# Patient Record
Sex: MALE | Race: WHITE | NOT HISPANIC OR LATINO | URBAN - METROPOLITAN AREA
[De-identification: names, ages, dates, MRNs, and addresses within clinical notes are randomized per-mention and may not be internally consistent; named-entity substitution may affect disease eponyms.]

---

## 2017-01-16 ENCOUNTER — OFFICE VISIT - HEALTHEAST (OUTPATIENT)
Dept: INTERNAL MEDICINE | Facility: CLINIC | Age: 72
End: 2017-01-16

## 2017-01-16 ENCOUNTER — COMMUNICATION - HEALTHEAST (OUTPATIENT)
Dept: INTERNAL MEDICINE | Facility: CLINIC | Age: 72
End: 2017-01-16

## 2017-01-16 DIAGNOSIS — F32.9 MAJOR DEPRESSIVE DISORDER, SINGLE EPISODE, UNSPECIFIED: ICD-10-CM

## 2017-01-24 ENCOUNTER — COMMUNICATION - HEALTHEAST (OUTPATIENT)
Dept: INTERNAL MEDICINE | Facility: CLINIC | Age: 72
End: 2017-01-24

## 2017-01-24 DIAGNOSIS — R52 PAIN: ICD-10-CM

## 2017-02-13 ENCOUNTER — COMMUNICATION - HEALTHEAST (OUTPATIENT)
Dept: INTERNAL MEDICINE | Facility: CLINIC | Age: 72
End: 2017-02-13

## 2017-03-09 ENCOUNTER — COMMUNICATION - HEALTHEAST (OUTPATIENT)
Dept: INTERNAL MEDICINE | Facility: CLINIC | Age: 72
End: 2017-03-09

## 2017-03-09 DIAGNOSIS — R52 PAIN: ICD-10-CM

## 2017-03-27 ENCOUNTER — OFFICE VISIT - HEALTHEAST (OUTPATIENT)
Dept: INTERNAL MEDICINE | Facility: CLINIC | Age: 72
End: 2017-03-27

## 2017-03-27 DIAGNOSIS — Z12.11 ENCOUNTER FOR SCREENING COLONOSCOPY: ICD-10-CM

## 2017-03-27 DIAGNOSIS — J01.00 ACUTE MAXILLARY SINUSITIS, RECURRENCE NOT SPECIFIED: ICD-10-CM

## 2017-03-27 DIAGNOSIS — L30.9 DERMATITIS: ICD-10-CM

## 2017-03-27 DIAGNOSIS — J30.9 ALLERGIC RHINITIS, CAUSE UNSPECIFIED: ICD-10-CM

## 2017-03-27 ASSESSMENT — MIFFLIN-ST. JEOR: SCORE: 2014.46

## 2017-04-03 ENCOUNTER — AMBULATORY - HEALTHEAST (OUTPATIENT)
Dept: CARDIOLOGY | Facility: CLINIC | Age: 72
End: 2017-04-03

## 2017-04-03 DIAGNOSIS — Z95.810 ICD (IMPLANTABLE CARDIOVERTER-DEFIBRILLATOR) IN PLACE: ICD-10-CM

## 2017-05-12 ENCOUNTER — COMMUNICATION - HEALTHEAST (OUTPATIENT)
Dept: INTERNAL MEDICINE | Facility: CLINIC | Age: 72
End: 2017-05-12

## 2017-05-12 DIAGNOSIS — R52 PAIN: ICD-10-CM

## 2017-05-16 ENCOUNTER — COMMUNICATION - HEALTHEAST (OUTPATIENT)
Dept: INTERNAL MEDICINE | Facility: CLINIC | Age: 72
End: 2017-05-16

## 2017-05-19 ENCOUNTER — COMMUNICATION - HEALTHEAST (OUTPATIENT)
Dept: SCHEDULING | Facility: CLINIC | Age: 72
End: 2017-05-19

## 2017-05-31 ENCOUNTER — AMBULATORY - HEALTHEAST (OUTPATIENT)
Dept: CARDIOLOGY | Facility: CLINIC | Age: 72
End: 2017-05-31

## 2017-05-31 DIAGNOSIS — I42.8 CARDIOMYOPATHY, NONISCHEMIC (H): ICD-10-CM

## 2017-05-31 DIAGNOSIS — I47.20 SUSTAINED VT (VENTRICULAR TACHYCARDIA) (H): ICD-10-CM

## 2017-05-31 DIAGNOSIS — Z95.810 ICD (IMPLANTABLE CARDIOVERTER-DEFIBRILLATOR), BIVENTRICULAR, IN SITU: ICD-10-CM

## 2017-05-31 LAB — HCC DEVICE COMMENTS: NORMAL

## 2017-05-31 ASSESSMENT — MIFFLIN-ST. JEOR: SCORE: 2000.85

## 2017-06-22 ENCOUNTER — RECORDS - HEALTHEAST (OUTPATIENT)
Dept: ADMINISTRATIVE | Facility: OTHER | Age: 72
End: 2017-06-22

## 2017-06-22 ENCOUNTER — COMMUNICATION - HEALTHEAST (OUTPATIENT)
Dept: INTERNAL MEDICINE | Facility: CLINIC | Age: 72
End: 2017-06-22

## 2017-07-06 ENCOUNTER — COMMUNICATION - HEALTHEAST (OUTPATIENT)
Dept: INTERNAL MEDICINE | Facility: CLINIC | Age: 72
End: 2017-07-06

## 2017-07-06 DIAGNOSIS — R52 PAIN: ICD-10-CM

## 2017-07-23 ENCOUNTER — RECORDS - HEALTHEAST (OUTPATIENT)
Dept: ADMINISTRATIVE | Facility: OTHER | Age: 72
End: 2017-07-23

## 2017-07-27 ENCOUNTER — COMMUNICATION - HEALTHEAST (OUTPATIENT)
Dept: INTERNAL MEDICINE | Facility: CLINIC | Age: 72
End: 2017-07-27

## 2017-08-30 ENCOUNTER — AMBULATORY - HEALTHEAST (OUTPATIENT)
Dept: CARDIOLOGY | Facility: CLINIC | Age: 72
End: 2017-08-30

## 2017-08-30 DIAGNOSIS — Z95.810 ICD (IMPLANTABLE CARDIOVERTER-DEFIBRILLATOR), BIVENTRICULAR, IN SITU: ICD-10-CM

## 2017-08-31 ENCOUNTER — COMMUNICATION - HEALTHEAST (OUTPATIENT)
Dept: CARDIOLOGY | Facility: CLINIC | Age: 72
End: 2017-08-31

## 2017-09-01 LAB — HCC DEVICE COMMENTS: NORMAL

## 2017-09-07 ENCOUNTER — COMMUNICATION - HEALTHEAST (OUTPATIENT)
Dept: INTERNAL MEDICINE | Facility: CLINIC | Age: 72
End: 2017-09-07

## 2017-09-22 ENCOUNTER — COMMUNICATION - HEALTHEAST (OUTPATIENT)
Dept: INTERNAL MEDICINE | Facility: CLINIC | Age: 72
End: 2017-09-22

## 2017-09-25 ENCOUNTER — OFFICE VISIT - HEALTHEAST (OUTPATIENT)
Dept: INTERNAL MEDICINE | Facility: CLINIC | Age: 72
End: 2017-09-25

## 2017-09-25 DIAGNOSIS — F32.9 MAJOR DEPRESSIVE DISORDER, SINGLE EPISODE, UNSPECIFIED: ICD-10-CM

## 2017-09-25 DIAGNOSIS — I42.6 ALCOHOLIC CARDIOMYOPATHY (H): ICD-10-CM

## 2017-09-25 DIAGNOSIS — R06.02 SOB (SHORTNESS OF BREATH): ICD-10-CM

## 2017-09-25 DIAGNOSIS — L30.9 DERMATITIS: ICD-10-CM

## 2017-09-25 DIAGNOSIS — J30.9 ALLERGIC RHINITIS, CAUSE UNSPECIFIED: ICD-10-CM

## 2017-09-25 DIAGNOSIS — G62.9 NEUROPATHY: ICD-10-CM

## 2017-09-25 DIAGNOSIS — R53.83 FATIGUE, UNSPECIFIED TYPE: ICD-10-CM

## 2017-09-25 DIAGNOSIS — I10 HYPERTENSION: ICD-10-CM

## 2017-09-25 DIAGNOSIS — I42.8 OTHER PRIMARY CARDIOMYOPATHIES: ICD-10-CM

## 2017-09-25 DIAGNOSIS — R52 PAIN: ICD-10-CM

## 2017-09-25 DIAGNOSIS — J01.00 ACUTE MAXILLARY SINUSITIS, RECURRENCE NOT SPECIFIED: ICD-10-CM

## 2017-09-25 DIAGNOSIS — Z23 INFLUENZA VACCINATION GIVEN: ICD-10-CM

## 2017-09-25 DIAGNOSIS — Z12.11 SCREEN FOR COLON CANCER: ICD-10-CM

## 2017-09-25 LAB
ATRIAL RATE - MUSE: 81 BPM
DIASTOLIC BLOOD PRESSURE - MUSE: NORMAL MMHG
INTERPRETATION ECG - MUSE: NORMAL
P AXIS - MUSE: 62 DEGREES
PR INTERVAL - MUSE: 112 MS
QRS DURATION - MUSE: 182 MS
QT - MUSE: 432 MS
QTC - MUSE: 501 MS
R AXIS - MUSE: 100 DEGREES
SYSTOLIC BLOOD PRESSURE - MUSE: NORMAL MMHG
T AXIS - MUSE: -76 DEGREES
VENTRICULAR RATE- MUSE: 81 BPM

## 2017-09-25 ASSESSMENT — MIFFLIN-ST. JEOR: SCORE: 2016.45

## 2017-09-26 ENCOUNTER — COMMUNICATION - HEALTHEAST (OUTPATIENT)
Dept: INTERNAL MEDICINE | Facility: CLINIC | Age: 72
End: 2017-09-26

## 2017-09-27 ENCOUNTER — COMMUNICATION - HEALTHEAST (OUTPATIENT)
Dept: SCHEDULING | Facility: CLINIC | Age: 72
End: 2017-09-27

## 2017-10-17 ENCOUNTER — AMBULATORY - HEALTHEAST (OUTPATIENT)
Dept: CARDIOLOGY | Facility: CLINIC | Age: 72
End: 2017-10-17

## 2017-10-17 DIAGNOSIS — I42.8 CARDIOMYOPATHY, NONISCHEMIC (H): ICD-10-CM

## 2017-10-17 DIAGNOSIS — I50.22 CHRONIC SYSTOLIC CHF (CONGESTIVE HEART FAILURE), NYHA CLASS 2 (H): ICD-10-CM

## 2017-10-17 DIAGNOSIS — I42.6 ALCOHOLIC CARDIOMYOPATHY (H): ICD-10-CM

## 2017-10-17 DIAGNOSIS — I47.20 SUSTAINED VT (VENTRICULAR TACHYCARDIA) (H): ICD-10-CM

## 2017-10-17 DIAGNOSIS — I47.10 PSVT (PAROXYSMAL SUPRAVENTRICULAR TACHYCARDIA) (H): ICD-10-CM

## 2017-10-17 DIAGNOSIS — I50.22 CHRONIC SYSTOLIC HEART FAILURE (H): ICD-10-CM

## 2017-10-17 DIAGNOSIS — I49.3 FREQUENT PVCS: ICD-10-CM

## 2017-10-17 DIAGNOSIS — Z95.810 ICD (IMPLANTABLE CARDIOVERTER-DEFIBRILLATOR), BIVENTRICULAR, IN SITU: ICD-10-CM

## 2017-10-17 LAB
ATRIAL RATE - MUSE: 41 BPM
DIASTOLIC BLOOD PRESSURE - MUSE: NORMAL MMHG
HCC DEVICE COMMENTS: NORMAL
INTERPRETATION ECG - MUSE: NORMAL
P AXIS - MUSE: 59 DEGREES
PR INTERVAL - MUSE: 136 MS
QRS DURATION - MUSE: 186 MS
QT - MUSE: 444 MS
QTC - MUSE: 502 MS
R AXIS - MUSE: 101 DEGREES
SYSTOLIC BLOOD PRESSURE - MUSE: NORMAL MMHG
T AXIS - MUSE: -82 DEGREES
VENTRICULAR RATE- MUSE: 77 BPM

## 2017-10-18 ENCOUNTER — OFFICE VISIT - HEALTHEAST (OUTPATIENT)
Dept: INTERNAL MEDICINE | Facility: CLINIC | Age: 72
End: 2017-10-18

## 2017-10-18 DIAGNOSIS — I47.10 PSVT (PAROXYSMAL SUPRAVENTRICULAR TACHYCARDIA) (H): ICD-10-CM

## 2017-10-18 DIAGNOSIS — I50.22 CHRONIC SYSTOLIC HEART FAILURE (H): ICD-10-CM

## 2017-10-18 ASSESSMENT — MIFFLIN-ST. JEOR: SCORE: 1989.51

## 2017-10-25 ENCOUNTER — COMMUNICATION - HEALTHEAST (OUTPATIENT)
Dept: INTERNAL MEDICINE | Facility: CLINIC | Age: 72
End: 2017-10-25

## 2017-10-25 DIAGNOSIS — G62.9 NEUROPATHY: ICD-10-CM

## 2017-11-04 ENCOUNTER — COMMUNICATION - HEALTHEAST (OUTPATIENT)
Dept: INTERNAL MEDICINE | Facility: CLINIC | Age: 72
End: 2017-11-04

## 2017-11-04 DIAGNOSIS — Z00.00 HEALTHCARE MAINTENANCE: ICD-10-CM

## 2017-11-06 ENCOUNTER — COMMUNICATION - HEALTHEAST (OUTPATIENT)
Dept: SCHEDULING | Facility: CLINIC | Age: 72
End: 2017-11-06

## 2017-11-06 DIAGNOSIS — R52 PAIN: ICD-10-CM

## 2017-12-06 ENCOUNTER — COMMUNICATION - HEALTHEAST (OUTPATIENT)
Dept: INTERNAL MEDICINE | Facility: CLINIC | Age: 72
End: 2017-12-06

## 2017-12-21 ENCOUNTER — COMMUNICATION - HEALTHEAST (OUTPATIENT)
Dept: INTERNAL MEDICINE | Facility: CLINIC | Age: 72
End: 2017-12-21

## 2018-01-16 ENCOUNTER — COMMUNICATION - HEALTHEAST (OUTPATIENT)
Dept: INTERNAL MEDICINE | Facility: CLINIC | Age: 73
End: 2018-01-16

## 2018-01-16 DIAGNOSIS — I10 HYPERTENSION: ICD-10-CM

## 2018-01-19 ENCOUNTER — COMMUNICATION - HEALTHEAST (OUTPATIENT)
Dept: INTERNAL MEDICINE | Facility: CLINIC | Age: 73
End: 2018-01-19

## 2018-01-19 DIAGNOSIS — I10 HYPERTENSION: ICD-10-CM

## 2018-01-22 ENCOUNTER — AMBULATORY - HEALTHEAST (OUTPATIENT)
Dept: CARDIOLOGY | Facility: CLINIC | Age: 73
End: 2018-01-22

## 2018-01-22 DIAGNOSIS — Z95.810 ICD (IMPLANTABLE CARDIOVERTER-DEFIBRILLATOR), BIVENTRICULAR, IN SITU: ICD-10-CM

## 2018-01-22 LAB — HCC DEVICE COMMENTS: NORMAL

## 2018-01-25 ENCOUNTER — COMMUNICATION - HEALTHEAST (OUTPATIENT)
Dept: INTERNAL MEDICINE | Facility: CLINIC | Age: 73
End: 2018-01-25

## 2018-01-29 ENCOUNTER — COMMUNICATION - HEALTHEAST (OUTPATIENT)
Dept: CARDIOLOGY | Facility: CLINIC | Age: 73
End: 2018-01-29

## 2018-01-29 ENCOUNTER — AMBULATORY - HEALTHEAST (OUTPATIENT)
Dept: CARDIOLOGY | Facility: CLINIC | Age: 73
End: 2018-01-29

## 2018-01-29 DIAGNOSIS — Z95.810 ICD (IMPLANTABLE CARDIOVERTER-DEFIBRILLATOR), BIVENTRICULAR, IN SITU: ICD-10-CM

## 2018-01-29 LAB — HCC DEVICE COMMENTS: NORMAL

## 2018-02-08 ENCOUNTER — AMBULATORY - HEALTHEAST (OUTPATIENT)
Dept: CARDIOLOGY | Facility: CLINIC | Age: 73
End: 2018-02-08

## 2018-02-08 DIAGNOSIS — I42.8 CARDIOMYOPATHY, NONISCHEMIC (H): ICD-10-CM

## 2018-02-08 DIAGNOSIS — I50.22 CHRONIC SYSTOLIC HEART FAILURE (H): ICD-10-CM

## 2018-02-08 DIAGNOSIS — I10 ESSENTIAL HYPERTENSION WITH GOAL BLOOD PRESSURE LESS THAN 140/90: ICD-10-CM

## 2018-02-08 DIAGNOSIS — I47.10 PSVT (PAROXYSMAL SUPRAVENTRICULAR TACHYCARDIA) (H): ICD-10-CM

## 2018-02-08 DIAGNOSIS — Z95.810 ICD (IMPLANTABLE CARDIOVERTER-DEFIBRILLATOR), BIVENTRICULAR, IN SITU: ICD-10-CM

## 2018-02-08 DIAGNOSIS — I49.3 FREQUENT PVCS: ICD-10-CM

## 2018-02-08 DIAGNOSIS — I47.20 SUSTAINED VT (VENTRICULAR TACHYCARDIA) (H): ICD-10-CM

## 2018-02-08 LAB — HCC DEVICE COMMENTS: NORMAL

## 2018-02-08 ASSESSMENT — MIFFLIN-ST. JEOR: SCORE: 1964.57

## 2018-03-05 ENCOUNTER — AMBULATORY - HEALTHEAST (OUTPATIENT)
Dept: CARDIOLOGY | Facility: CLINIC | Age: 73
End: 2018-03-05

## 2018-03-05 ENCOUNTER — COMMUNICATION - HEALTHEAST (OUTPATIENT)
Dept: CARDIOLOGY | Facility: CLINIC | Age: 73
End: 2018-03-05

## 2018-03-05 DIAGNOSIS — Z95.810 ICD (IMPLANTABLE CARDIOVERTER-DEFIBRILLATOR), BIVENTRICULAR, IN SITU: ICD-10-CM

## 2018-03-06 LAB — HCC DEVICE COMMENTS: NORMAL

## 2018-04-04 ENCOUNTER — AMBULATORY - HEALTHEAST (OUTPATIENT)
Dept: CARDIOLOGY | Facility: CLINIC | Age: 73
End: 2018-04-04

## 2018-04-04 DIAGNOSIS — Z95.810 ICD (IMPLANTABLE CARDIOVERTER-DEFIBRILLATOR), BIVENTRICULAR, IN SITU: ICD-10-CM

## 2018-04-04 DIAGNOSIS — I47.10 PSVT (PAROXYSMAL SUPRAVENTRICULAR TACHYCARDIA) (H): ICD-10-CM

## 2018-04-04 DIAGNOSIS — I47.20 SUSTAINED VT (VENTRICULAR TACHYCARDIA) (H): ICD-10-CM

## 2018-04-04 DIAGNOSIS — I42.8 CARDIOMYOPATHY, NONISCHEMIC (H): ICD-10-CM

## 2018-04-04 LAB
ANION GAP SERPL CALCULATED.3IONS-SCNC: 13 MMOL/L (ref 5–18)
BUN SERPL-MCNC: 55 MG/DL (ref 8–28)
CALCIUM SERPL-MCNC: 10.4 MG/DL (ref 8.5–10.5)
CHLORIDE BLD-SCNC: 103 MMOL/L (ref 98–107)
CO2 SERPL-SCNC: 21 MMOL/L (ref 22–31)
CREAT SERPL-MCNC: 2.02 MG/DL (ref 0.7–1.3)
GFR SERPL CREATININE-BSD FRML MDRD: 32 ML/MIN/1.73M2
GLUCOSE BLD-MCNC: 76 MG/DL (ref 70–125)
HCC DEVICE COMMENTS: NORMAL
MAGNESIUM SERPL-MCNC: 2 MG/DL (ref 1.8–2.6)
POTASSIUM BLD-SCNC: 6 MMOL/L (ref 3.5–5)
SODIUM SERPL-SCNC: 137 MMOL/L (ref 136–145)

## 2018-04-04 ASSESSMENT — MIFFLIN-ST. JEOR: SCORE: 1969.1

## 2018-04-06 ENCOUNTER — AMBULATORY - HEALTHEAST (OUTPATIENT)
Dept: CARDIOLOGY | Facility: CLINIC | Age: 73
End: 2018-04-06

## 2018-04-06 DIAGNOSIS — I42.6 ALCOHOLIC CARDIOMYOPATHY (H): ICD-10-CM

## 2018-04-06 DIAGNOSIS — I42.9 CARDIOMYOPATHY (H): ICD-10-CM

## 2018-04-13 ENCOUNTER — OFFICE VISIT - HEALTHEAST (OUTPATIENT)
Dept: INTERNAL MEDICINE | Facility: CLINIC | Age: 73
End: 2018-04-13

## 2018-04-13 DIAGNOSIS — Z95.810 ICD (IMPLANTABLE CARDIOVERTER-DEFIBRILLATOR), BIVENTRICULAR, IN SITU: ICD-10-CM

## 2018-04-13 DIAGNOSIS — I47.20 SUSTAINED VT (VENTRICULAR TACHYCARDIA) (H): ICD-10-CM

## 2018-04-13 DIAGNOSIS — I42.8 CARDIOMYOPATHY, NONISCHEMIC (H): ICD-10-CM

## 2018-04-13 DIAGNOSIS — G62.9 NEUROPATHY: ICD-10-CM

## 2018-04-13 DIAGNOSIS — Z12.11 SCREEN FOR COLON CANCER: ICD-10-CM

## 2018-04-13 DIAGNOSIS — H61.20 CERUMEN DEBRIS ON TYMPANIC MEMBRANE, UNSPECIFIED LATERALITY: ICD-10-CM

## 2018-04-13 DIAGNOSIS — F32.4 MAJOR DEPRESSIVE DISORDER WITH SINGLE EPISODE, IN PARTIAL REMISSION (H): ICD-10-CM

## 2018-04-13 LAB
ANION GAP SERPL CALCULATED.3IONS-SCNC: 10 MMOL/L (ref 5–18)
BUN SERPL-MCNC: 46 MG/DL (ref 8–28)
CALCIUM SERPL-MCNC: 10.1 MG/DL (ref 8.5–10.5)
CHLORIDE BLD-SCNC: 107 MMOL/L (ref 98–107)
CO2 SERPL-SCNC: 22 MMOL/L (ref 22–31)
CREAT SERPL-MCNC: 1.98 MG/DL (ref 0.7–1.3)
GFR SERPL CREATININE-BSD FRML MDRD: 33 ML/MIN/1.73M2
GLUCOSE BLD-MCNC: 104 MG/DL (ref 70–125)
POTASSIUM BLD-SCNC: 5.2 MMOL/L (ref 3.5–5)
SODIUM SERPL-SCNC: 139 MMOL/L (ref 136–145)

## 2018-04-15 ENCOUNTER — COMMUNICATION - HEALTHEAST (OUTPATIENT)
Dept: INTERNAL MEDICINE | Facility: CLINIC | Age: 73
End: 2018-04-15

## 2018-05-08 ENCOUNTER — COMMUNICATION - HEALTHEAST (OUTPATIENT)
Dept: INTERNAL MEDICINE | Facility: CLINIC | Age: 73
End: 2018-05-08

## 2018-05-08 DIAGNOSIS — Z88.9 MULTIPLE ALLERGIES: ICD-10-CM

## 2018-05-15 ENCOUNTER — COMMUNICATION - HEALTHEAST (OUTPATIENT)
Dept: INTERNAL MEDICINE | Facility: CLINIC | Age: 73
End: 2018-05-15

## 2018-05-15 DIAGNOSIS — Z91.030 BEE STING ALLERGY: ICD-10-CM

## 2018-05-21 ENCOUNTER — COMMUNICATION - HEALTHEAST (OUTPATIENT)
Dept: INTERNAL MEDICINE | Facility: CLINIC | Age: 73
End: 2018-05-21

## 2018-05-21 DIAGNOSIS — Z91.030 BEE STING ALLERGY: ICD-10-CM

## 2018-06-05 ENCOUNTER — OFFICE VISIT - HEALTHEAST (OUTPATIENT)
Dept: PODIATRY | Facility: CLINIC | Age: 73
End: 2018-06-05

## 2018-06-05 ENCOUNTER — AMBULATORY - HEALTHEAST (OUTPATIENT)
Dept: OTHER | Facility: CLINIC | Age: 73
End: 2018-06-05

## 2018-06-05 DIAGNOSIS — M21.6X1 PRONATION DEFORMITY OF BOTH FEET: ICD-10-CM

## 2018-06-05 DIAGNOSIS — M21.6X2 PRONATION DEFORMITY OF BOTH FEET: ICD-10-CM

## 2018-06-05 DIAGNOSIS — L97.509: ICD-10-CM

## 2018-06-18 ENCOUNTER — AMBULATORY - HEALTHEAST (OUTPATIENT)
Dept: CARDIOLOGY | Facility: CLINIC | Age: 73
End: 2018-06-18

## 2018-06-18 ENCOUNTER — COMMUNICATION - HEALTHEAST (OUTPATIENT)
Dept: CARDIOLOGY | Facility: CLINIC | Age: 73
End: 2018-06-18

## 2018-06-18 DIAGNOSIS — Z95.810 ICD (IMPLANTABLE CARDIOVERTER-DEFIBRILLATOR), BIVENTRICULAR, IN SITU: ICD-10-CM

## 2018-06-18 LAB
HCC DEVICE COMMENTS: NORMAL
HCC DEVICE IMPLANTING PROVIDER: NORMAL
HCC DEVICE MANUFACTURE: NORMAL
HCC DEVICE MODEL: NORMAL
HCC DEVICE SERIAL NUMBER: NORMAL
HCC DEVICE TYPE: NORMAL

## 2018-06-19 ENCOUNTER — ANESTHESIA - HEALTHEAST (OUTPATIENT)
Dept: SURGERY | Facility: HOSPITAL | Age: 73
End: 2018-06-19

## 2018-06-19 ENCOUNTER — OFFICE VISIT - HEALTHEAST (OUTPATIENT)
Dept: VASCULAR SURGERY | Facility: CLINIC | Age: 73
End: 2018-06-19

## 2018-06-19 DIAGNOSIS — L03.119 CELLULITIS OF FOOT: ICD-10-CM

## 2018-06-19 DIAGNOSIS — L97.512 SKIN ULCER OF RIGHT FOOT WITH FAT LAYER EXPOSED (H): ICD-10-CM

## 2018-06-19 ASSESSMENT — MIFFLIN-ST. JEOR: SCORE: 1878.38

## 2018-06-20 ENCOUNTER — SURGERY - HEALTHEAST (OUTPATIENT)
Dept: SURGERY | Facility: HOSPITAL | Age: 73
End: 2018-06-20

## 2018-06-22 LAB — BACTERIA SPEC CULT: ABNORMAL

## 2018-06-22 ASSESSMENT — MIFFLIN-ST. JEOR: SCORE: 1866.76

## 2018-06-23 LAB
BACTERIA SPEC CULT: ABNORMAL
GRAM STAIN RESULT: ABNORMAL

## 2018-06-25 ENCOUNTER — RECORDS - HEALTHEAST (OUTPATIENT)
Dept: LAB | Facility: CLINIC | Age: 73
End: 2018-06-25

## 2018-06-25 ENCOUNTER — OFFICE VISIT - HEALTHEAST (OUTPATIENT)
Dept: GERIATRICS | Facility: CLINIC | Age: 73
End: 2018-06-25

## 2018-06-25 ENCOUNTER — COMMUNICATION - HEALTHEAST (OUTPATIENT)
Dept: INFECTIOUS DISEASES | Facility: CLINIC | Age: 73
End: 2018-06-25

## 2018-06-25 DIAGNOSIS — M62.81 GENERALIZED MUSCLE WEAKNESS: ICD-10-CM

## 2018-06-25 DIAGNOSIS — I47.10 PSVT (PAROXYSMAL SUPRAVENTRICULAR TACHYCARDIA) (H): ICD-10-CM

## 2018-06-25 DIAGNOSIS — L02.611 ABSCESS OF RIGHT FOOT: ICD-10-CM

## 2018-06-25 DIAGNOSIS — G62.9 NEUROPATHY: ICD-10-CM

## 2018-06-25 DIAGNOSIS — I10 ESSENTIAL HYPERTENSION WITH GOAL BLOOD PRESSURE LESS THAN 140/90: ICD-10-CM

## 2018-06-25 LAB
ANION GAP SERPL CALCULATED.3IONS-SCNC: 11 MMOL/L (ref 5–18)
BASOPHILS # BLD AUTO: 0.1 THOU/UL (ref 0–0.2)
BASOPHILS NFR BLD AUTO: 1 % (ref 0–2)
BUN SERPL-MCNC: 33 MG/DL (ref 8–28)
C REACTIVE PROTEIN LHE: 1.9 MG/DL (ref 0–0.8)
CALCIUM SERPL-MCNC: 10.1 MG/DL (ref 8.5–10.5)
CHLORIDE BLD-SCNC: 106 MMOL/L (ref 98–107)
CO2 SERPL-SCNC: 24 MMOL/L (ref 22–31)
CREAT SERPL-MCNC: 1.56 MG/DL (ref 0.7–1.3)
EOSINOPHIL # BLD AUTO: 0.3 THOU/UL (ref 0–0.4)
EOSINOPHIL NFR BLD AUTO: 4 % (ref 0–6)
ERYTHROCYTE [DISTWIDTH] IN BLOOD BY AUTOMATED COUNT: 13.3 % (ref 11–14.5)
GFR SERPL CREATININE-BSD FRML MDRD: 44 ML/MIN/1.73M2
GLUCOSE BLD-MCNC: 94 MG/DL (ref 70–125)
HCT VFR BLD AUTO: 38.6 % (ref 40–54)
HGB BLD-MCNC: 13.5 G/DL (ref 14–18)
LYMPHOCYTES # BLD AUTO: 1.3 THOU/UL (ref 0.8–4.4)
LYMPHOCYTES NFR BLD AUTO: 15 % (ref 20–40)
MCH RBC QN AUTO: 33.2 PG (ref 27–34)
MCHC RBC AUTO-ENTMCNC: 35 G/DL (ref 32–36)
MCV RBC AUTO: 95 FL (ref 80–100)
MONOCYTES # BLD AUTO: 0.7 THOU/UL (ref 0–0.9)
MONOCYTES NFR BLD AUTO: 8 % (ref 2–10)
NEUTROPHILS # BLD AUTO: 6.4 THOU/UL (ref 2–7.7)
NEUTROPHILS NFR BLD AUTO: 73 % (ref 50–70)
PLATELET # BLD AUTO: 191 THOU/UL (ref 140–440)
PMV BLD AUTO: 9.5 FL (ref 8.5–12.5)
POTASSIUM BLD-SCNC: 4 MMOL/L (ref 3.5–5)
RBC # BLD AUTO: 4.07 MILL/UL (ref 4.4–6.2)
SODIUM SERPL-SCNC: 141 MMOL/L (ref 136–145)
WBC: 8.9 THOU/UL (ref 4–11)

## 2018-06-27 ENCOUNTER — AMBULATORY - HEALTHEAST (OUTPATIENT)
Dept: CARDIOLOGY | Facility: CLINIC | Age: 73
End: 2018-06-27

## 2018-06-27 ENCOUNTER — COMMUNICATION - HEALTHEAST (OUTPATIENT)
Dept: CARDIOLOGY | Facility: CLINIC | Age: 73
End: 2018-06-27

## 2018-06-27 DIAGNOSIS — Z95.810 ICD (IMPLANTABLE CARDIOVERTER-DEFIBRILLATOR), BIVENTRICULAR, IN SITU: ICD-10-CM

## 2018-06-28 ENCOUNTER — OFFICE VISIT - HEALTHEAST (OUTPATIENT)
Dept: GERIATRICS | Facility: CLINIC | Age: 73
End: 2018-06-28

## 2018-06-28 DIAGNOSIS — I50.22 CHRONIC SYSTOLIC HEART FAILURE (H): ICD-10-CM

## 2018-06-28 DIAGNOSIS — L02.611 ABSCESS OF RIGHT FOOT: ICD-10-CM

## 2018-06-28 DIAGNOSIS — Z86.74 HISTORY OF CARDIAC ARREST: ICD-10-CM

## 2018-06-28 DIAGNOSIS — F10.11 HISTORY OF ALCOHOL ABUSE: ICD-10-CM

## 2018-06-28 DIAGNOSIS — I47.20 SUSTAINED VT (VENTRICULAR TACHYCARDIA) (H): ICD-10-CM

## 2018-06-28 DIAGNOSIS — G62.9 NEUROPATHY: ICD-10-CM

## 2018-06-28 DIAGNOSIS — Z95.810 ICD (IMPLANTABLE CARDIOVERTER-DEFIBRILLATOR), BIVENTRICULAR, IN SITU: ICD-10-CM

## 2018-06-28 DIAGNOSIS — I42.8 CARDIOMYOPATHY, NONISCHEMIC (H): ICD-10-CM

## 2018-06-28 DIAGNOSIS — M86.9 OSTEOMYELITIS OF RIGHT FOOT (H): ICD-10-CM

## 2018-06-30 ENCOUNTER — AMBULATORY - HEALTHEAST (OUTPATIENT)
Dept: CARDIOLOGY | Facility: CLINIC | Age: 73
End: 2018-06-30

## 2018-06-30 DIAGNOSIS — Z95.810 ICD (IMPLANTABLE CARDIOVERTER-DEFIBRILLATOR), BIVENTRICULAR, IN SITU: ICD-10-CM

## 2018-07-04 ENCOUNTER — AMBULATORY - HEALTHEAST (OUTPATIENT)
Dept: CARDIOLOGY | Facility: CLINIC | Age: 73
End: 2018-07-04

## 2018-07-04 DIAGNOSIS — Z95.810 ICD (IMPLANTABLE CARDIOVERTER-DEFIBRILLATOR), BIVENTRICULAR, IN SITU: ICD-10-CM

## 2018-07-05 ENCOUNTER — OFFICE VISIT - HEALTHEAST (OUTPATIENT)
Dept: GERIATRICS | Facility: CLINIC | Age: 73
End: 2018-07-05

## 2018-07-05 DIAGNOSIS — M86.271 SUBACUTE OSTEOMYELITIS OF RIGHT FOOT (H): ICD-10-CM

## 2018-07-05 DIAGNOSIS — Z95.810 ICD (IMPLANTABLE CARDIOVERTER-DEFIBRILLATOR), BIVENTRICULAR, IN SITU: ICD-10-CM

## 2018-07-05 DIAGNOSIS — I47.20 VENTRICULAR TACHYCARDIA (H): ICD-10-CM

## 2018-07-05 DIAGNOSIS — I42.8 CARDIOMYOPATHY, NONISCHEMIC (H): ICD-10-CM

## 2018-07-06 ENCOUNTER — AMBULATORY - HEALTHEAST (OUTPATIENT)
Dept: CARDIOLOGY | Facility: CLINIC | Age: 73
End: 2018-07-06

## 2018-07-06 ENCOUNTER — CARE COORDINATION (OUTPATIENT)
Dept: CARDIOLOGY | Facility: CLINIC | Age: 73
End: 2018-07-06

## 2018-07-06 ENCOUNTER — RECORDS - HEALTHEAST (OUTPATIENT)
Dept: LAB | Facility: CLINIC | Age: 73
End: 2018-07-06

## 2018-07-06 ENCOUNTER — COMMUNICATION - HEALTHEAST (OUTPATIENT)
Dept: CARDIOLOGY | Facility: CLINIC | Age: 73
End: 2018-07-06

## 2018-07-06 ENCOUNTER — AMBULATORY - HEALTHEAST (OUTPATIENT)
Dept: GERIATRICS | Facility: CLINIC | Age: 73
End: 2018-07-06

## 2018-07-06 DIAGNOSIS — Z95.810 ICD (IMPLANTABLE CARDIOVERTER-DEFIBRILLATOR), BIVENTRICULAR, IN SITU: ICD-10-CM

## 2018-07-06 LAB
ANION GAP SERPL CALCULATED.3IONS-SCNC: 12 MMOL/L (ref 5–18)
BASOPHILS # BLD AUTO: 0.1 THOU/UL (ref 0–0.2)
BASOPHILS NFR BLD AUTO: 1 % (ref 0–2)
BUN SERPL-MCNC: 41 MG/DL (ref 8–28)
C REACTIVE PROTEIN LHE: 4.1 MG/DL (ref 0–0.8)
CALCIUM SERPL-MCNC: 9.7 MG/DL (ref 8.5–10.5)
CHLORIDE BLD-SCNC: 104 MMOL/L (ref 98–107)
CO2 SERPL-SCNC: 23 MMOL/L (ref 22–31)
CREAT SERPL-MCNC: 1.68 MG/DL (ref 0.7–1.3)
EOSINOPHIL # BLD AUTO: 0.3 THOU/UL (ref 0–0.4)
EOSINOPHIL NFR BLD AUTO: 3 % (ref 0–6)
ERYTHROCYTE [DISTWIDTH] IN BLOOD BY AUTOMATED COUNT: 13.4 % (ref 11–14.5)
GFR SERPL CREATININE-BSD FRML MDRD: 40 ML/MIN/1.73M2
GLUCOSE BLD-MCNC: 82 MG/DL (ref 70–125)
HCC DEVICE COMMENTS: NORMAL
HCC DEVICE IMPLANTING PROVIDER: NORMAL
HCC DEVICE MANUFACTURE: NORMAL
HCC DEVICE MODEL: NORMAL
HCC DEVICE SERIAL NUMBER: NORMAL
HCC DEVICE TYPE: NORMAL
HCT VFR BLD AUTO: 35.4 % (ref 40–54)
HGB BLD-MCNC: 12.3 G/DL (ref 14–18)
LYMPHOCYTES # BLD AUTO: 1 THOU/UL (ref 0.8–4.4)
LYMPHOCYTES NFR BLD AUTO: 13 % (ref 20–40)
MCH RBC QN AUTO: 32.2 PG (ref 27–34)
MCHC RBC AUTO-ENTMCNC: 34.7 G/DL (ref 32–36)
MCV RBC AUTO: 93 FL (ref 80–100)
MONOCYTES # BLD AUTO: 0.6 THOU/UL (ref 0–0.9)
MONOCYTES NFR BLD AUTO: 8 % (ref 2–10)
NEUTROPHILS # BLD AUTO: 5.7 THOU/UL (ref 2–7.7)
NEUTROPHILS NFR BLD AUTO: 75 % (ref 50–70)
PLATELET # BLD AUTO: 209 THOU/UL (ref 140–440)
PMV BLD AUTO: 9.9 FL (ref 8.5–12.5)
POTASSIUM BLD-SCNC: 3.4 MMOL/L (ref 3.5–5)
RBC # BLD AUTO: 3.82 MILL/UL (ref 4.4–6.2)
SODIUM SERPL-SCNC: 139 MMOL/L (ref 136–145)
WBC: 7.6 THOU/UL (ref 4–11)

## 2018-07-09 ENCOUNTER — OFFICE VISIT - HEALTHEAST (OUTPATIENT)
Dept: GERIATRICS | Facility: CLINIC | Age: 73
End: 2018-07-09

## 2018-07-09 DIAGNOSIS — I47.20 VENTRICULAR TACHYCARDIA (H): ICD-10-CM

## 2018-07-09 DIAGNOSIS — K59.00 CONSTIPATION: ICD-10-CM

## 2018-07-09 DIAGNOSIS — I42.8 CARDIOMYOPATHY, NONISCHEMIC (H): ICD-10-CM

## 2018-07-09 DIAGNOSIS — N18.30 CKD (CHRONIC KIDNEY DISEASE) STAGE 3, GFR 30-59 ML/MIN (H): ICD-10-CM

## 2018-07-09 DIAGNOSIS — Z95.810 ICD (IMPLANTABLE CARDIOVERTER-DEFIBRILLATOR), BIVENTRICULAR, IN SITU: ICD-10-CM

## 2018-07-09 DIAGNOSIS — M86.271 SUBACUTE OSTEOMYELITIS OF RIGHT FOOT (H): ICD-10-CM

## 2018-07-09 LAB
ANION GAP SERPL CALCULATED.3IONS-SCNC: 8 MMOL/L (ref 5–18)
BASOPHILS # BLD AUTO: 0.1 THOU/UL (ref 0–0.2)
BASOPHILS NFR BLD AUTO: 1 % (ref 0–2)
BUN SERPL-MCNC: 32 MG/DL (ref 8–28)
C REACTIVE PROTEIN LHE: 0.7 MG/DL (ref 0–0.8)
CALCIUM SERPL-MCNC: 10.1 MG/DL (ref 8.5–10.5)
CHLORIDE BLD-SCNC: 105 MMOL/L (ref 98–107)
CO2 SERPL-SCNC: 26 MMOL/L (ref 22–31)
CREAT SERPL-MCNC: 1.61 MG/DL (ref 0.7–1.3)
EOSINOPHIL # BLD AUTO: 0.2 THOU/UL (ref 0–0.4)
EOSINOPHIL NFR BLD AUTO: 3 % (ref 0–6)
ERYTHROCYTE [DISTWIDTH] IN BLOOD BY AUTOMATED COUNT: 13.4 % (ref 11–14.5)
GFR SERPL CREATININE-BSD FRML MDRD: 42 ML/MIN/1.73M2
GLUCOSE BLD-MCNC: 81 MG/DL (ref 70–125)
HCT VFR BLD AUTO: 41.4 % (ref 40–54)
HGB BLD-MCNC: 14 G/DL (ref 14–18)
LYMPHOCYTES # BLD AUTO: 1.2 THOU/UL (ref 0.8–4.4)
LYMPHOCYTES NFR BLD AUTO: 18 % (ref 20–40)
MCH RBC QN AUTO: 32.4 PG (ref 27–34)
MCHC RBC AUTO-ENTMCNC: 33.8 G/DL (ref 32–36)
MCV RBC AUTO: 96 FL (ref 80–100)
MONOCYTES # BLD AUTO: 0.6 THOU/UL (ref 0–0.9)
MONOCYTES NFR BLD AUTO: 9 % (ref 2–10)
NEUTROPHILS # BLD AUTO: 4.8 THOU/UL (ref 2–7.7)
NEUTROPHILS NFR BLD AUTO: 70 % (ref 50–70)
PLATELET # BLD AUTO: 215 THOU/UL (ref 140–440)
PMV BLD AUTO: 10.2 FL (ref 8.5–12.5)
POTASSIUM BLD-SCNC: 4.1 MMOL/L (ref 3.5–5)
RBC # BLD AUTO: 4.32 MILL/UL (ref 4.4–6.2)
SODIUM SERPL-SCNC: 139 MMOL/L (ref 136–145)
WBC: 6.9 THOU/UL (ref 4–11)

## 2018-07-11 ENCOUNTER — ALLIED HEALTH/NURSE VISIT (OUTPATIENT)
Dept: CARDIOLOGY | Facility: CLINIC | Age: 73
End: 2018-07-11
Attending: INTERNAL MEDICINE
Payer: MEDICARE

## 2018-07-11 ENCOUNTER — RESULTS ONLY (OUTPATIENT)
Dept: CARDIOLOGY | Facility: CLINIC | Age: 73
End: 2018-07-11

## 2018-07-11 VITALS
HEART RATE: 59 BPM | SYSTOLIC BLOOD PRESSURE: 105 MMHG | DIASTOLIC BLOOD PRESSURE: 69 MMHG | HEIGHT: 74 IN | OXYGEN SATURATION: 96 %

## 2018-07-11 DIAGNOSIS — Z95.810 ICD (IMPLANTABLE CARDIOVERTER-DEFIBRILLATOR) IN PLACE: ICD-10-CM

## 2018-07-11 DIAGNOSIS — I47.20 VENTRICULAR TACHYCARDIA (H): ICD-10-CM

## 2018-07-11 DIAGNOSIS — I42.8 NON-ISCHEMIC CARDIOMYOPATHY (H): Primary | ICD-10-CM

## 2018-07-11 DIAGNOSIS — I42.8 NICM (NONISCHEMIC CARDIOMYOPATHY) (H): Primary | ICD-10-CM

## 2018-07-11 PROCEDURE — 93226 XTRNL ECG REC<48 HR SCAN A/R: CPT

## 2018-07-11 PROCEDURE — 93284 PRGRMG EVAL IMPLANTABLE DFB: CPT | Mod: 26 | Performed by: INTERNAL MEDICINE

## 2018-07-11 PROCEDURE — 99205 OFFICE O/P NEW HI 60 MIN: CPT | Mod: ZP | Performed by: INTERNAL MEDICINE

## 2018-07-11 PROCEDURE — 93284 PRGRMG EVAL IMPLANTABLE DFB: CPT | Mod: ZF

## 2018-07-11 PROCEDURE — G0463 HOSPITAL OUTPT CLINIC VISIT: HCPCS | Mod: 25,ZF

## 2018-07-11 PROCEDURE — 93005 ELECTROCARDIOGRAM TRACING: CPT | Mod: ZF

## 2018-07-11 RX ORDER — OXYMETAZOLINE HYDROCHLORIDE 0.05 G/100ML
2 SPRAY NASAL
COMMUNITY

## 2018-07-11 RX ORDER — AMPICILLIN TRIHYDRATE 250 MG
1000 CAPSULE ORAL
COMMUNITY

## 2018-07-11 RX ORDER — LOPERAMIDE HCL 2 MG
2 CAPSULE ORAL
COMMUNITY
Start: 2018-07-04

## 2018-07-11 RX ORDER — FAMOTIDINE 20 MG/1
20 TABLET, FILM COATED ORAL
COMMUNITY
Start: 2018-07-04

## 2018-07-11 RX ORDER — FEXOFENADINE HCL 180 MG/1
TABLET ORAL
COMMUNITY
Start: 2017-12-22

## 2018-07-11 RX ORDER — ACETAMINOPHEN 325 MG/1
650 TABLET ORAL
COMMUNITY

## 2018-07-11 RX ORDER — SPIRONOLACTONE 25 MG/1
TABLET ORAL
COMMUNITY
Start: 2017-09-25

## 2018-07-11 RX ORDER — TOPIRAMATE 25 MG/1
25 TABLET, FILM COATED ORAL
COMMUNITY
Start: 2018-04-13 | End: 2019-04-13

## 2018-07-11 RX ORDER — EPINEPHRINE 0.3 MG/.3ML
0.3 INJECTION SUBCUTANEOUS
COMMUNITY
Start: 2018-05-21

## 2018-07-11 RX ORDER — DULOXETIN HYDROCHLORIDE 60 MG/1
60 CAPSULE, DELAYED RELEASE ORAL
COMMUNITY
Start: 2018-04-13 | End: 2019-04-13

## 2018-07-11 RX ORDER — ASCORBIC ACID 500 MG
500 TABLET ORAL
COMMUNITY

## 2018-07-11 RX ORDER — ASCORBIC ACID 1000 MG
175 TABLET ORAL
COMMUNITY

## 2018-07-11 RX ORDER — ASPIRIN 81 MG/1
81 TABLET ORAL
COMMUNITY

## 2018-07-11 RX ORDER — POTASSIUM CHLORIDE 1500 MG/1
20 TABLET, EXTENDED RELEASE ORAL
COMMUNITY

## 2018-07-11 RX ORDER — AMIODARONE HYDROCHLORIDE 200 MG/1
200 TABLET ORAL
COMMUNITY
Start: 2018-07-04

## 2018-07-11 RX ORDER — FUROSEMIDE 40 MG
40 TABLET ORAL
COMMUNITY
Start: 2018-04-06

## 2018-07-11 RX ORDER — DIPHENOXYLATE HYDROCHLORIDE AND ATROPINE SULFATE 2.5; .025 MG/1; MG/1
1 TABLET ORAL
COMMUNITY

## 2018-07-11 RX ORDER — SIMETHICONE 125 MG
125 TABLET,CHEWABLE ORAL
COMMUNITY

## 2018-07-11 RX ORDER — CARVEDILOL 25 MG/1
37.5 TABLET ORAL
COMMUNITY
Start: 2018-04-13 | End: 2019-04-13

## 2018-07-11 RX ORDER — UBIDECARENONE 100 MG
200 CAPSULE ORAL
COMMUNITY

## 2018-07-11 RX ORDER — MUPIROCIN 20 MG/G
OINTMENT TOPICAL
COMMUNITY
Start: 2017-09-25

## 2018-07-11 RX ORDER — TRAMADOL HYDROCHLORIDE 50 MG/1
100 TABLET ORAL
COMMUNITY
Start: 2018-07-04

## 2018-07-11 RX ORDER — ALLOPURINOL 300 MG/1
300 TABLET ORAL
COMMUNITY
Start: 2017-09-25 | End: 2018-09-25

## 2018-07-11 ASSESSMENT — PAIN SCALES - GENERAL: PAINLEVEL: NO PAIN (0)

## 2018-07-11 NOTE — MR AVS SNAPSHOT
"              After Visit Summary   2018    Florentino Fall    MRN: [id number]           Patient Information     Date Of Birth          1945        Visit Information        Provider Department      2018 4:00 PM 1,  Cv Device [facility]         Follow-ups after your visit        Who to contact     If you have questions or need follow up information about today's clinic visit or your schedule please contact [facility] directly at [phone].  Normal or non-critical lab and imaging results will be communicated to you by biNuhart, letter or phone within 4 business days after the clinic has received the results. If you do not hear from us within 7 days, please contact the clinic through [employer] System[employer] or phone. If you have a critical or abnormal lab result, we will notify you by phone as soon as possible.  Submit refill requests through [employer] or call your pharmacy and they will forward the refill request to us. Please allow 3 business days for your refill to be completed.          Additional Information About Your Visit        biNuharSmilebox Information     Alverix lets you send messages to your doctor, view your test results, renew your prescriptions, schedule appointments and more. To sign up, go to [email or web address] . Click on \"Log in\" on the left side of the screen, which will take you to the Welcome page. Then click on \"Sign up Now\" on the right side of the page.     You will be asked to enter the access code listed below, as well as some personal information. Please follow the directions to create your username and password.     Your access code is: [email or web address]  Expires: 10/7/2018  6:31 AM     Your access code will  in 90 days. If you need help or a new code, please call your [place] clinic or [phone].        Care EveryWhere ID     This is your Care EveryWhere ID. This could be used by other organizations to access your [place] medical records  [id number]         Blood " Pressure from Last 3 Encounters:   07/11/18 105/69    Weight from Last 3 Encounters:   No data found for Wt              Today, you had the following     No orders found for display       Primary Care Provider    None Specified       No primary provider on file.        Equal Access to Services     DEBORAH FAN : Hadjaxon milena zhu peter Hendricks, waherveda luqadaha, qaarethata kaalmada enrrique, liborio abraham quintondave watkins laSerinamarko stoddard. So [facility] [phone].    ATENCIÓN: Si habla español, tiene a cavanaugh disposición servicios gratuitos de asistencia lingüística. Llame al [phone].    We comply with applicable federal civil rights laws and [place] laws. We do not discriminate on the basis of race, color, national origin, age, disability, sex, sexual orientation, or gender identity.            Thank you!     Thank you for choosing [facility]  for your care. Our goal is always to provide you with excellent care. Hearing back from our patients is one way we can continue to improve our services. Please take a few minutes to complete the written survey that you may receive in the mail after your visit with us. Thank you!             Your Updated Medication List - Protect others around you: Learn how to safely use, store and throw away your medicines at www.disposemymeds.org.          This list is accurate as of 7/11/18 11:59 PM.  Always use your most recent med list.                   Brand Name Dispense Instructions for use Diagnosis    12 HOUR NASAL SPRAY 0.05 % spray   Generic drug:  oxymetazoline      Spray 2 sprays in nostril        acetaminophen 325 MG tablet    TYLENOL     Take 650 mg by mouth        allopurinol 300 MG tablet    ZYLOPRIM     Take 300 mg by mouth        amiodarone 200 MG tablet    PACERONE/CODARONE     Take 200 mg by mouth        ascorbic acid 500 MG tablet    VITAMIN C     Take 500 mg by mouth        aspirin 81 MG EC tablet      Take 81 mg by mouth        carvedilol 25 MG tablet    COREG     Take  37.5 mg by mouth        cinnamon 500 MG Caps      1,000 tablets        co-enzyme Q-10 100 MG Caps capsule      Take 200 mg by mouth        DULoxetine 60 MG EC capsule    CYMBALTA     Take 60 mg by mouth        EPINEPHrine 0.3 MG/0.3ML injection 2-pack    EPIPEN/ADRENACLICK/or ANY BX GENERIC EQUIV     0.3 mg        famotidine 20 MG tablet    PEPCID     Take 20 mg by mouth        fexofenadine 180 MG tablet    ALLEGRA     TAKE 1 TABLET (180 MG TOTAL) BY MOUTH DAILY.        furosemide 40 MG tablet    LASIX     Take 40 mg by mouth        loperamide 2 MG capsule    IMODIUM     Take 2 mg by mouth        Milk Thistle Extract 175 MG Tabs      Take 175 mg by mouth        MULTI-VITAMINS Tabs      1 tablet        mupirocin 2 % ointment    BACTROBAN          potassium chloride SA 20 MEQ CR tablet    K-DUR/KLOR-CON M     Take 20 mEq by mouth        RA KRILL  MG Caps      Take 1,000 mg by mouth        simethicone 125 MG Chew chewable tablet    MYLICON     Take 125 mg by mouth        spironolactone 25 MG tablet    ALDACTONE     Take one tablet by mouth one time daily        topiramate 25 MG tablet    TOPAMAX     Take 25 mg by mouth        traMADol 50 MG tablet    ULTRAM     Take 100 mg by mouth

## 2018-07-11 NOTE — LETTER
7/11/2018      RE: Florentino Fall  [address] [facility] [address]       Dear Colleague,    Thank you for the opportunity to participate in the care of your patient, Florentino Fall, at the [facility]. Please see a copy of my visit note below.    Electrophysiology Clinic Note  HPI:   Mr. Fall is a 73 year old male who has a past medical history significant for NICM LVEF 30%, s/p CRTD 8/2013, HTN, HLD, PVCs/VT, CKD, depression, and osteomyelitis Right foot. He was referred by [facility] Dr. Hernández for VT management.     He has a longstanding cardiac history. He reports he was diagnosed with NICM. He suffered an out of hospital cardiac arrest in 2/2013. Angiogram showed normal coronaries. Echo showed LVEF 30%. A CMRI showed NICM with DE and mid-myocardial scar c/w NICM thought secondary to past myocarditis. He had  LBBB. He later had a CRTD implanted in 8/2013. He then started developing VT with ATP and shocks (-260 ms). He was started on amiodarone. He had more VT in 6/2016 with ICD shock ( ms). Then in 7/2016 amiodarone was stopped due to ineffectiveness and malaise. In 8/2016, he had sustained VT ( ms ) that lasted about 40 minutes. His device was reprogrammed to cover this. He also had ICD shocks and ATPs. 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 He did pretty well until 2/2018 when he had further VT treated with ATPs (-280 ms). Then in 6/2018, he had ICD shocks and the later VT storm in 6/30/18 with multiple ICD shocks and amiodarone was resumed. On 7/2/18 and 7/5/18 he had further single ICD shocks. Episodes tend to show a PVC trigger initiating a polymorphic VT. See tracings below. He reports symptoms of lightheadedness, weakness, and some palpitations with VT. He has also been undergoing treatment for right foot osteomyelitis and is getting IV abx,  reports he has about 3 weeks left of abx. He has a wound vac on right foot.     He reports feeling at baseline today. Device interrogation shows normal device function. His intrinsic rhythm is SR. He had VT storm 6/30/18. That day he received 11 ICD shocks. ATP was delivered before every shock and all were unsuccessful. There is one EGM of ATP only prior to this date. Device reports it was successful, however the VT broke 5 seconds later. Most recent shock was 7/5/18. AP= 15% and RVP= 84% and LVP= 84%. Lead trends appear stable. He denies any chest pain/pressures,worsening shortness of breath, leg/ankle swelling, PND, or orthopnea. Current cardiac medications include: ASA, Spironolactone, Lasix, Coreg, and Amiodarone.         PAST MEDICAL HISTORY:  NICM LVEF 30%  s/p CRTD 8/2013  HTN  HLD  PVCs/VT  CKD  Depression  osteomyelitis Right foot.     CURRENT MEDICATIONS:  Current Outpatient Prescriptions   Medication Sig Dispense Refill     acetaminophen (TYLENOL) 325 MG tablet Take 650 mg by mouth       allopurinol (ZYLOPRIM) 300 MG tablet Take 300 mg by mouth       amiodarone (PACERONE/CODARONE) 200 MG tablet Take 200 mg by mouth       ascorbic acid (VITAMIN C) 500 MG tablet Take 500 mg by mouth       aspirin 81 MG EC tablet Take 81 mg by mouth       carvedilol (COREG) 25 MG tablet Take 37.5 mg by mouth       cinnamon 500 MG CAPS 1,000 tablets       co-enzyme Q-10 100 MG CAPS capsule Take 200 mg by mouth       DULoxetine (CYMBALTA) 60 MG EC capsule Take 60 mg by mouth       EPINEPHrine (EPIPEN/ADRENACLICK/OR ANY BX GENERIC EQUIV) 0.3 MG/0.3ML injection 2-pack 0.3 mg       famotidine (PEPCID) 20 MG tablet Take 20 mg by mouth       fexofenadine (ALLEGRA) 180 MG tablet TAKE 1 TABLET (180 MG TOTAL) BY MOUTH DAILY.       furosemide (LASIX) 40 MG tablet Take 40 mg by mouth       loperamide (IMODIUM) 2 MG capsule Take 2 mg by mouth       Milk Thistle Extract 175 MG TABS Take 175 mg by mouth       Multiple Vitamin  "(MULTI-VITAMINS) TABS 1 tablet       mupirocin (BACTROBAN) 2 % ointment        oxymetazoline (12 HOUR NASAL SPRAY) 0.05 % spray Spray 2 sprays in nostril       potassium chloride SA (K-DUR/KLOR-CON M) 20 MEQ CR tablet Take 20 mEq by mouth       RA KRILL  MG CAPS Take 1,000 mg by mouth       simethicone (MYLICON) 125 MG CHEW chewable tablet Take 125 mg by mouth       spironolactone (ALDACTONE) 25 MG tablet Take one tablet by mouth one time daily       topiramate (TOPAMAX) 25 MG tablet Take 25 mg by mouth       traMADol (ULTRAM) 50 MG tablet Take 100 mg by mouth         PAST SURGICAL HISTORY:  No past surgical history on file.    ALLERGIES:   Not on File    FAMILY HISTORY:  No family history on file.    SOCIAL HISTORY:  Social History   Substance Use Topics     Smoking status: Former Smoker     Smokeless tobacco: Never Used     Alcohol use No       ROS:   A comprehensive 10 point review of systems negative other than as mentioned in HPI.  Exam:  /69 (BP Location: Left arm, Patient Position: Chair, Cuff Size: Adult Large)  Pulse 59  Ht 1.88 m (6' 2\")  SpO2 96%  GENERAL APPEARANCE: alert and no distress  HEENT: no icterus, no xanthelasmas, normal pupil size and reaction, normal palate, mucosa moist, no central cyanosis  NECK: no adenopathy, no asymmetry, masses, or scars, thyroid normal to palpation and no bruits, JVP not elevated  RESPIRATORY: lungs clear to auscultation - no rales, rhonchi or wheezes, no use of accessory muscles, no retractions, respirations are unlabored, normal respiratory rate  CARDIOVASCULAR: regular rhythm, normal S1 with physiologic split S2, no S3 or S4 and soft murmur.  ABDOMEN: soft, non tender, bowel sounds normal, non-distended  EXTREMITIES: right foot wound vac, no edema.   NEURO: alert and oriented to person/place/time, normal speech  SKIN: right foot wound, no ecchymoses, no rashes  PSYCH: normal affect, cooperative    Labs:  Reviewed.     Testing/Procedures:  7/2/18 " ECHOCARDIOGRAM (OSH REPORT):   1.Left ventricle ejection fraction is severely decreased. The estimated   left ventricular ejection fraction is 30%.    2.Normal right ventricular size and systolic function.    3.Severe enlargement left ventricle with mild increased size left   atrium.    4.No significant valvular abnormalities noted other than the trivial   tricuspid, pulmonic and mitral insufficiencies.    5.No obvious infective vegetation noted.    6.When compared to the previous TTE study dated 7/1/2018, no significant   change.        Assessment and Plan:   Mr. Fall is a 73 year old male who has a past medical history significant for NICM LVEF 30%, s/p CRTD 8/2013, HTN, HLD, PVCs/VT, CKD, depression, and osteomyelitis Right foot. He was referred by [facility] Dr. Hernández for VT management. He has a longstanding cardiac history. He reports he was diagnosed with NICM. He suffered an out of hospital cardiac arrest in 2/2013. Angiogram showed normal coronaries. Echo showed LVEF 30%. A CMRI showed NICM with DE and mid-myocardial scar c/w NICM thought secondary to past myocarditis. He had  LBBB. He later had a CRTD implanted in 8/2013. He then started developing VT with ATP and shocks (-260 ms). He was started on amiodarone. He had more VT in 6/2016 with ICD shock ( ms). Then in 7/2016 amiodarone was stopped due to ineffectiveness and malaise. In 8/2016, he had sustained VT ( ms ) that lasted about 40 minutes. His device was reprogrammed to cover this. He also had ICD shocks and ATPs. 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 He did pretty well until 2/2018 when he had further VT treated with ATPs (-280 ms). Then in 6/2018, he had ICD shocks and the later VT storm in 6/30/18 with multiple ICD shocks and amiodarone was resumed. On 7/2/18 and 7/5/18 he had further single ICD shocks. Episodes tend to show a PVC trigger  initiating a polymorphic VT. See tracings below. He reports symptoms of lightheadedness, weakness, and some palpitations with VT. He has also been undergoing treatment for right foot osteomyelitis and is getting IV abx, reports he has about 3 weeks left of abx. He has a wound vac on right foot.     NICM LVEF 30%, NYHA II-III:  VT with recent VT storm:   1. ACEi/ARB: Deferred due to renal function  2. BB: Continue Coreg   3. Aldosterone antagonist: Continue spironolactone.  4. SCD prophylaxis: s/p CRTD, secondary prevention  5. Fluid status: euvolemic, continue lasix  6. VT: He has had VT with ICD shocks over the years with recent VT stephen necessitating resumption of amiodarone. Amiodarone previously was stopped in 7/2016 due to ineffectiveness and and malaise. He was started back on amiodarone in 6/2018. He has since had a couple ICD shocks. He would strongly prefer not to be on amiodarone chronically. Sotalol has been considered, but he does have some renal dysfunction. We also discussed role of ablation including indications, risks, and benefits. He might require epicardial access. The procedural risk of EP study and ablation were discussed in detail. Risks discussed include: vascular complications, CVA, AVB, pericardial effusion and tamponade, bleeding to internal structures surrounding access sites, HF exacerbation, or even death. He would like to pursue ablation. We will work to schedule this after his treatment for his right foot osteomyelitis is completed. In the interim, we will have him undergo a 12 lead Holter to identify PVC trigger and PVC morphology.   Follow up 3 months after ablation.       The patient states understanding and is agreeable with plan.   This patient was seen and evaluated with Dr. Coy. The above note reflects our joint assessment and plan.   DANN Stout CNP  Pager: [phone]    EP STAFF NOTE  I have seen and examined the patient as part of a shared visit with RAMANA Stout  NP.  I agree with the note above. I reviewed today's vital signs and medications. I have reviewed and discussed with the advanced practice provider their physical examination, assessment, and plan   Briefly, NICM with fast VT with ICD shocks, now back on amio, has had mild side effects before and wants to get off of it if possible  My key history/exam findings are: RRR.   The key management decisions made by me: ablation reasonable to consider, explained in detail, also explained higher recurrence rate with NICM and the possibility of still needing amio or another AAT..    Uvaldo Coy MD [facility]  Cardiology - Electrophysiology    CC  HARMONY JARAMILLO

## 2018-07-11 NOTE — NURSING NOTE
Per Nayeli Zhou, patient to have 48  monitor placed.  Diagnosis: VT  Monitor placed: Yes  Patient Instructed: Yes  Patient verbalized understanding: Yes  Holter # 12 lead holter monitor   Placed by; lino milan  Documented by: yisel khan cma

## 2018-07-11 NOTE — PROGRESS NOTES
Electrophysiology Clinic Note  HPI:   Mr. Fall is a 73 year old male who has a past medical history significant for NICM LVEF 30%, s/p CRTD 8/2013, HTN, HLD, PVCs/VT, CKD, depression, and osteomyelitis Right foot. He was referred by [facility] Dr. Hernández for VT management.     He has a longstanding cardiac history. He reports he was diagnosed with NICM. He suffered an out of hospital cardiac arrest in 2/2013. Angiogram showed normal coronaries. Echo showed LVEF 30%. A CMRI showed NICM with DE and mid-myocardial scar c/w NICM thought secondary to past myocarditis. He had  LBBB. He later had a CRTD implanted in 8/2013. He then started developing VT with ATP and shocks (-260 ms). He was started on amiodarone. He had more VT in 6/2016 with ICD shock ( ms). Then in 7/2016 amiodarone was stopped due to ineffectiveness and malaise. In 8/2016, he had sustained VT ( ms ) that lasted about 40 minutes. His device was reprogrammed to cover this. He also had ICD shocks and ATPs. 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 He did pretty well until 2/2018 when he had further VT treated with ATPs (-280 ms). Then in 6/2018, he had ICD shocks and the later VT storm in 6/30/18 with multiple ICD shocks and amiodarone was resumed. On 7/2/18 and 7/5/18 he had further single ICD shocks. Episodes tend to show a PVC trigger initiating a polymorphic VT. See tracings below. He reports symptoms of lightheadedness, weakness, and some palpitations with VT. He has also been undergoing treatment for right foot osteomyelitis and is getting IV abx, reports he has about 3 weeks left of abx. He has a wound vac on right foot.     He reports feeling at baseline today. Device interrogation shows normal device function. His intrinsic rhythm is SR. He had VT storm 6/30/18. That day he received 11 ICD shocks. ATP was delivered before every shock and all were  unsuccessful. There is one EGM of ATP only prior to this date. Device reports it was successful, however the VT broke 5 seconds later. Most recent shock was 7/5/18. AP= 15% and RVP= 84% and LVP= 84%. Lead trends appear stable. He denies any chest pain/pressures,worsening shortness of breath, leg/ankle swelling, PND, or orthopnea. Current cardiac medications include: ASA, Spironolactone, Lasix, Coreg, and Amiodarone.         PAST MEDICAL HISTORY:  NICM LVEF 30%  s/p CRTD 8/2013  HTN  HLD  PVCs/VT  CKD  Depression  osteomyelitis Right foot.     CURRENT MEDICATIONS:  Current Outpatient Prescriptions   Medication Sig Dispense Refill     acetaminophen (TYLENOL) 325 MG tablet Take 650 mg by mouth       allopurinol (ZYLOPRIM) 300 MG tablet Take 300 mg by mouth       amiodarone (PACERONE/CODARONE) 200 MG tablet Take 200 mg by mouth       ascorbic acid (VITAMIN C) 500 MG tablet Take 500 mg by mouth       aspirin 81 MG EC tablet Take 81 mg by mouth       carvedilol (COREG) 25 MG tablet Take 37.5 mg by mouth       cinnamon 500 MG CAPS 1,000 tablets       co-enzyme Q-10 100 MG CAPS capsule Take 200 mg by mouth       DULoxetine (CYMBALTA) 60 MG EC capsule Take 60 mg by mouth       EPINEPHrine (EPIPEN/ADRENACLICK/OR ANY BX GENERIC EQUIV) 0.3 MG/0.3ML injection 2-pack 0.3 mg       famotidine (PEPCID) 20 MG tablet Take 20 mg by mouth       fexofenadine (ALLEGRA) 180 MG tablet TAKE 1 TABLET (180 MG TOTAL) BY MOUTH DAILY.       furosemide (LASIX) 40 MG tablet Take 40 mg by mouth       loperamide (IMODIUM) 2 MG capsule Take 2 mg by mouth       Milk Thistle Extract 175 MG TABS Take 175 mg by mouth       Multiple Vitamin (MULTI-VITAMINS) TABS 1 tablet       mupirocin (BACTROBAN) 2 % ointment        oxymetazoline (12 HOUR NASAL SPRAY) 0.05 % spray Spray 2 sprays in nostril       potassium chloride SA (K-DUR/KLOR-CON M) 20 MEQ CR tablet Take 20 mEq by mouth       RA KRILL  MG CAPS Take 1,000 mg by mouth       simethicone (MYLICON)  "125 MG CHEW chewable tablet Take 125 mg by mouth       spironolactone (ALDACTONE) 25 MG tablet Take one tablet by mouth one time daily       topiramate (TOPAMAX) 25 MG tablet Take 25 mg by mouth       traMADol (ULTRAM) 50 MG tablet Take 100 mg by mouth         PAST SURGICAL HISTORY:  No past surgical history on file.    ALLERGIES:   Not on File    FAMILY HISTORY:  No family history on file.    SOCIAL HISTORY:  Social History   Substance Use Topics     Smoking status: Former Smoker     Smokeless tobacco: Never Used     Alcohol use No       ROS:   A comprehensive 10 point review of systems negative other than as mentioned in HPI.  Exam:  /69 (BP Location: Left arm, Patient Position: Chair, Cuff Size: Adult Large)  Pulse 59  Ht 1.88 m (6' 2\")  SpO2 96%  GENERAL APPEARANCE: alert and no distress  HEENT: no icterus, no xanthelasmas, normal pupil size and reaction, normal palate, mucosa moist, no central cyanosis  NECK: no adenopathy, no asymmetry, masses, or scars, thyroid normal to palpation and no bruits, JVP not elevated  RESPIRATORY: lungs clear to auscultation - no rales, rhonchi or wheezes, no use of accessory muscles, no retractions, respirations are unlabored, normal respiratory rate  CARDIOVASCULAR: regular rhythm, normal S1 with physiologic split S2, no S3 or S4 and soft murmur.  ABDOMEN: soft, non tender, bowel sounds normal, non-distended  EXTREMITIES: right foot wound vac, no edema.   NEURO: alert and oriented to person/place/time, normal speech  SKIN: right foot wound, no ecchymoses, no rashes  PSYCH: normal affect, cooperative    Labs:  Reviewed.     Testing/Procedures:  7/2/18 ECHOCARDIOGRAM ([facility] REPORT):   1.Left ventricle ejection fraction is severely decreased. The estimated   left ventricular ejection fraction is 30%.    2.Normal right ventricular size and systolic function.    3.Severe enlargement left ventricle with mild increased size left   atrium.    4.No significant valvular " abnormalities noted other than the trivial   tricuspid, pulmonic and mitral insufficiencies.    5.No obvious infective vegetation noted.    6.When compared to the previous TTE study dated 7/1/2018, no significant   change.        Assessment and Plan:   Mr. Fall is a 73 year old male who has a past medical history significant for NICM LVEF 30%, s/p CRTD 8/2013, HTN, HLD, PVCs/VT, CKD, depression, and osteomyelitis Right foot. He was referred by [facility] Dr. Hernández for VT management. He has a longstanding cardiac history. He reports he was diagnosed with NICM. He suffered an out of hospital cardiac arrest in 2/2013. Angiogram showed normal coronaries. Echo showed LVEF 30%. A CMRI showed NICM with DE and mid-myocardial scar c/w NICM thought secondary to past myocarditis. He had  LBBB. He later had a CRTD implanted in 8/2013. He then started developing VT with ATP and shocks (-260 ms). He was started on amiodarone. He had more VT in 6/2016 with ICD shock ( ms). Then in 7/2016 amiodarone was stopped due to ineffectiveness and malaise. In 8/2016, he had sustained VT ( ms ) that lasted about 40 minutes. His device was reprogrammed to cover this. He also had ICD shocks and ATPs. 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 He did pretty well until 2/2018 when he had further VT treated with ATPs (-280 ms). Then in 6/2018, he had ICD shocks and the later VT storm in 6/30/18 with multiple ICD shocks and amiodarone was resumed. On 7/2/18 and 7/5/18 he had further single ICD shocks. Episodes tend to show a PVC trigger initiating a polymorphic VT. See tracings below. He reports symptoms of lightheadedness, weakness, and some palpitations with VT. He has also been undergoing treatment for right foot osteomyelitis and is getting IV abx, reports he has about 3 weeks left of abx. He has a wound vac on right foot.     NICM LVEF 30%, NYHA  II-III:  VT with recent VT storm:   1. ACEi/ARB: Deferred due to renal function  2. BB: Continue Coreg   3. Aldosterone antagonist: Continue spironolactone.  4. SCD prophylaxis: s/p CRTD, secondary prevention  5. Fluid status: euvolemic, continue lasix  6. VT: He has had VT with ICD shocks over the years with recent VT stephen necessitating resumption of amiodarone. Amiodarone previously was stopped in 7/2016 due to ineffectiveness and and malaise. He was started back on amiodarone in 6/2018. He has since had a couple ICD shocks. He would strongly prefer not to be on amiodarone chronically. Sotalol has been considered, but he does have some renal dysfunction. We also discussed role of ablation including indications, risks, and benefits. He might require epicardial access. The procedural risk of EP study and ablation were discussed in detail. Risks discussed include: vascular complications, CVA, AVB, pericardial effusion and tamponade, bleeding to internal structures surrounding access sites, HF exacerbation, or even death. He would like to pursue ablation. We will work to schedule this after his treatment for his right foot osteomyelitis is completed. In the interim, we will have him undergo a 12 lead Holter to identify PVC trigger and PVC morphology.   Follow up 3 months after ablation.       The patient states understanding and is agreeable with plan.   This patient was seen and evaluated with Dr. Coy. The above note reflects our joint assessment and plan.   DANN Stout CNP  Pager: [phone]    EP STAFF NOTE  I have seen and examined the patient as part of a shared visit with RAMANA Stuot NP.  I agree with the note above. I reviewed today's vital signs and medications. I have reviewed and discussed with the advanced practice provider their physical examination, assessment, and plan   Briefly, NICM with fast VT with ICD shocks, now back on amio, has had mild side effects before and wants to get off of it  if possible  My key history/exam findings are: RRR.   The key management decisions made by me: ablation reasonable to consider, explained in detail, also explained higher recurrence rate with NICM and the possibility of still needing amio or another AAT..    Uvaldo Coy MD [facility]  Cardiology - Electrophysiology    CC  HARMONY JARAMILLO

## 2018-07-11 NOTE — MR AVS SNAPSHOT
"              After Visit Summary   2018    Florentino Fall    MRN: [id number]           Patient Information     Date Of Birth          1945        Visit Information        Provider Department      2018 4:00 PM Uvaldo Coy MD [facility]        Today's Diagnoses     NICM (nonischemic cardiomyopathy) (H)    -  1    Ventricular tachycardia (H)           Follow-ups after your visit        Who to contact     If you have questions or need follow up information about today's clinic visit or your schedule please contact [facility] directly at [phone].  Normal or non-critical lab and imaging results will be communicated to you by HOTELbeathart, letter or phone within 4 business days after the clinic has received the results. If you do not hear from us within 7 days, please contact the clinic through [employer]ELbe[employer] or phone. If you have a critical or abnormal lab result, we will notify you by phone as soon as possible.  Submit refill requests through [employer] or call your pharmacy and they will forward the refill request to us. Please allow 3 business days for your refill to be completed.          Additional Information About Your Visit        MyChart Information     [employer] lets you send messages to your doctor, view your test results, renew your prescriptions, schedule appointments and more. To sign up, go to [email or web address] . Click on \"Log in\" on the left side of the screen, which will take you to the Welcome page. Then click on \"Sign up Now\" on the right side of the page.     You will be asked to enter the access code listed below, as well as some personal information. Please follow the directions to create your username and password.     Your access code is: [email or web address]  Expires: 10/7/2018  6:31 AM     Your access code will  in 90 days. If you need help or a new code, please call your [place] clinic or [phone].        Care EveryWhere ID     This is your Care EveryWhere ID. " "This could be used by other organizations to access your [place] medical records  [id number]        Your Vitals Were     Pulse Height Pulse Oximetry             59 1.88 m (6' 2\") 96%          Blood Pressure from Last 3 Encounters:   07/11/18 105/69    Weight from Last 3 Encounters:   No data found for Wt              We Performed the Following     EKG 12-lead, tracing only (Same Day)     Holter monitor 48 hour       Information about OPIOIDS     PRESCRIPTION OPIOIDS: WHAT YOU NEED TO KNOW   We gave you an opioid (narcotic) pain medicine. It is important to manage your pain, but opioids are not always the best choice. You should first try all the other options your care team gave you. Take this medicine for as short a time (and as few doses) as possible.     These medicines have risks:    DO NOT drive when on new or higher doses of pain medicine. These medicines can affect your alertness and reaction times, and you could be arrested for driving under the influence (DUI). If you need to use opioids long-term, talk to your care team about driving.    DO NOT operate heave machinery    DO NOT do any other dangerous activities while taking these medicines.     DO NOT drink any alcohol while taking these medicines.      If the opioid prescribed includes acetaminophen, DO NOT take with any other medicines that contain acetaminophen. Read all labels carefully. Look for the word  acetaminophen  or  Tylenol.  Ask your pharmacist if you have questions or are unsure.    You can get addicted to pain medicines, especially if you have a history of addiction (chemical, alcohol or substance dependence). Talk to your care team about ways to reduce this risk.    Store your pills in a secure place, locked if possible. We will not replace any lost or stolen medicine. If you don t finish your medicine, please throw away (dispose) as directed by your pharmacist. The [place] [employer] has more information about safe " disposal: https://www.pca.[facility].us/living-green/managing-unwanted-medications.     All opioids tend to cause constipation. Drink plenty of water and eat foods that have a lot of fiber, such as fruits, vegetables, prune juice, apple juice and high-fiber cereal. Take a laxative (Miralax, milk of magnesia, Colace, Senna) if you don t move your bowels at least every other day.          Primary Care Provider    None Specified       No primary provider on file.        Equal Access to Services     DEBORAH FAN : Hadjaxon perezo Soomaali, waaxda luqadaha, qaybta kaalmada adedaveyaindra, liborio eddy . So [facility] [phone].    ATENCIÓN: Si habla español, tiene a cavanaugh disposición servicios gratuitos de asistencia lingüística. Llame al [phone].    We comply with applicable federal civil rights laws and [place] laws. We do not discriminate on the basis of race, color, national origin, age, disability, sex, sexual orientation, or gender identity.            Thank you!     Thank you for choosing [facility]  for your care. Our goal is always to provide you with excellent care. Hearing back from our patients is one way we can continue to improve our services. Please take a few minutes to complete the written survey that you may receive in the mail after your visit with us. Thank you!             Your Updated Medication List - Protect others around you: Learn how to safely use, store and throw away your medicines at www.disposemymeds.org.          This list is accurate as of 7/11/18 11:59 PM.  Always use your most recent med list.                   Brand Name Dispense Instructions for use Diagnosis    12 HOUR NASAL SPRAY 0.05 % spray   Generic drug:  oxymetazoline      Spray 2 sprays in nostril        acetaminophen 325 MG tablet    TYLENOL     Take 650 mg by mouth        allopurinol 300 MG tablet    ZYLOPRIM     Take 300 mg by mouth        amiodarone 200 MG tablet    PACERONE/CODARONE      Take 200 mg by mouth        ascorbic acid 500 MG tablet    VITAMIN C     Take 500 mg by mouth        aspirin 81 MG EC tablet      Take 81 mg by mouth        carvedilol 25 MG tablet    COREG     Take 37.5 mg by mouth        cinnamon 500 MG Caps      1,000 tablets        co-enzyme Q-10 100 MG Caps capsule      Take 200 mg by mouth        DULoxetine 60 MG EC capsule    CYMBALTA     Take 60 mg by mouth        EPINEPHrine 0.3 MG/0.3ML injection 2-pack    EPIPEN/ADRENACLICK/or ANY BX GENERIC EQUIV     0.3 mg        famotidine 20 MG tablet    PEPCID     Take 20 mg by mouth        fexofenadine 180 MG tablet    ALLEGRA     TAKE 1 TABLET (180 MG TOTAL) BY MOUTH DAILY.        furosemide 40 MG tablet    LASIX     Take 40 mg by mouth        loperamide 2 MG capsule    IMODIUM     Take 2 mg by mouth        Milk Thistle Extract 175 MG Tabs      Take 175 mg by mouth        MULTI-VITAMINS Tabs      1 tablet        mupirocin 2 % ointment    BACTROBAN          potassium chloride SA 20 MEQ CR tablet    K-DUR/KLOR-CON M     Take 20 mEq by mouth        RA KRILL  MG Caps      Take 1,000 mg by mouth        simethicone 125 MG Chew chewable tablet    MYLICON     Take 125 mg by mouth        spironolactone 25 MG tablet    ALDACTONE     Take one tablet by mouth one time daily        topiramate 25 MG tablet    TOPAMAX     Take 25 mg by mouth        traMADol 50 MG tablet    ULTRAM     Take 100 mg by mouth

## 2018-07-11 NOTE — NURSING NOTE
Chief Complaint   Patient presents with     New Patient     RAMANA Coy, referral from Dr. Hernández for VT, NICM, CRTD     Vitals were taken and medications were reconciled. EKG was performed.    ALAN Choudhary  4:03 PM

## 2018-07-12 ENCOUNTER — OFFICE VISIT - HEALTHEAST (OUTPATIENT)
Dept: GERIATRICS | Facility: CLINIC | Age: 73
End: 2018-07-12

## 2018-07-12 DIAGNOSIS — I47.20 VENTRICULAR TACHYCARDIA (H): ICD-10-CM

## 2018-07-12 DIAGNOSIS — I42.8 CARDIOMYOPATHY, NONISCHEMIC (H): ICD-10-CM

## 2018-07-12 DIAGNOSIS — Z95.810 ICD (IMPLANTABLE CARDIOVERTER-DEFIBRILLATOR), BIVENTRICULAR, IN SITU: ICD-10-CM

## 2018-07-12 DIAGNOSIS — M86.271 SUBACUTE OSTEOMYELITIS OF RIGHT FOOT (H): ICD-10-CM

## 2018-07-12 PROBLEM — I42.9 CARDIOMYOPATHY (H): Status: ACTIVE | Noted: 2018-07-12

## 2018-07-12 LAB — INTERPRETATION ECG - MUSE: NORMAL

## 2018-07-12 NOTE — PROGRESS NOTES
Preliminary Device Interrogation Results.  Final physician signed paceart report to be scanned and attached.    [place] [facility] [place] and iterative programming of a [employer], Bi-Ventricular ICD, per Dr. Coy's orders. His intrinsic rhythm is SR. Normal ICD function. Pt had VT storm 6/30/18. That day he received 11 ICD shocks. ATP was delivered before every shock and all were unsuccessful. There is one EGM of ATP only prior to this date. Device reports it was successful, however the VT broke 5 seconds later. Most recent shock was 7/5/18, at the rehab facility. AP= 15% and RVP= 84% and LVP= 84%. Lead trends appear stable. Battery estimates 3.5 years to TEMO. Per Dr. Coy's orders the ATP was made more aggressive and BV-Trigger pacing turned off to see the PVC morphology.  Multi lead ICD

## 2018-07-13 ENCOUNTER — RECORDS - HEALTHEAST (OUTPATIENT)
Dept: LAB | Facility: CLINIC | Age: 73
End: 2018-07-13

## 2018-07-13 LAB
ANION GAP SERPL CALCULATED.3IONS-SCNC: 7 MMOL/L (ref 5–18)
BASOPHILS # BLD AUTO: 0.1 THOU/UL (ref 0–0.2)
BASOPHILS NFR BLD AUTO: 1 % (ref 0–2)
BUN SERPL-MCNC: 35 MG/DL (ref 8–28)
C REACTIVE PROTEIN LHE: 1.6 MG/DL (ref 0–0.8)
CALCIUM SERPL-MCNC: 9.9 MG/DL (ref 8.5–10.5)
CHLORIDE BLD-SCNC: 107 MMOL/L (ref 98–107)
CO2 SERPL-SCNC: 27 MMOL/L (ref 22–31)
CREAT SERPL-MCNC: 1.61 MG/DL (ref 0.7–1.3)
EOSINOPHIL # BLD AUTO: 0.3 THOU/UL (ref 0–0.4)
EOSINOPHIL NFR BLD AUTO: 3 % (ref 0–6)
ERYTHROCYTE [DISTWIDTH] IN BLOOD BY AUTOMATED COUNT: 13.4 % (ref 11–14.5)
GFR SERPL CREATININE-BSD FRML MDRD: 42 ML/MIN/1.73M2
GLUCOSE BLD-MCNC: 84 MG/DL (ref 70–125)
HCT VFR BLD AUTO: 37.5 % (ref 40–54)
HGB BLD-MCNC: 12.4 G/DL (ref 14–18)
LYMPHOCYTES # BLD AUTO: 1.1 THOU/UL (ref 0.8–4.4)
LYMPHOCYTES NFR BLD AUTO: 14 % (ref 20–40)
MCH RBC QN AUTO: 32.1 PG (ref 27–34)
MCHC RBC AUTO-ENTMCNC: 33.1 G/DL (ref 32–36)
MCV RBC AUTO: 97 FL (ref 80–100)
MONOCYTES # BLD AUTO: 0.5 THOU/UL (ref 0–0.9)
MONOCYTES NFR BLD AUTO: 6 % (ref 2–10)
NEUTROPHILS # BLD AUTO: 6 THOU/UL (ref 2–7.7)
NEUTROPHILS NFR BLD AUTO: 76 % (ref 50–70)
PLATELET # BLD AUTO: 173 THOU/UL (ref 140–440)
PMV BLD AUTO: 10.5 FL (ref 8.5–12.5)
POTASSIUM BLD-SCNC: 4.2 MMOL/L (ref 3.5–5)
RBC # BLD AUTO: 3.86 MILL/UL (ref 4.4–6.2)
SODIUM SERPL-SCNC: 141 MMOL/L (ref 136–145)
WBC: 8 THOU/UL (ref 4–11)

## 2018-07-16 ENCOUNTER — OFFICE VISIT - HEALTHEAST (OUTPATIENT)
Dept: GERIATRICS | Facility: CLINIC | Age: 73
End: 2018-07-16

## 2018-07-16 ENCOUNTER — RECORDS - HEALTHEAST (OUTPATIENT)
Dept: LAB | Facility: CLINIC | Age: 73
End: 2018-07-16

## 2018-07-16 ENCOUNTER — AMBULATORY - HEALTHEAST (OUTPATIENT)
Dept: GERIATRICS | Facility: CLINIC | Age: 73
End: 2018-07-16

## 2018-07-16 DIAGNOSIS — I10 ESSENTIAL HYPERTENSION WITH GOAL BLOOD PRESSURE LESS THAN 140/90: ICD-10-CM

## 2018-07-16 DIAGNOSIS — I50.22 CHRONIC SYSTOLIC HEART FAILURE (H): ICD-10-CM

## 2018-07-16 DIAGNOSIS — Z95.810 ICD (IMPLANTABLE CARDIOVERTER-DEFIBRILLATOR), BIVENTRICULAR, IN SITU: ICD-10-CM

## 2018-07-16 DIAGNOSIS — M86.271 SUBACUTE OSTEOMYELITIS OF RIGHT FOOT (H): ICD-10-CM

## 2018-07-16 DIAGNOSIS — I47.20 VENTRICULAR TACHYCARDIA (H): ICD-10-CM

## 2018-07-16 LAB
ANION GAP SERPL CALCULATED.3IONS-SCNC: 9 MMOL/L (ref 5–18)
BASOPHILS # BLD AUTO: 0.1 THOU/UL (ref 0–0.2)
BASOPHILS NFR BLD AUTO: 1 % (ref 0–2)
BUN SERPL-MCNC: 31 MG/DL (ref 8–28)
C REACTIVE PROTEIN LHE: 0.4 MG/DL (ref 0–0.8)
CALCIUM SERPL-MCNC: 9.4 MG/DL (ref 8.5–10.5)
CHLORIDE BLD-SCNC: 107 MMOL/L (ref 98–107)
CO2 SERPL-SCNC: 23 MMOL/L (ref 22–31)
CREAT SERPL-MCNC: 1.56 MG/DL (ref 0.7–1.3)
EOSINOPHIL # BLD AUTO: 0.3 THOU/UL (ref 0–0.4)
EOSINOPHIL NFR BLD AUTO: 4 % (ref 0–6)
ERYTHROCYTE [DISTWIDTH] IN BLOOD BY AUTOMATED COUNT: 13.6 % (ref 11–14.5)
GFR SERPL CREATININE-BSD FRML MDRD: 44 ML/MIN/1.73M2
GLUCOSE BLD-MCNC: 81 MG/DL (ref 70–125)
HCT VFR BLD AUTO: 36.4 % (ref 40–54)
HGB BLD-MCNC: 11.7 G/DL (ref 14–18)
LYMPHOCYTES # BLD AUTO: 1 THOU/UL (ref 0.8–4.4)
LYMPHOCYTES NFR BLD AUTO: 17 % (ref 20–40)
MCH RBC QN AUTO: 31.2 PG (ref 27–34)
MCHC RBC AUTO-ENTMCNC: 32.1 G/DL (ref 32–36)
MCV RBC AUTO: 97 FL (ref 80–100)
MONOCYTES # BLD AUTO: 0.5 THOU/UL (ref 0–0.9)
MONOCYTES NFR BLD AUTO: 8 % (ref 2–10)
NEUTROPHILS # BLD AUTO: 4.1 THOU/UL (ref 2–7.7)
NEUTROPHILS NFR BLD AUTO: 69 % (ref 50–70)
PLATELET # BLD AUTO: 166 THOU/UL (ref 140–440)
PMV BLD AUTO: 10.3 FL (ref 8.5–12.5)
POTASSIUM BLD-SCNC: 4.1 MMOL/L (ref 3.5–5)
RBC # BLD AUTO: 3.75 MILL/UL (ref 4.4–6.2)
SODIUM SERPL-SCNC: 139 MMOL/L (ref 136–145)
WBC: 6 THOU/UL (ref 4–11)

## 2018-07-17 ENCOUNTER — RECORDS - HEALTHEAST (OUTPATIENT)
Dept: ADMINISTRATIVE | Facility: OTHER | Age: 73
End: 2018-07-17

## 2018-07-19 ENCOUNTER — OFFICE VISIT - HEALTHEAST (OUTPATIENT)
Dept: GERIATRICS | Facility: CLINIC | Age: 73
End: 2018-07-19

## 2018-07-19 DIAGNOSIS — Z95.810 ICD (IMPLANTABLE CARDIOVERTER-DEFIBRILLATOR), BIVENTRICULAR, IN SITU: ICD-10-CM

## 2018-07-19 DIAGNOSIS — I47.20 VENTRICULAR TACHYCARDIA (H): ICD-10-CM

## 2018-07-19 DIAGNOSIS — M86.271 SUBACUTE OSTEOMYELITIS OF RIGHT FOOT (H): ICD-10-CM

## 2018-07-19 DIAGNOSIS — I42.8 CARDIOMYOPATHY, NONISCHEMIC (H): ICD-10-CM

## 2018-07-20 ENCOUNTER — RECORDS - HEALTHEAST (OUTPATIENT)
Dept: LAB | Facility: CLINIC | Age: 73
End: 2018-07-20

## 2018-07-20 LAB
ANION GAP SERPL CALCULATED.3IONS-SCNC: 8 MMOL/L (ref 5–18)
BASOPHILS # BLD AUTO: 0.1 THOU/UL (ref 0–0.2)
BASOPHILS NFR BLD AUTO: 1 % (ref 0–2)
BUN SERPL-MCNC: 27 MG/DL (ref 8–28)
C REACTIVE PROTEIN LHE: 1 MG/DL (ref 0–0.8)
CALCIUM SERPL-MCNC: 9.6 MG/DL (ref 8.5–10.5)
CHLORIDE BLD-SCNC: 108 MMOL/L (ref 98–107)
CO2 SERPL-SCNC: 25 MMOL/L (ref 22–31)
CREAT SERPL-MCNC: 1.57 MG/DL (ref 0.7–1.3)
EOSINOPHIL # BLD AUTO: 0.3 THOU/UL (ref 0–0.4)
EOSINOPHIL NFR BLD AUTO: 5 % (ref 0–6)
ERYTHROCYTE [DISTWIDTH] IN BLOOD BY AUTOMATED COUNT: 14 % (ref 11–14.5)
GFR SERPL CREATININE-BSD FRML MDRD: 43 ML/MIN/1.73M2
GLUCOSE BLD-MCNC: 75 MG/DL (ref 70–125)
HCT VFR BLD AUTO: 36 % (ref 40–54)
HGB BLD-MCNC: 12.4 G/DL (ref 14–18)
LYMPHOCYTES # BLD AUTO: 0.8 THOU/UL (ref 0.8–4.4)
LYMPHOCYTES NFR BLD AUTO: 14 % (ref 20–40)
MCH RBC QN AUTO: 32.2 PG (ref 27–34)
MCHC RBC AUTO-ENTMCNC: 34.4 G/DL (ref 32–36)
MCV RBC AUTO: 94 FL (ref 80–100)
MONOCYTES # BLD AUTO: 0.5 THOU/UL (ref 0–0.9)
MONOCYTES NFR BLD AUTO: 9 % (ref 2–10)
NEUTROPHILS # BLD AUTO: 4.2 THOU/UL (ref 2–7.7)
NEUTROPHILS NFR BLD AUTO: 72 % (ref 50–70)
PLATELET # BLD AUTO: 151 THOU/UL (ref 140–440)
PMV BLD AUTO: 10.3 FL (ref 8.5–12.5)
POTASSIUM BLD-SCNC: 4 MMOL/L (ref 3.5–5)
RBC # BLD AUTO: 3.85 MILL/UL (ref 4.4–6.2)
SODIUM SERPL-SCNC: 141 MMOL/L (ref 136–145)
WBC: 5.9 THOU/UL (ref 4–11)

## 2018-07-23 ENCOUNTER — OFFICE VISIT - HEALTHEAST (OUTPATIENT)
Dept: GERIATRICS | Facility: CLINIC | Age: 73
End: 2018-07-23

## 2018-07-23 DIAGNOSIS — M86.271 SUBACUTE OSTEOMYELITIS OF RIGHT FOOT (H): ICD-10-CM

## 2018-07-23 DIAGNOSIS — M62.81 GENERALIZED MUSCLE WEAKNESS: ICD-10-CM

## 2018-07-23 DIAGNOSIS — I10 ESSENTIAL HYPERTENSION: ICD-10-CM

## 2018-07-23 DIAGNOSIS — I50.22 CHRONIC SYSTOLIC HEART FAILURE (H): ICD-10-CM

## 2018-07-23 DIAGNOSIS — M10.9 GOUT: ICD-10-CM

## 2018-07-23 LAB
ANION GAP SERPL CALCULATED.3IONS-SCNC: 8 MMOL/L (ref 5–18)
BASOPHILS # BLD AUTO: 0.1 THOU/UL (ref 0–0.2)
BASOPHILS NFR BLD AUTO: 1 % (ref 0–2)
BUN SERPL-MCNC: 27 MG/DL (ref 8–28)
C REACTIVE PROTEIN LHE: 0.4 MG/DL (ref 0–0.8)
CALCIUM SERPL-MCNC: 9.5 MG/DL (ref 8.5–10.5)
CHLORIDE BLD-SCNC: 110 MMOL/L (ref 98–107)
CO2 SERPL-SCNC: 25 MMOL/L (ref 22–31)
CREAT SERPL-MCNC: 1.52 MG/DL (ref 0.7–1.3)
EOSINOPHIL # BLD AUTO: 0.2 THOU/UL (ref 0–0.4)
EOSINOPHIL NFR BLD AUTO: 6 % (ref 0–6)
ERYTHROCYTE [DISTWIDTH] IN BLOOD BY AUTOMATED COUNT: 14.4 % (ref 11–14.5)
GFR SERPL CREATININE-BSD FRML MDRD: 45 ML/MIN/1.73M2
GLUCOSE BLD-MCNC: 80 MG/DL (ref 70–125)
HCT VFR BLD AUTO: 37.2 % (ref 40–54)
HGB BLD-MCNC: 12.1 G/DL (ref 14–18)
LYMPHOCYTES # BLD AUTO: 0.9 THOU/UL (ref 0.8–4.4)
LYMPHOCYTES NFR BLD AUTO: 23 % (ref 20–40)
MCH RBC QN AUTO: 32 PG (ref 27–34)
MCHC RBC AUTO-ENTMCNC: 32.5 G/DL (ref 32–36)
MCV RBC AUTO: 98 FL (ref 80–100)
MONOCYTES # BLD AUTO: 0.4 THOU/UL (ref 0–0.9)
MONOCYTES NFR BLD AUTO: 11 % (ref 2–10)
NEUTROPHILS # BLD AUTO: 2.3 THOU/UL (ref 2–7.7)
NEUTROPHILS NFR BLD AUTO: 59 % (ref 50–70)
PLATELET # BLD AUTO: 132 THOU/UL (ref 140–440)
PMV BLD AUTO: 10.2 FL (ref 8.5–12.5)
POTASSIUM BLD-SCNC: 4.1 MMOL/L (ref 3.5–5)
RBC # BLD AUTO: 3.78 MILL/UL (ref 4.4–6.2)
SODIUM SERPL-SCNC: 143 MMOL/L (ref 136–145)
WBC: 3.9 THOU/UL (ref 4–11)

## 2018-07-24 ENCOUNTER — OFFICE VISIT - HEALTHEAST (OUTPATIENT)
Dept: INFECTIOUS DISEASES | Facility: CLINIC | Age: 73
End: 2018-07-24

## 2018-07-24 ENCOUNTER — COMMUNICATION - HEALTHEAST (OUTPATIENT)
Dept: ADMINISTRATIVE | Facility: CLINIC | Age: 73
End: 2018-07-24

## 2018-07-24 DIAGNOSIS — M86.271 SUBACUTE OSTEOMYELITIS OF RIGHT FOOT (H): ICD-10-CM

## 2018-07-24 ASSESSMENT — MIFFLIN-ST. JEOR: SCORE: 1814.88

## 2018-07-26 ENCOUNTER — OFFICE VISIT - HEALTHEAST (OUTPATIENT)
Dept: GERIATRICS | Facility: CLINIC | Age: 73
End: 2018-07-26

## 2018-07-26 DIAGNOSIS — I42.8 CARDIOMYOPATHY, NONISCHEMIC (H): ICD-10-CM

## 2018-07-26 DIAGNOSIS — M86.271 SUBACUTE OSTEOMYELITIS OF RIGHT FOOT (H): ICD-10-CM

## 2018-07-26 DIAGNOSIS — I47.20 VENTRICULAR TACHYCARDIA (H): ICD-10-CM

## 2018-07-26 DIAGNOSIS — I50.22 CHRONIC SYSTOLIC HEART FAILURE (H): ICD-10-CM

## 2018-07-26 DIAGNOSIS — M62.81 GENERALIZED MUSCLE WEAKNESS: ICD-10-CM

## 2018-07-26 DIAGNOSIS — Z95.810 ICD (IMPLANTABLE CARDIOVERTER-DEFIBRILLATOR), BIVENTRICULAR, IN SITU: ICD-10-CM

## 2018-07-27 ENCOUNTER — RECORDS - HEALTHEAST (OUTPATIENT)
Dept: LAB | Facility: CLINIC | Age: 73
End: 2018-07-27

## 2018-07-27 ENCOUNTER — HOME CARE/HOSPICE - HEALTHEAST (OUTPATIENT)
Dept: HOME HEALTH SERVICES | Facility: HOME HEALTH | Age: 73
End: 2018-07-27

## 2018-07-27 LAB
ANION GAP SERPL CALCULATED.3IONS-SCNC: 7 MMOL/L (ref 5–18)
BASOPHILS # BLD AUTO: 0 THOU/UL (ref 0–0.2)
BASOPHILS NFR BLD AUTO: 1 % (ref 0–2)
BUN SERPL-MCNC: 27 MG/DL (ref 8–28)
C REACTIVE PROTEIN LHE: 0.2 MG/DL (ref 0–0.8)
CALCIUM SERPL-MCNC: 9.8 MG/DL (ref 8.5–10.5)
CHLORIDE BLD-SCNC: 110 MMOL/L (ref 98–107)
CO2 SERPL-SCNC: 26 MMOL/L (ref 22–31)
CREAT SERPL-MCNC: 1.57 MG/DL (ref 0.7–1.3)
EOSINOPHIL # BLD AUTO: 0.2 THOU/UL (ref 0–0.4)
EOSINOPHIL NFR BLD AUTO: 5 % (ref 0–6)
ERYTHROCYTE [DISTWIDTH] IN BLOOD BY AUTOMATED COUNT: 14.4 % (ref 11–14.5)
GFR SERPL CREATININE-BSD FRML MDRD: 43 ML/MIN/1.73M2
GLUCOSE BLD-MCNC: 93 MG/DL (ref 70–125)
HCT VFR BLD AUTO: 39.1 % (ref 40–54)
HGB BLD-MCNC: 12.6 G/DL (ref 14–18)
INTERPRETATION MONITOR -MUSE: NORMAL
LYMPHOCYTES # BLD AUTO: 0.9 THOU/UL (ref 0.8–4.4)
LYMPHOCYTES NFR BLD AUTO: 20 % (ref 20–40)
MCH RBC QN AUTO: 31.6 PG (ref 27–34)
MCHC RBC AUTO-ENTMCNC: 32.2 G/DL (ref 32–36)
MCV RBC AUTO: 98 FL (ref 80–100)
MONOCYTES # BLD AUTO: 0.4 THOU/UL (ref 0–0.9)
MONOCYTES NFR BLD AUTO: 8 % (ref 2–10)
NEUTROPHILS # BLD AUTO: 3 THOU/UL (ref 2–7.7)
NEUTROPHILS NFR BLD AUTO: 67 % (ref 50–70)
PLATELET # BLD AUTO: 140 THOU/UL (ref 140–440)
PMV BLD AUTO: 10.3 FL (ref 8.5–12.5)
POTASSIUM BLD-SCNC: 4.3 MMOL/L (ref 3.5–5)
RBC # BLD AUTO: 3.99 MILL/UL (ref 4.4–6.2)
SODIUM SERPL-SCNC: 143 MMOL/L (ref 136–145)
WBC: 4.5 THOU/UL (ref 4–11)

## 2018-07-30 ENCOUNTER — OFFICE VISIT - HEALTHEAST (OUTPATIENT)
Dept: GERIATRICS | Facility: CLINIC | Age: 73
End: 2018-07-30

## 2018-07-30 ENCOUNTER — AMBULATORY - HEALTHEAST (OUTPATIENT)
Dept: CARDIOLOGY | Facility: CLINIC | Age: 73
End: 2018-07-30

## 2018-07-30 ENCOUNTER — COMMUNICATION - HEALTHEAST (OUTPATIENT)
Dept: CARDIOLOGY | Facility: CLINIC | Age: 73
End: 2018-07-30

## 2018-07-30 DIAGNOSIS — Z95.810 ICD (IMPLANTABLE CARDIOVERTER-DEFIBRILLATOR), BIVENTRICULAR, IN SITU: ICD-10-CM

## 2018-07-30 DIAGNOSIS — M86.271 SUBACUTE OSTEOMYELITIS OF RIGHT FOOT (H): ICD-10-CM

## 2018-07-30 DIAGNOSIS — I47.20 VENTRICULAR TACHYCARDIA (H): ICD-10-CM

## 2018-07-30 DIAGNOSIS — D64.9 ANEMIA: ICD-10-CM

## 2018-07-30 DIAGNOSIS — I10 ESSENTIAL HYPERTENSION WITH GOAL BLOOD PRESSURE LESS THAN 140/90: ICD-10-CM

## 2018-07-30 DIAGNOSIS — M62.81 GENERALIZED MUSCLE WEAKNESS: ICD-10-CM

## 2018-07-30 DIAGNOSIS — Z79.899 LONG TERM USE OF DRUG: ICD-10-CM

## 2018-07-30 LAB
ANION GAP SERPL CALCULATED.3IONS-SCNC: 7 MMOL/L (ref 5–18)
BASOPHILS # BLD AUTO: 0.1 THOU/UL (ref 0–0.2)
BASOPHILS NFR BLD AUTO: 1 % (ref 0–2)
BUN SERPL-MCNC: 27 MG/DL (ref 8–28)
C REACTIVE PROTEIN LHE: 0.4 MG/DL (ref 0–0.8)
CALCIUM SERPL-MCNC: 9.3 MG/DL (ref 8.5–10.5)
CHLORIDE BLD-SCNC: 108 MMOL/L (ref 98–107)
CO2 SERPL-SCNC: 25 MMOL/L (ref 22–31)
CREAT SERPL-MCNC: 1.42 MG/DL (ref 0.7–1.3)
EOSINOPHIL # BLD AUTO: 0.3 THOU/UL (ref 0–0.4)
EOSINOPHIL NFR BLD AUTO: 6 % (ref 0–6)
ERYTHROCYTE [DISTWIDTH] IN BLOOD BY AUTOMATED COUNT: 14.6 % (ref 11–14.5)
GFR SERPL CREATININE-BSD FRML MDRD: 49 ML/MIN/1.73M2
GLUCOSE BLD-MCNC: 86 MG/DL (ref 70–125)
HCT VFR BLD AUTO: 36.3 % (ref 40–54)
HGB BLD-MCNC: 12.2 G/DL (ref 14–18)
LYMPHOCYTES # BLD AUTO: 0.8 THOU/UL (ref 0.8–4.4)
LYMPHOCYTES NFR BLD AUTO: 19 % (ref 20–40)
MCH RBC QN AUTO: 32.4 PG (ref 27–34)
MCHC RBC AUTO-ENTMCNC: 33.6 G/DL (ref 32–36)
MCV RBC AUTO: 96 FL (ref 80–100)
MONOCYTES # BLD AUTO: 0.4 THOU/UL (ref 0–0.9)
MONOCYTES NFR BLD AUTO: 9 % (ref 2–10)
NEUTROPHILS # BLD AUTO: 2.8 THOU/UL (ref 2–7.7)
NEUTROPHILS NFR BLD AUTO: 65 % (ref 50–70)
PLATELET # BLD AUTO: 123 THOU/UL (ref 140–440)
PMV BLD AUTO: 10 FL (ref 8.5–12.5)
POTASSIUM BLD-SCNC: 4 MMOL/L (ref 3.5–5)
RBC # BLD AUTO: 3.77 MILL/UL (ref 4.4–6.2)
SODIUM SERPL-SCNC: 140 MMOL/L (ref 136–145)
WBC: 4.3 THOU/UL (ref 4–11)

## 2018-08-01 ENCOUNTER — COMMUNICATION - HEALTHEAST (OUTPATIENT)
Dept: CARDIOLOGY | Facility: CLINIC | Age: 73
End: 2018-08-01

## 2018-08-01 ENCOUNTER — OFFICE VISIT - HEALTHEAST (OUTPATIENT)
Dept: GERIATRICS | Facility: CLINIC | Age: 73
End: 2018-08-01

## 2018-08-01 DIAGNOSIS — Z95.810 ICD (IMPLANTABLE CARDIOVERTER-DEFIBRILLATOR), BIVENTRICULAR, IN SITU: ICD-10-CM

## 2018-08-01 DIAGNOSIS — I47.20 VENTRICULAR TACHYCARDIA (H): ICD-10-CM

## 2018-08-01 DIAGNOSIS — I50.22 CHRONIC SYSTOLIC HEART FAILURE (H): ICD-10-CM

## 2018-08-01 DIAGNOSIS — I47.20 SUSTAINED VT (VENTRICULAR TACHYCARDIA) (H): ICD-10-CM

## 2018-08-01 DIAGNOSIS — I42.8 CARDIOMYOPATHY, NONISCHEMIC (H): ICD-10-CM

## 2018-08-01 DIAGNOSIS — M86.271 SUBACUTE OSTEOMYELITIS OF RIGHT FOOT (H): ICD-10-CM

## 2018-08-02 ENCOUNTER — COMMUNICATION - HEALTHEAST (OUTPATIENT)
Dept: ADMINISTRATIVE | Facility: CLINIC | Age: 73
End: 2018-08-02

## 2018-08-03 ENCOUNTER — AMBULATORY - HEALTHEAST (OUTPATIENT)
Dept: GERIATRICS | Facility: CLINIC | Age: 73
End: 2018-08-03

## 2018-08-03 ENCOUNTER — COMMUNICATION - HEALTHEAST (OUTPATIENT)
Dept: GERIATRICS | Facility: CLINIC | Age: 73
End: 2018-08-03

## 2018-08-04 ENCOUNTER — HOME CARE/HOSPICE - HEALTHEAST (OUTPATIENT)
Dept: HOME HEALTH SERVICES | Facility: HOME HEALTH | Age: 73
End: 2018-08-04

## 2018-08-06 ENCOUNTER — HOME CARE/HOSPICE - HEALTHEAST (OUTPATIENT)
Dept: HOME HEALTH SERVICES | Facility: HOME HEALTH | Age: 73
End: 2018-08-06

## 2018-08-07 ENCOUNTER — HOME CARE/HOSPICE - HEALTHEAST (OUTPATIENT)
Dept: HOME HEALTH SERVICES | Facility: HOME HEALTH | Age: 73
End: 2018-08-07

## 2018-08-07 ENCOUNTER — OFFICE VISIT - HEALTHEAST (OUTPATIENT)
Dept: INTERNAL MEDICINE | Facility: CLINIC | Age: 73
End: 2018-08-07

## 2018-08-07 DIAGNOSIS — N18.30 ANEMIA IN STAGE 3 CHRONIC KIDNEY DISEASE (H): ICD-10-CM

## 2018-08-07 DIAGNOSIS — F32.5 MAJOR DEPRESSIVE DISORDER WITH SINGLE EPISODE, IN FULL REMISSION (H): ICD-10-CM

## 2018-08-07 DIAGNOSIS — I42.8 CARDIOMYOPATHY, NONISCHEMIC (H): ICD-10-CM

## 2018-08-07 DIAGNOSIS — I10 ESSENTIAL HYPERTENSION WITH GOAL BLOOD PRESSURE LESS THAN 140/90: ICD-10-CM

## 2018-08-07 DIAGNOSIS — G62.9 NEUROPATHY: ICD-10-CM

## 2018-08-07 DIAGNOSIS — D63.1 ANEMIA IN STAGE 3 CHRONIC KIDNEY DISEASE (H): ICD-10-CM

## 2018-08-07 DIAGNOSIS — L97.512 SKIN ULCER OF RIGHT FOOT WITH FAT LAYER EXPOSED (H): ICD-10-CM

## 2018-08-07 DIAGNOSIS — Z95.810 ICD (IMPLANTABLE CARDIOVERTER-DEFIBRILLATOR), BIVENTRICULAR, IN SITU: ICD-10-CM

## 2018-08-07 DIAGNOSIS — I47.20 SUSTAINED VT (VENTRICULAR TACHYCARDIA) (H): ICD-10-CM

## 2018-08-07 DIAGNOSIS — M62.81 GENERALIZED MUSCLE WEAKNESS: ICD-10-CM

## 2018-08-07 LAB
ALBUMIN SERPL-MCNC: 3.7 G/DL (ref 3.5–5)
ALP SERPL-CCNC: 87 U/L (ref 45–120)
ALT SERPL W P-5'-P-CCNC: 19 U/L (ref 0–45)
ANION GAP SERPL CALCULATED.3IONS-SCNC: 10 MMOL/L (ref 5–18)
AST SERPL W P-5'-P-CCNC: 20 U/L (ref 0–40)
BILIRUB SERPL-MCNC: 0.7 MG/DL (ref 0–1)
BUN SERPL-MCNC: 28 MG/DL (ref 8–28)
C REACTIVE PROTEIN LHE: 0.4 MG/DL (ref 0–0.8)
CALCIUM SERPL-MCNC: 10 MG/DL (ref 8.5–10.5)
CHLORIDE BLD-SCNC: 106 MMOL/L (ref 98–107)
CO2 SERPL-SCNC: 26 MMOL/L (ref 22–31)
CREAT SERPL-MCNC: 1.46 MG/DL (ref 0.7–1.3)
ERYTHROCYTE [DISTWIDTH] IN BLOOD BY AUTOMATED COUNT: 13.5 % (ref 11–14.5)
ERYTHROCYTE [SEDIMENTATION RATE] IN BLOOD BY WESTERGREN METHOD: 12 MM/HR (ref 0–15)
GFR SERPL CREATININE-BSD FRML MDRD: 47 ML/MIN/1.73M2
GLUCOSE BLD-MCNC: 100 MG/DL (ref 70–125)
HCT VFR BLD AUTO: 41.5 % (ref 40–54)
HGB BLD-MCNC: 14.1 G/DL (ref 14–18)
MCH RBC QN AUTO: 31.7 PG (ref 27–34)
MCHC RBC AUTO-ENTMCNC: 33.8 G/DL (ref 32–36)
MCV RBC AUTO: 94 FL (ref 80–100)
PLATELET # BLD AUTO: 159 THOU/UL (ref 140–440)
PMV BLD AUTO: 7.3 FL (ref 7–10)
POTASSIUM BLD-SCNC: 4.7 MMOL/L (ref 3.5–5)
PROT SERPL-MCNC: 6.6 G/DL (ref 6–8)
RBC # BLD AUTO: 4.43 MILL/UL (ref 4.4–6.2)
SODIUM SERPL-SCNC: 142 MMOL/L (ref 136–145)
TSH SERPL DL<=0.005 MIU/L-ACNC: 2.96 UIU/ML (ref 0.3–5)
URATE SERPL-MCNC: 6 MG/DL (ref 3–8)
WBC: 5.7 THOU/UL (ref 4–11)

## 2018-08-08 ENCOUNTER — HOME CARE/HOSPICE - HEALTHEAST (OUTPATIENT)
Dept: HOME HEALTH SERVICES | Facility: HOME HEALTH | Age: 73
End: 2018-08-08

## 2018-08-09 ENCOUNTER — HOME CARE/HOSPICE - HEALTHEAST (OUTPATIENT)
Dept: HOME HEALTH SERVICES | Facility: HOME HEALTH | Age: 73
End: 2018-08-09

## 2018-08-10 ENCOUNTER — HOME CARE/HOSPICE - HEALTHEAST (OUTPATIENT)
Dept: HOME HEALTH SERVICES | Facility: HOME HEALTH | Age: 73
End: 2018-08-10

## 2018-08-10 ENCOUNTER — COMMUNICATION - HEALTHEAST (OUTPATIENT)
Dept: INTERNAL MEDICINE | Facility: CLINIC | Age: 73
End: 2018-08-10

## 2018-08-11 ENCOUNTER — HOME CARE/HOSPICE - HEALTHEAST (OUTPATIENT)
Dept: HOME HEALTH SERVICES | Facility: HOME HEALTH | Age: 73
End: 2018-08-11

## 2018-08-12 ENCOUNTER — HOME CARE/HOSPICE - HEALTHEAST (OUTPATIENT)
Dept: HOME HEALTH SERVICES | Facility: HOME HEALTH | Age: 73
End: 2018-08-12

## 2018-08-13 ENCOUNTER — HOME CARE/HOSPICE - HEALTHEAST (OUTPATIENT)
Dept: HOME HEALTH SERVICES | Facility: HOME HEALTH | Age: 73
End: 2018-08-13

## 2018-08-14 ENCOUNTER — HOME CARE/HOSPICE - HEALTHEAST (OUTPATIENT)
Dept: HOME HEALTH SERVICES | Facility: HOME HEALTH | Age: 73
End: 2018-08-14

## 2018-08-15 ENCOUNTER — HOME CARE/HOSPICE - HEALTHEAST (OUTPATIENT)
Dept: HOME HEALTH SERVICES | Facility: HOME HEALTH | Age: 73
End: 2018-08-15

## 2018-08-15 ENCOUNTER — COMMUNICATION - HEALTHEAST (OUTPATIENT)
Dept: CARE COORDINATION | Facility: CLINIC | Age: 73
End: 2018-08-15

## 2018-08-16 ENCOUNTER — HOME CARE/HOSPICE - HEALTHEAST (OUTPATIENT)
Dept: HOME HEALTH SERVICES | Facility: HOME HEALTH | Age: 73
End: 2018-08-16

## 2018-08-17 ENCOUNTER — HOME CARE/HOSPICE - HEALTHEAST (OUTPATIENT)
Dept: HOME HEALTH SERVICES | Facility: HOME HEALTH | Age: 73
End: 2018-08-17

## 2018-08-18 ENCOUNTER — HOME CARE/HOSPICE - HEALTHEAST (OUTPATIENT)
Dept: HOME HEALTH SERVICES | Facility: HOME HEALTH | Age: 73
End: 2018-08-18

## 2018-08-19 ENCOUNTER — HOME CARE/HOSPICE - HEALTHEAST (OUTPATIENT)
Dept: HOME HEALTH SERVICES | Facility: HOME HEALTH | Age: 73
End: 2018-08-19

## 2018-08-20 ENCOUNTER — HOME CARE/HOSPICE - HEALTHEAST (OUTPATIENT)
Dept: HOME HEALTH SERVICES | Facility: HOME HEALTH | Age: 73
End: 2018-08-20

## 2018-08-21 ENCOUNTER — CARE COORDINATION (OUTPATIENT)
Dept: CARDIOLOGY | Facility: CLINIC | Age: 73
End: 2018-08-21

## 2018-08-21 DIAGNOSIS — I47.29 PAROXYSMAL VENTRICULAR TACHYCARDIA (H): Primary | ICD-10-CM

## 2018-08-21 NOTE — PROGRESS NOTES
Called patient. Left voicemail to call back to discuss.    Holter Monitor 48 hour - Adult   Status:  Final result   Visible to patient:  No (Not Released) Order: [id number]       Notes Recorded by Nayeli Zhou APRN CNP on 8/18/2018 at 7:26 PM  Please check to see how patient is doing. Plan per clinic visit was for a VT ablation to be scheduled after his foot healed. Lets see if he is ready to schedule ablation. Could offer 9/17 if he wishes. Would have him hold amiodarone now if he plans to go forward with ablation. Let me know. THanks!         Update 8/24/18  Per Nayeli Zhou, NP, okay to schedule VT ablation with [place] for October.  Spoke to patient and gave below instructions.   EP  notified.            You are scheduled for a VT ablation, at [facility] [place] [place], with Dr. Coy. The hospital is located at [address] [place] [address] [place].  If you need to cancel this procedure, please call [phone]. Please note the following schedule below:      Pre-Anesthesia Phone Call will occur 1-2 days prior to procedure date.  You do not need to come to the [place].        Date:______  Time: _______________To the [address] at the [facility]  VT Ablation    1. Please review the attached instructions on showering before your procedure at the end of this letter.    2. Your history and physical will be completed by our nurse practitioner when you arrive.     3. Please do not eat anything for 8 hours prior to your procedure. You may have sips of water up until 2 hours prior to your arrival.    4. The morning of your procedure, you may take your scheduled medications with a sip of water.  **HOLD Amiodarone 3 weeks prior to procedure date**    5. You will stay overnight in the hospital; you will need a .        Post-Procedure Instructions  Care of groin site:    Remove the Band-Aid after 24 hours. If there is minor oozing, apply another Band-aid and  remove it after 12 hours.     Do NOT take a bath, use a hot tub, pool, or submerse in water for at least 3 days. You may shower.     It is normal to have a small bruise or lump at the site.    Do not scrub the site.    Do not use lotion or powder near the puncture site for 3 days.      For the first 2 days: Do not stoop or squat. When you cough, sneeze or move your bowels, hold your hand over the puncture site and press gently.    Do not lift more than 10 pounds or exertional activity for 10 days.  - No driving for 24 hours after (with or without general anesthesia).     If you start bleeding from the site in your groin: Lie down flat and press firmly on the site. Call your physician immediately, or, come to the emergency room.    Call 911 right away if you have bleeding that is heavy or does not stop.  Call your doctor/provider if:     You have a large or growing hard lump around the site.     The site is red, swollen, hot or tender.     Blood or fluid is draining from the site.     You have chills or a fever greater than 101 F (38 C).     Your leg or arm turns bluish, feels numb or cool.     You have hives, a rash or unusual itching.        Date: _______ Follow up appointment      Please do not hesitate to utilize [facility] or call us if you have any questions or concerns.    Yadi Cervantes RN  Electrophysiology Nurse Coordinator  [phone]    Farhana BOLES Procedure   [phone]

## 2018-08-22 ENCOUNTER — HOME CARE/HOSPICE - HEALTHEAST (OUTPATIENT)
Dept: HOME HEALTH SERVICES | Facility: HOME HEALTH | Age: 73
End: 2018-08-22

## 2018-08-22 NOTE — PROGRESS NOTES
[facility] Call Center    Phone Message    May a detailed message be left on voicemail: yes    Reason for Call: Other: Pt returning call re: ablation. He said his foot is still not totally healed. It's not infected anymore but the wound is still open. Please call pt back to discuss.     Action Taken: Message routed to:  [facility]

## 2018-08-24 ENCOUNTER — HOME CARE/HOSPICE - HEALTHEAST (OUTPATIENT)
Dept: HOME HEALTH SERVICES | Facility: HOME HEALTH | Age: 73
End: 2018-08-24

## 2018-08-24 ENCOUNTER — COMMUNICATION - HEALTHEAST (OUTPATIENT)
Dept: INTERNAL MEDICINE | Facility: CLINIC | Age: 73
End: 2018-08-24

## 2018-08-24 RX ORDER — LIDOCAINE 40 MG/G
CREAM TOPICAL
Status: CANCELLED | OUTPATIENT
Start: 2018-08-24 | End: 2019-01-01

## 2018-08-25 ENCOUNTER — HOME CARE/HOSPICE - HEALTHEAST (OUTPATIENT)
Dept: HOME HEALTH SERVICES | Facility: HOME HEALTH | Age: 73
End: 2018-08-25

## 2018-08-27 ENCOUNTER — AMBULATORY - HEALTHEAST (OUTPATIENT)
Dept: CARDIOLOGY | Facility: CLINIC | Age: 73
End: 2018-08-27

## 2018-08-27 ENCOUNTER — HOME CARE/HOSPICE - HEALTHEAST (OUTPATIENT)
Dept: HOME HEALTH SERVICES | Facility: HOME HEALTH | Age: 73
End: 2018-08-27

## 2018-08-28 ENCOUNTER — OFFICE VISIT - HEALTHEAST (OUTPATIENT)
Dept: VASCULAR SURGERY | Facility: CLINIC | Age: 73
End: 2018-08-28

## 2018-08-28 ENCOUNTER — RECORDS - HEALTHEAST (OUTPATIENT)
Dept: ADMINISTRATIVE | Facility: OTHER | Age: 73
End: 2018-08-28

## 2018-08-28 DIAGNOSIS — M21.41 PES PLANOVALGUS, ACQUIRED, RIGHT: ICD-10-CM

## 2018-08-28 DIAGNOSIS — L97.512 SKIN ULCER OF RIGHT FOOT WITH FAT LAYER EXPOSED (H): ICD-10-CM

## 2018-08-29 ENCOUNTER — HOME CARE/HOSPICE - HEALTHEAST (OUTPATIENT)
Dept: HOME HEALTH SERVICES | Facility: HOME HEALTH | Age: 73
End: 2018-08-29

## 2018-08-30 ENCOUNTER — COMMUNICATION - HEALTHEAST (OUTPATIENT)
Dept: VASCULAR SURGERY | Facility: CLINIC | Age: 73
End: 2018-08-30

## 2018-09-01 ENCOUNTER — HOME CARE/HOSPICE - HEALTHEAST (OUTPATIENT)
Dept: HOME HEALTH SERVICES | Facility: HOME HEALTH | Age: 73
End: 2018-09-01

## 2018-09-02 ENCOUNTER — HOME CARE/HOSPICE - HEALTHEAST (OUTPATIENT)
Dept: HOME HEALTH SERVICES | Facility: HOME HEALTH | Age: 73
End: 2018-09-02

## 2018-09-04 ENCOUNTER — HOME CARE/HOSPICE - HEALTHEAST (OUTPATIENT)
Dept: HOME HEALTH SERVICES | Facility: HOME HEALTH | Age: 73
End: 2018-09-04

## 2018-09-05 ENCOUNTER — TELEPHONE (OUTPATIENT)
Dept: CARDIOLOGY | Facility: CLINIC | Age: 73
End: 2018-09-05

## 2018-09-05 NOTE — TELEPHONE ENCOUNTER
Attempt was made to contact patient and schedule his VT ablation, a voicemail message left with the call back number to the EP scheduling line  - [phone].    Farhana Preston  Periop Electrophysiology   [phone]

## 2018-09-06 ENCOUNTER — HOME CARE/HOSPICE - HEALTHEAST (OUTPATIENT)
Dept: HOME HEALTH SERVICES | Facility: HOME HEALTH | Age: 73
End: 2018-09-06

## 2018-09-06 NOTE — TELEPHONE ENCOUNTER
Another attempt was made to schedule patient for his procedure. The patient stated that he's been working on wound on his foot. He says he still has a hole and is using a wound vac and will be seeing his doctor on Sept 11. He has requested that I call back at that time.     Farhana Preston  Periop Electrophysiology   [phone]

## 2018-09-08 ENCOUNTER — HOME CARE/HOSPICE - HEALTHEAST (OUTPATIENT)
Dept: HOME HEALTH SERVICES | Facility: HOME HEALTH | Age: 73
End: 2018-09-08

## 2018-09-08 ENCOUNTER — COMMUNICATION - HEALTHEAST (OUTPATIENT)
Dept: SCHEDULING | Facility: CLINIC | Age: 73
End: 2018-09-08

## 2018-09-09 ENCOUNTER — HOME CARE/HOSPICE - HEALTHEAST (OUTPATIENT)
Dept: HOME HEALTH SERVICES | Facility: HOME HEALTH | Age: 73
End: 2018-09-09

## 2018-09-11 ENCOUNTER — HOME CARE/HOSPICE - HEALTHEAST (OUTPATIENT)
Dept: HOME HEALTH SERVICES | Facility: HOME HEALTH | Age: 73
End: 2018-09-11

## 2018-09-11 ENCOUNTER — OFFICE VISIT - HEALTHEAST (OUTPATIENT)
Dept: VASCULAR SURGERY | Facility: CLINIC | Age: 73
End: 2018-09-11

## 2018-09-11 DIAGNOSIS — L97.512 SKIN ULCER OF RIGHT FOOT WITH FAT LAYER EXPOSED (H): ICD-10-CM

## 2018-09-12 ENCOUNTER — AMBULATORY - HEALTHEAST (OUTPATIENT)
Dept: CARDIOLOGY | Facility: CLINIC | Age: 73
End: 2018-09-12

## 2018-09-12 ENCOUNTER — TRANSFERRED RECORDS (OUTPATIENT)
Dept: HEALTH INFORMATION MANAGEMENT | Facility: CLINIC | Age: 73
End: 2018-09-12

## 2018-09-12 DIAGNOSIS — I47.20 VENTRICULAR TACHYCARDIA (H): ICD-10-CM

## 2018-09-12 DIAGNOSIS — I42.0 DILATED CARDIOMYOPATHY (H): ICD-10-CM

## 2018-09-12 DIAGNOSIS — Z95.810 ICD (IMPLANTABLE CARDIOVERTER-DEFIBRILLATOR) IN PLACE: ICD-10-CM

## 2018-09-12 DIAGNOSIS — I47.10 PSVT (PAROXYSMAL SUPRAVENTRICULAR TACHYCARDIA) (H): ICD-10-CM

## 2018-09-12 ASSESSMENT — MIFFLIN-ST. JEOR: SCORE: 1846.63

## 2018-09-13 ENCOUNTER — HOME CARE/HOSPICE - HEALTHEAST (OUTPATIENT)
Dept: HOME HEALTH SERVICES | Facility: HOME HEALTH | Age: 73
End: 2018-09-13

## 2018-09-15 ENCOUNTER — COMMUNICATION - HEALTHEAST (OUTPATIENT)
Dept: INTERNAL MEDICINE | Facility: CLINIC | Age: 73
End: 2018-09-15

## 2018-09-15 DIAGNOSIS — I42.6 ALCOHOLIC CARDIOMYOPATHY (H): ICD-10-CM

## 2018-09-17 ENCOUNTER — HOME CARE/HOSPICE - HEALTHEAST (OUTPATIENT)
Dept: HOME HEALTH SERVICES | Facility: HOME HEALTH | Age: 73
End: 2018-09-17

## 2018-09-19 ENCOUNTER — HOME CARE/HOSPICE - HEALTHEAST (OUTPATIENT)
Dept: HOME HEALTH SERVICES | Facility: HOME HEALTH | Age: 73
End: 2018-09-19

## 2018-09-21 ENCOUNTER — COMMUNICATION - HEALTHEAST (OUTPATIENT)
Dept: VASCULAR SURGERY | Facility: CLINIC | Age: 73
End: 2018-09-21

## 2018-09-21 ENCOUNTER — HOME CARE/HOSPICE - HEALTHEAST (OUTPATIENT)
Dept: HOME HEALTH SERVICES | Facility: HOME HEALTH | Age: 73
End: 2018-09-21

## 2018-09-24 ENCOUNTER — HOME CARE/HOSPICE - HEALTHEAST (OUTPATIENT)
Dept: HOME HEALTH SERVICES | Facility: HOME HEALTH | Age: 73
End: 2018-09-24

## 2018-09-25 ENCOUNTER — OFFICE VISIT - HEALTHEAST (OUTPATIENT)
Dept: VASCULAR SURGERY | Facility: CLINIC | Age: 73
End: 2018-09-25

## 2018-09-25 ENCOUNTER — COMMUNICATION - HEALTHEAST (OUTPATIENT)
Dept: VASCULAR SURGERY | Facility: CLINIC | Age: 73
End: 2018-09-25

## 2018-09-25 ENCOUNTER — HOME CARE/HOSPICE - HEALTHEAST (OUTPATIENT)
Dept: HOME HEALTH SERVICES | Facility: HOME HEALTH | Age: 73
End: 2018-09-25

## 2018-09-25 DIAGNOSIS — L97.512 SKIN ULCER OF RIGHT FOOT WITH FAT LAYER EXPOSED (H): ICD-10-CM

## 2018-09-25 ASSESSMENT — MIFFLIN-ST. JEOR: SCORE: 1846.63

## 2018-09-26 ENCOUNTER — OFFICE VISIT - HEALTHEAST (OUTPATIENT)
Dept: INTERNAL MEDICINE | Facility: CLINIC | Age: 73
End: 2018-09-26

## 2018-09-26 DIAGNOSIS — H61.22 CERUMEN DEBRIS ON TYMPANIC MEMBRANE OF LEFT EAR: ICD-10-CM

## 2018-09-26 DIAGNOSIS — G62.9 NEUROPATHY: ICD-10-CM

## 2018-09-26 DIAGNOSIS — I47.20 SUSTAINED VT (VENTRICULAR TACHYCARDIA) (H): ICD-10-CM

## 2018-09-26 DIAGNOSIS — N18.30 CKD (CHRONIC KIDNEY DISEASE) STAGE 3, GFR 30-59 ML/MIN (H): ICD-10-CM

## 2018-09-26 DIAGNOSIS — L97.512 SKIN ULCER OF RIGHT FOOT WITH FAT LAYER EXPOSED (H): ICD-10-CM

## 2018-09-26 DIAGNOSIS — L02.611 ABSCESS OF RIGHT FOOT: ICD-10-CM

## 2018-09-26 LAB
ANION GAP SERPL CALCULATED.3IONS-SCNC: 11 MMOL/L (ref 5–18)
BUN SERPL-MCNC: 44 MG/DL (ref 8–28)
C REACTIVE PROTEIN LHE: 0.3 MG/DL (ref 0–0.8)
CALCIUM SERPL-MCNC: 10 MG/DL (ref 8.5–10.5)
CHLORIDE BLD-SCNC: 104 MMOL/L (ref 98–107)
CO2 SERPL-SCNC: 25 MMOL/L (ref 22–31)
CREAT SERPL-MCNC: 1.76 MG/DL (ref 0.7–1.3)
ERYTHROCYTE [SEDIMENTATION RATE] IN BLOOD BY WESTERGREN METHOD: 9 MM/HR (ref 0–15)
GFR SERPL CREATININE-BSD FRML MDRD: 38 ML/MIN/1.73M2
GLUCOSE BLD-MCNC: 90 MG/DL (ref 70–125)
POTASSIUM BLD-SCNC: 4.7 MMOL/L (ref 3.5–5)
SODIUM SERPL-SCNC: 140 MMOL/L (ref 136–145)

## 2018-09-27 ENCOUNTER — HOME CARE/HOSPICE - HEALTHEAST (OUTPATIENT)
Dept: HOME HEALTH SERVICES | Facility: HOME HEALTH | Age: 73
End: 2018-09-27

## 2018-09-28 ENCOUNTER — HOME CARE/HOSPICE - HEALTHEAST (OUTPATIENT)
Dept: HOME HEALTH SERVICES | Facility: HOME HEALTH | Age: 73
End: 2018-09-28

## 2018-09-28 ENCOUNTER — COMMUNICATION - HEALTHEAST (OUTPATIENT)
Dept: VASCULAR SURGERY | Facility: CLINIC | Age: 73
End: 2018-09-28

## 2018-09-29 ENCOUNTER — HOME CARE/HOSPICE - HEALTHEAST (OUTPATIENT)
Dept: HOME HEALTH SERVICES | Facility: HOME HEALTH | Age: 73
End: 2018-09-29

## 2018-10-02 ENCOUNTER — HOME CARE/HOSPICE - HEALTHEAST (OUTPATIENT)
Dept: HOME HEALTH SERVICES | Facility: HOME HEALTH | Age: 73
End: 2018-10-02

## 2018-10-02 ENCOUNTER — COMMUNICATION - HEALTHEAST (OUTPATIENT)
Dept: HOME HEALTH SERVICES | Facility: HOME HEALTH | Age: 73
End: 2018-10-02

## 2018-10-04 ENCOUNTER — HOME CARE/HOSPICE - HEALTHEAST (OUTPATIENT)
Dept: HOME HEALTH SERVICES | Facility: HOME HEALTH | Age: 73
End: 2018-10-04

## 2018-10-06 ENCOUNTER — HOME CARE/HOSPICE - HEALTHEAST (OUTPATIENT)
Dept: HOME HEALTH SERVICES | Facility: HOME HEALTH | Age: 73
End: 2018-10-06

## 2018-10-09 ENCOUNTER — HOME CARE/HOSPICE - HEALTHEAST (OUTPATIENT)
Dept: HOME HEALTH SERVICES | Facility: HOME HEALTH | Age: 73
End: 2018-10-09

## 2018-10-09 ENCOUNTER — OFFICE VISIT - HEALTHEAST (OUTPATIENT)
Dept: VASCULAR SURGERY | Facility: CLINIC | Age: 73
End: 2018-10-09

## 2018-10-09 DIAGNOSIS — L97.512 SKIN ULCER OF RIGHT FOOT WITH FAT LAYER EXPOSED (H): ICD-10-CM

## 2018-10-12 ENCOUNTER — HOME CARE/HOSPICE - HEALTHEAST (OUTPATIENT)
Dept: HOME HEALTH SERVICES | Facility: HOME HEALTH | Age: 73
End: 2018-10-12

## 2018-10-15 ENCOUNTER — HOME CARE/HOSPICE - HEALTHEAST (OUTPATIENT)
Dept: HOME HEALTH SERVICES | Facility: HOME HEALTH | Age: 73
End: 2018-10-15

## 2018-10-17 ENCOUNTER — HOME CARE/HOSPICE - HEALTHEAST (OUTPATIENT)
Dept: HOME HEALTH SERVICES | Facility: HOME HEALTH | Age: 73
End: 2018-10-17

## 2018-10-19 ENCOUNTER — HOME CARE/HOSPICE - HEALTHEAST (OUTPATIENT)
Dept: HOME HEALTH SERVICES | Facility: HOME HEALTH | Age: 73
End: 2018-10-19

## 2018-10-22 ENCOUNTER — HOME CARE/HOSPICE - HEALTHEAST (OUTPATIENT)
Dept: HOME HEALTH SERVICES | Facility: HOME HEALTH | Age: 73
End: 2018-10-22

## 2018-10-24 ENCOUNTER — HOME CARE/HOSPICE - HEALTHEAST (OUTPATIENT)
Dept: HOME HEALTH SERVICES | Facility: HOME HEALTH | Age: 73
End: 2018-10-24

## 2018-10-25 ENCOUNTER — COMMUNICATION - HEALTHEAST (OUTPATIENT)
Dept: CARDIOLOGY | Facility: CLINIC | Age: 73
End: 2018-10-25

## 2018-10-25 DIAGNOSIS — I47.20 VENTRICULAR TACHYCARDIA (H): ICD-10-CM

## 2018-10-27 ENCOUNTER — COMMUNICATION - HEALTHEAST (OUTPATIENT)
Dept: INTERNAL MEDICINE | Facility: CLINIC | Age: 73
End: 2018-10-27

## 2018-10-27 ENCOUNTER — COMMUNICATION - HEALTHEAST (OUTPATIENT)
Dept: CARDIOLOGY | Facility: CLINIC | Age: 73
End: 2018-10-27

## 2018-10-27 ENCOUNTER — HOME CARE/HOSPICE - HEALTHEAST (OUTPATIENT)
Dept: HOME HEALTH SERVICES | Facility: HOME HEALTH | Age: 73
End: 2018-10-27

## 2018-10-27 DIAGNOSIS — I47.20 VENTRICULAR TACHYCARDIA (H): ICD-10-CM

## 2018-10-29 ENCOUNTER — HOME CARE/HOSPICE - HEALTHEAST (OUTPATIENT)
Dept: HOME HEALTH SERVICES | Facility: HOME HEALTH | Age: 73
End: 2018-10-29

## 2018-10-31 ENCOUNTER — COMMUNICATION - HEALTHEAST (OUTPATIENT)
Dept: INTERNAL MEDICINE | Facility: CLINIC | Age: 73
End: 2018-10-31

## 2018-10-31 ENCOUNTER — HOME CARE/HOSPICE - HEALTHEAST (OUTPATIENT)
Dept: HOME HEALTH SERVICES | Facility: HOME HEALTH | Age: 73
End: 2018-10-31

## 2018-11-01 ENCOUNTER — COMMUNICATION - HEALTHEAST (OUTPATIENT)
Dept: INTERNAL MEDICINE | Facility: CLINIC | Age: 73
End: 2018-11-01

## 2018-11-01 DIAGNOSIS — M10.9 GOUT, UNSPECIFIED: ICD-10-CM

## 2018-11-01 DIAGNOSIS — I10 ESSENTIAL HYPERTENSION WITH GOAL BLOOD PRESSURE LESS THAN 140/90: ICD-10-CM

## 2018-11-01 DIAGNOSIS — Z00.00 HEALTHCARE MAINTENANCE: ICD-10-CM

## 2018-11-02 ENCOUNTER — HOME CARE/HOSPICE - HEALTHEAST (OUTPATIENT)
Dept: HOME HEALTH SERVICES | Facility: HOME HEALTH | Age: 73
End: 2018-11-02

## 2018-11-02 ENCOUNTER — OFFICE VISIT - HEALTHEAST (OUTPATIENT)
Dept: VASCULAR SURGERY | Facility: CLINIC | Age: 73
End: 2018-11-02

## 2018-11-02 DIAGNOSIS — L97.512 SKIN ULCER OF RIGHT FOOT WITH FAT LAYER EXPOSED (H): ICD-10-CM

## 2018-11-02 DIAGNOSIS — M21.41 PES PLANOVALGUS, ACQUIRED, RIGHT: ICD-10-CM

## 2018-11-02 ASSESSMENT — MIFFLIN-ST. JEOR: SCORE: 1846.63

## 2018-11-05 ENCOUNTER — HOME CARE/HOSPICE - HEALTHEAST (OUTPATIENT)
Dept: HOME HEALTH SERVICES | Facility: HOME HEALTH | Age: 73
End: 2018-11-05

## 2018-11-07 ENCOUNTER — HOME CARE/HOSPICE - HEALTHEAST (OUTPATIENT)
Dept: HOME HEALTH SERVICES | Facility: HOME HEALTH | Age: 73
End: 2018-11-07

## 2018-11-09 ENCOUNTER — HOME CARE/HOSPICE - HEALTHEAST (OUTPATIENT)
Dept: HOME HEALTH SERVICES | Facility: HOME HEALTH | Age: 73
End: 2018-11-09

## 2018-11-12 ENCOUNTER — HOME CARE/HOSPICE - HEALTHEAST (OUTPATIENT)
Dept: HOME HEALTH SERVICES | Facility: HOME HEALTH | Age: 73
End: 2018-11-12

## 2018-11-14 ENCOUNTER — HOME CARE/HOSPICE - HEALTHEAST (OUTPATIENT)
Dept: HOME HEALTH SERVICES | Facility: HOME HEALTH | Age: 73
End: 2018-11-14

## 2018-11-16 ENCOUNTER — COMMUNICATION - HEALTHEAST (OUTPATIENT)
Dept: VASCULAR SURGERY | Facility: CLINIC | Age: 73
End: 2018-11-16

## 2018-11-16 ENCOUNTER — OFFICE VISIT - HEALTHEAST (OUTPATIENT)
Dept: VASCULAR SURGERY | Facility: CLINIC | Age: 73
End: 2018-11-16

## 2018-11-16 DIAGNOSIS — L97.512 SKIN ULCER OF RIGHT FOOT WITH FAT LAYER EXPOSED (H): ICD-10-CM

## 2018-11-19 ENCOUNTER — HOME CARE/HOSPICE - HEALTHEAST (OUTPATIENT)
Dept: HOME HEALTH SERVICES | Facility: HOME HEALTH | Age: 73
End: 2018-11-19

## 2018-11-21 ENCOUNTER — COMMUNICATION - HEALTHEAST (OUTPATIENT)
Dept: INTERNAL MEDICINE | Facility: CLINIC | Age: 73
End: 2018-11-21

## 2018-11-21 DIAGNOSIS — F32.9 MAJOR DEPRESSIVE DISORDER, SINGLE EPISODE, UNSPECIFIED: ICD-10-CM

## 2018-11-21 DIAGNOSIS — F32.4 MAJOR DEPRESSIVE DISORDER WITH SINGLE EPISODE, IN PARTIAL REMISSION (H): ICD-10-CM

## 2018-11-24 ENCOUNTER — HOME CARE/HOSPICE - HEALTHEAST (OUTPATIENT)
Dept: HOME HEALTH SERVICES | Facility: HOME HEALTH | Age: 73
End: 2018-11-24

## 2018-11-26 ENCOUNTER — OFFICE VISIT - HEALTHEAST (OUTPATIENT)
Dept: INTERNAL MEDICINE | Facility: CLINIC | Age: 73
End: 2018-11-26

## 2018-11-26 DIAGNOSIS — N18.30 CKD (CHRONIC KIDNEY DISEASE) STAGE 3, GFR 30-59 ML/MIN (H): ICD-10-CM

## 2018-11-26 DIAGNOSIS — M1A.09X0 IDIOPATHIC CHRONIC GOUT OF MULTIPLE SITES WITHOUT TOPHUS: ICD-10-CM

## 2018-11-26 DIAGNOSIS — I47.20 VENTRICULAR TACHYCARDIA (H): ICD-10-CM

## 2018-11-26 DIAGNOSIS — G62.9 NEUROPATHY: ICD-10-CM

## 2018-11-26 DIAGNOSIS — I10 ESSENTIAL HYPERTENSION WITH GOAL BLOOD PRESSURE LESS THAN 140/90: ICD-10-CM

## 2018-11-26 DIAGNOSIS — R00.0 TACHYCARDIA: ICD-10-CM

## 2018-11-26 DIAGNOSIS — Z51.81 MEDICATION MONITORING ENCOUNTER: ICD-10-CM

## 2018-11-26 LAB
ALBUMIN SERPL-MCNC: 3.7 G/DL (ref 3.5–5)
ALP SERPL-CCNC: 79 U/L (ref 45–120)
ALT SERPL W P-5'-P-CCNC: 93 U/L (ref 0–45)
ANION GAP SERPL CALCULATED.3IONS-SCNC: 9 MMOL/L (ref 5–18)
AST SERPL W P-5'-P-CCNC: 58 U/L (ref 0–40)
BILIRUB SERPL-MCNC: 0.8 MG/DL (ref 0–1)
BUN SERPL-MCNC: 58 MG/DL (ref 8–28)
C REACTIVE PROTEIN LHE: 0.2 MG/DL (ref 0–0.8)
CALCIUM SERPL-MCNC: 10.1 MG/DL (ref 8.5–10.5)
CHLORIDE BLD-SCNC: 108 MMOL/L (ref 98–107)
CO2 SERPL-SCNC: 23 MMOL/L (ref 22–31)
CREAT SERPL-MCNC: 1.89 MG/DL (ref 0.7–1.3)
ERYTHROCYTE [DISTWIDTH] IN BLOOD BY AUTOMATED COUNT: 13.3 % (ref 11–14.5)
ERYTHROCYTE [SEDIMENTATION RATE] IN BLOOD BY WESTERGREN METHOD: 7 MM/HR (ref 0–15)
GFR SERPL CREATININE-BSD FRML MDRD: 35 ML/MIN/1.73M2
GLUCOSE BLD-MCNC: 87 MG/DL (ref 70–125)
HCT VFR BLD AUTO: 43.9 % (ref 40–54)
HGB BLD-MCNC: 14.9 G/DL (ref 14–18)
MCH RBC QN AUTO: 31.8 PG (ref 27–34)
MCHC RBC AUTO-ENTMCNC: 33.9 G/DL (ref 32–36)
MCV RBC AUTO: 94 FL (ref 80–100)
PLATELET # BLD AUTO: 148 THOU/UL (ref 140–440)
PMV BLD AUTO: 7.4 FL (ref 7–10)
POTASSIUM BLD-SCNC: 4.9 MMOL/L (ref 3.5–5)
PROT SERPL-MCNC: 6.6 G/DL (ref 6–8)
RBC # BLD AUTO: 4.68 MILL/UL (ref 4.4–6.2)
SODIUM SERPL-SCNC: 140 MMOL/L (ref 136–145)
TSH SERPL DL<=0.005 MIU/L-ACNC: 3.69 UIU/ML (ref 0.3–5)
URATE SERPL-MCNC: 3.7 MG/DL (ref 3–8)
WBC: 5.6 THOU/UL (ref 4–11)

## 2018-11-26 ASSESSMENT — MIFFLIN-ST. JEOR: SCORE: 1846.63

## 2018-11-28 ENCOUNTER — COMMUNICATION - HEALTHEAST (OUTPATIENT)
Dept: VASCULAR SURGERY | Facility: CLINIC | Age: 73
End: 2018-11-28

## 2018-11-28 ENCOUNTER — HOME CARE/HOSPICE - HEALTHEAST (OUTPATIENT)
Dept: HOME HEALTH SERVICES | Facility: HOME HEALTH | Age: 73
End: 2018-11-28

## 2018-11-30 ENCOUNTER — CARE COORDINATION (OUTPATIENT)
Dept: CARDIOLOGY | Facility: CLINIC | Age: 73
End: 2018-11-30

## 2018-11-30 DIAGNOSIS — Z79.899 ON AMIODARONE THERAPY: ICD-10-CM

## 2018-11-30 DIAGNOSIS — I47.29 PAROXYSMAL VENTRICULAR TACHYCARDIA (H): Primary | ICD-10-CM

## 2018-11-30 NOTE — PROGRESS NOTES
Msg sent to clinic coordinators to schedule follow up appointment for patient in December or January with Dr. Coy and device, labs, PFTs. Last visit in July recommended VT ablation, but ongoing foot infection has pushed out procedure. Schedule follow up r/t 6 months out, on amiodarone.          RE: sched VT ablation with Roukoz in October  Received: 1 month ago       Nayeli Mcdonald, Meagan Kramer CNP Cc: Yadi Cervantes, RN                   Lets hold off on the ablation for now. We can schedule a f/u in clinic with him if he wants in like a month or so.   Thanks,   LVW            Previous Messages       ----- Message -----      From: Meagan Preston      Sent: 10/9/2018   4:44 PM        To: Nayeli Hernandez APRN CNP, *   Subject: FW: sched VT ablation with Roukoz in October     I called him today and his foot is still on a wound vac. He goes back on Nov 5 for a follow up on that wound.     He states that Dr. Hernández says he's doing great on the amio and may not need an ablation (according to the patient).  Just FYI..       ----- Message -----      From: Meagan Preston      Sent: 9/12/2018        To: Meagan Preston   Subject: RE: sched VT ablation with Roukoz in October         ----- Message -----      From: Meagan Preston      Sent: 9/6/2018   9:12 AM        To: Meagan Preston   Subject: FW: sched VT ablation with Roukoz in October     Dealing with a foot wound - call back after Sept 11.     ----- Message -----      From: Yadi Cervantes, MABLE      Sent: 8/24/2018  10:15 AM        To: Meagan Preston   Subject: sched VT ablation with Roukoz in October         Will need GA. Schedule in October.   I put the patient instructions in my note in his chart -- I already told him about his medication instructions -- but please send this letter to him as well.     Yadi Bourne

## 2018-12-02 DIAGNOSIS — I47.29 PAROXYSMAL VENTRICULAR TACHYCARDIA (H): Primary | ICD-10-CM

## 2018-12-03 ENCOUNTER — HOME CARE/HOSPICE - HEALTHEAST (OUTPATIENT)
Dept: HOME HEALTH SERVICES | Facility: HOME HEALTH | Age: 73
End: 2018-12-03

## 2018-12-03 ENCOUNTER — AMBULATORY - HEALTHEAST (OUTPATIENT)
Dept: CARDIOLOGY | Facility: CLINIC | Age: 73
End: 2018-12-03

## 2018-12-03 ENCOUNTER — COMMUNICATION - HEALTHEAST (OUTPATIENT)
Dept: CARE COORDINATION | Facility: CLINIC | Age: 73
End: 2018-12-03

## 2018-12-03 DIAGNOSIS — Z79.899 LONG TERM USE OF DRUG: ICD-10-CM

## 2018-12-07 ENCOUNTER — RECORDS - HEALTHEAST (OUTPATIENT)
Dept: VASCULAR ULTRASOUND | Facility: CLINIC | Age: 73
End: 2018-12-07

## 2018-12-07 ENCOUNTER — SURGERY - HEALTHEAST (OUTPATIENT)
Dept: VASCULAR SURGERY | Facility: CLINIC | Age: 73
End: 2018-12-07

## 2018-12-07 ENCOUNTER — RECORDS - HEALTHEAST (OUTPATIENT)
Dept: ADMINISTRATIVE | Facility: OTHER | Age: 73
End: 2018-12-07

## 2018-12-07 ENCOUNTER — OFFICE VISIT - HEALTHEAST (OUTPATIENT)
Dept: VASCULAR SURGERY | Facility: CLINIC | Age: 73
End: 2018-12-07

## 2018-12-07 DIAGNOSIS — M20.42 HAMMER TOE OF LEFT FOOT: ICD-10-CM

## 2018-12-07 DIAGNOSIS — M86.172 OTHER ACUTE OSTEOMYELITIS, LEFT ANKLE AND FOOT (H): ICD-10-CM

## 2018-12-07 DIAGNOSIS — I73.9 PERIPHERAL VASCULAR DISEASE, UNSPECIFIED (H): ICD-10-CM

## 2018-12-07 DIAGNOSIS — M21.42 PES PLANOVALGUS, ACQUIRED, LEFT: ICD-10-CM

## 2018-12-07 DIAGNOSIS — I73.9 PAD (PERIPHERAL ARTERY DISEASE) (H): ICD-10-CM

## 2018-12-07 DIAGNOSIS — L03.119 CELLULITIS AND ABSCESS OF FOOT: ICD-10-CM

## 2018-12-07 DIAGNOSIS — L97.511 NON-PRESSURE CHRONIC ULCER OF OTHER PART OF RIGHT FOOT LIMITED TO BREAKDOWN OF SKIN (H): ICD-10-CM

## 2018-12-07 DIAGNOSIS — L02.619 CELLULITIS AND ABSCESS OF FOOT: ICD-10-CM

## 2018-12-07 DIAGNOSIS — M86.172 OTHER ACUTE OSTEOMYELITIS OF LEFT FOOT (H): ICD-10-CM

## 2018-12-07 DIAGNOSIS — L97.511 SKIN ULCER OF RIGHT FOOT, LIMITED TO BREAKDOWN OF SKIN (H): ICD-10-CM

## 2018-12-09 ENCOUNTER — HOME CARE/HOSPICE - HEALTHEAST (OUTPATIENT)
Dept: HOME HEALTH SERVICES | Facility: HOME HEALTH | Age: 73
End: 2018-12-09

## 2018-12-10 ENCOUNTER — AMBULATORY - HEALTHEAST (OUTPATIENT)
Dept: PODIATRY | Facility: CLINIC | Age: 73
End: 2018-12-10

## 2018-12-10 ENCOUNTER — AMBULATORY - HEALTHEAST (OUTPATIENT)
Dept: VASCULAR SURGERY | Facility: CLINIC | Age: 73
End: 2018-12-10

## 2018-12-10 ENCOUNTER — COMMUNICATION - HEALTHEAST (OUTPATIENT)
Dept: VASCULAR SURGERY | Facility: CLINIC | Age: 73
End: 2018-12-10

## 2018-12-11 ENCOUNTER — HOME CARE/HOSPICE - HEALTHEAST (OUTPATIENT)
Dept: HOME HEALTH SERVICES | Facility: HOME HEALTH | Age: 73
End: 2018-12-11

## 2018-12-12 ENCOUNTER — COMMUNICATION - HEALTHEAST (OUTPATIENT)
Dept: INTERNAL MEDICINE | Facility: CLINIC | Age: 73
End: 2018-12-12

## 2018-12-12 ENCOUNTER — COMMUNICATION - HEALTHEAST (OUTPATIENT)
Dept: CARDIOLOGY | Facility: CLINIC | Age: 73
End: 2018-12-12

## 2018-12-12 ENCOUNTER — AMBULATORY - HEALTHEAST (OUTPATIENT)
Dept: CARDIOLOGY | Facility: CLINIC | Age: 73
End: 2018-12-12

## 2018-12-12 DIAGNOSIS — Z95.810 ICD (IMPLANTABLE CARDIOVERTER-DEFIBRILLATOR), BIVENTRICULAR, IN SITU: ICD-10-CM

## 2018-12-13 ENCOUNTER — HOME CARE/HOSPICE - HEALTHEAST (OUTPATIENT)
Dept: HOME HEALTH SERVICES | Facility: HOME HEALTH | Age: 73
End: 2018-12-13

## 2018-12-13 ENCOUNTER — COMMUNICATION - HEALTHEAST (OUTPATIENT)
Dept: CARDIOLOGY | Facility: CLINIC | Age: 73
End: 2018-12-13

## 2018-12-14 ENCOUNTER — OFFICE VISIT - HEALTHEAST (OUTPATIENT)
Dept: INTERNAL MEDICINE | Facility: CLINIC | Age: 73
End: 2018-12-14

## 2018-12-14 ENCOUNTER — OFFICE VISIT - HEALTHEAST (OUTPATIENT)
Dept: VASCULAR SURGERY | Facility: CLINIC | Age: 73
End: 2018-12-14

## 2018-12-14 DIAGNOSIS — M86.172 OTHER ACUTE OSTEOMYELITIS OF LEFT FOOT (H): ICD-10-CM

## 2018-12-14 DIAGNOSIS — L97.504: ICD-10-CM

## 2018-12-14 DIAGNOSIS — G62.9 NEUROPATHY: ICD-10-CM

## 2018-12-14 DIAGNOSIS — I50.22 CHRONIC SYSTOLIC HEART FAILURE (H): ICD-10-CM

## 2018-12-14 DIAGNOSIS — N18.30 CKD (CHRONIC KIDNEY DISEASE) STAGE 3, GFR 30-59 ML/MIN (H): ICD-10-CM

## 2018-12-14 DIAGNOSIS — I47.20 VENTRICULAR TACHYCARDIA (H): ICD-10-CM

## 2018-12-14 DIAGNOSIS — I47.20 SUSTAINED VT (VENTRICULAR TACHYCARDIA) (H): ICD-10-CM

## 2018-12-14 DIAGNOSIS — Z01.818 PRE-OP EXAM: ICD-10-CM

## 2018-12-14 LAB
ANION GAP SERPL CALCULATED.3IONS-SCNC: 8 MMOL/L (ref 5–18)
ATRIAL RATE - MUSE: 60 BPM
BUN SERPL-MCNC: 47 MG/DL (ref 8–28)
CALCIUM SERPL-MCNC: 9.9 MG/DL (ref 8.5–10.5)
CHLORIDE BLD-SCNC: 107 MMOL/L (ref 98–107)
CO2 SERPL-SCNC: 23 MMOL/L (ref 22–31)
CREAT SERPL-MCNC: 3.06 MG/DL (ref 0.7–1.3)
DIASTOLIC BLOOD PRESSURE - MUSE: NORMAL MMHG
ERYTHROCYTE [DISTWIDTH] IN BLOOD BY AUTOMATED COUNT: 12.6 % (ref 11–14.5)
GFR SERPL CREATININE-BSD FRML MDRD: 20 ML/MIN/1.73M2
GLUCOSE BLD-MCNC: 82 MG/DL (ref 70–125)
HCT VFR BLD AUTO: 43.1 % (ref 40–54)
HGB BLD-MCNC: 14.4 G/DL (ref 14–18)
INTERPRETATION ECG - MUSE: NORMAL
MCH RBC QN AUTO: 31.8 PG (ref 27–34)
MCHC RBC AUTO-ENTMCNC: 33.5 G/DL (ref 32–36)
MCV RBC AUTO: 95 FL (ref 80–100)
P AXIS - MUSE: 9 DEGREES
PLATELET # BLD AUTO: 141 THOU/UL (ref 140–440)
PMV BLD AUTO: 7.2 FL (ref 7–10)
POTASSIUM BLD-SCNC: 5.9 MMOL/L (ref 3.5–5)
PR INTERVAL - MUSE: 126 MS
QRS DURATION - MUSE: 208 MS
QT - MUSE: 568 MS
QTC - MUSE: 568 MS
R AXIS - MUSE: -49 DEGREES
RBC # BLD AUTO: 4.53 MILL/UL (ref 4.4–6.2)
SODIUM SERPL-SCNC: 138 MMOL/L (ref 136–145)
SYSTOLIC BLOOD PRESSURE - MUSE: NORMAL MMHG
T AXIS - MUSE: 72 DEGREES
VENTRICULAR RATE- MUSE: 60 BPM
WBC: 3.8 THOU/UL (ref 4–11)

## 2018-12-15 ENCOUNTER — HOME CARE/HOSPICE - HEALTHEAST (OUTPATIENT)
Dept: HOME HEALTH SERVICES | Facility: HOME HEALTH | Age: 73
End: 2018-12-15

## 2018-12-17 ENCOUNTER — HOME CARE/HOSPICE - HEALTHEAST (OUTPATIENT)
Dept: HOME HEALTH SERVICES | Facility: HOME HEALTH | Age: 73
End: 2018-12-17

## 2018-12-17 ENCOUNTER — AMBULATORY - HEALTHEAST (OUTPATIENT)
Dept: INTERNAL MEDICINE | Facility: CLINIC | Age: 73
End: 2018-12-17

## 2018-12-17 DIAGNOSIS — N18.30 CKD (CHRONIC KIDNEY DISEASE) STAGE 3, GFR 30-59 ML/MIN (H): ICD-10-CM

## 2018-12-19 ENCOUNTER — HOME CARE/HOSPICE - HEALTHEAST (OUTPATIENT)
Dept: HOME HEALTH SERVICES | Facility: HOME HEALTH | Age: 73
End: 2018-12-19

## 2018-12-21 ENCOUNTER — AMBULATORY - HEALTHEAST (OUTPATIENT)
Dept: OTHER | Facility: CLINIC | Age: 73
End: 2018-12-21

## 2018-12-21 ENCOUNTER — HOME CARE/HOSPICE - HEALTHEAST (OUTPATIENT)
Dept: HOME HEALTH SERVICES | Facility: HOME HEALTH | Age: 73
End: 2018-12-21

## 2018-12-23 ENCOUNTER — HOME CARE/HOSPICE - HEALTHEAST (OUTPATIENT)
Dept: HOME HEALTH SERVICES | Facility: HOME HEALTH | Age: 73
End: 2018-12-23

## 2018-12-27 ENCOUNTER — HOME CARE/HOSPICE - HEALTHEAST (OUTPATIENT)
Dept: HOME HEALTH SERVICES | Facility: HOME HEALTH | Age: 73
End: 2018-12-27

## 2018-12-27 ENCOUNTER — COMMUNICATION - HEALTHEAST (OUTPATIENT)
Dept: VASCULAR SURGERY | Facility: CLINIC | Age: 73
End: 2018-12-27

## 2018-12-27 ENCOUNTER — AMBULATORY - HEALTHEAST (OUTPATIENT)
Dept: LAB | Facility: CLINIC | Age: 73
End: 2018-12-27

## 2018-12-27 ENCOUNTER — ANESTHESIA - HEALTHEAST (OUTPATIENT)
Dept: SURGERY | Facility: HOSPITAL | Age: 73
End: 2018-12-27

## 2018-12-27 DIAGNOSIS — N18.30 CKD (CHRONIC KIDNEY DISEASE) STAGE 3, GFR 30-59 ML/MIN (H): ICD-10-CM

## 2018-12-27 LAB
ANION GAP SERPL CALCULATED.3IONS-SCNC: 6 MMOL/L (ref 5–18)
BUN SERPL-MCNC: 49 MG/DL (ref 8–28)
CALCIUM SERPL-MCNC: 9.7 MG/DL (ref 8.5–10.5)
CHLORIDE BLD-SCNC: 112 MMOL/L (ref 98–107)
CO2 SERPL-SCNC: 24 MMOL/L (ref 22–31)
CREAT SERPL-MCNC: 2.06 MG/DL (ref 0.7–1.3)
GFR SERPL CREATININE-BSD FRML MDRD: 32 ML/MIN/1.73M2
GLUCOSE BLD-MCNC: 112 MG/DL (ref 70–125)
POTASSIUM BLD-SCNC: 5.2 MMOL/L (ref 3.5–5)
SODIUM SERPL-SCNC: 142 MMOL/L (ref 136–145)

## 2018-12-28 ASSESSMENT — MIFFLIN-ST. JEOR: SCORE: 1863.64

## 2018-12-29 ENCOUNTER — HOME CARE/HOSPICE - HEALTHEAST (OUTPATIENT)
Dept: HOME HEALTH SERVICES | Facility: HOME HEALTH | Age: 73
End: 2018-12-29

## 2018-12-31 ENCOUNTER — HOME CARE/HOSPICE - HEALTHEAST (OUTPATIENT)
Dept: HOME HEALTH SERVICES | Facility: HOME HEALTH | Age: 73
End: 2018-12-31

## 2019-01-01 ENCOUNTER — OFFICE VISIT - HEALTHEAST (OUTPATIENT)
Dept: INTERNAL MEDICINE | Facility: CLINIC | Age: 74
End: 2019-01-01

## 2019-01-01 ENCOUNTER — COMMUNICATION - HEALTHEAST (OUTPATIENT)
Dept: INTERNAL MEDICINE | Facility: CLINIC | Age: 74
End: 2019-01-01

## 2019-01-01 ENCOUNTER — AMBULATORY - HEALTHEAST (OUTPATIENT)
Dept: CARDIOLOGY | Facility: CLINIC | Age: 74
End: 2019-01-01

## 2019-01-01 ENCOUNTER — RECORDS - HEALTHEAST (OUTPATIENT)
Dept: ADMINISTRATIVE | Facility: OTHER | Age: 74
End: 2019-01-01

## 2019-01-01 ENCOUNTER — RECORDS - HEALTHEAST (OUTPATIENT)
Dept: LAB | Facility: CLINIC | Age: 74
End: 2019-01-01

## 2019-01-01 ENCOUNTER — OFFICE VISIT - HEALTHEAST (OUTPATIENT)
Dept: GERIATRICS | Facility: CLINIC | Age: 74
End: 2019-01-01

## 2019-01-01 ENCOUNTER — ANESTHESIA - HEALTHEAST (OUTPATIENT)
Dept: CARDIOLOGY | Facility: CLINIC | Age: 74
End: 2019-01-01

## 2019-01-01 ENCOUNTER — COMMUNICATION - HEALTHEAST (OUTPATIENT)
Dept: SCHEDULING | Facility: CLINIC | Age: 74
End: 2019-01-01

## 2019-01-01 ENCOUNTER — SURGERY - HEALTHEAST (OUTPATIENT)
Dept: CARDIOLOGY | Facility: CLINIC | Age: 74
End: 2019-01-01

## 2019-01-01 ENCOUNTER — DOCUMENTATION ONLY (OUTPATIENT)
Dept: OTHER | Facility: CLINIC | Age: 74
End: 2019-01-01

## 2019-01-01 ENCOUNTER — COMMUNICATION - HEALTHEAST (OUTPATIENT)
Dept: INFECTIOUS DISEASES | Facility: CLINIC | Age: 74
End: 2019-01-01

## 2019-01-01 ENCOUNTER — COMMUNICATION - HEALTHEAST (OUTPATIENT)
Dept: GERIATRICS | Facility: CLINIC | Age: 74
End: 2019-01-01

## 2019-01-01 ENCOUNTER — OFFICE VISIT - HEALTHEAST (OUTPATIENT)
Dept: INFECTIOUS DISEASES | Facility: CLINIC | Age: 74
End: 2019-01-01

## 2019-01-01 ENCOUNTER — COMMUNICATION - HEALTHEAST (OUTPATIENT)
Dept: PHARMACY | Facility: CLINIC | Age: 74
End: 2019-01-01

## 2019-01-01 ENCOUNTER — AMBULATORY - HEALTHEAST (OUTPATIENT)
Dept: OTHER | Facility: CLINIC | Age: 74
End: 2019-01-01

## 2019-01-01 ENCOUNTER — COMMUNICATION - HEALTHEAST (OUTPATIENT)
Dept: CARDIOLOGY | Facility: CLINIC | Age: 74
End: 2019-01-01

## 2019-01-01 ENCOUNTER — AMBULATORY - HEALTHEAST (OUTPATIENT)
Dept: PHARMACY | Facility: CLINIC | Age: 74
End: 2019-01-01

## 2019-01-01 ENCOUNTER — AMBULATORY - HEALTHEAST (OUTPATIENT)
Dept: GERIATRICS | Facility: CLINIC | Age: 74
End: 2019-01-01

## 2019-01-01 ENCOUNTER — COMMUNICATION - HEALTHEAST (OUTPATIENT)
Dept: ADMINISTRATIVE | Facility: CLINIC | Age: 74
End: 2019-01-01

## 2019-01-01 ENCOUNTER — RECORDS - HEALTHEAST (OUTPATIENT)
Dept: HEALTH INFORMATION MANAGEMENT | Facility: CLINIC | Age: 74
End: 2019-01-01

## 2019-01-01 ENCOUNTER — COMMUNICATION - HEALTHEAST (OUTPATIENT)
Dept: CARE COORDINATION | Facility: CLINIC | Age: 74
End: 2019-01-01

## 2019-01-01 ENCOUNTER — AMBULATORY - HEALTHEAST (OUTPATIENT)
Dept: INFECTIOUS DISEASES | Facility: CLINIC | Age: 74
End: 2019-01-01

## 2019-01-01 DIAGNOSIS — I47.20 VENTRICULAR TACHYCARDIA (H): ICD-10-CM

## 2019-01-01 DIAGNOSIS — F11.90 OPIATE USE: ICD-10-CM

## 2019-01-01 DIAGNOSIS — I47.20 VT (VENTRICULAR TACHYCARDIA) (H): ICD-10-CM

## 2019-01-01 DIAGNOSIS — Z95.810 ICD (IMPLANTABLE CARDIOVERTER-DEFIBRILLATOR), BIVENTRICULAR, IN SITU: ICD-10-CM

## 2019-01-01 DIAGNOSIS — N18.30 CKD (CHRONIC KIDNEY DISEASE) STAGE 3, GFR 30-59 ML/MIN (H): ICD-10-CM

## 2019-01-01 DIAGNOSIS — I33.0 SUBACUTE BACTERIAL ENDOCARDITIS: ICD-10-CM

## 2019-01-01 DIAGNOSIS — I48.20 CHRONIC ATRIAL FIBRILLATION (H): ICD-10-CM

## 2019-01-01 DIAGNOSIS — M54.50 ACUTE LEFT-SIDED LOW BACK PAIN WITHOUT SCIATICA: ICD-10-CM

## 2019-01-01 DIAGNOSIS — I44.2 THIRD DEGREE AV BLOCK (H): ICD-10-CM

## 2019-01-01 DIAGNOSIS — I42.8 NON-ISCHEMIC CARDIOMYOPATHY (H): ICD-10-CM

## 2019-01-01 DIAGNOSIS — I47.29 PVT (PAROXYSMAL VENTRICULAR TACHYCARDIA) (H): ICD-10-CM

## 2019-01-01 DIAGNOSIS — I50.22 CHRONIC SYSTOLIC HEART FAILURE (H): ICD-10-CM

## 2019-01-01 DIAGNOSIS — F32.5 MAJOR DEPRESSIVE DISORDER WITH SINGLE EPISODE, IN FULL REMISSION (H): ICD-10-CM

## 2019-01-01 DIAGNOSIS — Z79.899 OTHER LONG TERM (CURRENT) DRUG THERAPY: ICD-10-CM

## 2019-01-01 DIAGNOSIS — I47.20 SUSTAINED VT (VENTRICULAR TACHYCARDIA) (H): ICD-10-CM

## 2019-01-01 DIAGNOSIS — I47.20 RECURRENT VENTRICULAR TACHYCARDIA (H): ICD-10-CM

## 2019-01-01 DIAGNOSIS — I42.0 DILATED CARDIOMYOPATHY (H): ICD-10-CM

## 2019-01-01 DIAGNOSIS — I42.8 CARDIOMYOPATHY, NONISCHEMIC (H): ICD-10-CM

## 2019-01-01 DIAGNOSIS — L97.512 SKIN ULCER OF RIGHT FOOT WITH FAT LAYER EXPOSED (H): ICD-10-CM

## 2019-01-01 DIAGNOSIS — I47.29 PAROXYSMAL VENTRICULAR TACHYCARDIA (H): ICD-10-CM

## 2019-01-01 DIAGNOSIS — G62.9 NEUROPATHY: ICD-10-CM

## 2019-01-01 DIAGNOSIS — Z95.810 ICD (IMPLANTABLE CARDIOVERTER-DEFIBRILLATOR) IN PLACE: ICD-10-CM

## 2019-01-01 DIAGNOSIS — Z79.899 LONG TERM USE OF DRUG: ICD-10-CM

## 2019-01-01 DIAGNOSIS — R35.0 URINARY FREQUENCY: ICD-10-CM

## 2019-01-01 DIAGNOSIS — I10 ESSENTIAL HYPERTENSION WITH GOAL BLOOD PRESSURE LESS THAN 140/90: ICD-10-CM

## 2019-01-01 DIAGNOSIS — I47.10 PSVT (PAROXYSMAL SUPRAVENTRICULAR TACHYCARDIA) (H): ICD-10-CM

## 2019-01-01 DIAGNOSIS — I48.91 A-FIB (H): ICD-10-CM

## 2019-01-01 DIAGNOSIS — R89.9 ABNORMAL LABORATORY TEST RESULT: ICD-10-CM

## 2019-01-01 DIAGNOSIS — R79.89 OTHER SPECIFIED ABNORMAL FINDINGS OF BLOOD CHEMISTRY: ICD-10-CM

## 2019-01-01 DIAGNOSIS — E87.6 HYPOKALEMIA: ICD-10-CM

## 2019-01-01 DIAGNOSIS — I50.42 CHRONIC COMBINED SYSTOLIC AND DIASTOLIC HEART FAILURE (H): ICD-10-CM

## 2019-01-01 DIAGNOSIS — M62.81 GENERALIZED MUSCLE WEAKNESS: ICD-10-CM

## 2019-01-01 DIAGNOSIS — Z79.899 ON AMIODARONE THERAPY: ICD-10-CM

## 2019-01-01 DIAGNOSIS — N41.0 ACUTE PROSTATITIS: ICD-10-CM

## 2019-01-01 DIAGNOSIS — J30.9 ALLERGIC RHINITIS, UNSPECIFIED SEASONALITY, UNSPECIFIED TRIGGER: ICD-10-CM

## 2019-01-01 DIAGNOSIS — R19.5 POSITIVE COLORECTAL CANCER SCREENING USING COLOGUARD TEST: ICD-10-CM

## 2019-01-01 DIAGNOSIS — Z00.00 HEALTHCARE MAINTENANCE: ICD-10-CM

## 2019-01-01 DIAGNOSIS — D64.9 ANEMIA, UNSPECIFIED TYPE: ICD-10-CM

## 2019-01-01 DIAGNOSIS — K21.9 GASTROESOPHAGEAL REFLUX DISEASE, ESOPHAGITIS PRESENCE NOT SPECIFIED: ICD-10-CM

## 2019-01-01 DIAGNOSIS — Z86.74 HISTORY OF CARDIAC ARREST: ICD-10-CM

## 2019-01-01 DIAGNOSIS — N40.0 BENIGN PROSTATIC HYPERPLASIA, UNSPECIFIED WHETHER LOWER URINARY TRACT SYMPTOMS PRESENT: ICD-10-CM

## 2019-01-01 LAB
AEROBIC BLOOD CULTURE BOTTLE: NO GROWTH
AEROBIC BLOOD CULTURE BOTTLE: NO GROWTH
ALBUMIN SERPL-MCNC: 2.5 G/DL (ref 3.5–5)
ALBUMIN SERPL-MCNC: 2.8 G/DL (ref 3.5–5)
ALBUMIN SERPL-MCNC: 2.8 G/DL (ref 3.5–5)
ALBUMIN SERPL-MCNC: 3.2 G/DL (ref 3.5–5)
ALBUMIN SERPL-MCNC: 3.3 G/DL (ref 3.5–5)
ALBUMIN SERPL-MCNC: 3.4 G/DL (ref 3.5–5)
ALBUMIN SERPL-MCNC: 3.6 G/DL (ref 3.5–5)
ALBUMIN UR-MCNC: NEGATIVE MG/DL
ALP SERPL-CCNC: 66 U/L (ref 45–120)
ALP SERPL-CCNC: 67 U/L (ref 45–120)
ALP SERPL-CCNC: 68 U/L (ref 45–120)
ALP SERPL-CCNC: 68 U/L (ref 45–120)
ALP SERPL-CCNC: 78 U/L (ref 45–120)
ALP SERPL-CCNC: 83 U/L (ref 45–120)
ALP SERPL-CCNC: 86 U/L (ref 45–120)
ALT SERPL W P-5'-P-CCNC: 14 U/L (ref 0–45)
ALT SERPL W P-5'-P-CCNC: 18 U/L (ref 0–45)
ALT SERPL W P-5'-P-CCNC: 18 U/L (ref 0–45)
ALT SERPL W P-5'-P-CCNC: 260 U/L (ref 0–45)
ALT SERPL W P-5'-P-CCNC: 48 U/L (ref 0–45)
ALT SERPL W P-5'-P-CCNC: 75 U/L (ref 0–45)
ALT SERPL W P-5'-P-CCNC: <9 U/L (ref 0–45)
ANAEROBIC BLOOD CULTURE BOTTLE: NO GROWTH
ANAEROBIC BLOOD CULTURE BOTTLE: NO GROWTH
ANION GAP SERPL CALCULATED.3IONS-SCNC: 10 MMOL/L (ref 5–18)
ANION GAP SERPL CALCULATED.3IONS-SCNC: 12 MMOL/L (ref 5–18)
ANION GAP SERPL CALCULATED.3IONS-SCNC: 6 MMOL/L (ref 5–18)
ANION GAP SERPL CALCULATED.3IONS-SCNC: 7 MMOL/L (ref 5–18)
ANION GAP SERPL CALCULATED.3IONS-SCNC: 8 MMOL/L (ref 5–18)
ANION GAP SERPL CALCULATED.3IONS-SCNC: 9 MMOL/L (ref 5–18)
APPEARANCE UR: CLEAR
AST SERPL W P-5'-P-CCNC: 119 U/L (ref 0–40)
AST SERPL W P-5'-P-CCNC: 13 U/L (ref 0–40)
AST SERPL W P-5'-P-CCNC: 14 U/L (ref 0–40)
AST SERPL W P-5'-P-CCNC: 23 U/L (ref 0–40)
AST SERPL W P-5'-P-CCNC: 24 U/L (ref 0–40)
AST SERPL W P-5'-P-CCNC: 38 U/L (ref 0–40)
AST SERPL W P-5'-P-CCNC: 42 U/L (ref 0–40)
ATRIAL RATE - MUSE: 80 BPM
BASOPHILS # BLD AUTO: 0 THOU/UL (ref 0–0.2)
BASOPHILS # BLD AUTO: 0.1 THOU/UL (ref 0–0.2)
BASOPHILS # BLD AUTO: 0.1 THOU/UL (ref 0–0.2)
BASOPHILS NFR BLD AUTO: 1 % (ref 0–2)
BASOPHILS NFR BLD AUTO: 1 % (ref 0–2)
BASOPHILS NFR BLD AUTO: 2 % (ref 0–2)
BILIRUB SERPL-MCNC: 0.2 MG/DL (ref 0–1)
BILIRUB SERPL-MCNC: 0.3 MG/DL (ref 0–1)
BILIRUB SERPL-MCNC: 0.4 MG/DL (ref 0–1)
BILIRUB SERPL-MCNC: 0.4 MG/DL (ref 0–1)
BILIRUB SERPL-MCNC: 0.5 MG/DL (ref 0–1)
BILIRUB SERPL-MCNC: 0.5 MG/DL (ref 0–1)
BILIRUB UR QL STRIP: NEGATIVE
BUN SERPL-MCNC: 27 MG/DL (ref 8–28)
BUN SERPL-MCNC: 28 MG/DL (ref 8–28)
BUN SERPL-MCNC: 33 MG/DL (ref 8–28)
BUN SERPL-MCNC: 34 MG/DL (ref 8–28)
BUN SERPL-MCNC: 34 MG/DL (ref 8–28)
BUN SERPL-MCNC: 35 MG/DL (ref 8–28)
BUN SERPL-MCNC: 35 MG/DL (ref 8–28)
BUN SERPL-MCNC: 36 MG/DL (ref 8–28)
BUN SERPL-MCNC: 36 MG/DL (ref 8–28)
BUN SERPL-MCNC: 39 MG/DL (ref 8–28)
BUN SERPL-MCNC: 39 MG/DL (ref 8–28)
BUN SERPL-MCNC: 45 MG/DL (ref 8–28)
C REACTIVE PROTEIN LHE: 0.7 MG/DL (ref 0–0.8)
C REACTIVE PROTEIN LHE: 0.9 MG/DL (ref 0–0.8)
C REACTIVE PROTEIN LHE: 1 MG/DL (ref 0–0.8)
C REACTIVE PROTEIN LHE: 1.1 MG/DL (ref 0–0.8)
CALCIUM SERPL-MCNC: 8.3 MG/DL (ref 8.5–10.5)
CALCIUM SERPL-MCNC: 8.9 MG/DL (ref 8.5–10.5)
CALCIUM SERPL-MCNC: 9 MG/DL (ref 8.5–10.5)
CALCIUM SERPL-MCNC: 9.1 MG/DL (ref 8.5–10.5)
CALCIUM SERPL-MCNC: 9.2 MG/DL (ref 8.5–10.5)
CALCIUM SERPL-MCNC: 9.3 MG/DL (ref 8.5–10.5)
CALCIUM SERPL-MCNC: 9.4 MG/DL (ref 8.5–10.5)
CALCIUM SERPL-MCNC: 9.9 MG/DL (ref 8.5–10.5)
CEA SERPL-MCNC: 6.2 NG/ML (ref 0–3)
CHLORIDE BLD-SCNC: 107 MMOL/L (ref 98–107)
CHLORIDE BLD-SCNC: 107 MMOL/L (ref 98–107)
CHLORIDE BLD-SCNC: 109 MMOL/L (ref 98–107)
CHLORIDE BLD-SCNC: 111 MMOL/L (ref 98–107)
CHLORIDE BLD-SCNC: 112 MMOL/L (ref 98–107)
CHLORIDE BLD-SCNC: 112 MMOL/L (ref 98–107)
CHLORIDE BLD-SCNC: 114 MMOL/L (ref 98–107)
CO2 SERPL-SCNC: 18 MMOL/L (ref 22–31)
CO2 SERPL-SCNC: 22 MMOL/L (ref 22–31)
CO2 SERPL-SCNC: 23 MMOL/L (ref 22–31)
CO2 SERPL-SCNC: 24 MMOL/L (ref 22–31)
CO2 SERPL-SCNC: 24 MMOL/L (ref 22–31)
CO2 SERPL-SCNC: 25 MMOL/L (ref 22–31)
CO2 SERPL-SCNC: 26 MMOL/L (ref 22–31)
COLOR UR AUTO: YELLOW
CREAT SERPL-MCNC: 1.58 MG/DL (ref 0.7–1.3)
CREAT SERPL-MCNC: 1.59 MG/DL (ref 0.7–1.3)
CREAT SERPL-MCNC: 1.65 MG/DL (ref 0.7–1.3)
CREAT SERPL-MCNC: 2.31 MG/DL (ref 0.7–1.3)
CREAT SERPL-MCNC: 2.7 MG/DL (ref 0.7–1.3)
CREAT SERPL-MCNC: 2.71 MG/DL (ref 0.7–1.3)
CREAT SERPL-MCNC: 2.74 MG/DL (ref 0.7–1.3)
CREAT SERPL-MCNC: 2.74 MG/DL (ref 0.7–1.3)
CREAT SERPL-MCNC: 2.77 MG/DL (ref 0.7–1.3)
CREAT SERPL-MCNC: 2.81 MG/DL (ref 0.7–1.3)
CREAT SERPL-MCNC: 2.97 MG/DL (ref 0.7–1.3)
CREAT SERPL-MCNC: 2.97 MG/DL (ref 0.7–1.3)
DIASTOLIC BLOOD PRESSURE - MUSE: NORMAL MMHG
EOSINOPHIL # BLD AUTO: 0.2 THOU/UL (ref 0–0.4)
EOSINOPHIL # BLD AUTO: 0.3 THOU/UL (ref 0–0.4)
EOSINOPHIL # BLD AUTO: 0.3 THOU/UL (ref 0–0.4)
EOSINOPHIL NFR BLD AUTO: 5 % (ref 0–6)
EOSINOPHIL NFR BLD AUTO: 9 % (ref 0–6)
EOSINOPHIL NFR BLD AUTO: 9 % (ref 0–6)
ERYTHROCYTE [DISTWIDTH] IN BLOOD BY AUTOMATED COUNT: 10.5 % (ref 11–14.5)
ERYTHROCYTE [DISTWIDTH] IN BLOOD BY AUTOMATED COUNT: 11.2 % (ref 11–14.5)
ERYTHROCYTE [DISTWIDTH] IN BLOOD BY AUTOMATED COUNT: 11.3 % (ref 11–14.5)
ERYTHROCYTE [DISTWIDTH] IN BLOOD BY AUTOMATED COUNT: 12.1 % (ref 11–14.5)
ERYTHROCYTE [DISTWIDTH] IN BLOOD BY AUTOMATED COUNT: 12.7 % (ref 11–14.5)
ERYTHROCYTE [DISTWIDTH] IN BLOOD BY AUTOMATED COUNT: 14.1 % (ref 11–14.5)
ERYTHROCYTE [DISTWIDTH] IN BLOOD BY AUTOMATED COUNT: 14.5 % (ref 11–14.5)
ERYTHROCYTE [DISTWIDTH] IN BLOOD BY AUTOMATED COUNT: 15 % (ref 11–14.5)
ERYTHROCYTE [DISTWIDTH] IN BLOOD BY AUTOMATED COUNT: 15.5 % (ref 11–14.5)
ERYTHROCYTE [DISTWIDTH] IN BLOOD BY AUTOMATED COUNT: 15.9 % (ref 11–14.5)
ERYTHROCYTE [SEDIMENTATION RATE] IN BLOOD BY WESTERGREN METHOD: 19 MM/HR (ref 0–15)
ERYTHROCYTE [SEDIMENTATION RATE] IN BLOOD BY WESTERGREN METHOD: 19 MM/HR (ref 0–15)
ERYTHROCYTE [SEDIMENTATION RATE] IN BLOOD BY WESTERGREN METHOD: 25 MM/HR (ref 0–15)
ERYTHROCYTE [SEDIMENTATION RATE] IN BLOOD BY WESTERGREN METHOD: 27 MM/HR (ref 0–15)
GFR SERPL CREATININE-BSD FRML MDRD: 21 ML/MIN/1.73M2
GFR SERPL CREATININE-BSD FRML MDRD: 21 ML/MIN/1.73M2
GFR SERPL CREATININE-BSD FRML MDRD: 22 ML/MIN/1.73M2
GFR SERPL CREATININE-BSD FRML MDRD: 22 ML/MIN/1.73M2
GFR SERPL CREATININE-BSD FRML MDRD: 23 ML/MIN/1.73M2
GFR SERPL CREATININE-BSD FRML MDRD: 28 ML/MIN/1.73M2
GFR SERPL CREATININE-BSD FRML MDRD: 41 ML/MIN/1.73M2
GFR SERPL CREATININE-BSD FRML MDRD: 43 ML/MIN/1.73M2
GFR SERPL CREATININE-BSD FRML MDRD: 43 ML/MIN/1.73M2
GLUCOSE BLD-MCNC: 101 MG/DL (ref 70–125)
GLUCOSE BLD-MCNC: 104 MG/DL (ref 70–125)
GLUCOSE BLD-MCNC: 76 MG/DL (ref 70–125)
GLUCOSE BLD-MCNC: 79 MG/DL (ref 70–125)
GLUCOSE BLD-MCNC: 81 MG/DL (ref 70–125)
GLUCOSE BLD-MCNC: 81 MG/DL (ref 70–125)
GLUCOSE BLD-MCNC: 88 MG/DL (ref 70–125)
GLUCOSE BLD-MCNC: 90 MG/DL (ref 70–125)
GLUCOSE BLD-MCNC: 93 MG/DL (ref 70–125)
GLUCOSE BLD-MCNC: 93 MG/DL (ref 70–125)
GLUCOSE BLD-MCNC: 99 MG/DL (ref 70–125)
GLUCOSE BLD-MCNC: 99 MG/DL (ref 70–125)
GLUCOSE UR STRIP-MCNC: NEGATIVE MG/DL
HBA1C MFR BLD: 4.9 % (ref 3.5–6)
HCC DEVICE COMMENTS: NORMAL
HCC DEVICE IMPLANTING PROVIDER: NORMAL
HCC DEVICE MANUFACTURE: NORMAL
HCC DEVICE MODEL: NORMAL
HCC DEVICE SERIAL NUMBER: NORMAL
HCC DEVICE TYPE: NORMAL
HCT VFR BLD AUTO: 31.6 % (ref 40–54)
HCT VFR BLD AUTO: 32.2 % (ref 40–54)
HCT VFR BLD AUTO: 32.9 % (ref 40–54)
HCT VFR BLD AUTO: 35.1 % (ref 40–54)
HCT VFR BLD AUTO: 35.5 % (ref 40–54)
HCT VFR BLD AUTO: 36.1 % (ref 40–54)
HCT VFR BLD AUTO: 39.6 % (ref 40–54)
HCT VFR BLD AUTO: 43.6 % (ref 40–54)
HCT VFR BLD AUTO: 43.6 % (ref 40–54)
HCT VFR BLD AUTO: 44.4 % (ref 40–54)
HGB BLD-MCNC: 10.1 G/DL (ref 14–18)
HGB BLD-MCNC: 10.6 G/DL (ref 14–18)
HGB BLD-MCNC: 10.9 G/DL (ref 14–18)
HGB BLD-MCNC: 11.9 G/DL (ref 14–18)
HGB BLD-MCNC: 13.3 G/DL (ref 14–18)
HGB BLD-MCNC: 14.6 G/DL (ref 14–18)
HGB BLD-MCNC: 14.8 G/DL (ref 14–18)
HGB BLD-MCNC: 15 G/DL (ref 14–18)
HGB BLD-MCNC: 9.6 G/DL (ref 14–18)
HGB BLD-MCNC: 9.9 G/DL (ref 14–18)
HGB UR QL STRIP: NEGATIVE
INTERPRETATION ECG - MUSE: NORMAL
KETONES UR STRIP-MCNC: NEGATIVE MG/DL
LEUKOCYTE ESTERASE UR QL STRIP: NEGATIVE
LYMPHOCYTES # BLD AUTO: 0.7 THOU/UL (ref 0.8–4.4)
LYMPHOCYTES # BLD AUTO: 0.8 THOU/UL (ref 0.8–4.4)
LYMPHOCYTES # BLD AUTO: 0.9 THOU/UL (ref 0.8–4.4)
LYMPHOCYTES NFR BLD AUTO: 18 % (ref 20–40)
LYMPHOCYTES NFR BLD AUTO: 19 % (ref 20–40)
LYMPHOCYTES NFR BLD AUTO: 23 % (ref 20–40)
MCH RBC QN AUTO: 31.2 PG (ref 27–34)
MCH RBC QN AUTO: 31.3 PG (ref 27–34)
MCH RBC QN AUTO: 31.4 PG (ref 27–34)
MCH RBC QN AUTO: 31.5 PG (ref 27–34)
MCH RBC QN AUTO: 31.7 PG (ref 27–34)
MCH RBC QN AUTO: 31.9 PG (ref 27–34)
MCH RBC QN AUTO: 31.9 PG (ref 27–34)
MCH RBC QN AUTO: 33.3 PG (ref 27–34)
MCH RBC QN AUTO: 33.4 PG (ref 27–34)
MCH RBC QN AUTO: 33.5 PG (ref 27–34)
MCHC RBC AUTO-ENTMCNC: 30.1 G/DL (ref 32–36)
MCHC RBC AUTO-ENTMCNC: 30.2 G/DL (ref 32–36)
MCHC RBC AUTO-ENTMCNC: 30.4 G/DL (ref 32–36)
MCHC RBC AUTO-ENTMCNC: 30.7 G/DL (ref 32–36)
MCHC RBC AUTO-ENTMCNC: 31.4 G/DL (ref 32–36)
MCHC RBC AUTO-ENTMCNC: 33.1 G/DL (ref 32–36)
MCHC RBC AUTO-ENTMCNC: 33.5 G/DL (ref 32–36)
MCHC RBC AUTO-ENTMCNC: 33.5 G/DL (ref 32–36)
MCHC RBC AUTO-ENTMCNC: 33.8 G/DL (ref 32–36)
MCHC RBC AUTO-ENTMCNC: 33.9 G/DL (ref 32–36)
MCV RBC AUTO: 100 FL (ref 80–100)
MCV RBC AUTO: 102 FL (ref 80–100)
MCV RBC AUTO: 102 FL (ref 80–100)
MCV RBC AUTO: 103 FL (ref 80–100)
MCV RBC AUTO: 104 FL (ref 80–100)
MCV RBC AUTO: 105 FL (ref 80–100)
MCV RBC AUTO: 95 FL (ref 80–100)
MCV RBC AUTO: 96 FL (ref 80–100)
MCV RBC AUTO: 98 FL (ref 80–100)
MCV RBC AUTO: 98 FL (ref 80–100)
MONOCYTES # BLD AUTO: 0.4 THOU/UL (ref 0–0.9)
MONOCYTES # BLD AUTO: 0.5 THOU/UL (ref 0–0.9)
MONOCYTES # BLD AUTO: 0.5 THOU/UL (ref 0–0.9)
MONOCYTES NFR BLD AUTO: 12 % (ref 2–10)
MONOCYTES NFR BLD AUTO: 13 % (ref 2–10)
MONOCYTES NFR BLD AUTO: 9 % (ref 2–10)
NEUTROPHILS # BLD AUTO: 1.9 THOU/UL (ref 2–7.7)
NEUTROPHILS # BLD AUTO: 2.1 THOU/UL (ref 2–7.7)
NEUTROPHILS # BLD AUTO: 3 THOU/UL (ref 2–7.7)
NEUTROPHILS NFR BLD AUTO: 53 % (ref 50–70)
NEUTROPHILS NFR BLD AUTO: 58 % (ref 50–70)
NEUTROPHILS NFR BLD AUTO: 66 % (ref 50–70)
NITRATE UR QL: NEGATIVE
P AXIS - MUSE: 37 DEGREES
PH UR STRIP: 5 [PH] (ref 5–8)
PH UR STRIP: 5.5 [PH] (ref 5–8)
PH UR STRIP: 5.5 [PH] (ref 5–8)
PLATELET # BLD AUTO: 112 THOU/UL (ref 140–440)
PLATELET # BLD AUTO: 120 THOU/UL (ref 140–440)
PLATELET # BLD AUTO: 123 THOU/UL (ref 140–440)
PLATELET # BLD AUTO: 128 THOU/UL (ref 140–440)
PLATELET # BLD AUTO: 140 THOU/UL (ref 140–440)
PLATELET # BLD AUTO: 144 THOU/UL (ref 140–440)
PLATELET # BLD AUTO: 146 THOU/UL (ref 140–440)
PLATELET # BLD AUTO: 160 THOU/UL (ref 140–440)
PLATELET # BLD AUTO: 161 THOU/UL (ref 140–440)
PLATELET # BLD AUTO: 162 THOU/UL (ref 140–440)
PMV BLD AUTO: 10 FL (ref 8.5–12.5)
PMV BLD AUTO: 10.1 FL (ref 8.5–12.5)
PMV BLD AUTO: 10.1 FL (ref 8.5–12.5)
PMV BLD AUTO: 10.2 FL (ref 8.5–12.5)
PMV BLD AUTO: 10.4 FL (ref 8.5–12.5)
PMV BLD AUTO: 7 FL (ref 7–10)
PMV BLD AUTO: 7.4 FL (ref 7–10)
PMV BLD AUTO: 7.7 FL (ref 7–10)
POTASSIUM BLD-SCNC: 3.8 MMOL/L (ref 3.5–5)
POTASSIUM BLD-SCNC: 3.8 MMOL/L (ref 3.5–5)
POTASSIUM BLD-SCNC: 4 MMOL/L (ref 3.5–5)
POTASSIUM BLD-SCNC: 4 MMOL/L (ref 3.5–5)
POTASSIUM BLD-SCNC: 4.2 MMOL/L (ref 3.5–5)
POTASSIUM BLD-SCNC: 4.2 MMOL/L (ref 3.5–5)
POTASSIUM BLD-SCNC: 4.3 MMOL/L (ref 3.5–5)
POTASSIUM BLD-SCNC: 4.6 MMOL/L (ref 3.5–5)
POTASSIUM BLD-SCNC: 4.7 MMOL/L (ref 3.5–5)
POTASSIUM BLD-SCNC: 4.9 MMOL/L (ref 3.5–5)
PR INTERVAL - MUSE: 126 MS
PROT SERPL-MCNC: 5.9 G/DL (ref 6–8)
PROT SERPL-MCNC: 5.9 G/DL (ref 6–8)
PROT SERPL-MCNC: 6 G/DL (ref 6–8)
PROT SERPL-MCNC: 6.1 G/DL (ref 6–8)
PROT SERPL-MCNC: 6.1 G/DL (ref 6–8)
PROT SERPL-MCNC: 6.2 G/DL (ref 6–8)
PROT SERPL-MCNC: 6.3 G/DL (ref 6–8)
QRS DURATION - MUSE: 232 MS
QT - MUSE: 542 MS
QTC - MUSE: 625 MS
R AXIS - MUSE: -27 DEGREES
RBC # BLD AUTO: 3.07 MILL/UL (ref 4.4–6.2)
RBC # BLD AUTO: 3.14 MILL/UL (ref 4.4–6.2)
RBC # BLD AUTO: 3.17 MILL/UL (ref 4.4–6.2)
RBC # BLD AUTO: 3.38 MILL/UL (ref 4.4–6.2)
RBC # BLD AUTO: 3.49 MILL/UL (ref 4.4–6.2)
RBC # BLD AUTO: 3.75 MILL/UL (ref 4.4–6.2)
RBC # BLD AUTO: 4.18 MILL/UL (ref 4.4–6.2)
RBC # BLD AUTO: 4.35 MILL/UL (ref 4.4–6.2)
RBC # BLD AUTO: 4.43 MILL/UL (ref 4.4–6.2)
RBC # BLD AUTO: 4.51 MILL/UL (ref 4.4–6.2)
SODIUM SERPL-SCNC: 136 MMOL/L (ref 136–145)
SODIUM SERPL-SCNC: 142 MMOL/L (ref 136–145)
SODIUM SERPL-SCNC: 144 MMOL/L (ref 136–145)
SODIUM SERPL-SCNC: 145 MMOL/L (ref 136–145)
SP GR UR STRIP: 1.01 (ref 1–1.03)
SYSTOLIC BLOOD PRESSURE - MUSE: NORMAL MMHG
T AXIS - MUSE: 85 DEGREES
TSH SERPL DL<=0.005 MIU/L-ACNC: 2.96 UIU/ML (ref 0.3–5)
TSH SERPL DL<=0.005 MIU/L-ACNC: 3.83 UIU/ML (ref 0.3–5)
URATE SERPL-MCNC: 5.1 MG/DL (ref 3–8)
UROBILINOGEN UR STRIP-ACNC: NORMAL
VENTRICULAR RATE- MUSE: 80 BPM
WBC: 3.5 THOU/UL (ref 4–11)
WBC: 3.6 THOU/UL (ref 4–11)
WBC: 3.6 THOU/UL (ref 4–11)
WBC: 4 THOU/UL (ref 4–11)
WBC: 4.5 THOU/UL (ref 4–11)
WBC: 5.2 THOU/UL (ref 4–11)
WBC: 5.5 THOU/UL (ref 4–11)
WBC: 5.9 THOU/UL (ref 4–11)
WBC: 6.1 THOU/UL (ref 4–11)
WBC: 6.5 THOU/UL (ref 4–11)

## 2019-01-01 ASSESSMENT — MIFFLIN-ST. JEOR
SCORE: 1870.73
SCORE: 1857.97
SCORE: 1852.53
SCORE: 1852.53
SCORE: 1891.08
SCORE: 1760.45
SCORE: 1799.46
SCORE: 1795.83
SCORE: 1849.75
SCORE: 1845.75
SCORE: 1845.27
SCORE: 1783.98
SCORE: 1746.92
SCORE: 1845.27
SCORE: 1890.18
SCORE: 1867.95
SCORE: 1867.95
SCORE: 1894.71
SCORE: 1771.79
SCORE: 1792.2
SCORE: 1846.69
SCORE: 1760.45
SCORE: 1820.75
SCORE: 1705.75
SCORE: 1831.75
SCORE: 1928.28
SCORE: 1849.75
SCORE: 1806.26
SCORE: 1840.28
SCORE: 1920.57
SCORE: 1867.95
SCORE: 1845.27
SCORE: 1920.57
SCORE: 1824.21
SCORE: 1871.01
SCORE: 1901.52
SCORE: 1891.08
SCORE: 1845.75
SCORE: 1806.26
SCORE: 1831.75
SCORE: 1873.85
SCORE: 1891.08
SCORE: 1833.02
SCORE: 1789.93
SCORE: 1840.28
SCORE: 1829.39
SCORE: 1705.75
SCORE: 1795.83
SCORE: 1824.21
SCORE: 1821.23
SCORE: 1821.23
SCORE: 1846.69
SCORE: 1840.28
SCORE: 1844.75
SCORE: 1789.93
SCORE: 1821.23
SCORE: 1852.53
SCORE: 1901.52
SCORE: 1831.75
SCORE: 1829.39
SCORE: 1745.93
SCORE: 1831.75
SCORE: 1831.75
SCORE: 1783.98
SCORE: 1789.93
SCORE: 1760.45
SCORE: 1846.75
SCORE: 1783.98
SCORE: 1746.92
SCORE: 1901.06
SCORE: 1745.93
SCORE: 1901.06
SCORE: 1837.81
SCORE: 1890.18
SCORE: 1845.75
SCORE: 1795.83
SCORE: 1920.57
SCORE: 1771.79
SCORE: 1851.17
SCORE: 1887.74
SCORE: 1846.63
SCORE: 1792.2
SCORE: 1792.2
SCORE: 1890.18
SCORE: 1857.97
SCORE: 1799.46
SCORE: 1831.75
SCORE: 1846.75
SCORE: 1857.97
SCORE: 1846.69
SCORE: 1799.46
SCORE: 1771.79
SCORE: 1837.81
SCORE: 1806.26
SCORE: 1829.39
SCORE: 1867.04
SCORE: 1837.81
SCORE: 1844.75
SCORE: 1824.21
SCORE: 1820.75
SCORE: 1844.75
SCORE: 1849.75
SCORE: 1928.28
SCORE: 1894.71
SCORE: 1705.75
SCORE: 1745.93
SCORE: 1928.28
SCORE: 1846.75
SCORE: 1746.92
SCORE: 1820.75
SCORE: 1901.06
SCORE: 1894.71
SCORE: 1901.52

## 2019-01-02 ENCOUNTER — SURGERY - HEALTHEAST (OUTPATIENT)
Dept: SURGERY | Facility: HOSPITAL | Age: 74
End: 2019-01-02

## 2019-01-02 ENCOUNTER — COMMUNICATION - HEALTHEAST (OUTPATIENT)
Dept: VASCULAR SURGERY | Facility: CLINIC | Age: 74
End: 2019-01-02

## 2019-01-03 ENCOUNTER — HOME CARE/HOSPICE - HEALTHEAST (OUTPATIENT)
Dept: HOME HEALTH SERVICES | Facility: HOME HEALTH | Age: 74
End: 2019-01-03

## 2019-01-04 ENCOUNTER — HOME CARE/HOSPICE - HEALTHEAST (OUTPATIENT)
Dept: HOME HEALTH SERVICES | Facility: HOME HEALTH | Age: 74
End: 2019-01-04

## 2019-01-04 ENCOUNTER — COMMUNICATION - HEALTHEAST (OUTPATIENT)
Dept: HOME HEALTH SERVICES | Facility: HOME HEALTH | Age: 74
End: 2019-01-04

## 2019-01-09 ENCOUNTER — OFFICE VISIT - HEALTHEAST (OUTPATIENT)
Dept: VASCULAR SURGERY | Facility: CLINIC | Age: 74
End: 2019-01-09

## 2019-01-09 DIAGNOSIS — L97.501 ULCER OF FOOT, LIMITED TO BREAKDOWN OF SKIN, UNSPECIFIED LATERALITY (H): ICD-10-CM

## 2019-01-09 DIAGNOSIS — L97.511 SKIN ULCER OF RIGHT FOOT, LIMITED TO BREAKDOWN OF SKIN (H): ICD-10-CM

## 2019-01-10 ENCOUNTER — COMMUNICATION - HEALTHEAST (OUTPATIENT)
Dept: VASCULAR SURGERY | Facility: CLINIC | Age: 74
End: 2019-01-10

## 2019-01-10 ENCOUNTER — COMMUNICATION - HEALTHEAST (OUTPATIENT)
Dept: HOME HEALTH SERVICES | Facility: HOME HEALTH | Age: 74
End: 2019-01-10

## 2019-01-10 ENCOUNTER — HOME CARE/HOSPICE - HEALTHEAST (OUTPATIENT)
Dept: HOME HEALTH SERVICES | Facility: HOME HEALTH | Age: 74
End: 2019-01-10

## 2019-01-14 ENCOUNTER — HOME CARE/HOSPICE - HEALTHEAST (OUTPATIENT)
Dept: HOME HEALTH SERVICES | Facility: HOME HEALTH | Age: 74
End: 2019-01-14

## 2019-01-16 ENCOUNTER — HOME CARE/HOSPICE - HEALTHEAST (OUTPATIENT)
Dept: HOME HEALTH SERVICES | Facility: HOME HEALTH | Age: 74
End: 2019-01-16

## 2019-01-18 ENCOUNTER — HOME CARE/HOSPICE - HEALTHEAST (OUTPATIENT)
Dept: HOME HEALTH SERVICES | Facility: HOME HEALTH | Age: 74
End: 2019-01-18

## 2019-01-20 ENCOUNTER — HOME CARE/HOSPICE - HEALTHEAST (OUTPATIENT)
Dept: HOME HEALTH SERVICES | Facility: HOME HEALTH | Age: 74
End: 2019-01-20

## 2019-01-22 ENCOUNTER — HOME CARE/HOSPICE - HEALTHEAST (OUTPATIENT)
Dept: HOME HEALTH SERVICES | Facility: HOME HEALTH | Age: 74
End: 2019-01-22

## 2019-01-22 ENCOUNTER — COMMUNICATION - HEALTHEAST (OUTPATIENT)
Dept: INTERNAL MEDICINE | Facility: CLINIC | Age: 74
End: 2019-01-22

## 2019-01-24 ENCOUNTER — OFFICE VISIT - HEALTHEAST (OUTPATIENT)
Dept: VASCULAR SURGERY | Facility: CLINIC | Age: 74
End: 2019-01-24

## 2019-01-24 ENCOUNTER — AMBULATORY - HEALTHEAST (OUTPATIENT)
Dept: OTHER | Facility: CLINIC | Age: 74
End: 2019-01-24

## 2019-01-24 DIAGNOSIS — M21.42 PES PLANOVALGUS, ACQUIRED, LEFT: ICD-10-CM

## 2019-01-24 DIAGNOSIS — M21.41 PES PLANOVALGUS, ACQUIRED, RIGHT: ICD-10-CM

## 2019-01-24 DIAGNOSIS — L97.511 SKIN ULCER OF RIGHT FOOT, LIMITED TO BREAKDOWN OF SKIN (H): ICD-10-CM

## 2019-01-26 ENCOUNTER — HOME CARE/HOSPICE - HEALTHEAST (OUTPATIENT)
Dept: HOME HEALTH SERVICES | Facility: HOME HEALTH | Age: 74
End: 2019-01-26

## 2019-01-28 ENCOUNTER — HOME CARE/HOSPICE - HEALTHEAST (OUTPATIENT)
Dept: HOME HEALTH SERVICES | Facility: HOME HEALTH | Age: 74
End: 2019-01-28

## 2019-01-30 ENCOUNTER — COMMUNICATION - HEALTHEAST (OUTPATIENT)
Dept: CARE COORDINATION | Facility: CLINIC | Age: 74
End: 2019-01-30

## 2019-01-30 ENCOUNTER — AMBULATORY - HEALTHEAST (OUTPATIENT)
Dept: CARDIOLOGY | Facility: CLINIC | Age: 74
End: 2019-01-30

## 2019-01-30 ENCOUNTER — COMMUNICATION - HEALTHEAST (OUTPATIENT)
Dept: VASCULAR SURGERY | Facility: CLINIC | Age: 74
End: 2019-01-30

## 2019-01-30 ENCOUNTER — HOME CARE/HOSPICE - HEALTHEAST (OUTPATIENT)
Dept: HOME HEALTH SERVICES | Facility: HOME HEALTH | Age: 74
End: 2019-01-30

## 2019-02-01 ENCOUNTER — HOME CARE/HOSPICE - HEALTHEAST (OUTPATIENT)
Dept: HOME HEALTH SERVICES | Facility: HOME HEALTH | Age: 74
End: 2019-02-01

## 2019-02-03 ENCOUNTER — HOME CARE/HOSPICE - HEALTHEAST (OUTPATIENT)
Dept: HOME HEALTH SERVICES | Facility: HOME HEALTH | Age: 74
End: 2019-02-03

## 2019-02-07 ENCOUNTER — COMMUNICATION - HEALTHEAST (OUTPATIENT)
Dept: INTERNAL MEDICINE | Facility: CLINIC | Age: 74
End: 2019-02-07

## 2019-02-08 ENCOUNTER — HOME CARE/HOSPICE - HEALTHEAST (OUTPATIENT)
Dept: HOME HEALTH SERVICES | Facility: HOME HEALTH | Age: 74
End: 2019-02-08

## 2019-02-14 ENCOUNTER — HOME CARE/HOSPICE - HEALTHEAST (OUTPATIENT)
Dept: HOME HEALTH SERVICES | Facility: HOME HEALTH | Age: 74
End: 2019-02-14

## 2019-02-18 ENCOUNTER — COMMUNICATION - HEALTHEAST (OUTPATIENT)
Dept: CARDIOLOGY | Facility: CLINIC | Age: 74
End: 2019-02-18

## 2019-02-18 ENCOUNTER — AMBULATORY - HEALTHEAST (OUTPATIENT)
Dept: CARDIOLOGY | Facility: CLINIC | Age: 74
End: 2019-02-18

## 2019-02-18 ENCOUNTER — TRANSFERRED RECORDS (OUTPATIENT)
Dept: HEALTH INFORMATION MANAGEMENT | Facility: CLINIC | Age: 74
End: 2019-02-18

## 2019-02-18 DIAGNOSIS — Z95.810 ICD (IMPLANTABLE CARDIOVERTER-DEFIBRILLATOR), BIVENTRICULAR, IN SITU: ICD-10-CM

## 2019-02-21 ENCOUNTER — HOME CARE/HOSPICE - HEALTHEAST (OUTPATIENT)
Dept: HOME HEALTH SERVICES | Facility: HOME HEALTH | Age: 74
End: 2019-02-21

## 2019-02-22 ENCOUNTER — OFFICE VISIT - HEALTHEAST (OUTPATIENT)
Dept: VASCULAR SURGERY | Facility: CLINIC | Age: 74
End: 2019-02-22

## 2019-02-22 DIAGNOSIS — M21.41 PES PLANOVALGUS, ACQUIRED, RIGHT: ICD-10-CM

## 2019-02-22 DIAGNOSIS — M21.42 PES PLANOVALGUS, ACQUIRED, LEFT: ICD-10-CM

## 2019-02-22 DIAGNOSIS — L97.511 SKIN ULCER OF RIGHT FOOT, LIMITED TO BREAKDOWN OF SKIN (H): ICD-10-CM

## 2019-02-26 ENCOUNTER — OFFICE VISIT - HEALTHEAST (OUTPATIENT)
Dept: INTERNAL MEDICINE | Facility: CLINIC | Age: 74
End: 2019-02-26

## 2019-02-26 DIAGNOSIS — I73.9 PAD (PERIPHERAL ARTERY DISEASE) (H): ICD-10-CM

## 2019-02-26 DIAGNOSIS — I10 ESSENTIAL HYPERTENSION WITH GOAL BLOOD PRESSURE LESS THAN 140/90: ICD-10-CM

## 2019-02-26 DIAGNOSIS — G62.9 NEUROPATHY: ICD-10-CM

## 2019-02-26 DIAGNOSIS — B37.2 YEAST DERMATITIS: ICD-10-CM

## 2019-02-26 DIAGNOSIS — M1A.09X1 IDIOPATHIC CHRONIC GOUT OF MULTIPLE SITES WITH TOPHUS: ICD-10-CM

## 2019-02-26 DIAGNOSIS — Z79.899 LONG TERM USE OF DRUG: ICD-10-CM

## 2019-02-26 DIAGNOSIS — Z95.810 ICD (IMPLANTABLE CARDIOVERTER-DEFIBRILLATOR) IN PLACE: ICD-10-CM

## 2019-02-26 DIAGNOSIS — I47.20 SUSTAINED VT (VENTRICULAR TACHYCARDIA) (H): ICD-10-CM

## 2019-02-26 DIAGNOSIS — Z86.79 HISTORY OF VENTRICULAR FIBRILLATION: ICD-10-CM

## 2019-02-26 DIAGNOSIS — F32.5 MAJOR DEPRESSIVE DISORDER WITH SINGLE EPISODE, IN FULL REMISSION (H): ICD-10-CM

## 2019-02-26 DIAGNOSIS — I50.22 CHRONIC SYSTOLIC HEART FAILURE (H): ICD-10-CM

## 2019-02-26 DIAGNOSIS — N18.30 CKD (CHRONIC KIDNEY DISEASE) STAGE 3, GFR 30-59 ML/MIN (H): ICD-10-CM

## 2019-02-26 LAB
ERYTHROCYTE [DISTWIDTH] IN BLOOD BY AUTOMATED COUNT: 12.4 % (ref 11–14.5)
HCT VFR BLD AUTO: 41.6 % (ref 40–54)
HGB BLD-MCNC: 13.9 G/DL (ref 14–18)
MCH RBC QN AUTO: 32.2 PG (ref 27–34)
MCHC RBC AUTO-ENTMCNC: 33.5 G/DL (ref 32–36)
MCV RBC AUTO: 96 FL (ref 80–100)
PLATELET # BLD AUTO: 146 THOU/UL (ref 140–440)
PMV BLD AUTO: 7.6 FL (ref 7–10)
RBC # BLD AUTO: 4.32 MILL/UL (ref 4.4–6.2)
TSH SERPL DL<=0.005 MIU/L-ACNC: 3.17 UIU/ML (ref 0.3–5)
WBC: 6.1 THOU/UL (ref 4–11)

## 2019-02-26 ASSESSMENT — MIFFLIN-ST. JEOR: SCORE: 1875.27

## 2019-02-27 ENCOUNTER — AMBULATORY - HEALTHEAST (OUTPATIENT)
Dept: CARDIOLOGY | Facility: CLINIC | Age: 74
End: 2019-02-27

## 2019-02-27 ENCOUNTER — AMBULATORY - HEALTHEAST (OUTPATIENT)
Dept: OTHER | Facility: CLINIC | Age: 74
End: 2019-02-27

## 2019-02-27 LAB
ALT SERPL W P-5'-P-CCNC: 57 U/L (ref 0–45)
ANION GAP SERPL CALCULATED.3IONS-SCNC: 10 MMOL/L (ref 5–18)
BUN SERPL-MCNC: 37 MG/DL (ref 8–28)
CALCIUM SERPL-MCNC: 9.2 MG/DL (ref 8.5–10.5)
CHLORIDE BLD-SCNC: 108 MMOL/L (ref 98–107)
CO2 SERPL-SCNC: 23 MMOL/L (ref 22–31)
CREAT SERPL-MCNC: 2 MG/DL (ref 0.7–1.3)
GFR SERPL CREATININE-BSD FRML MDRD: 33 ML/MIN/1.73M2
GLUCOSE BLD-MCNC: 87 MG/DL (ref 70–125)
POTASSIUM BLD-SCNC: 4.3 MMOL/L (ref 3.5–5)
SODIUM SERPL-SCNC: 141 MMOL/L (ref 136–145)

## 2019-02-28 ENCOUNTER — HOME CARE/HOSPICE - HEALTHEAST (OUTPATIENT)
Dept: HOME HEALTH SERVICES | Facility: HOME HEALTH | Age: 74
End: 2019-02-28

## 2019-03-05 ENCOUNTER — AMBULATORY - HEALTHEAST (OUTPATIENT)
Dept: CARDIOLOGY | Facility: CLINIC | Age: 74
End: 2019-03-05

## 2019-03-05 ENCOUNTER — TRANSFERRED RECORDS (OUTPATIENT)
Dept: HEALTH INFORMATION MANAGEMENT | Facility: CLINIC | Age: 74
End: 2019-03-05

## 2019-03-05 DIAGNOSIS — I47.29 PAROXYSMAL VENTRICULAR TACHYCARDIA (H): ICD-10-CM

## 2019-03-05 DIAGNOSIS — Z95.810 ICD (IMPLANTABLE CARDIOVERTER-DEFIBRILLATOR) IN PLACE: ICD-10-CM

## 2019-03-05 DIAGNOSIS — N18.30 CKD (CHRONIC KIDNEY DISEASE) STAGE 3, GFR 30-59 ML/MIN (H): ICD-10-CM

## 2019-03-05 DIAGNOSIS — I42.8 NON-ISCHEMIC CARDIOMYOPATHY (H): ICD-10-CM

## 2019-03-05 DIAGNOSIS — Z95.810 ICD (IMPLANTABLE CARDIOVERTER-DEFIBRILLATOR) IN PLACE: Primary | ICD-10-CM

## 2019-03-05 ASSESSMENT — MIFFLIN-ST. JEOR: SCORE: 1872.71

## 2019-03-17 ENCOUNTER — COMMUNICATION - HEALTHEAST (OUTPATIENT)
Dept: INTERNAL MEDICINE | Facility: CLINIC | Age: 74
End: 2019-03-17

## 2019-03-17 DIAGNOSIS — G62.9 NEUROPATHY: ICD-10-CM

## 2019-03-20 ENCOUNTER — AMBULATORY - HEALTHEAST (OUTPATIENT)
Dept: INTERNAL MEDICINE | Facility: CLINIC | Age: 74
End: 2019-03-20

## 2019-03-20 ENCOUNTER — COMMUNICATION - HEALTHEAST (OUTPATIENT)
Dept: INTERNAL MEDICINE | Facility: CLINIC | Age: 74
End: 2019-03-20

## 2019-03-20 DIAGNOSIS — L27.0 ALLERGIC DRUG RASH: ICD-10-CM

## 2019-03-22 ENCOUNTER — OFFICE VISIT - HEALTHEAST (OUTPATIENT)
Dept: VASCULAR SURGERY | Facility: CLINIC | Age: 74
End: 2019-03-22

## 2019-03-22 ENCOUNTER — RECORDS - HEALTHEAST (OUTPATIENT)
Dept: ADMINISTRATIVE | Facility: OTHER | Age: 74
End: 2019-03-22

## 2019-03-22 DIAGNOSIS — L97.521 FOOT ULCERATION, LEFT, LIMITED TO BREAKDOWN OF SKIN (H): ICD-10-CM

## 2019-03-22 ASSESSMENT — MIFFLIN-ST. JEOR: SCORE: 1872.71

## 2019-03-28 ENCOUNTER — COMMUNICATION - HEALTHEAST (OUTPATIENT)
Dept: INTERNAL MEDICINE | Facility: CLINIC | Age: 74
End: 2019-03-28

## 2019-03-28 DIAGNOSIS — L97.512 SKIN ULCER OF RIGHT FOOT WITH FAT LAYER EXPOSED (H): ICD-10-CM

## 2019-04-02 ENCOUNTER — COMMUNICATION - HEALTHEAST (OUTPATIENT)
Dept: INTERNAL MEDICINE | Facility: CLINIC | Age: 74
End: 2019-04-02

## 2019-04-02 DIAGNOSIS — E11.9 TYPE 2 DIABETES MELLITUS (H): ICD-10-CM

## 2019-04-03 ENCOUNTER — AMBULATORY - HEALTHEAST (OUTPATIENT)
Dept: VASCULAR SURGERY | Facility: CLINIC | Age: 74
End: 2019-04-03

## 2019-04-03 ENCOUNTER — COMMUNICATION - HEALTHEAST (OUTPATIENT)
Dept: VASCULAR SURGERY | Facility: CLINIC | Age: 74
End: 2019-04-03

## 2019-04-05 ENCOUNTER — OFFICE VISIT - HEALTHEAST (OUTPATIENT)
Dept: VASCULAR SURGERY | Facility: CLINIC | Age: 74
End: 2019-04-05

## 2019-04-05 DIAGNOSIS — M21.42 PES PLANOVALGUS, ACQUIRED, LEFT: ICD-10-CM

## 2019-04-05 ASSESSMENT — MIFFLIN-ST. JEOR: SCORE: 1872.71

## 2019-04-11 ENCOUNTER — COMMUNICATION - HEALTHEAST (OUTPATIENT)
Dept: ADMINISTRATIVE | Facility: CLINIC | Age: 74
End: 2019-04-11

## 2019-04-11 ENCOUNTER — RECORDS - HEALTHEAST (OUTPATIENT)
Dept: SCHEDULING | Facility: CLINIC | Age: 74
End: 2019-04-11

## 2019-04-12 ENCOUNTER — COMMUNICATION - HEALTHEAST (OUTPATIENT)
Dept: CARDIOLOGY | Facility: CLINIC | Age: 74
End: 2019-04-12

## 2019-04-12 DIAGNOSIS — I47.20 VENTRICULAR TACHYCARDIA (H): ICD-10-CM

## 2019-04-15 ENCOUNTER — OFFICE VISIT - HEALTHEAST (OUTPATIENT)
Dept: INTERNAL MEDICINE | Facility: CLINIC | Age: 74
End: 2019-04-15

## 2019-04-15 ENCOUNTER — AMBULATORY - HEALTHEAST (OUTPATIENT)
Dept: LAB | Facility: CLINIC | Age: 74
End: 2019-04-15

## 2019-04-15 DIAGNOSIS — E11.9 TYPE 2 DIABETES MELLITUS (H): ICD-10-CM

## 2019-04-15 DIAGNOSIS — Z12.11 SCREEN FOR COLON CANCER: ICD-10-CM

## 2019-04-15 DIAGNOSIS — R19.7 DIARRHEA, UNSPECIFIED TYPE: ICD-10-CM

## 2019-04-15 DIAGNOSIS — I49.01 VENTRICULAR FIBRILLATION (H): ICD-10-CM

## 2019-04-15 LAB
ALBUMIN SERPL-MCNC: 3.4 G/DL (ref 3.5–5)
ALP SERPL-CCNC: 59 U/L (ref 45–120)
ALT SERPL W P-5'-P-CCNC: 35 U/L (ref 0–45)
ANION GAP SERPL CALCULATED.3IONS-SCNC: 8 MMOL/L (ref 5–18)
AST SERPL W P-5'-P-CCNC: 20 U/L (ref 0–40)
BILIRUB SERPL-MCNC: 0.5 MG/DL (ref 0–1)
BUN SERPL-MCNC: 41 MG/DL (ref 8–28)
CALCIUM SERPL-MCNC: 9.2 MG/DL (ref 8.5–10.5)
CHLORIDE BLD-SCNC: 112 MMOL/L (ref 98–107)
CO2 SERPL-SCNC: 21 MMOL/L (ref 22–31)
CREAT SERPL-MCNC: 2.31 MG/DL (ref 0.7–1.3)
CREAT UR-MCNC: 190.6 MG/DL
ERYTHROCYTE [DISTWIDTH] IN BLOOD BY AUTOMATED COUNT: 11.5 % (ref 11–14.5)
GFR SERPL CREATININE-BSD FRML MDRD: 28 ML/MIN/1.73M2
GLUCOSE BLD-MCNC: 92 MG/DL (ref 70–125)
HBA1C MFR BLD: 4.9 % (ref 3.5–6)
HCT VFR BLD AUTO: 40.1 % (ref 40–54)
HGB BLD-MCNC: 13.7 G/DL (ref 14–18)
MCH RBC QN AUTO: 33.3 PG (ref 27–34)
MCHC RBC AUTO-ENTMCNC: 34.1 G/DL (ref 32–36)
MCV RBC AUTO: 97 FL (ref 80–100)
MICROALBUMIN UR-MCNC: 0.81 MG/DL (ref 0–1.99)
MICROALBUMIN/CREAT UR: 4.2 MG/G
PLATELET # BLD AUTO: 148 THOU/UL (ref 140–440)
PMV BLD AUTO: 8.3 FL (ref 7–10)
POTASSIUM BLD-SCNC: 3.8 MMOL/L (ref 3.5–5)
PROT SERPL-MCNC: 5.8 G/DL (ref 6–8)
RBC # BLD AUTO: 4.12 MILL/UL (ref 4.4–6.2)
SODIUM SERPL-SCNC: 141 MMOL/L (ref 136–145)
TSH SERPL DL<=0.005 MIU/L-ACNC: 3.7 UIU/ML (ref 0.3–5)
WBC: 5 THOU/UL (ref 4–11)

## 2019-04-15 ASSESSMENT — MIFFLIN-ST. JEOR: SCORE: 1878.1

## 2019-04-16 ENCOUNTER — AMBULATORY - HEALTHEAST (OUTPATIENT)
Dept: CARDIOLOGY | Facility: CLINIC | Age: 74
End: 2019-04-16

## 2019-04-16 DIAGNOSIS — Z95.810 ICD (IMPLANTABLE CARDIOVERTER-DEFIBRILLATOR) IN PLACE: ICD-10-CM

## 2019-04-17 ENCOUNTER — AMBULATORY - HEALTHEAST (OUTPATIENT)
Dept: CARDIOLOGY | Facility: CLINIC | Age: 74
End: 2019-04-17

## 2019-04-17 DIAGNOSIS — Z95.810 ICD (IMPLANTABLE CARDIOVERTER-DEFIBRILLATOR) IN PLACE: ICD-10-CM

## 2019-04-18 ENCOUNTER — AMBULATORY - HEALTHEAST (OUTPATIENT)
Dept: CARDIOLOGY | Facility: CLINIC | Age: 74
End: 2019-04-18

## 2019-04-18 ENCOUNTER — HOME CARE/HOSPICE - HEALTHEAST (OUTPATIENT)
Dept: HOME HEALTH SERVICES | Facility: HOME HEALTH | Age: 74
End: 2019-04-18

## 2019-04-18 DIAGNOSIS — Z95.810 ICD (IMPLANTABLE CARDIOVERTER-DEFIBRILLATOR) IN PLACE: ICD-10-CM

## 2019-04-19 ENCOUNTER — COMMUNICATION - HEALTHEAST (OUTPATIENT)
Dept: VASCULAR SURGERY | Facility: CLINIC | Age: 74
End: 2019-04-19

## 2019-04-22 ENCOUNTER — AMBULATORY - HEALTHEAST (OUTPATIENT)
Dept: CARDIOLOGY | Facility: CLINIC | Age: 74
End: 2019-04-22

## 2019-04-22 ENCOUNTER — COMMUNICATION - HEALTHEAST (OUTPATIENT)
Dept: INTERNAL MEDICINE | Facility: CLINIC | Age: 74
End: 2019-04-22

## 2019-04-22 ENCOUNTER — COMMUNICATION - HEALTHEAST (OUTPATIENT)
Dept: CARDIOLOGY | Facility: CLINIC | Age: 74
End: 2019-04-22

## 2019-04-22 ENCOUNTER — RECORDS - HEALTHEAST (OUTPATIENT)
Dept: ADMINISTRATIVE | Facility: OTHER | Age: 74
End: 2019-04-22

## 2019-04-22 DIAGNOSIS — I42.8 NON-ISCHEMIC CARDIOMYOPATHY (H): ICD-10-CM

## 2019-04-22 DIAGNOSIS — Z95.810 ICD (IMPLANTABLE CARDIOVERTER-DEFIBRILLATOR) IN PLACE: ICD-10-CM

## 2019-04-22 DIAGNOSIS — I47.20 RECURRENT VENTRICULAR TACHYCARDIA (H): ICD-10-CM

## 2019-04-22 ASSESSMENT — MIFFLIN-ST. JEOR: SCORE: 1855.7

## 2019-04-24 ENCOUNTER — OFFICE VISIT - HEALTHEAST (OUTPATIENT)
Dept: INTERNAL MEDICINE | Facility: CLINIC | Age: 74
End: 2019-04-24

## 2019-04-24 DIAGNOSIS — I50.23 ACUTE ON CHRONIC SYSTOLIC CONGESTIVE HEART FAILURE (H): ICD-10-CM

## 2019-04-24 DIAGNOSIS — I47.29 PAROXYSMAL VENTRICULAR TACHYCARDIA (H): ICD-10-CM

## 2019-04-24 DIAGNOSIS — G62.9 NEUROPATHY: ICD-10-CM

## 2019-04-24 DIAGNOSIS — N18.30 CKD (CHRONIC KIDNEY DISEASE) STAGE 3, GFR 30-59 ML/MIN (H): ICD-10-CM

## 2019-04-24 DIAGNOSIS — R19.7 DIARRHEA, UNSPECIFIED TYPE: ICD-10-CM

## 2019-04-24 ASSESSMENT — MIFFLIN-ST. JEOR: SCORE: 1852.3

## 2019-04-25 ENCOUNTER — AMBULATORY - HEALTHEAST (OUTPATIENT)
Dept: PHARMACY | Facility: CLINIC | Age: 74
End: 2019-04-25

## 2019-04-25 DIAGNOSIS — I50.23 ACUTE ON CHRONIC SYSTOLIC CONGESTIVE HEART FAILURE (H): ICD-10-CM

## 2019-04-25 DIAGNOSIS — R19.7 DIARRHEA, UNSPECIFIED TYPE: ICD-10-CM

## 2019-04-25 DIAGNOSIS — K21.9 GASTROESOPHAGEAL REFLUX DISEASE, ESOPHAGITIS PRESENCE NOT SPECIFIED: ICD-10-CM

## 2019-04-25 DIAGNOSIS — I47.29 PAROXYSMAL VENTRICULAR TACHYCARDIA (H): ICD-10-CM

## 2019-04-26 ENCOUNTER — COMMUNICATION - HEALTHEAST (OUTPATIENT)
Dept: INTERNAL MEDICINE | Facility: CLINIC | Age: 74
End: 2019-04-26

## 2019-04-29 ENCOUNTER — COMMUNICATION - HEALTHEAST (OUTPATIENT)
Dept: CARDIOLOGY | Facility: CLINIC | Age: 74
End: 2019-04-29

## 2019-04-29 ENCOUNTER — AMBULATORY - HEALTHEAST (OUTPATIENT)
Dept: CARDIOLOGY | Facility: CLINIC | Age: 74
End: 2019-04-29

## 2019-04-29 DIAGNOSIS — Z95.810 ICD (IMPLANTABLE CARDIOVERTER-DEFIBRILLATOR) IN PLACE: ICD-10-CM

## 2019-04-30 ENCOUNTER — RECORDS - HEALTHEAST (OUTPATIENT)
Dept: ADMINISTRATIVE | Facility: OTHER | Age: 74
End: 2019-04-30

## 2019-04-30 LAB — COLOGUARD-ABSTRACT: POSITIVE

## 2019-05-10 ENCOUNTER — COMMUNICATION - HEALTHEAST (OUTPATIENT)
Dept: INTERNAL MEDICINE | Facility: CLINIC | Age: 74
End: 2019-05-10

## 2019-05-10 DIAGNOSIS — I47.29 PAROXYSMAL VENTRICULAR TACHYCARDIA (H): ICD-10-CM

## 2020-01-01 ENCOUNTER — COMMUNICATION - HEALTHEAST (OUTPATIENT)
Dept: INTERNAL MEDICINE | Facility: CLINIC | Age: 75
End: 2020-01-01

## 2020-01-01 ENCOUNTER — OFFICE VISIT - HEALTHEAST (OUTPATIENT)
Dept: INTERNAL MEDICINE | Facility: CLINIC | Age: 75
End: 2020-01-01

## 2020-01-01 ENCOUNTER — AMBULATORY - HEALTHEAST (OUTPATIENT)
Dept: INTERNAL MEDICINE | Facility: CLINIC | Age: 75
End: 2020-01-01

## 2020-01-01 ENCOUNTER — ANESTHESIA - HEALTHEAST (OUTPATIENT)
Dept: CARDIOLOGY | Facility: CLINIC | Age: 75
End: 2020-01-01

## 2020-01-01 ENCOUNTER — AMBULATORY - HEALTHEAST (OUTPATIENT)
Dept: CARDIOLOGY | Facility: CLINIC | Age: 75
End: 2020-01-01

## 2020-01-01 ENCOUNTER — AMBULATORY - HEALTHEAST (OUTPATIENT)
Dept: LAB | Facility: CLINIC | Age: 75
End: 2020-01-01

## 2020-01-01 ENCOUNTER — COMMUNICATION - HEALTHEAST (OUTPATIENT)
Dept: NURSING | Facility: CLINIC | Age: 75
End: 2020-01-01

## 2020-01-01 ENCOUNTER — AMBULATORY - HEALTHEAST (OUTPATIENT)
Dept: NURSING | Facility: CLINIC | Age: 75
End: 2020-01-01

## 2020-01-01 ENCOUNTER — COMMUNICATION - HEALTHEAST (OUTPATIENT)
Dept: CARDIOLOGY | Facility: CLINIC | Age: 75
End: 2020-01-01

## 2020-01-01 ENCOUNTER — OFFICE VISIT - HEALTHEAST (OUTPATIENT)
Dept: CARDIOLOGY | Facility: CLINIC | Age: 75
End: 2020-01-01

## 2020-01-01 DIAGNOSIS — I42.0 DILATED CARDIOMYOPATHY (H): ICD-10-CM

## 2020-01-01 DIAGNOSIS — N18.4 CHRONIC KIDNEY DISEASE, STAGE 4 (SEVERE) (H): ICD-10-CM

## 2020-01-01 DIAGNOSIS — Z51.81 MEDICATION MONITORING ENCOUNTER: ICD-10-CM

## 2020-01-01 DIAGNOSIS — M54.50 CHRONIC BILATERAL LOW BACK PAIN WITHOUT SCIATICA: ICD-10-CM

## 2020-01-01 DIAGNOSIS — I47.29 PAROXYSMAL VENTRICULAR TACHYCARDIA (H): ICD-10-CM

## 2020-01-01 DIAGNOSIS — Z79.899 ON AMIODARONE THERAPY: ICD-10-CM

## 2020-01-01 DIAGNOSIS — G89.29 CHRONIC BILATERAL LOW BACK PAIN WITHOUT SCIATICA: ICD-10-CM

## 2020-01-01 DIAGNOSIS — M1A.09X0 IDIOPATHIC CHRONIC GOUT OF MULTIPLE SITES WITHOUT TOPHUS: ICD-10-CM

## 2020-01-01 DIAGNOSIS — Z95.810 ICD (IMPLANTABLE CARDIOVERTER-DEFIBRILLATOR) IN PLACE: ICD-10-CM

## 2020-01-01 DIAGNOSIS — I33.0 SUBACUTE BACTERIAL ENDOCARDITIS: ICD-10-CM

## 2020-01-01 DIAGNOSIS — I47.20 RECURRENT VENTRICULAR TACHYCARDIA (H): ICD-10-CM

## 2020-01-01 DIAGNOSIS — Z79.899 OTHER LONG TERM (CURRENT) DRUG THERAPY: ICD-10-CM

## 2020-01-01 DIAGNOSIS — Z95.810 ICD (IMPLANTABLE CARDIOVERTER-DEFIBRILLATOR), BIVENTRICULAR, IN SITU: ICD-10-CM

## 2020-01-01 DIAGNOSIS — I48.91 A-FIB (H): ICD-10-CM

## 2020-01-01 DIAGNOSIS — I44.2 THIRD DEGREE AV BLOCK (H): ICD-10-CM

## 2020-01-01 DIAGNOSIS — I47.20 VT (VENTRICULAR TACHYCARDIA) (H): ICD-10-CM

## 2020-01-01 DIAGNOSIS — I47.20 VENTRICULAR TACHYCARDIA (H): ICD-10-CM

## 2020-01-01 DIAGNOSIS — N18.30 CKD (CHRONIC KIDNEY DISEASE) STAGE 3, GFR 30-59 ML/MIN (H): ICD-10-CM

## 2020-01-01 DIAGNOSIS — I48.0 PAROXYSMAL ATRIAL FIBRILLATION (H): ICD-10-CM

## 2020-01-01 DIAGNOSIS — L29.9 PRURITUS, UNSPECIFIED: ICD-10-CM

## 2020-01-01 DIAGNOSIS — R06.02 SOB (SHORTNESS OF BREATH): ICD-10-CM

## 2020-01-01 DIAGNOSIS — F32.5 MAJOR DEPRESSIVE DISORDER WITH SINGLE EPISODE, IN FULL REMISSION (H): ICD-10-CM

## 2020-01-01 DIAGNOSIS — Z89.421 ACQUIRED ABSENCE OF OTHER RIGHT TOE(S) (H): ICD-10-CM

## 2020-01-01 LAB
ALBUMIN SERPL-MCNC: 3.6 G/DL (ref 3.5–5)
ALBUMIN SERPL-MCNC: 3.8 G/DL (ref 3.5–5)
ALBUMIN SERPL-MCNC: 3.8 G/DL (ref 3.5–5)
ALP SERPL-CCNC: 54 U/L (ref 45–120)
ALP SERPL-CCNC: 70 U/L (ref 45–120)
ALP SERPL-CCNC: 91 U/L (ref 45–120)
ALT SERPL W P-5'-P-CCNC: 10 U/L (ref 0–45)
ALT SERPL W P-5'-P-CCNC: 24 U/L (ref 0–45)
ALT SERPL W P-5'-P-CCNC: 24 U/L (ref 0–45)
ALT SERPL W P-5'-P-CCNC: <9 U/L (ref 0–45)
ANION GAP SERPL CALCULATED.3IONS-SCNC: 8 MMOL/L (ref 5–18)
ANION GAP SERPL CALCULATED.3IONS-SCNC: 9 MMOL/L (ref 5–18)
ANION GAP SERPL CALCULATED.3IONS-SCNC: 9 MMOL/L (ref 5–18)
AST SERPL W P-5'-P-CCNC: 16 U/L (ref 0–40)
AST SERPL W P-5'-P-CCNC: 19 U/L (ref 0–40)
AST SERPL W P-5'-P-CCNC: 20 U/L (ref 0–40)
BILIRUB SERPL-MCNC: 0.4 MG/DL (ref 0–1)
BILIRUB SERPL-MCNC: 0.5 MG/DL (ref 0–1)
BILIRUB SERPL-MCNC: 0.6 MG/DL (ref 0–1)
BUN SERPL-MCNC: 40 MG/DL (ref 8–28)
BUN SERPL-MCNC: 51 MG/DL (ref 8–28)
BUN SERPL-MCNC: 52 MG/DL (ref 8–28)
CALCIUM SERPL-MCNC: 8.9 MG/DL (ref 8.5–10.5)
CALCIUM SERPL-MCNC: 9.4 MG/DL (ref 8.5–10.5)
CALCIUM SERPL-MCNC: 9.5 MG/DL (ref 8.5–10.5)
CHLORIDE BLD-SCNC: 111 MMOL/L (ref 98–107)
CHLORIDE BLD-SCNC: 111 MMOL/L (ref 98–107)
CHLORIDE BLD-SCNC: 112 MMOL/L (ref 98–107)
CHOLEST SERPL-MCNC: 131 MG/DL
CO2 SERPL-SCNC: 20 MMOL/L (ref 22–31)
CO2 SERPL-SCNC: 21 MMOL/L (ref 22–31)
CO2 SERPL-SCNC: 22 MMOL/L (ref 22–31)
CREAT SERPL-MCNC: 2.69 MG/DL (ref 0.7–1.3)
CREAT SERPL-MCNC: 2.81 MG/DL (ref 0.7–1.3)
CREAT SERPL-MCNC: 2.85 MG/DL (ref 0.7–1.3)
ERYTHROCYTE [DISTWIDTH] IN BLOOD BY AUTOMATED COUNT: 11.1 % (ref 11–14.5)
ERYTHROCYTE [DISTWIDTH] IN BLOOD BY AUTOMATED COUNT: 14.9 % (ref 11–14.5)
ERYTHROCYTE [DISTWIDTH] IN BLOOD BY AUTOMATED COUNT: 17.2 % (ref 11–14.5)
FASTING STATUS PATIENT QL REPORTED: NO
GFR SERPL CREATININE-BSD FRML MDRD: 22 ML/MIN/1.73M2
GFR SERPL CREATININE-BSD FRML MDRD: 22 ML/MIN/1.73M2
GFR SERPL CREATININE-BSD FRML MDRD: 23 ML/MIN/1.73M2
GLUCOSE BLD-MCNC: 81 MG/DL (ref 70–125)
GLUCOSE BLD-MCNC: 82 MG/DL (ref 70–125)
GLUCOSE BLD-MCNC: 96 MG/DL (ref 70–125)
HCC DEVICE COMMENTS: NORMAL
HCC DEVICE IMPLANTING PROVIDER: NORMAL
HCC DEVICE MANUFACTURE: NORMAL
HCC DEVICE MODEL: NORMAL
HCC DEVICE SERIAL NUMBER: NORMAL
HCC DEVICE TYPE: NORMAL
HCT VFR BLD AUTO: 37.6 % (ref 40–54)
HCT VFR BLD AUTO: 38.3 % (ref 40–54)
HCT VFR BLD AUTO: 38.3 % (ref 40–54)
HDLC SERPL-MCNC: 39 MG/DL
HGB BLD-MCNC: 12 G/DL (ref 14–18)
HGB BLD-MCNC: 12.4 G/DL (ref 14–18)
HGB BLD-MCNC: 13.5 G/DL (ref 14–18)
LDLC SERPL CALC-MCNC: 80 MG/DL
MCH RBC QN AUTO: 31.4 PG (ref 27–34)
MCH RBC QN AUTO: 32 PG (ref 27–34)
MCH RBC QN AUTO: 32.4 PG (ref 27–34)
MCHC RBC AUTO-ENTMCNC: 31.9 G/DL (ref 32–36)
MCHC RBC AUTO-ENTMCNC: 32.4 G/DL (ref 32–36)
MCHC RBC AUTO-ENTMCNC: 35.3 G/DL (ref 32–36)
MCV RBC AUTO: 102 FL (ref 80–100)
MCV RBC AUTO: 91 FL (ref 80–100)
MCV RBC AUTO: 97 FL (ref 80–100)
PLATELET # BLD AUTO: 113 THOU/UL (ref 140–440)
PLATELET # BLD AUTO: 144 THOU/UL (ref 140–440)
PLATELET # BLD AUTO: 164 THOU/UL (ref 140–440)
PMV BLD AUTO: 11.7 FL (ref 8.5–12.5)
PMV BLD AUTO: 12.4 FL (ref 8.5–12.5)
PMV BLD AUTO: 7.8 FL (ref 7–10)
POTASSIUM BLD-SCNC: 4.5 MMOL/L (ref 3.5–5)
POTASSIUM BLD-SCNC: 5.2 MMOL/L (ref 3.5–5)
POTASSIUM BLD-SCNC: 5.3 MMOL/L (ref 3.5–5)
PROT SERPL-MCNC: 6.3 G/DL (ref 6–8)
PROT SERPL-MCNC: 6.4 G/DL (ref 6–8)
PROT SERPL-MCNC: 6.6 G/DL (ref 6–8)
RBC # BLD AUTO: 3.7 MILL/UL (ref 4.4–6.2)
RBC # BLD AUTO: 3.95 MILL/UL (ref 4.4–6.2)
RBC # BLD AUTO: 4.22 MILL/UL (ref 4.4–6.2)
SODIUM SERPL-SCNC: 140 MMOL/L (ref 136–145)
SODIUM SERPL-SCNC: 141 MMOL/L (ref 136–145)
SODIUM SERPL-SCNC: 142 MMOL/L (ref 136–145)
TRIGL SERPL-MCNC: 62 MG/DL
TSH SERPL DL<=0.005 MIU/L-ACNC: 1.79 UIU/ML (ref 0.3–5)
TSH SERPL DL<=0.005 MIU/L-ACNC: 3.13 UIU/ML (ref 0.3–5)
TSH SERPL DL<=0.005 MIU/L-ACNC: 4.01 UIU/ML (ref 0.3–5)
URATE SERPL-MCNC: 2.8 MG/DL (ref 3–8)
URATE SERPL-MCNC: 8.4 MG/DL (ref 3–8)
WBC: 4.5 THOU/UL (ref 4–11)
WBC: 4.5 THOU/UL (ref 4–11)
WBC: 4.8 THOU/UL (ref 4–11)

## 2020-01-01 ASSESSMENT — MIFFLIN-ST. JEOR: SCORE: 1833.02

## 2020-01-01 ASSESSMENT — PATIENT HEALTH QUESTIONNAIRE - PHQ9: SUM OF ALL RESPONSES TO PHQ QUESTIONS 1-9: 2

## 2020-05-12 ENCOUNTER — COMMUNICATION - HEALTHEAST (OUTPATIENT)
Dept: INTERNAL MEDICINE | Facility: CLINIC | Age: 75
End: 2020-05-12

## 2020-05-14 ENCOUNTER — COMMUNICATION - HEALTHEAST (OUTPATIENT)
Dept: SCHEDULING | Facility: CLINIC | Age: 75
End: 2020-05-14

## 2021-05-26 VITALS — HEART RATE: 74 BPM | RESPIRATION RATE: 14 BRPM | DIASTOLIC BLOOD PRESSURE: 70 MMHG | SYSTOLIC BLOOD PRESSURE: 104 MMHG

## 2021-05-27 ASSESSMENT — PATIENT HEALTH QUESTIONNAIRE - PHQ9: SUM OF ALL RESPONSES TO PHQ QUESTIONS 1-9: 2

## 2021-05-27 NOTE — TELEPHONE ENCOUNTER
Medication: Tramadol  Last Date Filled 2/10/19   pulled: YES    Only PCP Prescribing? : YES  Taken as prescribed from physician notes? YES    CSA in last year: YES  Random Utox in last year: no, will get at next scheduled appointment  (if any of the above answer NO - schedule with PCP)     Opioids + benzodiazepines? YES  (if the above answer YES - schedule with PCP every 6 months)     All responses suggest: Refilling prescription

## 2021-05-27 NOTE — PROGRESS NOTES
ASSESSMENT:  1. Diarrhea, unspecified type  1 week duration.  No recent antibiotics.  No new medication.  High risk for C. difficile with hospitalization and chronic osteomyelitis but no antibiotics since at least January.  Begin metabolic evaluation and stepwise trial off medicines  - C. difficile Toxigenic by PCR  - Ova and Parasite, Stool; Future  - HM2(CBC w/o Differential)  - Comprehensive Metabolic Panel  - Thyroid Stimulating Hormone (TSH)    2. Screen for colon cancer  - Cologuard    3. Ventricular fibrillation (H)  Stable.  Syncope earlier today less alarming with known defibrillator in place and previous history of ventricular tachycardia and fibrillation, not an issue during that event.  Advised to stay hydrated and hold diuretics        PLAN:  Patient Instructions   No travel diarrhea    Bacterial or parasitic infection    Antibiotic induced diarrhea.  You have taken them , but wrong time frame    Medicine side effect:  Iron   Anything with magnesium    Stool tests for infection    Stop iron    Stop Meloxicam    Decrease Topiramate to 50 mgs twice a day    Decrease Duloxetine from twice a day to once a day    Watch furosemide while you are having diarrhea    Stop Spironolactone           Depending on what we find we may add empiric antibiotics     Take as many of the diarrhea pills as you need      Orders Placed This Encounter   Procedures     Ova and Parasite, Stool     Standing Status:   Future     Standing Expiration Date:   4/15/2020     C. Diff Toxin By PCR     Standing Status:   Future     Standing Expiration Date:   4/14/2020     HM2(CBC w/o Differential)     Comprehensive Metabolic Panel     Thyroid Stimulating Hormone (TSH)     Cologuard     Medications Discontinued During This Encounter   Medication Reason     topiramate (TOPAMAX) 25 MG tablet        Return in about 6 months (around 10/15/2019) for for a full review of medications and lab testing.      CHIEF COMPLAINT:  Chief Complaint    Patient presents with     Diarrhea     x1 wk       HISTORY OF PRESENT ILLNESS:  Florentino is a 73 y.o. male presenting to the clinic today with complaints of diarrhea. He is accompanied by his wife.     Diarrhea: He has had diarrhea since last Tuesday. He has not been getting much of a warning before needing to go, and it has been terrible. It was especially bad this morning. The stool has been mostly liquid, and he is having diarrhea every hour to hour and a half. He does not feel sick and denies fever or chills. He has not done any recent traveling, but he wonders if it could be related to food. He has not started any new medications recently and has not been treated with antibiotics for quite a while. He has been taking antidiarrheal medications and inquires if that is okay to continue. He notes that he passed out this morning when getting up from the toilet. His blood pressure is quite low in the clinic today. He has been trying to drink a lot of water and is not taking as much furosemide.    Neuropathy: He has been taking the same dose of topiramate for a while; he thinks it was last increased around the beginning of the year. He is taking 50 mg in the morning and 100 mg in the evening. He would like to try another vitamin B12 shot today.     Edema: He has cut back on his furosemide since even before developing the diarrhea.     Foot Ulceration: His foot wound is doing well and Dr. Zaman has cleared him.     Gout: He restarted allopurinol about two weeks ago.     REVIEW OF SYSTEMS:   He has not needed to take anything for acid reflux lately. He has been taking iron once daily since being in transitional care. He took prednisone for a rash recently. He is not taking any new vitamins. All other systems are negative.    PFSH:  Reviewed, as below.     TOBACCO USE:  Social History     Tobacco Use   Smoking Status Former Smoker     Packs/day: 2.50     Years: 50.00     Pack years: 125.00     Types: Cigarettes, Pipe,  "Cigars     Last attempt to quit: 2000     Years since quittin.2   Smokeless Tobacco Never Used       VITALS:  Vitals:    04/15/19 1635   BP: (!) 72/48   Patient Site: Left Arm   Patient Position: Sitting   Cuff Size: Adult Large   Pulse: 60   Resp: 16   SpO2: 92%   Weight: (!) 238 lb 3 oz (108 kg)   Height: 6' 2.5\" (1.892 m)     Wt Readings from Last 3 Encounters:   04/15/19 (!) 238 lb 3 oz (108 kg)   19 (!) 237 lb (107.5 kg)   19 (!) 237 lb (107.5 kg)     Body mass index is 30.17 kg/m .    PHYSICAL EXAM:  Constitutional:  Reveals an alert, pleasant, talkative man who presents in a wheelchair. Affect appropriate.  Vitals:  Per nursing notes.  Neurologic:  Cranial nerves II-XII intact.     Psychiatric:  Mood appropriate, memory intact.     MEDICATIONS:  Current Outpatient Medications   Medication Sig Dispense Refill     acetaminophen (TYLENOL) 325 MG tablet Take 650 mg by mouth 2 (two) times a day as needed for pain.        allopurinol (ZYLOPRIM) 300 MG tablet TAKE 1 TABLET BY MOUTH DAILY. 90 tablet 2     amiodarone (PACERONE) 200 MG tablet Take 200 mg by mouth see administration instructions. Take 1 tablet daily on 4 days per week, and 1 tablet twice a day on 3 days per week as directed       ascorbic acid, vitamin C, (ASCORBIC ACID WITH JAVIER HIPS) 500 MG tablet Take 500 mg by mouth daily. (start after guiacs obtained)       aspirin 81 MG EC tablet Take 81 mg by mouth daily.       carvedilol (COREG) 25 MG tablet Take 2 tablets (50 mg total) by mouth 2 (two) times a day with meals. 360 tablet 3     CHOLECALCIFEROL 1,000 unit tablet TAKE 1 TABLET BY MOUTH DAILY. 90 tablet 2     cinnamon bark (CINNAMON ORAL) Take 1 tablet by mouth Daily at 5 pm..              coenzyme Q10 100 mg capsule Take 200 mg by mouth daily.        DULoxetine (CYMBALTA) 60 MG capsule Take 1 capsule (60 mg total) by mouth 2 (two) times a day. 180 capsule 3     EPINEPHrine (EPIPEN/ADRENACLICK) 0.3 mg/0.3 mL injection Inject 0.3 " mL (0.3 mg total) as directed as needed for anaphylaxis. Inject into thigh. 2 Pre-filled Pen Syringe 0     famotidine (PEPCID) 20 MG tablet Take 1 tablet (20 mg total) by mouth 2 (two) times a day. (Patient taking differently: Take 1 tablet by mouth 2 (two) times a day as needed for heartburn. Indications: multiple endocrine neoplasia, gastroesophageal reflux disease)  0     ferrous sulfate 325 (65 FE) MG tablet Take 1 tablet by mouth daily with breakfast. (start after guiacs obtained)       fexofenadine (ALLEGRA) 180 MG tablet TAKE 1 TABLET (180 MG TOTAL) BY MOUTH DAILY. 90 tablet 3     furosemide (LASIX) 20 MG tablet Take 20 mg by mouth daily.       furosemide (LASIX) 40 MG tablet TAKE 1 AND 1/2 TABLET BY MOUTH TWICE DAILY  3     HYDROcodone-acetaminophen (NORCO) 5-325 mg per tablet Take 1 tablet by mouth every 4 (four) hours as needed for pain. 20 tablet 0     KRILL OIL ORAL Take 1,000 mg by mouth daily.              loperamide (IMODIUM) 2 mg capsule Take 1 capsule (2 mg total) by mouth 3 (three) times a day as needed for diarrhea.  0     meloxicam (MOBIC) 15 MG tablet TAKE ONE TABLET BY MOUTH ONE TIME DAILY 90 tablet 3     milk thistle 175 mg tablet Take 175 mg by mouth daily.       multivitamin (MULTIVITAMIN) per tablet 1 tablet every other day.        mupirocin (BACTROBAN) 2 % ointment Apply 1 application topically 2 (two) times a day as needed.       oxymetazoline (AFRIN) 0.05 % nasal spray 2 sprays into each nostril 2 (two) times a day as needed.        simethicone (MYLICON) 125 MG chewable tablet Chew 125 mg every 6 (six) hours as needed for flatulence.       spironolactone (ALDACTONE) 25 MG tablet TAKE ONE TABLET BY MOUTH ONE TIME DAILY 90 tablet 2     topiramate (TOPAMAX) 50 MG tablet 50 mgs am and 100 mgs pm 270 tablet 3     traMADol (ULTRAM) 50 mg tablet TAKE 2 TABLETS (100 MG TOTAL) BY MOUTH 3 (THREE) TIMES A DAY. 180 tablet 1     Current Facility-Administered Medications   Medication Dose Route  Frequency Provider Last Rate Last Dose     cyanocobalamin injection 1,000 mcg  1,000 mcg Intramuscular Q30 Days Mario Lepe MD   1,000 mcg at 02/26/19 1231     lidocaine 2 % jelly (XYLOCAINE)   Topical PRN Phu Zaman DPM   1 application at 01/09/19 1349       ADDITIONAL HISTORY SUMMARIZED (2): Reviewed 4/5/2019 Dr. Zaman note regarding foot ulceration. Reviewed mychart message from 3/20/2019 regarding rash  DECISION TO OBTAIN EXTRA INFORMATION (1): None.   RADIOLOGY TESTS (1): None.  LABS (1): Labs ordered.   MEDICINE TESTS (1): None.  INDEPENDENT REVIEW (2 each): None.     The visit lasted a total of 19 minutes face to face with the patient. Over 50% of the time was spent counseling and educating the patient about his diarrhea.    I, Mitch Vines, am scribing for and in the presence of, Dr. Lepe.    I, Dr. Lepe, personally performed the services described in this documentation, as scribed by Mitch Vines in my presence, and it is both accurate and complete.    Total data points: 3

## 2021-05-27 NOTE — TELEPHONE ENCOUNTER
VS Patient  He assumed he will still be needing wound care supplies? Patient spoke to [facility] and they have not received orders for the supplies. [facility] fax #[phone].    Please call Ray on cell with an update. Detailed message ok.

## 2021-05-27 NOTE — TELEPHONE ENCOUNTER
Pt is over due for microalbumin and A1c please sign so we can have the pt come in for a lab only appointment

## 2021-05-27 NOTE — TELEPHONE ENCOUNTER
Controlled Substance Refill Request  Medication:   Requested Prescriptions     Pending Prescriptions Disp Refills     traMADol (ULTRAM) 50 mg tablet [Pharmacy Med Name: TRAMADOL HCL 50 MG TABLET] 180 tablet      Sig: TAKE 2 TABLETS (100 MG TOTAL) BY MOUTH 3 (THREE) TIMES A DAY.     Date Last Fill: 9/26/18  Ph[id number]   Submit electronically to pharmacy  Controlled Substance Agreement on File:   Encounter-Level CSA Scan Date - 01/16/2017:    Scan on 1/18/2017  1:41 PM (below)             Encounter-Level CSA Scan Date - 11/12/2015:    Scan on 11/13/2015  1:29 PM (below)         Last office visit: Last office visit pertaining to requested medication was 2/26/19.

## 2021-05-27 NOTE — TELEPHONE ENCOUNTER
Writer called and left a detailed message. Informed patient that since his wound has healed we are unable to order supplies for him as they won't be covered. I also informed him that if he was looking to protect the area for a while longer he should  supplies at his local pharmacy.

## 2021-05-27 NOTE — PATIENT INSTRUCTIONS - HE
No travel diarrhea    Bacterial or parasitic infection    Antibiotic induced diarrhea.  You have taken them , but wrong time frame    Medicine side effect:  Iron   Anything with magnesium    Stool tests for infection    Stop iron    Stop Meloxicam    Decrease Topiramate to 50 mgs twice a day    Decrease Duloxetine from twice a day to once a day    Watch furosemide while you are having diarrhea    Stop Spironolactone           Depending on what we find we may add empiric antibiotics     Take as many of the diarrhea pills as you need

## 2021-05-28 NOTE — TELEPHONE ENCOUNTER
"Type: Alert remote CRT-D transmission for shock therapy.   Presenting: None provided.   Lead/Battery Status: Stable battery, RA, and RV measurements, although variable R wave measurements, noted, in the past. There was a jump in LV pace impedance (473 ohms to 611 ohms, appears to have stabilized.   Atrial Arrhythmias: Since 4/22/19, none detected   Vent Arrhythmias: Since 4/22/19, one VT episode detected on 4/29/19, rate 120s-170s, that toggled the therapy zone, treated with ATPx7 accelerating rhythm with some treatments, treated successfully with a 31J shock. Four other episode detected in the V-1 zone, review of available EGMs appear to be one 11 seconds VT, rate 130s-140s, treated successfully with ATPx1, and 12 seconds VT, rate 100s-150s toggling the therapy zone that was self-terminating (two episdoes treated with ATPx1-2, unable to view EGMs). and 229 NSVT  Comments: Normal CRT-D function. RV pacing 94%, LV pacing 93%.  Routed to device RN for review.   Alerts: None. KLP      Transmission reviewed, known frequent slow VT. Recent admission for VT storm and is now on Amiodarone and Mexilitine 3x/day. Todd received a shock post-discharge last week and was seen by Dr. Hernández in clinic for reprogramming to 3-Zone therapies. See last OV noted and [facility] reports for changes made to settings.     Since OV last week on 4/22/19 continues to have several NSVTs, some VT episodes requiring ATP, and now another 31J shock for slow VT is noted at ~1:30 AM this morning, ( after 7 rounds of ATP were unsuccessful)    Reviewed with Todd, he states he has actually was feeling pretty well up until the shock last evening. States he was very anxious/stressed out yesterday about an upcoming move, also states he may have \"pushed it too hard.\" He and his wife are supposed to be moving into an Assisted Living facility, but with his wife's dementia they are now telling them they need memory care facility. This has been really tough on the " "whole family. Todd was still up worrying about everything when he got the shock. States he was laying in a bit of an awkward position on the left side last night, turned over because of a very sharp pain and felt like something moved inside him. He sat up and then said for about 2 inutes he could tell he was about to get a shock, then it \"finally did go off.\"      Todd is feeling well now. States he is going to try not to stress too much today and will try to rest. He is aware that Dr. Hernández is out of office this week and has follow up with him next week. He is aware of when to seek ER, and will call with any concerns before then.     Cassandra Cook RN      "

## 2021-05-28 NOTE — PATIENT INSTRUCTIONS - HE
Florentino Fall    It was a pleasure to see you at [facility] today.    Your defibrillator suggested for more effective pacing for ventricular tachycardia  Continue current medications  Follow up appointment:   Dr. Hernández in device clinic on May 8, 2019    Contact Tatyana at [phone] with questions or concerns.    Ulises Hernández MD

## 2021-05-28 NOTE — TELEPHONE ENCOUNTER
Sivan from [facility] called requesting dressing change order and note from 4/5/19 visit. Writer informed her that pt was discharged from our care.

## 2021-05-28 NOTE — PROGRESS NOTES
ASSESSMENT:  1.  Paroxysmal ventricular tachycardia.  Now on 3 times daily amiodarone and mexiletine.  Brief recurrence on April 19 without syncope.  Discussed driving restrictions    2.  Acute/chronic congestive heart failure.  Reviewed volume and adjustment of furosemide    3.  Chronic kidney disease.  Reviewed worsening then improvement on decreased diuretics    4.  Diarrhea.  No recurrence.  Reviewed stool cultures negative in the hospital    5.  Neuropathy.  Stable on decreased medication.  Consider repeat B12 shot    6.  Disposition.  Almost moved into assisted living but unable due to wife memory issues.  Discussed    PLAN:  Patient Instructions   Stay on furosemide 20, either 20 mgs, or half of 40's    No blood today          No orders of the defined types were placed in this encounter.    There are no discontinued medications.    Return in about 1 month (around 5/22/2019) for for a full review of medications and lab testing, and contact me through [email or web address].    All patient medications were reconciled.     CHIEF COMPLAINT:  Chief Complaint   Patient presents with     Hospital Visit Follow Up     4/16-4/18 - [facility] - Syncope - Dehydration/hypoglycemic       HISTORY OF PRESENT ILLNESS:  Florentino is a 73 y.o. male presenting to the clinic today for inpatient follow up after discharge on 4/18/19 with V tach, and medication monitoring, and diarrhea.     Assisted living: They were expecting to go to an assisted living facility, but they were evaluated today and were told that his wife needs a greater level of care for memory care, so they no longer can go to the assisted living facility.     Recurrent Vtach: He had a couple more heart symptoms but once he started getting the new pills this started quieting down. They are going to be getting therapy in home. He is taking the amiodarone three times a day, and Mexiletinethree times a day. He will follow up with cardiologist Dr. Hernández in a couple weeks. He is taking  "his furosemide 20mg and tolerating it well. He gets a little swelling in his feet and left leg. He had LOC after diarrhea for 5 days with low blood sugars. State law is that after LOC there is 6 months without driving, and he is very disappointed to be without a car for that length of time.     Diarrhea: He had a little more diarrhea  afternoon, but it has since returned to normal. Stool cultures in hospital were negative for C. Diff, and otherwise negative.     REVIEW OF SYSTEMS:   Denies present diarrhea, medication side effects,  Admits to peripheral edema, diarrhea 3 days ago,   All other systems are negative.    PFSH:  He continues to live in his home.   He is disappointed an assisted living facility option just fell through for him.   Reviewed as below.     TOBACCO USE:  Social History     Tobacco Use   Smoking Status Former Smoker     Packs/day: 2.50     Years: 50.00     Pack years: 125.00     Types: Cigarettes, Pipe, Cigars     Last attempt to quit: 2000     Years since quittin.3   Smokeless Tobacco Never Used       VITALS:  Vitals:    19 1441   BP: 96/68   Patient Site: Left Arm   Patient Position: Sitting   Cuff Size: Adult Large   Pulse: 95   Resp: 16   SpO2: 95%   Weight: (!) 234 lb 4 oz (106.3 kg)   Height: 6' 2\" (1.88 m)     Wt Readings from Last 3 Encounters:   19 (!) 234 lb 4 oz (106.3 kg)   19 (!) 235 lb (106.6 kg)   19 (!) 231 lb (104.8 kg)     Body mass index is 30.08 kg/m .    PHYSICAL EXAM:  Constitutional:  Reveals an alert pleasant man in no acute distress, affect appropriate.  Vitals:  Per nursing notes.  Cardiac:  Regular rate and rhythm without murmurs, rubs, or gallops. Legs with trace edema on the left. Palpation of the radial pulse regular.  Lungs: Clear lung fields bilaterally, no wheezes crackles or rhonchi.  Respiratory effort normal.  Neurologic: Cranial nerves II-XII, motor strength, sensation, and coordination grossly intact.     Psychiatric:  " Mood appropriate, memory intact.     MEDICATIONS:  Current Outpatient Medications   Medication Sig Dispense Refill     acetaminophen (TYLENOL) 325 MG tablet Take 650 mg by mouth 2 (two) times a day as needed for pain.        allopurinol (ZYLOPRIM) 300 MG tablet TAKE 1 TABLET BY MOUTH DAILY. 90 tablet 2     amiodarone (PACERONE) 200 MG tablet Take 1 tablet (200 mg total) by mouth 3 (three) times a day. 90 tablet 0     ascorbic acid, vitamin C, (ASCORBIC ACID WITH JAVIER HIPS) 500 MG tablet Take 500 mg by mouth daily. (start after mya obtained)       aspirin 81 MG EC tablet Take 81 mg by mouth daily.       CHOLECALCIFEROL 1,000 unit tablet TAKE 1 TABLET BY MOUTH DAILY. 90 tablet 2     cinnamon bark (CINNAMON ORAL) Take 1,000 mg by mouth Daily at 5 pm..              coenzyme Q10 100 mg capsule Take 200 mg by mouth daily.        DULoxetine (CYMBALTA) 60 MG capsule Take 60 mg by mouth daily.       EPINEPHrine (EPIPEN/ADRENACLICK) 0.3 mg/0.3 mL injection Inject 0.3 mL (0.3 mg total) as directed as needed for anaphylaxis. Inject into thigh. 2 Pre-filled Pen Syringe 0     famotidine (PEPCID) 20 MG tablet Take 1 tablet (20 mg total) by mouth 2 (two) times a day. (Patient taking differently: Take 1 tablet by mouth 2 (two) times a day as needed for heartburn. Indications: multiple endocrine neoplasia, gastroesophageal reflux disease)  0     fexofenadine (ALLEGRA) 180 MG tablet TAKE 1 TABLET (180 MG TOTAL) BY MOUTH DAILY. 90 tablet 3     furosemide (LASIX) 20 MG tablet Take 1 tablet (20 mg total) by mouth daily. 30 tablet 0     KLOR-CON M20 20 mEq tablet Take 20 mEq by mouth daily.  3     KRILL OIL ORAL Take 1,000 mg by mouth daily.              loperamide (IMODIUM) 2 mg capsule Take 1 capsule (2 mg total) by mouth 3 (three) times a day as needed for diarrhea.  0     metoprolol succinate (TOPROL-XL) 100 MG 24 hr tablet Take 1 tablet (100 mg total) by mouth daily. 30 tablet 0     mexiletine (MEXITIL) 150 MG capsule Take 1  capsule (150 mg total) by mouth 3 (three) times a day with meals. 90 capsule 0     milk thistle 175 mg tablet Take 175 mg by mouth daily.       multivitamin (MULTIVITAMIN) per tablet 1 tablet every other day.        mupirocin (BACTROBAN) 2 % ointment Apply 1 application topically 2 (two) times a day as needed.       simethicone (MYLICON) 125 MG chewable tablet Chew 125 mg every 6 (six) hours as needed for flatulence.       topiramate (TOPAMAX) 50 MG tablet Take 50 mg by mouth 2 (two) times a day.       traMADol (ULTRAM) 50 mg tablet TAKE 2 TABLETS (100 MG TOTAL) BY MOUTH 3 (THREE) TIMES A DAY. 180 tablet 1     Current Facility-Administered Medications   Medication Dose Route Frequency Provider Last Rate Last Dose     cyanocobalamin injection 1,000 mcg  1,000 mcg Intramuscular Q30 Days Mario Lepe MD   1,000 mcg at 02/26/19 1231       ADDITIONAL HISTORY SUMMARIZED (2): 4/22/19 Dr. Hernández cardiology note reviewed for treatment plan for recurrent Vtach, and non ischemic cardiomyopathy.   DECISION TO OBTAIN EXTRA INFORMATION (1): None.   RADIOLOGY TESTS (1): None.  LABS (1): 4/18/19, 4/17/19 labs reviewed   MEDICINE TESTS (1): None.  INDEPENDENT REVIEW (2 each): None.     The visit lasted a total of 13 minutes face to face with the patient. Over 50% of the time was spent counseling and educating the patient about fluid balance, return to driving, cardiac arrythmias.    IHarrison, am scribing for and in the presence of, Dr. Lepe.    I, Dr. Lepe, personally performed the services described in this documentation, as scribed by Harrison Dudley in my presence, and it is both accurate and complete.    Total data points: 3

## 2021-05-28 NOTE — PROGRESS NOTES
Rep device check.  Please see link for full device report.  Patient was informed of results and next follow up during today's visit.

## 2021-05-28 NOTE — PATIENT INSTRUCTIONS - HE
Florentino Fall    It was a pleasure to see you at [facility] today.    Continue taking your current medications including amiodarone 200 mg twice daily  ICD was reset from low rate 95 to low rate 80 bpm  Follow up appointment:   Dr. Hernández in 1 month in device clinic  Consideration will be given to decreasing amiodarone to 400mg 4 days/week 200 mg 3 days/week at that time  VT ablation procedure at [facility] was again discussed    Contact Tatyana at [phone] with questions or concerns.    Ulises Hernández MD

## 2021-05-28 NOTE — PROGRESS NOTES
In clinic device check with Device RN and Dr. Hernández.  Please see link for full device report.  Patient was informed of results and next follow up during today's visit.

## 2021-05-28 NOTE — PROGRESS NOTES
"[facility] Transition of Care Encounter  Assessment & Plan                                                     Post Discharge Medication Reconciliation Status: discharge medications reconciled, continue medications without change    1. Paroxysmal ventricular tachycardia (H)  Now on his regimen of amiodarone, mexiletine, metoprolol he has not had another episode of V. tach.  Tolerating without adverse effects.  He will be following up in about a week and a half with Dr. Hernández.    2. Acute on chronic systolic congestive heart failure (H)  Continues on 20 mg of furosemide, his continued off of spironolactone, continue current regimen until follow-up with PCP.    3. Gastroesophageal reflux disease, esophagitis presence not specified  Stable on as needed famotidine.    4. Diarrhea, unspecified type  Largely resolved with medication adjustments, recommend to continue current regimen.  He has had slight worsening of his neuropathy but this is manageable, will discuss further with PCP at follow-up.    Follow Up  Return in about 2 weeks (around 5/8/2019) for Dr. Hernández .      Subjective & Objective                                                       Florentino Fall is a 73 y.o. male called for a transitions of care visit. he was discharged from [facility] on 4/18/19 for Ventricular tachycardia. He is in a wheelchair.     Chief Complaint: Hospital Follow up     Medication Adherence/Access: stable, he sets up his own medications on a daily basis, he prefers this. He is taking medications now 3 times daily.     Paroxyzmal V-Tach: reviewed follow up with Dr. Hernández, \"He was discharged from the hospital on April 18 but did not start taking mexiletine until late on the 19th.  ICD interrogation today confirmed ICD shocks.  Patient was unaware of shocks and felt minimal palpitations.\" Follow up on May 8th.  He is appropriately switched from carvedilol to metoprolol during hospitalization.    CHF: now back on furosemide after discharge, " no more spironolactone.     GERD: as needed famotidine.     Diarrhea: much better now after adjusting topiramate and cymbalta. Slightly worse neuropathy but tolerable.     PMH: reviewed in EPIC   Allergies/ADRs: reviewed in EPIC   Alcohol: reviewed in EPIC   Tobacco:   Social History     Tobacco Use   Smoking Status Former Smoker     Packs/day: 2.50     Years: 50.00     Pack years: 125.00     Types: Cigarettes, Pipe, Cigars     Last attempt to quit: 2000     Years since quittin.3   Smokeless Tobacco Never Used     Recent Vitals:   BP Readings from Last 3 Encounters:   19 96/68   19 90/66   19 119/74      Wt Readings from Last 3 Encounters:   19 (!) 234 lb 4 oz (106.3 kg)   19 (!) 235 lb (106.6 kg)   19 (!) 231 lb (104.8 kg)     ----------------    Much or all of the text in this note was generated through the use of Dragon Dictate voice-to-text software. Errors in spelling or words which seem out of context are unintentional. Sound alike errors, in particular, may have escaped editing.    The patient declined an after visit summary    I spent 30 minutes with this patient today;  . All changes were made via collaborative practice agreement with Mario Lepe MD. A copy of the visit note was provided to the patient's provider.     Ulices Richardson, PharmD, BCACP  Medication Management (MTM) Pharmacist  [facility]    Current Outpatient Medications   Medication Sig Dispense Refill     amiodarone (PACERONE) 200 MG tablet Take 1 tablet (200 mg total) by mouth 3 (three) times a day. 90 tablet 0     DULoxetine (CYMBALTA) 60 MG capsule Take 60 mg by mouth daily.       famotidine (PEPCID) 20 MG tablet Take 1 tablet (20 mg total) by mouth 2 (two) times a day. (Patient taking differently: Take 1 tablet by mouth 2 (two) times a day as needed for heartburn. Indications: multiple endocrine neoplasia, gastroesophageal reflux disease)  0     furosemide  (LASIX) 20 MG tablet Take 1 tablet (20 mg total) by mouth daily. 30 tablet 0     loperamide (IMODIUM) 2 mg capsule Take 1 capsule (2 mg total) by mouth 3 (three) times a day as needed for diarrhea.  0     metoprolol succinate (TOPROL-XL) 100 MG 24 hr tablet Take 1 tablet (100 mg total) by mouth daily. 30 tablet 0     mexiletine (MEXITIL) 150 MG capsule Take 1 capsule (150 mg total) by mouth 3 (three) times a day with meals. 90 capsule 0     topiramate (TOPAMAX) 50 MG tablet Take 50 mg by mouth 2 (two) times a day.       acetaminophen (TYLENOL) 325 MG tablet Take 650 mg by mouth 2 (two) times a day as needed for pain.        allopurinol (ZYLOPRIM) 300 MG tablet TAKE 1 TABLET BY MOUTH DAILY. 90 tablet 2     ascorbic acid, vitamin C, (ASCORBIC ACID WITH JAVIER HIPS) 500 MG tablet Take 500 mg by mouth daily. (start after guiacs obtained)       aspirin 81 MG EC tablet Take 81 mg by mouth daily.       CHOLECALCIFEROL 1,000 unit tablet TAKE 1 TABLET BY MOUTH DAILY. 90 tablet 2     cinnamon bark (CINNAMON ORAL) Take 1,000 mg by mouth Daily at 5 pm..              coenzyme Q10 100 mg capsule Take 200 mg by mouth daily.        EPINEPHrine (EPIPEN/ADRENACLICK) 0.3 mg/0.3 mL injection Inject 0.3 mL (0.3 mg total) as directed as needed for anaphylaxis. Inject into thigh. 2 Pre-filled Pen Syringe 0     fexofenadine (ALLEGRA) 180 MG tablet TAKE 1 TABLET (180 MG TOTAL) BY MOUTH DAILY. 90 tablet 3     KLOR-CON M20 20 mEq tablet Take 20 mEq by mouth daily.  3     KRILL OIL ORAL Take 1,000 mg by mouth daily.              milk thistle 175 mg tablet Take 175 mg by mouth daily.       multivitamin (MULTIVITAMIN) per tablet 1 tablet every other day.        mupirocin (BACTROBAN) 2 % ointment Apply 1 application topically 2 (two) times a day as needed.       simethicone (MYLICON) 125 MG chewable tablet Chew 125 mg every 6 (six) hours as needed for flatulence.       traMADol (ULTRAM) 50 mg tablet TAKE 2 TABLETS (100 MG TOTAL) BY MOUTH 3 (THREE)  TIMES A DAY. 180 tablet 1     Current Facility-Administered Medications   Medication Dose Route Frequency Provider Last Rate Last Dose     cyanocobalamin injection 1,000 mcg  1,000 mcg Intramuscular Q30 Days Mario Lepe MD   1,000 mcg at 02/26/19 1234

## 2021-05-28 NOTE — TELEPHONE ENCOUNTER
Type: CRT-D alert remote transmission for shock therapy.  Presenting rhythm: unavailable.   Battery/lead status: stable.  Arrhythmias: since 4/18/19(device checked elsewhere); 11 VT episodes all occurring on 4/19/19 from 5:56-23:36, 130's-160's, 1-5 ATP attempted, some unsuccessful, terminated with 41J shock. 26 other NSVT detected.  Comments: appears to be normal ICD function. Device biVP 88%RV and 84%LV. Routed to device RN for review.   Device/lead alerts: none. HARSHA     Transmission reviewed. Several  NSVT episodes noted the morning of 4/19/19 (day after DC from hospital). Eventually sustained slow VT is noted, 5 rounds of ATP and then 41 J shock at ~7:30 AM, and then again another sustained episode requiring 5 rounds ATP and then 41 J shock at around 11:30 pm on 4/19/19. Some crosstalk is noted at times but appropriately blanked by device.     Reviewed episodes of with Ray, he does not recall either shock event from Friday. However, states he was probably asleep at the time of both shocks. Does not recall waking up to shocks either. He states he is currently on track with Mexiltine and Amiodarone 3 times a day, but admits that the Mexilitine was delayed about a day due to pharmacy issue. He thinks he was able to get 2 doses in Saturday and all 3 doses in yesterday.        Advised to call with any new shocks or syncope. He agrees. Will update Dr Hernández at this time. Device reports scanned in.     Cassandra Cook RN

## 2021-05-28 NOTE — TELEPHONE ENCOUNTER
Pt's cologuard test came back positive. This means abnormal DNA was found in the stool.  PCP recommends further evaluation by means of a barium enema.  Spoke with pt and relaeyd this.  PT understanding and agreeable to barium enema.  Order pended for PCP approval.

## 2021-05-28 NOTE — TELEPHONE ENCOUNTER
Refill Approved    Rx renewed per Medication Renewal Policy. Medication was last renewed on 4/18/19, last OV 4/24/19.    Jyotsna Herr, [facility] Triage/Med Refill 5/12/2019     Requested Prescriptions   Pending Prescriptions Disp Refills     furosemide (LASIX) 20 MG tablet [Pharmacy Med Name: FUROSEMIDE 20 MG TABLET] 30 tablet 0     Sig: TAKE 1 TABLET BY MOUTH EVERY DAY       Diuretics/Combination Diuretics Refill Protocol  Passed - 5/11/2019  1:40 PM        Passed - Visit with PCP or prescribing provider visit in past 12 months     Last office visit with prescriber/PCP: 4/24/2019 Mario Lepe MD OR same dept: 4/24/2019 Mario Lepe MD OR same specialty: 4/24/2019 Mario Lepe MD  Last physical: 12/14/2018 Last MTM visit: Visit date not found   Next visit within 3 mo: Visit date not found  Next physical within 3 mo: Visit date not found  Prescriber OR PCP: Mario Lepe MD  Last diagnosis associated with med order: 1. Paroxysmal ventricular tachycardia (H)  - furosemide (LASIX) 20 MG tablet [Pharmacy Med Name: FUROSEMIDE 20 MG TABLET]; TAKE 1 TABLET BY MOUTH EVERY DAY  Dispense: 30 tablet; Refill: 0  - amiodarone (PACERONE) 200 MG tablet [Pharmacy Med Name: AMIODARONE  MG TABLET]; TAKE 1 TABLET BY MOUTH THREE TIMES A DAY  Dispense: 90 tablet; Refill: 0  - mexiletine (MEXITIL) 150 MG capsule [Pharmacy Med Name: MEXILETINE 150 MG CAPSULE]; TAKE 1 CAPSULE BY MOUTH THREE TIMES A DAY WITH MEALS  Dispense: 90 capsule; Refill: 0  - metoprolol succinate (TOPROL-XL) 100 MG 24 hr tablet [Pharmacy Med Name: METOPROLOL SUCC  MG TAB]; TAKE 1 TABLET BY MOUTH EVERY DAY  Dispense: 30 tablet; Refill: 0    If protocol passes may refill for 12 months if within 3 months of last provider visit (or a total of 15 months).             Passed - Serum Potassium in past 12 months      Lab Results   Component Value Date    Potassium 3.8 04/18/2019             Passed - Serum Sodium in past 12  months      Lab Results   Component Value Date    Sodium 142 04/18/2019             Passed - Blood pressure on file in past 12 months     BP Readings from Last 1 Encounters:   04/24/19 96/68             Passed - Serum Creatinine in past 12 months      Creatinine   Date Value Ref Range Status   04/18/2019 1.42 (H) 0.70 - 1.30 mg/dL Final             amiodarone (PACERONE) 200 MG tablet [Pharmacy Med Name: AMIODARONE  MG TABLET] 90 tablet 0     Sig: TAKE 1 TABLET BY MOUTH THREE TIMES A DAY       Refill Protocol for Amiodarone Failed - 5/11/2019  1:40 PM        Failed - Amiodarone level in last 3 months     No components found for: AMIODARO                Failed - Eye exam in last 3 months     Eye exam: No results found for this or any previous visit. No results found for this or any previous visit.  No exam data present If protocol doesn't pull in eye exam; should not prevent refill.          Passed - TSH in last 6 months     TSH   Date Value Ref Range Status   04/15/2019 3.70 0.30 - 5.00 uIU/mL Final                   Passed - LFT or AST or ALT in last 12 months     Albumin   Date Value Ref Range Status   04/15/2019 3.4 (L) 3.5 - 5.0 g/dL Final     Bilirubin, Total   Date Value Ref Range Status   04/15/2019 0.5 0.0 - 1.0 mg/dL Final     Bilirubin, Direct   Date Value Ref Range Status   05/18/2016 0.2 <=0.5 mg/dL Final     Alkaline Phosphatase   Date Value Ref Range Status   04/15/2019 59 45 - 120 U/L Final     AST   Date Value Ref Range Status   04/15/2019 20 0 - 40 U/L Final     ALT   Date Value Ref Range Status   04/15/2019 35 0 - 45 U/L Final     Protein, Total   Date Value Ref Range Status   04/15/2019 5.8 (L) 6.0 - 8.0 g/dL Final                Passed - PCP or prescribing provider visit in past 6 months     Last office visit with prescriber/PCP: 4/24/2019 OR same dept: 4/24/2019 Mario Lepe MD OR same specialty: 4/24/2019 Mario Lepe MD Last physical: 12/14/2018  Last MTM visit: Visit  date not found     Next appt within 3 mo: Visit date not found  Next physical within 3 mo: Visit date not found  Prescriber OR PCP: Mario Lepe MD  Last diagnosis associated with med order: 1. Paroxysmal ventricular tachycardia (H)  - furosemide (LASIX) 20 MG tablet [Pharmacy Med Name: FUROSEMIDE 20 MG TABLET]; TAKE 1 TABLET BY MOUTH EVERY DAY  Dispense: 30 tablet; Refill: 0  - amiodarone (PACERONE) 200 MG tablet [Pharmacy Med Name: AMIODARONE  MG TABLET]; TAKE 1 TABLET BY MOUTH THREE TIMES A DAY  Dispense: 90 tablet; Refill: 0  - mexiletine (MEXITIL) 150 MG capsule [Pharmacy Med Name: MEXILETINE 150 MG CAPSULE]; TAKE 1 CAPSULE BY MOUTH THREE TIMES A DAY WITH MEALS  Dispense: 90 capsule; Refill: 0  - metoprolol succinate (TOPROL-XL) 100 MG 24 hr tablet [Pharmacy Med Name: METOPROLOL SUCC  MG TAB]; TAKE 1 TABLET BY MOUTH EVERY DAY  Dispense: 30 tablet; Refill: 0     If protocol passes may refill for 6 months if within 3 months of last provider visit (or a total of 9 months).          Passed - BMP in last 12 months     Sodium   Date Value Ref Range Status   04/18/2019 142 136 - 145 mmol/L Final     Potassium   Date Value Ref Range Status   04/18/2019 3.8 3.5 - 5.0 mmol/L Final     Chloride   Date Value Ref Range Status   04/18/2019 118 (H) 98 - 107 mmol/L Final     CO2   Date Value Ref Range Status   04/18/2019 18 (L) 22 - 31 mmol/L Final     BUN   Date Value Ref Range Status   04/18/2019 25 8 - 28 mg/dL Final     Creatinine   Date Value Ref Range Status   04/18/2019 1.42 (H) 0.70 - 1.30 mg/dL Final             Passed - CBC w/plts (HM2) in last 12 months     WBC   Date Value Ref Range Status   04/17/2019 3.7 (L) 4.0 - 11.0 thou/uL Final     Hemoglobin   Date Value Ref Range Status   04/17/2019 11.5 (L) 14.0 - 18.0 g/dL Final     Hematocrit   Date Value Ref Range Status   04/17/2019 34.4 (L) 40.0 - 54.0 % Final     Platelets   Date Value Ref Range Status   04/17/2019 112 (E) 044 - 243 [facility]/ Final              Passed - ECG in last 12 months     ECG rhythm strip: No results found for this or any previous visit. ECG 12 lead MUSE:   Results for orders placed or performed during the hospital encounter of 04/16/19   ECG 12 lead   Result Value Ref Range    SYSTOLIC BLOOD PRESSURE  mmHg    DIASTOLIC BLOOD PRESSURE  mmHg    VENTRICULAR RATE 80 BPM    ATRIAL RATE 77 BPM    P-R INTERVAL  ms    QRS DURATION 162 ms    Q-T INTERVAL 524 ms    QTC CALCULATION (BEZET) 604 ms    P Axis  degrees    R AXIS 245 degrees    T AXIS 60 degrees    MUSE DIAGNOSIS       AV sequential or dual chamber electronic pacemaker  When compared with ECG of 16-APR-2019 13:28,  Electronic ventricular pacemaker has replaced Wide QRS rhythm  Vent. rate has decreased BY  57 BPM  Confirmed by YUE PARK MD LOC:WW ([id number]) on 4/17/2019 11:52:45 AM      ECG 12 lead nursing unit:   Results for orders placed or performed during the hospital encounter of 04/16/19   ECG 12 lead nursing unit performed   Result Value Ref Range    SYSTOLIC BLOOD PRESSURE 95 mmHg    DIASTOLIC BLOOD PRESSURE 65 mmHg    VENTRICULAR RATE 137 BPM    ATRIAL RATE 137 BPM    P-R INTERVAL 136 ms    QRS DURATION 192 ms    Q-T INTERVAL 342 ms    QTC CALCULATION (BEZET) 516 ms    P Axis 44 degrees    R AXIS 76 degrees    T AXIS 261 degrees    MUSE DIAGNOSIS       Wide QRS rhythm  Ventricular tachycardia  Abnormal ECG  When compared with ECG of 14-DEC-2018 15:28,  Ventricular tachycardia has replaced AV sequential or dual chamber electronic pacemaker  Vent. rate has increased BY  77 BPM  Confirmed by SHIV RUEDA MD LOC:JN ([id number]) on 4/16/2019 2:41:53 PM               Passed - Mg level in last 3 months     Magnesium   Date Value Ref Range Status   04/18/2019 1.8 1.8 - 2.6 mg/dL Final              Passed - Chest x-ray in last 3 months     Chest x-ray: No results found for this or any previous visit. No results found for this or any previous visit.          mexiletine (MEXITIL) 150 MG  capsule [Pharmacy Med Name: MEXILETINE 150 MG CAPSULE] 90 capsule 0     Sig: TAKE 1 CAPSULE BY MOUTH THREE TIMES A DAY WITH MEALS       Refill Protocol for Dronedarone, Mexiletine, Propafenone Passed - 5/11/2019  1:40 PM        Passed - LFT or AST or ALT on file in last 12 months     Albumin   Date Value Ref Range Status   04/15/2019 3.4 (L) 3.5 - 5.0 g/dL Final     Bilirubin, Total   Date Value Ref Range Status   04/15/2019 0.5 0.0 - 1.0 mg/dL Final     Bilirubin, Direct   Date Value Ref Range Status   05/18/2016 0.2 <=0.5 mg/dL Final     Alkaline Phosphatase   Date Value Ref Range Status   04/15/2019 59 45 - 120 U/L Final     AST   Date Value Ref Range Status   04/15/2019 20 0 - 40 U/L Final     ALT   Date Value Ref Range Status   04/15/2019 35 0 - 45 U/L Final     Protein, Total   Date Value Ref Range Status   04/15/2019 5.8 (L) 6.0 - 8.0 g/dL Final                Passed - PCP or prescribing provider visit in past 6 months or next 3 months     Last office visit with prescriber/PCP: 4/24/2019 OR same dept: 4/24/2019 Mario Lepe MD OR same specialty: 4/24/2019 Mario Lepe MD Last physical: 12/14/2018 Last MTM visit: Visit date not found     Next appt within 3 mo: Visit date not found  Next physical within 3 mo: Visit date not found  Prescriber OR PCP: Mario Lepe MD  Last diagnosis associated with med order: 1. Paroxysmal ventricular tachycardia (H)  - furosemide (LASIX) 20 MG tablet [Pharmacy Med Name: FUROSEMIDE 20 MG TABLET]; TAKE 1 TABLET BY MOUTH EVERY DAY  Dispense: 30 tablet; Refill: 0  - amiodarone (PACERONE) 200 MG tablet [Pharmacy Med Name: AMIODARONE  MG TABLET]; TAKE 1 TABLET BY MOUTH THREE TIMES A DAY  Dispense: 90 tablet; Refill: 0  - mexiletine (MEXITIL) 150 MG capsule [Pharmacy Med Name: MEXILETINE 150 MG CAPSULE]; TAKE 1 CAPSULE BY MOUTH THREE TIMES A DAY WITH MEALS  Dispense: 90 capsule; Refill: 0  - metoprolol succinate (TOPROL-XL) 100 MG 24 hr tablet [Pharmacy  Med Name: METOPROLOL SUCC  MG TAB]; TAKE 1 TABLET BY MOUTH EVERY DAY  Dispense: 30 tablet; Refill: 0    If protocol passes may refill for 6 months if within 3 months of last provider visit (or a total of 9 months).              Passed - BMP on file in last 12 months     Sodium   Date Value Ref Range Status   04/18/2019 142 136 - 145 mmol/L Final     Potassium   Date Value Ref Range Status   04/18/2019 3.8 3.5 - 5.0 mmol/L Final     Chloride   Date Value Ref Range Status   04/18/2019 118 (H) 98 - 107 mmol/L Final     CO2   Date Value Ref Range Status   04/18/2019 18 (L) 22 - 31 mmol/L Final     BUN   Date Value Ref Range Status   04/18/2019 25 8 - 28 mg/dL Final     Creatinine   Date Value Ref Range Status   04/18/2019 1.42 (H) 0.70 - 1.30 mg/dL Final             Passed - CBC w/plts (hm2) on file in last 12 months     WBC   Date Value Ref Range Status   04/17/2019 3.7 (L) 4.0 - 11.0 thou/uL Final     Hemoglobin   Date Value Ref Range Status   04/17/2019 11.5 (L) 14.0 - 18.0 g/dL Final     Hematocrit   Date Value Ref Range Status   04/17/2019 34.4 (L) 40.0 - 54.0 % Final     Platelets   Date Value Ref Range Status   04/17/2019 112 (L) 140 - 440 thou/uL Final             Passed - ECG in last 12 months     ECG rhythm strip: No results found for this or any previous visit. ECG 12 lead MUSE:   Results for orders placed or performed during the hospital encounter of 04/16/19   ECG 12 lead   Result Value Ref Range    SYSTOLIC BLOOD PRESSURE  mmHg    DIASTOLIC BLOOD PRESSURE  mmHg    VENTRICULAR RATE 80 BPM    ATRIAL RATE 77 BPM    P-R INTERVAL  ms    QRS DURATION 162 ms    Q-T INTERVAL 524 ms    QTC CALCULATION (BEZET) 604 ms    P Axis  degrees    R AXIS 245 degrees    T AXIS 60 degrees    MUSE DIAGNOSIS       AV sequential or dual chamber electronic pacemaker  When compared with ECG of 16-APR-2019 13:28,  Electronic ventricular pacemaker has replaced Wide QRS rhythm  Vent. rate has decreased BY  57 BPM  Confirmed by  YUE PARK MD LOC:WW ([id number]) on 4/17/2019 11:52:45 AM      ECG 12 lead nursing unit:   Results for orders placed or performed during the hospital encounter of 04/16/19   ECG 12 lead nursing unit performed   Result Value Ref Range    SYSTOLIC BLOOD PRESSURE 95 mmHg    DIASTOLIC BLOOD PRESSURE 65 mmHg    VENTRICULAR RATE 137 BPM    ATRIAL RATE 137 BPM    P-R INTERVAL 136 ms    QRS DURATION 192 ms    Q-T INTERVAL 342 ms    QTC CALCULATION (BEZET) 516 ms    P Axis 44 degrees    R AXIS 76 degrees    T AXIS 261 degrees    MUSE DIAGNOSIS       Wide QRS rhythm  Ventricular tachycardia  Abnormal ECG  When compared with ECG of 14-DEC-2018 15:28,  Ventricular tachycardia has replaced AV sequential or dual chamber electronic pacemaker  Vent. rate has increased BY  77 BPM  Confirmed by SHIV RUEDA MD LOC:JN ([id number]) on 4/16/2019 2:41:53 PM               metoprolol succinate (TOPROL-XL) 100 MG 24 hr tablet [Pharmacy Med Name: METOPROLOL SUCC  MG TAB] 30 tablet 0     Sig: TAKE 1 TABLET BY MOUTH EVERY DAY       Beta-Blockers Refill Protocol Passed - 5/11/2019  1:40 PM        Passed - PCP or prescribing provider visit in past 12 months or next 3 months     Last office visit with prescriber/PCP: 4/24/2019 Mario Lepe MD OR same dept: 4/24/2019 Mario Lepe MD OR same specialty: 4/24/2019 Mario Lepe MD  Last physical: 12/14/2018 Last MTM visit: Visit date not found   Next visit within 3 mo: Visit date not found  Next physical within 3 mo: Visit date not found  Prescriber OR PCP: Mario Lepe MD  Last diagnosis associated with med order: 1. Paroxysmal ventricular tachycardia (H)  - furosemide (LASIX) 20 MG tablet [Pharmacy Med Name: FUROSEMIDE 20 MG TABLET]; TAKE 1 TABLET BY MOUTH EVERY DAY  Dispense: 30 tablet; Refill: 0  - amiodarone (PACERONE) 200 MG tablet [Pharmacy Med Name: AMIODARONE  MG TABLET]; TAKE 1 TABLET BY MOUTH THREE TIMES A DAY  Dispense: 90 tablet; Refill: 0  -  mexiletine (MEXITIL) 150 MG capsule [Pharmacy Med Name: MEXILETINE 150 MG CAPSULE]; TAKE 1 CAPSULE BY MOUTH THREE TIMES A DAY WITH MEALS  Dispense: 90 capsule; Refill: 0  - metoprolol succinate (TOPROL-XL) 100 MG 24 hr tablet [Pharmacy Med Name: METOPROLOL SUCC  MG TAB]; TAKE 1 TABLET BY MOUTH EVERY DAY  Dispense: 30 tablet; Refill: 0    If protocol passes may refill for 12 months if within 3 months of last provider visit (or a total of 15 months).             Passed - Blood pressure filed in past 12 months     BP Readings from Last 1 Encounters:   04/24/19 96/68

## 2021-05-28 NOTE — TELEPHONE ENCOUNTER
"Orders being requested: skilled nursing    -1 time per week for 1 week  -2 times per week for 2 weeks  -1 time per week for 6 weeks  -3 PRN's    Reason service is needed/diagnosis: recent discharge from hospital following \"cardiac issues\".  When are orders needed by: ASAP  Where to send Orders: Phone:  [phone]  Okay to leave detailed message?  Yes        Orders being requested: physical therapy   Reason service is needed/diagnosis: evaluate and treat  When are orders needed by: ASAP  Where to send Orders: Phone:  [phone]  Okay to leave detailed message?  Yes          Orders being requested:    Reason service is needed/diagnosis: evaluate and treat  When are orders needed by: ASAP  Where to send Orders: Phone:  [phone]  Okay to leave detailed message?  Yes        "

## 2021-05-29 NOTE — PATIENT INSTRUCTIONS - HE
Discuss + cologuard - ? Barium Enema    Increase Duloxetine back to twice a day.  Watch for worsening diarrhea    Update blood tests     CEA blood test screening for colon cancer.  If normal, we will sit tight    If abnormal, we will do the least toxic to the colon re: diarrhea and electrolytes    You need blood tests every so often on Amiodorone, so today, then august.

## 2021-05-29 NOTE — PROGRESS NOTES
ASSESSMENT:  1. Recurrent ventricular tachycardia (H)  Reviewed hospitalization and cardiology note.  Question dose of amiodarone.  Update labs.  No recurrent attacks  - HM2(CBC w/o Differential)  - Thyroid Stimulating Hormone (TSH)  - Comprehensive Metabolic Panel    2. CKD (chronic kidney disease) stage 3, GFR 30-59 ml/min (H)  Monitor labs    3. Essential hypertension with goal blood pressure less than 140/90  Stable    4. Neuropathy (H)  Worsened on decreased duloxetine.  Worsened mood with increased stress of wife.  Reviewed decrease in duloxetine due to possible diarrhea.  Cautious increase of duloxetine back to previous dose  - DULoxetine (CYMBALTA) 60 MG capsule; Take 1 capsule (60 mg total) by mouth 2 (two) times a day.  Dispense: 180 capsule; Refill: 3  - Glycosylated Hemoglobin A1c    5. Positive colorectal cancer screening using Cologuard test  Positive Cologuard.  Given his amputations and mobility limitations, and his fear of recurrent electrolyte abnormalities and recurrent ventricular tachycardia, he would like to be very conservative.  Indeed it is this approach that his prevented him from ever having a gastrointestinal evaluation for.  Previous positive Hemoccults noted.  Discussed CT colonoscopy versus CT versus CEA versus barium enema.  Colonoscopy would be very difficult for him    Although poorly sensitive, discussed next step screening as CEA to continue to be conservative, and yet do something  - CEA (Carcinoembryonic Antigen)    6. Other long term (current) drug therapy   No history of diabetes but monitor glycosylated hemoglobin  - Glycosylated Hemoglobin A1c    7. Chronic combined systolic and diastolic heart failure (H)   Continue to follow carefully.  Volume status excellent  - Thyroid Stimulating Hormone (TSH)    8. Other specified abnormal findings of blood chemistry   As described above  - CEA (Carcinoembryonic Antigen)        PLAN:  Patient Instructions   Discuss + cologuard - ?  Barium Enema    Increase Duloxetine back to twice a day.  Watch for worsening diarrhea    Update blood tests     CEA blood test screening for colon cancer.  If normal, we will sit tight    If abnormal, we will do the least toxic to the colon re: diarrhea and electrolytes    You need blood tests every so often on Amiodorone, so today, then august.        Orders Placed This Encounter   Procedures     Glycosylated Hemoglobin A1c     HM2(CBC w/o Differential)     Thyroid Stimulating Hormone (TSH)     Comprehensive Metabolic Panel     CEA (Carcinoembryonic Antigen)     Medications Discontinued During This Encounter   Medication Reason     DULoxetine (CYMBALTA) 60 MG capsule        Return in about 3 months (around 8/22/2019) for for a full review of everything we did today.      CHIEF COMPLAINT:  Chief Complaint   Patient presents with     Follow-up     1 mo check       HISTORY OF PRESENT ILLNESS:  Florentino is a 74 y.o. male presenting to the clinic today to follow up on his positive Cologuard. He is accompanied by his wife. He is not sure why he is here today. He thinks it was supposed to be an inpatient follow up, but he already had that appointment last month.     Positive Cologuard: His Cologuard returned positive last month, and he inquires what the next move should be. He has never had a colonoscopy, and this was his first time completing Cologuard. He has done stool cards in the past and recalls having some positive results, but he thinks that was due to hemorrhoids. He has seen blood on the toilet paper related to the hemorrhoids but has not had any other blood in the stool or abnormalities that he can remember. He does not recall ever having a flex sig or barium enema. He does not recall having a CT of the abdomen before either. He would like to avoid colonoscopy or any tests that involve prep if possible; he is worried some of this testing could trigger his heart.     Paroxysmal V-tach: He was in the hospital  "last month for ventricular tachycardia. He was then seen by Dr. Hernández in cardiology on . His pacemaker was adjusted, and he was told to continue his medications. He believes he is taking amiodarone three times daily. He is taking mexiletine as well. His defibrillator has not fired any time recently, and he has not had any other heart problems. He thinks he is scheduled to follow up with cardiology again in July.     Anxiety/Depression: He inquires if he could increase his duloxetine back to twice daily. There was concern the medication could have been contributing to diarrhea, but he recalls tolerating it twice daily in the past.     Neuropathy: He does not think the vitamin B12 shot in February helped his neuropathy. He is taking tramadol twice daily.     REVIEW OF SYSTEMS:   He has not had any diarrhea or reflux. Denies trouble breathing. No leg swelling. He has never been anemic. All other systems are negative.    PFSH:  His father had stomach cancer and  when Ray was only 4 years old. He is not sure if his father had colon cancer or not. He has never had cancer.     TOBACCO USE:  Social History     Tobacco Use   Smoking Status Former Smoker     Packs/day: 2.50     Years: 50.00     Pack years: 125.00     Types: Cigarettes, Pipe, Cigars     Last attempt to quit: 2000     Years since quittin.4   Smokeless Tobacco Never Used       VITALS:  Vitals:    19 1116   BP: 94/64   Patient Site: Left Arm   Patient Position: Sitting   Cuff Size: Adult Large   Pulse: 80   Resp: 16   SpO2: 98%   Weight: (!) 238 lb 6 oz (108.1 kg)   Height: 6' 2\" (1.88 m)     Wt Readings from Last 3 Encounters:   19 (!) 238 lb 6 oz (108.1 kg)   19 (!) 234 lb (106.1 kg)   19 (!) 234 lb 4 oz (106.3 kg)     Body mass index is 30.61 kg/m .    PHYSICAL EXAM:  Constitutional:  Reveals an alert, pleasant, talkative male. Affect appropriate. Does not appear acutely ill. Presents in a wheelchair. Vitals:  Per " nursing notes.  Neurologic:  Cranial nerves II-XII intact.     Psychiatric:  Mood appropriate, memory intact.     MEDICATIONS:  Current Outpatient Medications   Medication Sig Dispense Refill     acetaminophen (TYLENOL) 325 MG tablet Take 650 mg by mouth 2 (two) times a day as needed for pain.        allopurinol (ZYLOPRIM) 300 MG tablet TAKE 1 TABLET BY MOUTH DAILY. 90 tablet 2     amiodarone (PACERONE) 200 MG tablet Take 1 tablet (200 mg total) by mouth 2 (two) times a day. 180 tablet 3     ascorbic acid, vitamin C, (ASCORBIC ACID WITH JAVIER HIPS) 500 MG tablet Take 500 mg by mouth daily. (start after guiacs obtained)       aspirin 81 MG EC tablet Take 81 mg by mouth daily.       CHOLECALCIFEROL 1,000 unit tablet TAKE 1 TABLET BY MOUTH DAILY. 90 tablet 2     cinnamon bark (CINNAMON ORAL) Take 1,000 mg by mouth Daily at 5 pm..              coenzyme Q10 100 mg capsule Take 200 mg by mouth daily.        DULoxetine (CYMBALTA) 60 MG capsule Take 1 capsule (60 mg total) by mouth 2 (two) times a day. 180 capsule 3     EPINEPHrine (EPIPEN/ADRENACLICK) 0.3 mg/0.3 mL injection Inject 0.3 mL (0.3 mg total) as directed as needed for anaphylaxis. Inject into thigh. 2 Pre-filled Pen Syringe 0     famotidine (PEPCID) 20 MG tablet Take 1 tablet (20 mg total) by mouth 2 (two) times a day. (Patient taking differently: Take 1 tablet by mouth 2 (two) times a day as needed for heartburn. Indications: multiple endocrine neoplasia, gastroesophageal reflux disease)  0     fexofenadine (ALLEGRA) 180 MG tablet TAKE 1 TABLET (180 MG TOTAL) BY MOUTH DAILY. 90 tablet 3     furosemide (LASIX) 20 MG tablet TAKE 1 TABLET BY MOUTH EVERY DAY 30 tablet 0     KLOR-CON M20 20 mEq tablet Take 20 mEq by mouth daily.  3     KRILL OIL ORAL Take 1,000 mg by mouth daily.              loperamide (IMODIUM) 2 mg capsule Take 1 capsule (2 mg total) by mouth 3 (three) times a day as needed for diarrhea.  0     metoprolol succinate (TOPROL-XL) 100 MG 24 hr tablet  TAKE 1 TABLET BY MOUTH EVERY DAY 30 tablet 0     mexiletine (MEXITIL) 150 MG capsule Take 1 capsule (150 mg total) by mouth 3 (three) times a day. 270 capsule 3     milk thistle 175 mg tablet Take 175 mg by mouth daily.       multivitamin (MULTIVITAMIN) per tablet 1 tablet every other day.        mupirocin (BACTROBAN) 2 % ointment Apply 1 application topically 2 (two) times a day as needed.       simethicone (MYLICON) 125 MG chewable tablet Chew 125 mg every 6 (six) hours as needed for flatulence.       spironolactone (ALDACTONE) 25 MG tablet Take 25 mg by mouth daily.  2     topiramate (TOPAMAX) 50 MG tablet Take 50 mg by mouth 2 (two) times a day.       traMADol (ULTRAM) 50 mg tablet TAKE 2 TABLETS (100 MG TOTAL) BY MOUTH 3 (THREE) TIMES A DAY. (Patient taking differently: Take 100 mg by mouth 2 (two) times a day.       ) 180 tablet 1     Current Facility-Administered Medications   Medication Dose Route Frequency Provider Last Rate Last Dose     cyanocobalamin injection 1,000 mcg  1,000 mcg Intramuscular Q30 Days Mario Lepe MD   1,000 mcg at 02/26/19 1231       ADDITIONAL HISTORY SUMMARIZED (2): Reviewed 5/13 cardiology note regarding ventricular tachycardia.   DECISION TO OBTAIN EXTRA INFORMATION (1): None.   RADIOLOGY TESTS (1): Reviewed historical CT scans looking for abdominal CT  LABS (1): Labs from April reviewed - positive Cologuard. Labs ordered.   MEDICINE TESTS (1): None.  INDEPENDENT REVIEW (2 each): None.     The visit lasted a total of 25 minutes face to face with the patient. Over 50% of the time was spent counseling and educating the patient about his positive Cologuard.    IMitch, am scribing for and in the presence of, Dr. Lepe.    I, Dr. Lepe, personally performed the services described in this documentation, as scribed by Mitch Vines in my presence, and it is both accurate and complete.    Total data points: 4

## 2021-05-29 NOTE — TELEPHONE ENCOUNTER
Refill Approved    Rx renewed per Medication Renewal Policy. Medication was last renewed on 5/12/19.    Angelina Gonzalez, [facility] Triage/Med Refill 6/10/2019     Requested Prescriptions   Pending Prescriptions Disp Refills     metoprolol succinate (TOPROL-XL) 100 MG 24 hr tablet [Pharmacy Med Name: METOPROLOL SUCC  MG TAB] 30 tablet 0     Sig: TAKE 1 TABLET BY MOUTH EVERY DAY       Beta-Blockers Refill Protocol Passed - 6/8/2019 11:37 AM        Passed - PCP or prescribing provider visit in past 12 months or next 3 months     Last office visit with prescriber/PCP: 5/22/2019 Mario Lepe MD OR same dept: 5/22/2019 Mario Lepe MD OR same specialty: 5/22/2019 Mario Lepe MD  Last physical: 12/14/2018 Last MTM visit: Visit date not found   Next visit within 3 mo: Visit date not found  Next physical within 3 mo: Visit date not found  Prescriber OR PCP: Mario Lepe MD  Last diagnosis associated with med order: 1. Paroxysmal ventricular tachycardia (H)  - metoprolol succinate (TOPROL-XL) 100 MG 24 hr tablet [Pharmacy Med Name: METOPROLOL SUCC  MG TAB]; TAKE 1 TABLET BY MOUTH EVERY DAY  Dispense: 30 tablet; Refill: 0  - furosemide (LASIX) 20 MG tablet [Pharmacy Med Name: FUROSEMIDE 20 MG TABLET]; TAKE 1 TABLET BY MOUTH EVERY DAY  Dispense: 30 tablet; Refill: 0  - amiodarone (PACERONE) 200 MG tablet [Pharmacy Med Name: AMIODARONE  MG TABLET]; TAKE 1 TABLET BY MOUTH THREE TIMES A DAY  Dispense: 90 tablet; Refill: 0    If protocol passes may refill for 12 months if within 3 months of last provider visit (or a total of 15 months).             Passed - Blood pressure filed in past 12 months     BP Readings from Last 1 Encounters:   05/22/19 94/64             furosemide (LASIX) 20 MG tablet [Pharmacy Med Name: FUROSEMIDE 20 MG TABLET] 30 tablet 0     Sig: TAKE 1 TABLET BY MOUTH EVERY DAY       Diuretics/Combination Diuretics Refill Protocol  Passed - 6/8/2019 11:37 AM         Passed - Visit with PCP or prescribing provider visit in past 12 months     Last office visit with prescriber/PCP: 5/22/2019 Mario Lepe MD OR same dept: 5/22/2019 Mario Lepe MD OR same specialty: 5/22/2019 Mario Lepe MD  Last physical: 12/14/2018 Last MTM visit: Visit date not found   Next visit within 3 mo: Visit date not found  Next physical within 3 mo: Visit date not found  Prescriber OR PCP: Mario Lepe MD  Last diagnosis associated with med order: 1. Paroxysmal ventricular tachycardia (H)  - metoprolol succinate (TOPROL-XL) 100 MG 24 hr tablet [Pharmacy Med Name: METOPROLOL SUCC  MG TAB]; TAKE 1 TABLET BY MOUTH EVERY DAY  Dispense: 30 tablet; Refill: 0  - furosemide (LASIX) 20 MG tablet [Pharmacy Med Name: FUROSEMIDE 20 MG TABLET]; TAKE 1 TABLET BY MOUTH EVERY DAY  Dispense: 30 tablet; Refill: 0  - amiodarone (PACERONE) 200 MG tablet [Pharmacy Med Name: AMIODARONE  MG TABLET]; TAKE 1 TABLET BY MOUTH THREE TIMES A DAY  Dispense: 90 tablet; Refill: 0    If protocol passes may refill for 12 months if within 3 months of last provider visit (or a total of 15 months).             Passed - Serum Potassium in past 12 months      Lab Results   Component Value Date    Potassium 4.7 05/22/2019             Passed - Serum Sodium in past 12 months      Lab Results   Component Value Date    Sodium 142 05/22/2019             Passed - Blood pressure on file in past 12 months     BP Readings from Last 1 Encounters:   05/22/19 94/64             Passed - Serum Creatinine in past 12 months      Creatinine   Date Value Ref Range Status   05/22/2019 1.65 (H) 0.70 - 1.30 mg/dL Final             amiodarone (PACERONE) 200 MG tablet [Pharmacy Med Name: AMIODARONE  MG TABLET] 90 tablet 0     Sig: TAKE 1 TABLET BY MOUTH THREE TIMES A DAY       Refill Protocol for Amiodarone Failed - 6/8/2019 11:37 AM        Failed - Amiodarone level in last 3 months     No components found for:  AMIODARO                Failed - Eye exam in last 3 months     Eye exam: No results found for this or any previous visit. No results found for this or any previous visit.  No exam data present If protocol doesn't pull in eye exam; should not prevent refill.          Passed - TSH in last 6 months     TSH   Date Value Ref Range Status   05/22/2019 3.83 0.30 - 5.00 uIU/mL Final                   Passed - LFT or AST or ALT in last 12 months     Albumin   Date Value Ref Range Status   05/22/2019 3.6 3.5 - 5.0 g/dL Final     Bilirubin, Total   Date Value Ref Range Status   05/22/2019 0.5 0.0 - 1.0 mg/dL Final     Bilirubin, Direct   Date Value Ref Range Status   05/18/2016 0.2 <=0.5 mg/dL Final     Alkaline Phosphatase   Date Value Ref Range Status   05/22/2019 68 45 - 120 U/L Final     AST   Date Value Ref Range Status   05/22/2019 42 (H) 0 - 40 U/L Final     ALT   Date Value Ref Range Status   05/22/2019 75 (H) 0 - 45 U/L Final     Protein, Total   Date Value Ref Range Status   05/22/2019 6.1 6.0 - 8.0 g/dL Final                Passed - PCP or prescribing provider visit in past 6 months     Last office visit with prescriber/PCP: 5/22/2019 OR same dept: 5/22/2019 Mario Lepe MD OR same specialty: 5/22/2019 Mario Lepe MD Last physical: 12/14/2018  Last MTM visit: Visit date not found     Next appt within 3 mo: Visit date not found  Next physical within 3 mo: Visit date not found  Prescriber OR PCP: Mario Lepe MD  Last diagnosis associated with med order: 1. Paroxysmal ventricular tachycardia (H)  - metoprolol succinate (TOPROL-XL) 100 MG 24 hr tablet [Pharmacy Med Name: METOPROLOL SUCC  MG TAB]; TAKE 1 TABLET BY MOUTH EVERY DAY  Dispense: 30 tablet; Refill: 0  - furosemide (LASIX) 20 MG tablet [Pharmacy Med Name: FUROSEMIDE 20 MG TABLET]; TAKE 1 TABLET BY MOUTH EVERY DAY  Dispense: 30 tablet; Refill: 0  - amiodarone (PACERONE) 200 MG tablet [Pharmacy Med Name: AMIODARONE  MG  TABLET]; TAKE 1 TABLET BY MOUTH THREE TIMES A DAY  Dispense: 90 tablet; Refill: 0     If protocol passes may refill for 6 months if within 3 months of last provider visit (or a total of 9 months).          Passed - BMP in last 12 months     Sodium   Date Value Ref Range Status   05/22/2019 142 136 - 145 mmol/L Final     Potassium   Date Value Ref Range Status   05/22/2019 4.7 3.5 - 5.0 mmol/L Final     Chloride   Date Value Ref Range Status   05/22/2019 107 98 - 107 mmol/L Final     CO2   Date Value Ref Range Status   05/22/2019 23 22 - 31 mmol/L Final     BUN   Date Value Ref Range Status   05/22/2019 28 8 - 28 mg/dL Final     Creatinine   Date Value Ref Range Status   05/22/2019 1.65 (H) 0.70 - 1.30 mg/dL Final             Passed - CBC w/plts (HM2) in last 12 months     WBC   Date Value Ref Range Status   05/22/2019 6.1 4.0 - 11.0 thou/uL Final     Hemoglobin   Date Value Ref Range Status   05/22/2019 15.0 14.0 - 18.0 g/dL Final     Hematocrit   Date Value Ref Range Status   05/22/2019 44.4 40.0 - 54.0 % Final     Platelets   Date Value Ref Range Status   05/22/2019 161 140 - 440 thou/uL Final             Passed - ECG in last 12 months     ECG rhythm strip: No results found for this or any previous visit. ECG 12 lead MUSE:   Results for orders placed or performed during the hospital encounter of 04/16/19   ECG 12 lead   Result Value Ref Range    SYSTOLIC BLOOD PRESSURE  mmHg    DIASTOLIC BLOOD PRESSURE  mmHg    VENTRICULAR RATE 80 BPM    ATRIAL RATE 77 BPM    P-R INTERVAL  ms    QRS DURATION 162 ms    Q-T INTERVAL 524 ms    QTC CALCULATION (BEZET) 604 ms    P Axis  degrees    R AXIS 245 degrees    T AXIS 60 degrees    MUSE DIAGNOSIS       AV sequential or dual chamber electronic pacemaker  When compared with ECG of 16-APR-2019 13:28,  Electronic ventricular pacemaker has replaced Wide QRS rhythm  Vent. rate has decreased BY  57 BPM  Confirmed by YUE PARK MD LOC:[facility] ([id number]) on 4/17/2019 11:52:45 AM      ECG 12  lead nursing unit:   Results for orders placed or performed during the hospital encounter of 04/16/19   ECG 12 lead nursing unit performed   Result Value Ref Range    SYSTOLIC BLOOD PRESSURE 95 mmHg    DIASTOLIC BLOOD PRESSURE 65 mmHg    VENTRICULAR RATE 137 BPM    ATRIAL RATE 137 BPM    P-R INTERVAL 136 ms    QRS DURATION 192 ms    Q-T INTERVAL 342 ms    QTC CALCULATION (BEZET) 516 ms    P Axis 44 degrees    R AXIS 76 degrees    T AXIS 261 degrees    MUSE DIAGNOSIS       Wide QRS rhythm  Ventricular tachycardia  Abnormal ECG  When compared with ECG of 14-DEC-2018 15:28,  Ventricular tachycardia has replaced AV sequential or dual chamber electronic pacemaker  Vent. rate has increased BY  77 BPM  Confirmed by MOHIT RAYMUNDO, LES LOC:[facility] ([id number]) on 4/16/2019 2:41:53 PM               Passed - Mg level in last 3 months     Magnesium   Date Value Ref Range Status   04/18/2019 1.8 1.8 - 2.6 mg/dL Final              Passed - Chest x-ray in last 3 months     Chest x-ray: No results found for this or any previous visit. No results found for this or any previous visit.

## 2021-05-29 NOTE — TELEPHONE ENCOUNTER
Controlled Substance Refill Request  Medication:   Requested Prescriptions     Pending Prescriptions Disp Refills     traMADol (ULTRAM) 50 mg tablet [Pharmacy Med Name: TRAMADOL HCL 50 MG TABLET] 180 tablet 1     Sig: TAKE 2 TABLETS (100 MG TOTAL) BY MOUTH 3 (THREE) TIMES A DAY.     Date Last Fill: 4/1/19  Pharmacy: [id number]   Submit electronically to pharmacy  Controlled Substance Agreement on File:   Encounter-Level CSA Scan Date - 01/16/2017:    Scan on 1/18/2017  1:41 PM (below)             Encounter-Level CSA Scan Date - 11/12/2015:    Scan on 11/13/2015  1:29 PM (below)         Last office visit: Last office visit pertaining to requested medication was 5/22/19.

## 2021-05-29 NOTE — TELEPHONE ENCOUNTER
RN cannot approve Refill Request    RN can NOT refill this medication Protocol failed and NO refill given      Angelina Gonzalez, [facility] Triage/Med Refill 6/10/2019    Requested Prescriptions   Pending Prescriptions Disp Refills     amiodarone (PACERONE) 200 MG tablet [Pharmacy Med Name: AMIODARONE  MG TABLET] 270 tablet 3     Sig: TAKE 1 TABLET BY MOUTH THREE TIMES A DAY       Refill Protocol for Amiodarone Failed - 6/8/2019 11:37 AM        Failed - Amiodarone level in last 3 months     No components found for: AMIODARO                Failed - Eye exam in last 3 months     Eye exam: No results found for this or any previous visit. No results found for this or any previous visit.  No exam data present If protocol doesn't pull in eye exam; should not prevent refill.          Passed - TSH in last 6 months     TSH   Date Value Ref Range Status   05/22/2019 3.83 0.30 - 5.00 uIU/mL Final                   Passed - LFT or AST or ALT in last 12 months     Albumin   Date Value Ref Range Status   05/22/2019 3.6 3.5 - 5.0 g/dL Final     Bilirubin, Total   Date Value Ref Range Status   05/22/2019 0.5 0.0 - 1.0 mg/dL Final     Bilirubin, Direct   Date Value Ref Range Status   05/18/2016 0.2 <=0.5 mg/dL Final     Alkaline Phosphatase   Date Value Ref Range Status   05/22/2019 68 45 - 120 U/L Final     AST   Date Value Ref Range Status   05/22/2019 42 (H) 0 - 40 U/L Final     ALT   Date Value Ref Range Status   05/22/2019 75 (H) 0 - 45 U/L Final     Protein, Total   Date Value Ref Range Status   05/22/2019 6.1 6.0 - 8.0 g/dL Final                Passed - PCP or prescribing provider visit in past 6 months     Last office visit with prescriber/PCP: 5/22/2019 OR same dept: 5/22/2019 Mario Lepe MD OR same specialty: 5/22/2019 Mario Lepe MD Last physical: 12/14/2018  Last MTM visit: Visit date not found     Next appt within 3 mo: Visit date not found  Next physical within 3 mo: Visit date not  found  Prescriber OR PCP: Mario Lepe MD  Last diagnosis associated with med order: 1. Paroxysmal ventricular tachycardia (H)  - metoprolol succinate (TOPROL-XL) 100 MG 24 hr tablet; TAKE 1 TABLET BY MOUTH EVERY DAY  Dispense: 90 tablet; Refill: 3  - furosemide (LASIX) 20 MG tablet; TAKE 1 TABLET BY MOUTH EVERY DAY  Dispense: 90 tablet; Refill: 3  - amiodarone (PACERONE) 200 MG tablet [Pharmacy Med Name: AMIODARONE  MG TABLET]; TAKE 1 TABLET BY MOUTH THREE TIMES A DAY  Dispense: 270 tablet; Refill: 3     If protocol passes may refill for 6 months if within 3 months of last provider visit (or a total of 9 months).          Passed - BMP in last 12 months     Sodium   Date Value Ref Range Status   05/22/2019 142 136 - 145 mmol/L Final     Potassium   Date Value Ref Range Status   05/22/2019 4.7 3.5 - 5.0 mmol/L Final     Chloride   Date Value Ref Range Status   05/22/2019 107 98 - 107 mmol/L Final     CO2   Date Value Ref Range Status   05/22/2019 23 22 - 31 mmol/L Final     BUN   Date Value Ref Range Status   05/22/2019 28 8 - 28 mg/dL Final     Creatinine   Date Value Ref Range Status   05/22/2019 1.65 (H) 0.70 - 1.30 mg/dL Final             Passed - CBC w/plts (HM2) in last 12 months     WBC   Date Value Ref Range Status   05/22/2019 6.1 4.0 - 11.0 thou/uL Final     Hemoglobin   Date Value Ref Range Status   05/22/2019 15.0 14.0 - 18.0 g/dL Final     Hematocrit   Date Value Ref Range Status   05/22/2019 44.4 40.0 - 54.0 % Final     Platelets   Date Value Ref Range Status   05/22/2019 161 140 - 440 thou/uL Final             Passed - ECG in last 12 months     ECG rhythm strip: No results found for this or any previous visit. ECG 12 lead MUSE:   Results for orders placed or performed during the hospital encounter of 04/16/19   ECG 12 lead   Result Value Ref Range    SYSTOLIC BLOOD PRESSURE  mmHg    DIASTOLIC BLOOD PRESSURE  mmHg    VENTRICULAR RATE 80 BPM    ATRIAL RATE 77 BPM    P-R INTERVAL  ms    QRS  DURATION 162 ms    Q-T INTERVAL 524 ms    QTC CALCULATION (BEZET) 604 ms    P Axis  degrees    R AXIS 245 degrees    T AXIS 60 degrees    MUSE DIAGNOSIS       AV sequential or dual chamber electronic pacemaker  When compared with ECG of 16-APR-2019 13:28,  Electronic ventricular pacemaker has replaced Wide QRS rhythm  Vent. rate has decreased BY  57 BPM  Confirmed by YUE PARK MD LOC:[facility] [id number]) on 4/17/2019 11:52:45 AM      ECG 12 lead nursing unit:   Results for orders placed or performed during the hospital encounter of 04/16/19   ECG 12 lead nursing unit performed   Result Value Ref Range    SYSTOLIC BLOOD PRESSURE 95 mmHg    DIASTOLIC BLOOD PRESSURE 65 mmHg    VENTRICULAR RATE 137 BPM    ATRIAL RATE 137 BPM    P-R INTERVAL 136 ms    QRS DURATION 192 ms    Q-T INTERVAL 342 ms    QTC CALCULATION (BEZET) 516 ms    P Axis 44 degrees    R AXIS 76 degrees    T AXIS 261 degrees    MUSE DIAGNOSIS       Wide QRS rhythm  Ventricular tachycardia  Abnormal ECG  When compared with ECG of 14-DEC-2018 15:28,  Ventricular tachycardia has replaced AV sequential or dual chamber electronic pacemaker  Vent. rate has increased BY  77 BPM  Confirmed by SHIV RUEDA MD LOC:[facility] ([id number]) on 4/16/2019 2:41:53 PM               Passed - Mg level in last 3 months     Magnesium   Date Value Ref Range Status   04/18/2019 1.8 1.8 - 2.6 mg/dL Final              Passed - Chest x-ray in last 3 months     Chest x-ray: No results found for this or any previous visit. No results found for this or any previous visit.        Signed Prescriptions Disp Refills    metoprolol succinate (TOPROL-XL) 100 MG 24 hr tablet 90 tablet 3     Sig: TAKE 1 TABLET BY MOUTH EVERY DAY       Beta-Blockers Refill Protocol Passed - 6/8/2019 11:37 AM        Passed - PCP or prescribing provider visit in past 12 months or next 3 months     Last office visit with prescriber/PCP: 5/22/2019 Mario Lepe MD OR same dept: 5/22/2019 Mario Lepe MD OR same  specialty: 5/22/2019 Mario Lepe MD  Last physical: 12/14/2018 Last MTM visit: Visit date not found   Next visit within 3 mo: Visit date not found  Next physical within 3 mo: Visit date not found  Prescriber OR PCP: Mario Lepe MD  Last diagnosis associated with med order: 1. Paroxysmal ventricular tachycardia (H)  - metoprolol succinate (TOPROL-XL) 100 MG 24 hr tablet; TAKE 1 TABLET BY MOUTH EVERY DAY  Dispense: 90 tablet; Refill: 3  - furosemide (LASIX) 20 MG tablet; TAKE 1 TABLET BY MOUTH EVERY DAY  Dispense: 90 tablet; Refill: 3  - amiodarone (PACERONE) 200 MG tablet [Pharmacy Med Name: AMIODARONE  MG TABLET]; TAKE 1 TABLET BY MOUTH THREE TIMES A DAY  Dispense: 270 tablet; Refill: 3    If protocol passes may refill for 12 months if within 3 months of last provider visit (or a total of 15 months).             Passed - Blood pressure filed in past 12 months     BP Readings from Last 1 Encounters:   05/22/19 94/64            furosemide (LASIX) 20 MG tablet 90 tablet 3     Sig: TAKE 1 TABLET BY MOUTH EVERY DAY       Diuretics/Combination Diuretics Refill Protocol  Passed - 6/8/2019 11:37 AM        Passed - Visit with PCP or prescribing provider visit in past 12 months     Last office visit with prescriber/PCP: 5/22/2019 Mario Lepe MD OR same dept: 5/22/2019 Mario Lepe MD OR same specialty: 5/22/2019 Mario Lepe MD  Last physical: 12/14/2018 Last MTM visit: Visit date not found   Next visit within 3 mo: Visit date not found  Next physical within 3 mo: Visit date not found  Prescriber OR PCP: Mario Lepe MD  Last diagnosis associated with med order: 1. Paroxysmal ventricular tachycardia (H)  - metoprolol succinate (TOPROL-XL) 100 MG 24 hr tablet; TAKE 1 TABLET BY MOUTH EVERY DAY  Dispense: 90 tablet; Refill: 3  - furosemide (LASIX) 20 MG tablet; TAKE 1 TABLET BY MOUTH EVERY DAY  Dispense: 90 tablet; Refill: 3  - amiodarone (PACERONE) 200 MG tablet [Pharmacy  Med Name: AMIODARONE  MG TABLET]; TAKE 1 TABLET BY MOUTH THREE TIMES A DAY  Dispense: 270 tablet; Refill: 3    If protocol passes may refill for 12 months if within 3 months of last provider visit (or a total of 15 months).             Passed - Serum Potassium in past 12 months      Lab Results   Component Value Date    Potassium 4.7 05/22/2019             Passed - Serum Sodium in past 12 months      Lab Results   Component Value Date    Sodium 142 05/22/2019             Passed - Blood pressure on file in past 12 months     BP Readings from Last 1 Encounters:   05/22/19 94/64             Passed - Serum Creatinine in past 12 months      Creatinine   Date Value Ref Range Status   05/22/2019 1.65 (H) 0.70 - 1.30 mg/dL Final

## 2021-05-30 VITALS — BODY MASS INDEX: 34.65 KG/M2 | WEIGHT: 270 LBS | HEIGHT: 74 IN

## 2021-05-30 VITALS — BODY MASS INDEX: 35.31 KG/M2 | WEIGHT: 275 LBS

## 2021-05-30 NOTE — PATIENT INSTRUCTIONS - HE
Urine and blood for routine and for infection    Probably a prostate infection    Sulfa antibiotic twice a day for 10 days to cover urine, kidney, prostate, sinus    Update B 12 a bit early    Add Prednisone steroid 20 mgs once a day for one week for the low pressure and the weakness to work with the antibiotic

## 2021-05-30 NOTE — PROGRESS NOTES
In clinic device check with Dr. Hernández.  Please see link for full device report.  Patient was informed of results and next follow up during today's visit.

## 2021-05-30 NOTE — PROGRESS NOTES
ASSESSMENT:  1. Positive colorectal cancer screening using Cologuard test  Positive Cologuard.  Now positive CEA so.  Discussed implications.  Discussed possible screening options    Ray is very clear that he does not want to induce electrolyte abnormalities and would prefer watchful waiting.  He is willing to undergo further testing if he ends up in the hospital in the future or if he develops symptoms.    2. Paroxysmal ventricular tachycardia (H)  No recurrence.  Now on high-dose amiodarone.  Cardiology follow-up 2 weeks.  Decrease dosing    3. CKD (chronic kidney disease) stage 3, GFR 30-59 ml/min (H)  Blood test stable last month    4. Essential hypertension with goal blood pressure less than 140/90  Stable    5. ICD (implantable cardioverter-defibrillator) in place  No recurrence    6. Major depressive disorder with single episode, in full remission (H)  Improved with increased duloxetine    7. Neuropathy  Improved with increased duloxetine    8. Non-ischemic cardiomyopathy (H)  Volume stable      Reviewed amiodarone monitoring and discharge.  Continue mexiletine.  Elevated liver function tests noted.  Decrease amiodarone    PLAN:  Patient Instructions   Watch and wait for now.  Do the test if you end up in hospital, or if you develop symptoms    Colonoscopy with full prep    Barium enema with a milder prep    CT scan with a milder prep    CT scan with a full prep        Decrease Amiodorone to twice a day    See Dr Hernández next month    Blood tests again in august    See me august 22 for B 12 and labs        No orders of the defined types were placed in this encounter.    There are no discontinued medications.  Administrations This Visit     cyanocobalamin injection 1,000 mcg     Admin Date  06/26/2019 Action  Given Dose  1000 mcg Route  Intramuscular Administered By  Brittani Patino LPN              Return in about 6 weeks (around 8/7/2019).      CHIEF COMPLAINT:  Chief Complaint   Patient presents with      Follow-up       HISTORY OF PRESENT ILLNESS:  Florentino is a 74 y.o. male presenting to the clinic today to follow up on his positive Cologuard, neuropathy, and urination. He is accompanied by his wife.     Positive Cologuard: He had a positive Cologuard in April but would like to avoid colonoscopy or other testing that would require prep if possible. He is afraid of disrupting his electrolyte balance. This was discussed at his last appointment and it was decided to check a CEA, which was also slightly abnormal at 6.2. He does not recall having a regular CT of the abdomen in recent years. He does not have any abnormal bowel symptoms. He recalls that his father had polyps but did not do anything about them and  at age 52. He thinks his father ended up with stomach cancer that metastasized to the brain. After discussing the various options, he would like to watch and wait for now.     Paroxysmal V-tach: He has been taking amiodarone three times daily since April. He is also taking mexiletine and has not had any episodes of abnormal rhythm. He states he does not mind the shocks from his defibrillator. He is scheduled to follow up with Dr. Hernández next month.     Neuropathy: His dose of duloxetine was increased back to twice daily the last time he was here. His neuropathy seems to be a little better, his mood is a lot better, and he has not had any diarrhea. He missed his vitamin B12 shot at his last couple of visits but got one today.     Frequent Urination: He has been urinating frequently lately, and it seems changed from his normal. He is going more often and is not getting as much urine out each time he goes.     REVIEW OF SYSTEMS:   He has been feeling well since he was last here. He seems to get soft stools from red meat, so he has been avoiding it lately. All other systems are negative.    PFSH:  His father had some sort of GI cancer, he thinks stomach, that metastasized to the brain and killed him at age 52. He  "does not drink alcohol.     TOBACCO USE:  Social History     Tobacco Use   Smoking Status Former Smoker     Packs/day: 2.50     Years: 50.00     Pack years: 125.00     Types: Cigarettes, Pipe, Cigars     Last attempt to quit: 2000     Years since quittin.4   Smokeless Tobacco Never Used       VITALS:  Vitals:    19 1043   BP: 90/66   Patient Site: Left Arm   Patient Position: Sitting   Cuff Size: Adult Large   Pulse: 78   Resp: 16   SpO2: 94%   Weight: (!) 242 lb 1 oz (109.8 kg)   Height: 6' 2\" (1.88 m)     Wt Readings from Last 3 Encounters:   19 (!) 242 lb 1 oz (109.8 kg)   19 (!) 238 lb 6 oz (108.1 kg)   19 (!) 234 lb (106.1 kg)     Body mass index is 31.08 kg/m .    PHYSICAL EXAM:  Constitutional:  Reveals an alert, pleasant, talkative man. Affect appropriate. Presents in a wheelchair.  Vitals:  Per nursing notes.  Neurologic:  Cranial nerves II-XII intact.     Psychiatric:  Mood appropriate, memory intact.     MEDICATIONS:  Current Outpatient Medications   Medication Sig Dispense Refill     acetaminophen (TYLENOL) 325 MG tablet Take 650 mg by mouth 2 (two) times a day as needed for pain.        allopurinol (ZYLOPRIM) 300 MG tablet TAKE 1 TABLET BY MOUTH DAILY. 90 tablet 2     amiodarone (PACERONE) 200 MG tablet Take 1 tablet (200 mg total) by mouth 2 (two) times a day. 180 tablet 3     amiodarone (PACERONE) 200 MG tablet TAKE 1 TABLET BY MOUTH THREE TIMES A  tablet 3     ascorbic acid, vitamin C, (ASCORBIC ACID WITH JAVIER HIPS) 500 MG tablet Take 500 mg by mouth daily. (start after guiacs obtained)       aspirin 81 MG EC tablet Take 81 mg by mouth daily.       CHOLECALCIFEROL 1,000 unit tablet TAKE 1 TABLET BY MOUTH DAILY. 90 tablet 2     cinnamon bark (CINNAMON ORAL) Take 1,000 mg by mouth Daily at 5 pm..              coenzyme Q10 100 mg capsule Take 200 mg by mouth daily.        DULoxetine (CYMBALTA) 60 MG capsule Take 1 capsule (60 mg total) by mouth 2 (two) times a " day. 180 capsule 3     EPINEPHrine (EPIPEN/ADRENACLICK) 0.3 mg/0.3 mL injection Inject 0.3 mL (0.3 mg total) as directed as needed for anaphylaxis. Inject into thigh. 2 Pre-filled Pen Syringe 0     famotidine (PEPCID) 20 MG tablet Take 1 tablet (20 mg total) by mouth 2 (two) times a day. (Patient taking differently: Take 1 tablet by mouth 2 (two) times a day as needed for heartburn. Indications: multiple endocrine neoplasia, gastroesophageal reflux disease)  0     fexofenadine (ALLEGRA) 180 MG tablet TAKE 1 TABLET (180 MG TOTAL) BY MOUTH DAILY. 90 tablet 3     furosemide (LASIX) 20 MG tablet TAKE 1 TABLET BY MOUTH EVERY DAY 90 tablet 3     KLOR-CON M20 20 mEq tablet Take 20 mEq by mouth daily.  3     KRILL OIL ORAL Take 1,000 mg by mouth daily.              loperamide (IMODIUM) 2 mg capsule Take 1 capsule (2 mg total) by mouth 3 (three) times a day as needed for diarrhea.  0     metoprolol succinate (TOPROL-XL) 100 MG 24 hr tablet TAKE 1 TABLET BY MOUTH EVERY DAY 90 tablet 3     mexiletine (MEXITIL) 150 MG capsule Take 1 capsule (150 mg total) by mouth 3 (three) times a day. 270 capsule 3     milk thistle 175 mg tablet Take 175 mg by mouth daily.       multivitamin (MULTIVITAMIN) per tablet 1 tablet every other day.        mupirocin (BACTROBAN) 2 % ointment Apply 1 application topically 2 (two) times a day as needed.       simethicone (MYLICON) 125 MG chewable tablet Chew 125 mg every 6 (six) hours as needed for flatulence.       spironolactone (ALDACTONE) 25 MG tablet Take 25 mg by mouth daily.  2     topiramate (TOPAMAX) 50 MG tablet Take 50 mg by mouth 2 (two) times a day.       traMADol (ULTRAM) 50 mg tablet Take 2 tablets (100 mg total) by mouth 2 (two) times a day. 360 tablet 3     Current Facility-Administered Medications   Medication Dose Route Frequency Provider Last Rate Last Dose     cyanocobalamin injection 1,000 mcg  1,000 mcg Intramuscular Q30 Days Mario Lepe MD   1,000 mcg at 06/26/19 2890        ADDITIONAL HISTORY SUMMARIZED (2): Reviewed April 2019 hospital notes regarding dose of amiodarone.   DECISION TO OBTAIN EXTRA INFORMATION (1): None.   RADIOLOGY TESTS (1): None.  LABS (1): Reviewed labs from 5/22; elevated CEA  MEDICINE TESTS (1): None.  INDEPENDENT REVIEW (2 each): None.     The visit lasted a total of 25 minutes face to face with the patient. Over 50% of the time was spent counseling and educating the patient about his positive Cologuard.    IMitch, am scribing for and in the presence of, Dr. Lepe.    I, Dr. Lepe, personally performed the services described in this documentation, as scribed by Mitch Vines in my presence, and it is both accurate and complete.    Total data points: 3

## 2021-05-30 NOTE — PATIENT INSTRUCTIONS - HE
Florentino Fall    It was a pleasure to see you at [facility] today.    ICD was reprogrammed for more effective pacing for ventricular tachycardia  Stay off amiodarone  Call if fainting or shocks from your defibrillator  Follow up appointment:   Dr. Hernández in 1 month in device clinic  Consideration given to starting sotalol 80 mg once a day  with a decrease in metoprolol to 50 mg daily if recurrent ICD shocks.  VT ablation at [facility] was discussed as an alternative    Contact Tatyana at [phone] with questions or concerns.    Ulises Hernández MD

## 2021-05-30 NOTE — PATIENT INSTRUCTIONS - HE
Watch and wait for now.  Do the test if you end up in hospital, or if you develop symptoms    Colonoscopy with full prep    Barium enema with a milder prep    CT scan with a milder prep    CT scan with a full prep        Decrease Amiodorone to twice a day    See Dr Hernández next month    Blood tests again in august    See me august 22 for B 12 and labs

## 2021-05-30 NOTE — PROGRESS NOTES
ASSESSMENT:  1. Acute prostatitis  Presumptive, with urinary changes and symptoms suggestive of infection.  Check urinalysis and begin empiric treatment  - Urinalysis-UC if Indicated  - predniSONE (DELTASONE) 20 MG tablet; Take 20 mg by mouth daily for 7 days.  Dispense: 7 tablet; Refill: 0  - sulfamethoxazole-trimethoprim (BACTRIM DS) 800-160 mg per tablet; Take 1 tablet by mouth 2 (two) times a day for 10 days.  Dispense: 20 tablet; Refill: 0    2. Recurrent ventricular tachycardia (H)  No evidence of recurrent V. tach this last month despite lower dose amiodarone    3. On amiodarone therapy  Update labs  - HM2(CBC w/o Differential)  - Comprehensive Metabolic Panel    4. Neuropathy  Continue B12 shots.    5. Major depressive disorder with single episode, in full remission (H)  Improved with increased duloxetine    6. Essential hypertension with goal blood pressure less than 140/90  Blood pressure somewhat low but may be infected.  Burst of prednisone        PLAN:  Patient Instructions   Urine and blood for routine and for infection    Probably a prostate infection    Sulfa antibiotic twice a day for 10 days to cover urine, kidney, prostate, sinus    Update B 12 a bit early    Add Prednisone steroid 20 mgs once a day for one week for the low pressure and the weakness to work with the antibiotic       Orders Placed This Encounter   Procedures     Urinalysis-UC if Indicated     HM2(CBC w/o Differential)     Comprehensive Metabolic Panel     Medications Discontinued During This Encounter   Medication Reason     amiodarone (PACERONE) 200 MG tablet Duplicate order     Administrations This Visit     cyanocobalamin injection 1,000 mcg     Admin Date  07/16/2019 Action  Given Dose  1000 mcg Route  Intramuscular Administered By  Brittani Patino LPN              Return in about 4 weeks (around 8/13/2019) for for a full review of medications and lab testing.    CHIEF COMPLAINT:  Chief Complaint   Patient presents with      "Fatigue     general on/off since Wed - gradually improving       HISTORY OF PRESENT ILLNESS:  Florentino is a 74 y.o. male presenting to the clinic today complaining of general illness.  He reports 5 days ago he felt chills and possible fever.  He denies sinus congestion or sore throat.  He does have a loose cough but he reports this is similar.  He has a history of prostate infection 40 years ago and feels some of his current symptoms may be similar.  Urine was brownish and malodorous for 2 days but has since cleared.  He has had increased urinary hesitancy, frequency, and volume    Increase of duloxetine back to 120 mg day improved mood quickly    B12 shots seem to help neuropathy slightly        REVIEW OF SYSTEMS:   Occasional diarrhea especially this morning.  All other systems are negative.    PFSH:  He lives with and is accompanied by his wife Jeanne, who has rapidly progressing dementia    TOBACCO USE:  Social History     Tobacco Use   Smoking Status Former Smoker     Packs/day: 2.50     Years: 50.00     Pack years: 125.00     Types: Cigarettes, Pipe, Cigars     Last attempt to quit: 2000     Years since quittin.5   Smokeless Tobacco Never Used       VITALS:  Vitals:    19 1349   BP: (!) 86/60   Patient Site: Left Arm   Patient Position: Sitting   Cuff Size: Adult Large   Pulse: 80   Resp: 16   SpO2: 97%   Weight: (!) 237 lb 8 oz (107.7 kg)   Height: 6' 2\" (1.88 m)     Wt Readings from Last 3 Encounters:   19 (!) 237 lb 8 oz (107.7 kg)   19 (!) 242 lb 1 oz (109.8 kg)   19 (!) 238 lb 6 oz (108.1 kg)     Body mass index is 30.49 kg/m .    PHYSICAL EXAM:  Constitutional:  Reveals a pleasant ill-appearing man sitting in a wheelchair accompanied by his quiet wife.  Vitals:  Per nursing notes.    Abdomen bowel sounds positive without tenderness  Cardiac: Regular rate and rhythm without murmurs, rubs, or gallops.     Legs difficult to examine due to boot but no obvious edema  Palpation of " the radial pulse regular.  Lungs: Clear.  Respiratory effort normal.    Neurologic:  Cranial nerves II-XII intact.     Psychiatric:  Mood appropriate, memory intact.       ADDITIONAL HISTORY SUMMARIZED (2): Reviewed cardiology note and phone calls  CARE EVERYWHERE/ EXTRA INFORMATION (1): None.   RADIOLOGY TESTS (1):   LABS (1): Ordered today  CARDIOLOGY/MEDICINE TESTS (1): Device monitor without significant recurrent V. tach  INDEPENDENT REVIEW (2 each): None.     Total data points:4    Time in: 153 pm  Time out: 2:09 PM    The visit lasted a total of 16 minutes face to face with the patient. Over 50% of the time was spent counseling and educating the patient about medications, medication adjustments, medication side effects, and lab testing.        MEDICATIONS:  Current Outpatient Medications   Medication Sig Dispense Refill     acetaminophen (TYLENOL) 325 MG tablet Take 650 mg by mouth 2 (two) times a day as needed for pain.        allopurinol (ZYLOPRIM) 300 MG tablet TAKE 1 TABLET BY MOUTH DAILY. 90 tablet 2     amiodarone (PACERONE) 200 MG tablet Take 1 tablet (200 mg total) by mouth 2 (two) times a day. 180 tablet 3     ascorbic acid, vitamin C, (ASCORBIC ACID WITH JAVIER HIPS) 500 MG tablet Take 500 mg by mouth daily. (start after guiacs obtained)       aspirin 81 MG EC tablet Take 81 mg by mouth daily.       CHOLECALCIFEROL 1,000 unit tablet TAKE 1 TABLET BY MOUTH DAILY. 90 tablet 2     cinnamon bark (CINNAMON ORAL) Take 1,000 mg by mouth Daily at 5 pm..              coenzyme Q10 100 mg capsule Take 200 mg by mouth daily.        DULoxetine (CYMBALTA) 60 MG capsule Take 1 capsule (60 mg total) by mouth 2 (two) times a day. 180 capsule 3     EPINEPHrine (EPIPEN/ADRENACLICK) 0.3 mg/0.3 mL injection Inject 0.3 mL (0.3 mg total) as directed as needed for anaphylaxis. Inject into thigh. 2 Pre-filled Pen Syringe 0     famotidine (PEPCID) 20 MG tablet Take 1 tablet (20 mg total) by mouth 2 (two) times a day. (Patient  taking differently: Take 1 tablet by mouth 2 (two) times a day as needed for heartburn. Indications: multiple endocrine neoplasia, gastroesophageal reflux disease)  0     fexofenadine (ALLEGRA) 180 MG tablet TAKE 1 TABLET (180 MG TOTAL) BY MOUTH DAILY. 90 tablet 3     furosemide (LASIX) 20 MG tablet TAKE 1 TABLET BY MOUTH EVERY DAY 90 tablet 3     KLOR-CON M20 20 mEq tablet Take 20 mEq by mouth daily.  3     KRILL OIL ORAL Take 1,000 mg by mouth daily.              loperamide (IMODIUM) 2 mg capsule Take 1 capsule (2 mg total) by mouth 3 (three) times a day as needed for diarrhea.  0     metoprolol succinate (TOPROL-XL) 100 MG 24 hr tablet TAKE 1 TABLET BY MOUTH EVERY DAY 90 tablet 3     mexiletine (MEXITIL) 150 MG capsule Take 1 capsule (150 mg total) by mouth 3 (three) times a day. 270 capsule 3     milk thistle 175 mg tablet Take 175 mg by mouth daily.       multivitamin (MULTIVITAMIN) per tablet 1 tablet every other day.        mupirocin (BACTROBAN) 2 % ointment Apply 1 application topically 2 (two) times a day as needed.       potassium chloride (KLOR-CON M20) 20 MEQ tablet Take 1 tablet (20 mEq total) by mouth daily. 90 tablet 3     predniSONE (DELTASONE) 20 MG tablet Take 20 mg by mouth daily for 7 days. 7 tablet 0     simethicone (MYLICON) 125 MG chewable tablet Chew 125 mg every 6 (six) hours as needed for flatulence.       spironolactone (ALDACTONE) 25 MG tablet Take 25 mg by mouth daily.  2     sulfamethoxazole-trimethoprim (BACTRIM DS) 800-160 mg per tablet Take 1 tablet by mouth 2 (two) times a day for 10 days. 20 tablet 0     topiramate (TOPAMAX) 50 MG tablet Take 50 mg by mouth 2 (two) times a day.       traMADol (ULTRAM) 50 mg tablet Take 2 tablets (100 mg total) by mouth 2 (two) times a day. 360 tablet 3     Current Facility-Administered Medications   Medication Dose Route Frequency Provider Last Rate Last Dose     cyanocobalamin injection 1,000 mcg  1,000 mcg Intramuscular Q30 Days aMrio Lepe  MD LIANG   1,000 mcg at 07/16/19 6710

## 2021-05-30 NOTE — TELEPHONE ENCOUNTER
Refill Approved    Rx renewed per Medication Renewal Policy. Medication was last renewed on 4/19/19.    Rosy Willis, [facility] Triage/Med Refill 7/14/2019     Requested Prescriptions   Pending Prescriptions Disp Refills     KLOR-CON M20 20 mEq tablet [Pharmacy Med Name: KLOR-CON M20 TABLET] 90 tablet 3     Sig: TAKE 1 TABLET BY MOUTH EVERY DAY       Potassium Supplements Refill Protocol Passed - 7/14/2019  8:35 AM        Passed - PCP or prescribing provider visit in past 12 months       Last office visit with prescriber/PCP: 6/26/2019 Mario Lepe MD OR same dept: 6/26/2019 Mario Lepe MD OR same specialty: 6/26/2019 Mario Lepe MD  Last physical: 12/14/2018 Last MTM visit: Visit date not found   Next visit within 3 mo: Visit date not found  Next physical within 3 mo: Visit date not found  Prescriber OR PCP: Mario Lepe MD  Last diagnosis associated with med order: There are no diagnoses linked to this encounter.  If protocol passes may refill for 12 months if within 3 months of last provider visit (or a total of 15 months).             Passed - Potassium level in last 12 months     Lab Results   Component Value Date    Potassium 4.7 05/22/2019

## 2021-05-30 NOTE — TELEPHONE ENCOUNTER
Was feeling weak    Was feeling sweaty    This happened about bedtime last night    Had rapid a rapid heart rate    Felt shortness of breath    Had to sit up to breath    Feeling better today but not great    Still has a headache    His back hurts    No longer shortness of breath    Advised that he give Cardiology a call or go into the ER.    Marah Acevedo RN  [facility] Medication Refill and Triage Nurse  7/11/2019  9:06 AM      Reason for Disposition    Age > 60 years    Protocols used: HEART RATE AND HEARTBEAT [id number]

## 2021-05-30 NOTE — TELEPHONE ENCOUNTER
RN cannot approve Refill Request    RN can NOT refill this medication med is not covered by policy/route to provider. Last office visit: 7/16/2019 Mario Lepe MD Last Physical: 12/14/2018 Last MTM visit: Visit date not found Last visit same specialty: 7/16/2019 Mario Lepe MD.  Next visit within 3 mo: Visit date not found  Next physical within 3 mo: Visit date not found      Rosy Willis, [facility] Triage/Med Refill 7/27/2019    Requested Prescriptions   Pending Prescriptions Disp Refills     CHOLECALCIFEROL 1,000 unit tablet [Pharmacy Med Name: VITAMIN D3 1,000 UNIT TABLET] 90 tablet 2     Sig: TAKE 1 TABLET BY MOUTH DAILY.       There is no refill protocol information for this order

## 2021-05-30 NOTE — TELEPHONE ENCOUNTER
"Type: add on remote, patient reports he had episodes last night around midnight of shortness of breath, weakness etc.  Presenting rhythm: AP-biVP, rate 80 bpm.  Battery/lead status: stable  Arrhythmias: since 5/13/19, no mode switches. Six VT episodes with therapy, ATP x 1-3; 3 episodes on 7/4/19 appear continuous, toggling therapy zone. No EGMs available for the other 3 treated episodes. 17 nonsustained also detected, some toggle zone.  Comments: normal ICD function. BiV pacing 99%. Routed to device RN.   Device/lead alerts: none. prd     Call received from Todd this morning. States he had an episode at about midnight last evening where he woke up sweating with fast heart beats and a bit short of breath. Had to sit up and get out of bed for a while. Was wondering if his device \"picked up anything at that time.\"      Device transmission showed some a few Episodes of slower VT, some back to back and needing ATPx1-3. Episodes on 7/4/18 toggled detection and eventually dropped below detection. Known slow VTs.  Reviewed these episodes with Ray, he was asymptomatic but also most likely asleep.       None of these recorded episodes occurred last night during symptoms. Reviewed these episodes with patient and he was unaware of any of these treated VT episodes.  He states this afternoon he is feeling a bit better. We discussed the need to call with any s/s of fluid retention, weight changes, increased shortness of breath, fevers etc. He agrees.     Awaiting P[employer]art upgrade to finish processing report.     Cassandra Cook RN    "

## 2021-05-31 VITALS — WEIGHT: 270.44 LBS | BODY MASS INDEX: 34.71 KG/M2 | HEIGHT: 74 IN

## 2021-05-31 VITALS — BODY MASS INDEX: 34.27 KG/M2 | WEIGHT: 267 LBS | HEIGHT: 74 IN

## 2021-05-31 VITALS — WEIGHT: 264.5 LBS | BODY MASS INDEX: 33.95 KG/M2 | HEIGHT: 74 IN

## 2021-05-31 VITALS — HEIGHT: 74 IN | BODY MASS INDEX: 34.72 KG/M2

## 2021-05-31 NOTE — PROGRESS NOTES
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 Wears splints (boots) on both feet due to acquired Pes planovalgus of both feet.

## 2021-05-31 NOTE — TELEPHONE ENCOUNTER
Controlled Substance Refill Request  Medication:   Requested Prescriptions     Pending Prescriptions Disp Refills     traMADol (ULTRAM) 50 mg tablet [Pharmacy Med Name: TRAMADOL HCL 50 MG TABLET] 180 tablet 1     Sig: TAKE 2 TABLETS (100 MG TOTAL) BY MOUTH 3 (THREE) TIMES A DAY.     Date Last Fill: 6.11.19  Pharmacy: [facility]   Submit electronically to pharmacy  Controlled Substance Agreement on File:   Encounter-Level CSA Scan Date - 01/16/2017:    Scan on 1/18/2017  1:41 PM (below)             Encounter-Level CSA Scan Date - 11/12/2015:    Scan on 11/13/2015  1:29 PM (below)         Last office visit: Last office visit pertaining to requested medication was 7.16.19.

## 2021-05-31 NOTE — PROGRESS NOTES
ASSESSMENT:  1. Recurrent ventricular tachycardia (H)  Reviewed abnormal device report.  Reviewed holding of amiodarone by me, then resumption of lower dose, then cessation by cardiology.  Reviewed initiation of sotalol.  Phone initiation of sotalol done without adjustment of metoprolol.  Office discussion of sotalol discussed coexisting decrease in metoprolol    EKG done.  QT interval may be long for rate.  Will refer question to cardiology.  Update labs.  Decrease metoprolol.  Further adjustments per cardiology    Staff note to Dr. Ulises Hernández  - HM2(CBC w/o Differential)  - Comprehensive Metabolic Panel  - Electrocardiogram Perform - Clinic    2. Non-ischemic cardiomyopathy (H)  Stable volume    3. Urinary frequency  Infectious symptoms cleared.  Frequency persists.  Trial of tamsulosin cautiously given low blood pressure  - Urinalysis-UC if Indicated  - tamsulosin (FLOMAX) 0.4 mg cap; Take 1 capsule (0.4 mg total) by mouth Daily after breakfast.  Dispense: 30 capsule; Refill: 11    4. On amiodarone therapy  Although officially off amiodarone, monitor thyroid and liver tests for the next few months  - Thyroid Stimulating Hormone (TSH)      5.  Neuropathy.  Reasonably controlled.  Monthly B12 shots    PLAN:  Patient Instructions   Try stopping allegra a few days to see if that matters for urine    flomax 0.4 mgs once a day to relax prostate and normalize urine.  Try for a week and continue if helpful    Monthly vitamin B 12 shots for the neuropathy     EKG to monitor Sotalol    Dr Duvall note from July talked about decreasing Metoprolol when Sotalol was started      Orders Placed This Encounter   Procedures     Urinalysis-UC if Indicated     Thyroid Stimulating Hormone (TSH)     HM2(CBC w/o Differential)     Comprehensive Metabolic Panel     Electrocardiogram Perform - Clinic     Medications Discontinued During This Encounter   Medication Reason     traMADol (ULTRAM) 50 mg tablet Duplicate order      Administrations This Visit     cyanocobalamin injection 1,000 mcg     Admin Date  08/22/2019 Action  Given Dose  1000 mcg Route  Intramuscular Administered By  Brittani Patino LPN              Return in about 4 weeks (around 9/19/2019) for if your symptoms do not improve.      CHIEF COMPLAINT:  Chief Complaint   Patient presents with     Follow-up       HISTORY OF PRESENT ILLNESS:  Florentino is a 74 y.o. male presenting to the clinic today to follow up on his v-tach and with complaints of frequent urination. He is accompanied by his wife.     Frequent Urination: He has been urinating more frequently, and he has a lot of urgency. This has been worse for the past month and a half. He was treated with antibiotics for a possible prostate infection the last time he was here in July. His chills, aches, and other signs of infection resolved, but he does not think that helped the urgency. It is getting to the point that he is worried about not making it to a bathroom in time. He wakes up every two hours during the night to urinate, which is worse than before. He does not urinate much volume each time he goes. He does not recall ever taking Flomax. This bothers him more during the day than at night.     Paroxysmal Ventricular Tachycardia: He was seen by Dr. Hernández recently and was told to stay off of amiodarone because of some liver irritation. He has had four shocks from his defibrillator but only felt two; all have been while in bed. He was instead started on sotalol. He has had some reflux symptoms but has otherwise felt fine on the sotalol. He was supposed to see Dr. Hernández again last week but was not able to get in. He does not think his dose of metoprolol was adjusted, and he continues to take mexiletine.      Edema: His left legs tends to develop more swelling than the right. It has maybe been a little worse than normal in his left leg. He continues to take furosemide 20 mg daily.     Pain: His pain comes and goes.  "For a while, he was experiencing severe shooting pain in his toes, but it resolved on its own. He thinks the vitamin B12 shot might help a little but is not significant.     Health Maintenance: He got his flu shot already this year.     REVIEW OF SYSTEMS:   His blood pressure is low in the clinic today, but he has not felt dizzy or lightheaded. He continues to take Allegra once daily for his allergy symptoms. He has had some trouble getting his bowels regular. He was constipated, so he took Miralax for three days, but he then developed diarrhea for a few days. This has started to resolve, but it has been a long process. All other systems are negative.    [place]SH:  [place] to [place] this year.     TOBACCO USE:  Social History     Tobacco Use   Smoking Status Former Smoker     Packs/day: 2.50     Years: 50.00     Pack years: 125.00     Types: Cigarettes, Pipe, Cigars     Last attempt to quit: 2000     Years since quittin.6   Smokeless Tobacco Never Used       VITALS:  Vitals:    19 1106   BP: (!) 80/62   Patient Site: Left Arm   Patient Position: Sitting   Cuff Size: Adult Large   Pulse: 80   Resp: 16   SpO2: 93%   Weight: (!) 238 lb 5 oz (108.1 kg)   Height: 6' 2\" (1.88 m)     Wt Readings from Last 3 Encounters:   19 (!) 238 lb 5 oz (108.1 kg)   19 (!) 237 lb 8 oz (107.7 kg)   19 (!) 242 lb 1 oz (109.8 kg)     Body mass index is 30.6 kg/m .    PHYSICAL EXAM:  Constitutional:  Reveals an alert, pleasant, talkative man who presents in a wheelchair.  Vitals:  Per nursing notes.  Cardiac: Regular rate and rhythm without murmurs, rubs, or gallops.   Legs difficult to examine due to boot but no obvious edema  Palpation of the radial pulse regular.  Neurologic:  Cranial nerves II-XII intact.     Psychiatric:  Mood appropriate, memory intact.     EKG: AV sequential paced rhythm, QT interval may be long for rate at 542 ms     MEDICATIONS:  Current " Outpatient Medications   Medication Sig Dispense Refill     acetaminophen (TYLENOL) 325 MG tablet Take 650 mg by mouth 2 (two) times a day as needed for pain.        allopurinol (ZYLOPRIM) 300 MG tablet TAKE 1 TABLET BY MOUTH DAILY. 90 tablet 2     ascorbic acid, vitamin C, (ASCORBIC ACID WITH JAVIER HIPS) 500 MG tablet Take 500 mg by mouth daily. (start after guiacs obtained)       aspirin 81 MG EC tablet Take 81 mg by mouth daily.       CHOLECALCIFEROL 1,000 unit tablet TAKE 1 TABLET BY MOUTH DAILY. 90 tablet 2     cinnamon bark (CINNAMON ORAL) Take 1,000 mg by mouth Daily at 5 pm..              coenzyme Q10 100 mg capsule Take 200 mg by mouth daily.        DULoxetine (CYMBALTA) 60 MG capsule Take 1 capsule (60 mg total) by mouth 2 (two) times a day. 180 capsule 3     EPINEPHrine (EPIPEN/ADRENACLICK) 0.3 mg/0.3 mL injection Inject 0.3 mL (0.3 mg total) as directed as needed for anaphylaxis. Inject into thigh. 2 Pre-filled Pen Syringe 0     famotidine (PEPCID) 20 MG tablet Take 1 tablet (20 mg total) by mouth 2 (two) times a day. (Patient taking differently: Take 1 tablet by mouth 2 (two) times a day as needed for heartburn. Indications: multiple endocrine neoplasia, gastroesophageal reflux disease)  0     fexofenadine (ALLEGRA) 180 MG tablet TAKE 1 TABLET (180 MG TOTAL) BY MOUTH DAILY. 90 tablet 3     furosemide (LASIX) 20 MG tablet TAKE 1 TABLET BY MOUTH EVERY DAY 90 tablet 3     KLOR-CON M20 20 mEq tablet Take 20 mEq by mouth daily.  3     KRILL OIL ORAL Take 1,000 mg by mouth daily.              loperamide (IMODIUM) 2 mg capsule Take 1 capsule (2 mg total) by mouth 3 (three) times a day as needed for diarrhea.  0     meloxicam (MOBIC) 15 MG tablet Take 15 mg by mouth daily.  3     metoprolol succinate (TOPROL-XL) 100 MG 24 hr tablet TAKE 1 TABLET BY MOUTH EVERY DAY 90 tablet 3     mexiletine (MEXITIL) 150 MG capsule Take 1 capsule (150 mg total) by mouth 3 (three) times a day. 270 capsule 3     milk thistle 175  mg tablet Take 175 mg by mouth daily.       multivitamin (MULTIVITAMIN) per tablet 1 tablet every other day.        mupirocin (BACTROBAN) 2 % ointment Apply 1 application topically 2 (two) times a day as needed.       potassium chloride (KLOR-CON M20) 20 MEQ tablet Take 1 tablet (20 mEq total) by mouth daily. 90 tablet 3     simethicone (MYLICON) 125 MG chewable tablet Chew 125 mg every 6 (six) hours as needed for flatulence.       sotalol (BETAPACE) 80 MG tablet Take 1 tablet (80 mg total) by mouth daily. 90 tablet 1     spironolactone (ALDACTONE) 25 MG tablet Take 25 mg by mouth daily.  2     tamsulosin (FLOMAX) 0.4 mg cap Take 1 capsule (0.4 mg total) by mouth Daily after breakfast. 30 capsule 11     topiramate (TOPAMAX) 50 MG tablet Take 50 mg by mouth 2 (two) times a day.       traMADol (ULTRAM) 50 mg tablet Take 2 tablets (100 mg total) by mouth 2 (two) times a day. 360 tablet 3     Current Facility-Administered Medications   Medication Dose Route Frequency Provider Last Rate Last Dose     cyanocobalamin injection 1,000 mcg  1,000 mcg Intramuscular Q30 Days Mario Lepe MD   1,000 mcg at 08/22/19 1140       ADDITIONAL HISTORY SUMMARIZED (2): Reviewed 7/18 cardiology note regarding paroxysmal v-tach and medication changes  DECISION TO OBTAIN EXTRA INFORMATION (1): None.   RADIOLOGY TESTS (1): None.  LABS (1): Labs from 7/16/2019 reviewed. Labs ordered.   MEDICINE TESTS (1): EKG ordered.   INDEPENDENT REVIEW (2 each): EKG from today personally read.     The visit lasted a total of 20 minutes face to face with the patient. Over 50% of the time was spent counseling and educating the patient about his frequent urination and v-tach.    IMitch, am scribing for and in the presence of, Dr. Lepe.    I, Dr. Lepe, personally performed the services described in this documentation, as scribed by Mitch Vines in my presence, and it is both accurate and complete.    Total data points: 6

## 2021-05-31 NOTE — TELEPHONE ENCOUNTER
Who is calling:  Patient   Reason for Call:  Patient is calling in for follow up on refill medication Tramadol 50 mg tablet . Patient states that he is out of medication requesting to process as soon as possible.   Date of last appointment with primary care: 7/16/19  Okay to leave a detailed message: No

## 2021-05-31 NOTE — TELEPHONE ENCOUNTER
Recurrent VT with ICD shocks.  Please start sotalol 80 mg once daily today.   Appointment with me in device clinic later Friday afternoon if feasible.     Thanks, Ulises Hernández

## 2021-05-31 NOTE — TELEPHONE ENCOUNTER
Patient called, reviewed need for sotalol and f/u with Dr. Hernández.  Patient is agreeable and verbalizes understanding.  RX sent to pharmacy.   Patient encouraged to call with issues or concerns.  Note forwarded to scheduling to set up appointment.  ZECHARIAH

## 2021-05-31 NOTE — PATIENT INSTRUCTIONS - HE
Try stopping allegra a few days to see if that matters for urine    flomax 0.4 mgs once a day to relax prostate and normalize urine.  Try for a week and continue if helpful    Monthly vitamin B 12 shots for the neuropathy     EKG to monitor Sotalol    Dr Duvall note from July talked about decreasing Metoprolol when Sotalol was started

## 2021-06-01 ENCOUNTER — RECORDS - HEALTHEAST (OUTPATIENT)
Dept: ADMINISTRATIVE | Facility: CLINIC | Age: 76
End: 2021-06-01

## 2021-06-01 VITALS — BODY MASS INDEX: 33.38 KG/M2 | HEIGHT: 74 IN

## 2021-06-01 VITALS — WEIGHT: 226.8 LBS | BODY MASS INDEX: 29.12 KG/M2

## 2021-06-01 VITALS — WEIGHT: 260 LBS | HEIGHT: 74 IN | BODY MASS INDEX: 33.37 KG/M2

## 2021-06-01 VITALS — WEIGHT: 233.3 LBS | BODY MASS INDEX: 29.95 KG/M2

## 2021-06-01 VITALS — HEIGHT: 74 IN | BODY MASS INDEX: 33.24 KG/M2 | WEIGHT: 259 LBS

## 2021-06-01 VITALS — HEIGHT: 74 IN | WEIGHT: 226 LBS | BODY MASS INDEX: 29 KG/M2

## 2021-06-01 VITALS — WEIGHT: 234.5 LBS | BODY MASS INDEX: 30.11 KG/M2

## 2021-06-01 VITALS — BODY MASS INDEX: 29.08 KG/M2 | WEIGHT: 226.5 LBS

## 2021-06-01 VITALS — WEIGHT: 237.44 LBS | BODY MASS INDEX: 30.47 KG/M2 | HEIGHT: 74 IN

## 2021-06-01 NOTE — PROGRESS NOTES
ASSESSMENT:  1. Acute left-sided low back pain without sciatica  Presumably disc.  No trauma.  Increase topiramate.  Add back meloxicam.  Burst with prednisone.  Rule out possible nephrolithiasis.  Baclofen for possible muscle spasm  - baclofen (LIORESAL) 10 MG tablet; Take 1 tablet (10 mg total) by mouth 3 (three) times a day as needed.  Dispense: 30 tablet; Refill: 0  - predniSONE (DELTASONE) 20 MG tablet; Take 20 mg by mouth daily for 7 days.  Dispense: 7 tablet; Refill: 0  - topiramate (TOPAMAX) 100 MG tablet; Take 1 tablet (100 mg total) by mouth 2 (two) times a day.  Dispense: 60 tablet; Refill: 11  - meloxicam (MOBIC) 15 MG tablet; Take 1 tablet (15 mg total) by mouth daily.  Dispense: 90 tablet; Refill: 3  - Urinalysis-UC if Indicated    2. Recurrent ventricular tachycardia (H)  Stable without increased symptoms on polypharmacy      Unclear contribution of prostatitis    PLAN:  Patient Instructions   This is usually a disc in your neck or back.  Based on your examination, I suspect lumbar 2 or 3    Prednisone 20 mgs once daily for one week.  This decreases the inflammation and swelling around the disc    Naproxen 500 mgs twice a day, with food, for 2 weeks.  This decreases the inflammation and swelling around the disc.  For you resume Meloxicam 15 mgs daily with food    Gabapentin 300 mgs twice daily for 2-4 weeks.  This helps wrap and insulate and cool down the irritated nerve, and stabilize the disc pushing on the nerve.  For you already on topiramate, increase to 100 mgs twice a day    Add baclofen 10 mgs every 6 hours as needed for muscle spasm    Stronger pain pills as needed, for a short time.  Increase Tramadol to 3 times a day as needed.  Max is 8 per day total     If the medications help only partially, then we usually repeat the medications    If the medications do not help, I refer you to the [facility] spine clinic.  The clinic has physical therapy, accupuncture, botox, cortisone shots, traction,  xrays, MRI's, referral to surgeons.  They can do all the next steps, short of surgery, that the medications failed to solve          Orders Placed This Encounter   Procedures     Urinalysis-UC if Indicated     Medications Discontinued During This Encounter   Medication Reason     topiramate (TOPAMAX) 50 MG tablet      meloxicam (MOBIC) 15 MG tablet        Return for for a full review of everything we did today.    CHIEF COMPLAINT:  Chief Complaint   Patient presents with     Back Pain     2 weeks, pain has been traveling all over the back currently towards the left hip       HISTORY OF PRESENT ILLNESS:  Florentino is a 74 y.o. male presenting to the clinic today complaining of back pain.  Symptoms began approximately 2 weeks ago.  He feels the pain in his low back which will sometimes radiate to his left hip.  It does not go into the knee or feet.  It was originally slightly on the right side but that resolved.  He will sometimes feel it radiate up into the chest and will complain of an occasionally pleuritic type component.  Meloxicam was discontinued 3 months ago due to possible diarrhea.  He has tolerated prednisone in the past.  He complains of occasional fever and cloudy urine but that seems to have improved.    Flomax has improved his urinary stream    REVIEW OF SYSTEMS:   Improved urinary stream.  No rash.  Occasional muscle spasm.  All other systems are negative.    PFSH:  His wife Jeanne is demented.  He was able to go to the state [facility]    TOBACCO USE:  Social History     Tobacco Use   Smoking Status Former Smoker     Packs/day: 2.50     Years: 50.00     Pack years: 125.00     Types: Cigarettes, Pipe, Cigars     Last attempt to quit: 2000     Years since quittin.7   Smokeless Tobacco Never Used       VITALS:  Vitals:    19 1133   BP: (!) 86/62   Patient Site: Left Arm   Patient Position: Sitting   Cuff Size: Adult Large   Pulse: 72   SpO2: 96%     Wt Readings from Last 3 Encounters:   19 (!)  238 lb 5 oz (108.1 kg)   07/16/19 (!) 237 lb 8 oz (107.7 kg)   06/26/19 (!) 242 lb 1 oz (109.8 kg)     There is no height or weight on file to calculate BMI.    PHYSICAL EXAM:  Constitutional:  Reveals a pleasant uncomfortable bearded man sitting in a wheelchair.  Vitals:  Per nursing notes.    Palpation of the low back-no obvious muscle spasm.  Pain in the low lumbar area and's left sacroiliac  Cardiac: Regular rate and rhythm without murmurs, rubs, or gallops.     Legs 1+ edema.  Braces and boots on.  Difficult to stand out of the wheelchair  Palpation of the radial pulse regular.  Lungs: Clear.  Respiratory effort normal.    Neurologic:  Cranial nerves II-XII intact.     Psychiatric:  Mood appropriate, memory intact.       ADDITIONAL HISTORY SUMMARIZED (2):   CARE EVERYWHERE/ EXTRA INFORMATION (1): None.   RADIOLOGY TESTS (1):   LABS (1): Recheck urine today  CARDIOLOGY/MEDICINE TESTS (1):   INDEPENDENT REVIEW (2 each): None.     Total data points:1    Time in: 1141 am  Time out: 12:02 PM    The visit lasted a total of 21 minutes face to face with the patient. Over 50% of the time was spent counseling and educating the patient about medications, medication adjustments, medication side effects, and lab testing.        MEDICATIONS:  Current Outpatient Medications   Medication Sig Dispense Refill     acetaminophen (TYLENOL) 325 MG tablet Take 650 mg by mouth 2 (two) times a day as needed for pain.        allopurinol (ZYLOPRIM) 300 MG tablet TAKE 1 TABLET BY MOUTH DAILY. 90 tablet 2     ascorbic acid, vitamin C, (ASCORBIC ACID WITH JAVIER HIPS) 500 MG tablet Take 500 mg by mouth daily. (start after mya obtained)       aspirin 81 MG EC tablet Take 81 mg by mouth daily.       CHOLECALCIFEROL 1,000 unit tablet TAKE 1 TABLET BY MOUTH DAILY. 90 tablet 2     cinnamon bark (CINNAMON ORAL) Take 1,000 mg by mouth Daily at 5 pm..              coenzyme Q10 100 mg capsule Take 200 mg by mouth daily.        DULoxetine (CYMBALTA)  60 MG capsule Take 1 capsule (60 mg total) by mouth 2 (two) times a day. 180 capsule 3     EPINEPHrine (EPIPEN/ADRENACLICK) 0.3 mg/0.3 mL injection Inject 0.3 mL (0.3 mg total) as directed as needed for anaphylaxis. Inject into thigh. 2 Pre-filled Pen Syringe 0     famotidine (PEPCID) 20 MG tablet Take 1 tablet (20 mg total) by mouth 2 (two) times a day. (Patient taking differently: Take 1 tablet by mouth 2 (two) times a day as needed for heartburn. Indications: multiple endocrine neoplasia, gastroesophageal reflux disease)  0     fexofenadine (ALLEGRA) 180 MG tablet TAKE 1 TABLET (180 MG TOTAL) BY MOUTH DAILY. 90 tablet 3     furosemide (LASIX) 20 MG tablet TAKE 1 TABLET BY MOUTH EVERY DAY 90 tablet 3     KLOR-CON M20 20 mEq tablet Take 20 mEq by mouth daily.  3     KRILL OIL ORAL Take 1,000 mg by mouth daily.              loperamide (IMODIUM) 2 mg capsule Take 1 capsule (2 mg total) by mouth 3 (three) times a day as needed for diarrhea.  0     meloxicam (MOBIC) 15 MG tablet Take 1 tablet (15 mg total) by mouth daily. 90 tablet 3     metoprolol succinate (TOPROL-XL) 100 MG 24 hr tablet TAKE 1 TABLET BY MOUTH EVERY DAY 90 tablet 3     mexiletine (MEXITIL) 150 MG capsule Take 1 capsule (150 mg total) by mouth 3 (three) times a day. 270 capsule 3     milk thistle 175 mg tablet Take 175 mg by mouth daily.       multivitamin (MULTIVITAMIN) per tablet 1 tablet every other day.        mupirocin (BACTROBAN) 2 % ointment Apply 1 application topically 2 (two) times a day as needed.       potassium chloride (KLOR-CON M20) 20 MEQ tablet Take 1 tablet (20 mEq total) by mouth daily. 90 tablet 3     simethicone (MYLICON) 125 MG chewable tablet Chew 125 mg every 6 (six) hours as needed for flatulence.       sotalol (BETAPACE) 80 MG tablet Take 1 tablet (80 mg total) by mouth daily. 90 tablet 1     spironolactone (ALDACTONE) 25 MG tablet Take 25 mg by mouth daily.  2     tamsulosin (FLOMAX) 0.4 mg cap Take 1 capsule (0.4 mg total)  by mouth Daily after breakfast. 30 capsule 11     topiramate (TOPAMAX) 100 MG tablet Take 1 tablet (100 mg total) by mouth 2 (two) times a day. 60 tablet 11     traMADol (ULTRAM) 50 mg tablet Take 2 tablets (100 mg total) by mouth 2 (two) times a day. 360 tablet 3     baclofen (LIORESAL) 10 MG tablet Take 1 tablet (10 mg total) by mouth 3 (three) times a day as needed. 30 tablet 0     predniSONE (DELTASONE) 20 MG tablet Take 20 mg by mouth daily for 7 days. 7 tablet 0     Current Facility-Administered Medications   Medication Dose Route Frequency Provider Last Rate Last Dose     cyanocobalamin injection 1,000 mcg  1,000 mcg Intramuscular Q30 Days Mario Lepe MD   1,000 mcg at 08/22/19 1141

## 2021-06-01 NOTE — TELEPHONE ENCOUNTER
"Type: CRT-D alert remote transmission for shock therapy to convert arrhythmia.  Presenting rhythm: unavailable.  Battery/lead status: unavailable.  Arrhythmias: since 8/12/19; one VT episode on 9/16/19 7:56am, unable to see onset, does not meet detection in beginning of episode, ATPx9, unsuccessful and appears to have accelerated episode into VT zone, rates avg. 180 bpm, terminated with 41 J shock with immediate conversion. 345 other NSVT detected.  Comments: appears to be normal ICD function. Device biVP 96%. Routed to device RN for review.   Device/lead alerts: none. HARSHA     Transmission reviewed, agree with above. Call placed to patient to review device findings. Todd was unaware of any VT events or ICD shocks. States he has been having a really bad time with a broken disc in his back from an old injury. Has been having lots of pain with this and unable to sleep in his bed, therefore was away from his monitor for a few days. He does not recall the ICD shock, states he could have been still asleep at that time because his sleep is so erratic due to back issue. He states \" actually heart-wise I have been doing well, I was just thinking about how I haven't had a shock for a long time now.\" He states the shock is the \"least of his worries\", he is more concerned about his back troubles right now.     Advised to call with any shocks or syncope. He agrees. Denies any concerns at this time.     Will review with Dr. Hernández at this time.     Cassandra Cook RN             "

## 2021-06-01 NOTE — TELEPHONE ENCOUNTER
RN cannot approve Refill Request    RN can NOT refill this medication med is not covered by policy/route to provider     . Last office visit: 9/11/2019 Mario Lepe MD Last Physical: 12/14/2018 Last MTM visit: Visit date not found Last visit same specialty: 9/11/2019 Mario Lepe MD.  Next visit within 3 mo: Visit date not found  Next physical within 3 mo: Visit date not found      Angelina Gonzalez, [facility] Triage/Med Refill 9/17/2019    Requested Prescriptions   Pending Prescriptions Disp Refills     baclofen (LIORESAL) 10 MG tablet [Pharmacy Med Name: BACLOFEN 10 MG TABLET] 30 tablet 0     Sig: TAKE 1 TABLET (10 MG TOTAL) BY MOUTH 3 (THREE) TIMES A DAY AS NEEDED.       There is no refill protocol information for this order

## 2021-06-01 NOTE — PATIENT INSTRUCTIONS - HE
This is usually a disc in your neck or back.  Based on your examination, I suspect lumbar 2 or 3    Prednisone 20 mgs once daily for one week.  This decreases the inflammation and swelling around the disc    Naproxen 500 mgs twice a day, with food, for 2 weeks.  This decreases the inflammation and swelling around the disc.  For you resume Meloxicam 15 mgs daily with food    Gabapentin 300 mgs twice daily for 2-4 weeks.  This helps wrap and insulate and cool down the irritated nerve, and stabilize the disc pushing on the nerve.  For you already on topiramate, increase to 100 mgs twice a day    Add baclofen 10 mgs every 6 hours as needed for muscle spasm    Stronger pain pills as needed, for a short time.  Increase Tramadol to 3 times a day as needed.  Max is 8 per day total     If the medications help only partially, then we usually repeat the medications    If the medications do not help, I refer you to the [facility] spine clinic.  The clinic has physical therapy, accupuncture, botox, cortisone shots, traction, xrays, MRI's, referral to surgeons.  They can do all the next steps, short of surgery, that the medications failed to solve

## 2021-06-01 NOTE — TELEPHONE ENCOUNTER
Chart review shows patient had syncope today, and another ICD shock for VT. See notes form ER today, patient also is to see Dr. Hernández this afternoon.   Cassandra Cook RN

## 2021-06-02 ENCOUNTER — RECORDS - HEALTHEAST (OUTPATIENT)
Dept: ADMINISTRATIVE | Facility: CLINIC | Age: 76
End: 2021-06-02

## 2021-06-02 VITALS — WEIGHT: 237 LBS | HEIGHT: 75 IN | BODY MASS INDEX: 29.47 KG/M2

## 2021-06-02 VITALS — WEIGHT: 233 LBS | HEIGHT: 74 IN | BODY MASS INDEX: 29.9 KG/M2

## 2021-06-02 VITALS — BODY MASS INDEX: 29.92 KG/M2 | HEIGHT: 74 IN

## 2021-06-02 VITALS — HEIGHT: 75 IN | BODY MASS INDEX: 29.47 KG/M2 | WEIGHT: 237 LBS

## 2021-06-02 VITALS — HEIGHT: 74 IN | BODY MASS INDEX: 29.9 KG/M2 | WEIGHT: 233 LBS

## 2021-06-02 VITALS — HEIGHT: 74 IN | WEIGHT: 233 LBS | BODY MASS INDEX: 29.9 KG/M2

## 2021-06-02 VITALS — BODY MASS INDEX: 29.47 KG/M2 | HEIGHT: 75 IN | WEIGHT: 237 LBS

## 2021-06-02 VITALS — HEIGHT: 75 IN | WEIGHT: 237.56 LBS | BODY MASS INDEX: 29.54 KG/M2

## 2021-06-02 VITALS — HEIGHT: 75 IN | BODY MASS INDEX: 29.22 KG/M2 | WEIGHT: 235 LBS

## 2021-06-02 VITALS — WEIGHT: 233 LBS | BODY MASS INDEX: 29.9 KG/M2 | HEIGHT: 74 IN

## 2021-06-02 VITALS — BODY MASS INDEX: 30.17 KG/M2 | WEIGHT: 235 LBS

## 2021-06-02 NOTE — ANESTHESIA CARE TRANSFER NOTE
Last vitals:   Vitals:    10/07/19 1749   BP: 108/62   Pulse: 80   Resp: 12   Temp: 36.4  C (97.5  F)   SpO2: 97%     Patient's level of consciousness is awake  Spontaneous respirations: yes  Maintains airway independently: yes  Dentition unchanged: yes  Oropharynx: oropharynx clear of all foreign objects    QCDR Measures:  ASA# 20 - Surgical Safety Checklist: WHO surgical safety checklist completed prior to induction    PQRS# 430 - Adult PONV Prevention: 4558F - Pt received => 2 anti-emetic agents (different classes) preop & intraop  ASA# 8 - Peds PONV Prevention: NA - Not pediatric patient, not GA or 2 or more risk factors NOT present  PQRS# 424 - Laury-op Temp Management: NA - MAC anesthesia or case < 60 minutes  PQRS# 426 - PACU Transfer Protocol: - Transfer of care checklist used  ASA# 14 - Acute Post-op Pain: NA - Patient under age 10y or did not go to PACU

## 2021-06-02 NOTE — ANESTHESIA POSTPROCEDURE EVALUATION
Patient: Florentino Fall  EP Ablation Ventricular Tachycardia - General anesthesia to see pt AM, plan for MAC with precedex   Anesthesia type: No value filed.    Patient location: ICU  Last vitals:   Vitals Value Taken Time   /62 10/8/2019  7:00 AM   Temp 36.1  C (97  F) 10/8/2019  4:00 AM   Pulse 80 10/8/2019  8:46 AM   Resp 16 10/8/2019  8:46 AM   SpO2 98 % 10/8/2019  8:46 AM   Vitals shown include unvalidated device data.  Post vital signs: stable  Level of consciousness: lethargic  Post-anesthesia pain: pain controlled  Post-anesthesia nausea and vomiting: no  Pulmonary: unassisted, return to baseline  Cardiovascular: stable and blood pressure at baseline  Hydration: adequate  Anesthetic events: no    QCDR Measures:  ASA# 11 - Laury-op Cardiac Arrest: ASA11B - Patient did NOT experience unanticipated cardiac arrest  ASA# 12 - Laury-op Mortality Rate: ASA12B - Patient did NOT die  ASA# 13 - PACU Re-Intubation Rate: ASA13B - Patient did NOT require a new airway mgmt  ASA# 10 - Composite Anes Safety: ASA10A - No serious adverse event    Additional Notes:

## 2021-06-02 NOTE — ANESTHESIA PROCEDURE NOTES
Arterial Line  Reason for Procedure: hemodynamic monitoring  Patient location during procedure: OR pre-induction  Start time: 10/14/2019 2:35 PM  End time: 10/14/2019 2:39 PM  Staffing:  Performing  Anesthesiologist: Perlita Gordillo MD  Sterile Precautions:  sterile barriers used during insertion: cap, mask, sterile gloves, large sheet, and hand hygiene used.  Arterial Line:     Laterality: right  Location: radial  Prepped with: ChloroPrep    Needle gauge: 20 G  Number of Attempts: 1  Secured with: tape, transparent dressing and pressure dressing  Flushed with: saline  1% lidocaine local anesthesia used for skin prep.   See MAR for additional medications given.  Ultrasound evaluation of access site: yes  Vessel patent by US exam    Concurrent real time visualization of needle entry

## 2021-06-02 NOTE — ANESTHESIA POSTPROCEDURE EVALUATION
Patient: Florentino GALLOWAY Fall  EP Lead Extraction Level 3 - DEMETRIO Rey Keisha- surgery scheduling, Yayo-Whit VERMA-cv surg , Marty CARLSON-[facility] 10/09/19 Kasie Norris RN, EP Temporary Pacemaker Insertion, OR LASER LEAD EXTRACTION - LEVEL 3  Anesthesia type: general    Patient location: ICU  Last vitals:   Vitals Value Taken Time   BP 87/51 10/15/2019 12:39 PM   Temp 37.7  C (99.8  F) 10/15/2019  4:00 AM   Pulse 72 10/15/2019  1:39 PM   Resp 29 10/15/2019  1:39 PM   SpO2 94 % 10/15/2019  1:39 PM   Vitals shown include unvalidated device data.  Post vital signs: stable  Level of consciousness: awake and responds to simple questions  Post-anesthesia pain: pain controlled  Post-anesthesia nausea and vomiting: no  Pulmonary: unassisted  Cardiovascular: stable  Hydration: adequate  Anesthetic events: no    QCDR Measures:  ASA# 11 - Laury-op Cardiac Arrest: ASA11B - Patient did NOT experience unanticipated cardiac arrest  ASA# 12 - Laury-op Mortality Rate: ASA12B - Patient did NOT die  ASA# 13 - PACU Re-Intubation Rate: ASA13X - Exclusion: organ donor or direct ICU transfer  ASA# 10 - Composite Anes Safety: ASA10A - No serious adverse event    Additional Notes: Patient extubated. On vasopressin and NE. Doing well.

## 2021-06-02 NOTE — ANESTHESIA PREPROCEDURE EVALUATION
Anesthesia Evaluation      Patient summary reviewed   No history of anesthetic complications     Airway   Mallampati: II  Neck ROM: full   Pulmonary - normal exam                          Cardiovascular - normal exam  (+) hypertension, dysrhythmias, CHF, ,     ECG reviewed        Neuro/Psych      Endo/Other    (+) diabetes mellitus,      GI/Hepatic/Renal    (+)   chronic renal disease,           Dental                         Anesthesia Plan  Planned anesthetic: MAC    ASA 3     Anesthetic plan and risks discussed with: patient    Post-op plan: routine recovery

## 2021-06-02 NOTE — ANESTHESIA PROCEDURE NOTES
Arterial Line  Reason for Procedure: hemodynamic monitoring  Patient location during procedure: OR pre-induction  Start time: 10/7/2019 1:10 PM  End time: 10/7/2019 1:21 PM  Staffing:  Performing  Anesthesiologist: Kirstie Langston MD  Performing CRNA: Shirley Karimi CRNA  Sterile Precautions:  sterile barriers used during insertion: cap, mask, sterile gloves, large sheet, and hand hygiene used.  Arterial Line:   Immediately prior to procedure a time out was called to verify the correct patient, procedure, equipment, support staff and site/side marked as required  Laterality: right  Location: radial  Prepped with: ChloroPrep    Needle gauge: 20 G  Number of Attempts: 1  Secured with: tape  Flushed with: saline  1% lidocaine local anesthesia used for skin prep.   See MAR for additional medications given.  Ultrasound evaluation of access site: yes  Vessel patent by US exam    Concurrent real time visualization of needle entry

## 2021-06-02 NOTE — ANESTHESIA PREPROCEDURE EVALUATION
Anesthesia Evaluation      Patient summary reviewed   No history of anesthetic complications     Airway   Mallampati: II  Neck ROM: full   Pulmonary - negative ROS and normal exam                          Cardiovascular - normal exam  (+) pacemaker (ICD in place), hypertension, dysrhythmias (Afib), CHF (EF 20%), , hypercholesterolemia,     ECG reviewed        Neuro/Psych - negative ROS     Endo/Other    (+) diabetes mellitus,      GI/Hepatic/Renal    (+)   chronic renal disease (CKD Stage III),      Other findings: Found to have subacute endocarditis requiring RV lead extraction      Dental    (+) chipped                       Anesthesia Plan  Planned anesthetic: general endotracheal  NE in line  Arterial line  Etomidate for induction  DL ETT with fiberoptic guidance  ASA 4   Induction: intravenous   Anesthetic plan and risks discussed with: patient  Anesthesia plan special considerations: arterial catheterization, IV therapy two IVs,   Post-op plan: routine recovery

## 2021-06-02 NOTE — ANESTHESIA CARE TRANSFER NOTE
Last vitals:   Vitals:    10/14/19 2104   BP: 134/84   Pulse: 74   Resp: 20   Temp: 36.4  C (97.5  F)   SpO2: 98%     Patient's level of consciousness is drowsy  Spontaneous respirations: no: INTUBATED AND SEDATED  Maintains airway independently: no: INTUBATED  Dentition unchanged: yes  Oropharynx: oropharynx clear of all foreign objects and endotracheal tube in place    QCDR Measures:  ASA# 20 - Surgical Safety Checklist: WHO surgical safety checklist completed prior to induction    PQRS# 430 - Adult PONV Prevention: 4558F - Pt received => 2 anti-emetic agents (different classes) preop & intraop  ASA# 8 - Peds PONV Prevention: NA - Not pediatric patient, not GA or 2 or more risk factors NOT present  PQRS# 424 - Laury-op Temp Management: 4559F - At least one body temp DOCUMENTED => 35.5C or 95.9F within required timeframe  PQRS# 426 - PACU Transfer Protocol: - Transfer of care checklist used  ASA# 14 - Acute Post-op Pain: ASA14B - Patient did NOT experience pain >= 7 out of 10

## 2021-06-03 VITALS — BODY MASS INDEX: 29.92 KG/M2 | WEIGHT: 233.01 LBS | BODY MASS INDEX: 29.92 KG/M2 | WEIGHT: 233.01 LBS

## 2021-06-03 VITALS
BODY MASS INDEX: 31.46 KG/M2 | WEIGHT: 245 LBS | BODY MASS INDEX: 29.89 KG/M2 | BODY MASS INDEX: 30.08 KG/M2 | WEIGHT: 232.81 LBS | HEIGHT: 74 IN | HEIGHT: 74 IN | BODY MASS INDEX: 30.08 KG/M2 | WEIGHT: 234.3 LBS | BODY MASS INDEX: 31.46 KG/M2

## 2021-06-03 VITALS
WEIGHT: 211.02 LBS | BODY MASS INDEX: 27.09 KG/M2 | HEIGHT: 74 IN | WEIGHT: 214 LBS | BODY MASS INDEX: 27.09 KG/M2 | WEIGHT: 214 LBS | WEIGHT: 211.02 LBS | HEIGHT: 74 IN | BODY MASS INDEX: 27.46 KG/M2 | WEIGHT: 214 LBS | BODY MASS INDEX: 27.46 KG/M2 | BODY MASS INDEX: 27.46 KG/M2 | HEIGHT: 74 IN

## 2021-06-03 VITALS — BODY MASS INDEX: 27.07 KG/M2 | WEIGHT: 210.8 LBS | BODY MASS INDEX: 27.07 KG/M2 | WEIGHT: 210.8 LBS

## 2021-06-03 VITALS — WEIGHT: 237.5 LBS | BODY MASS INDEX: 30.48 KG/M2 | HEIGHT: 74 IN

## 2021-06-03 VITALS
OXYGEN SATURATION: 98 % | BODY MASS INDEX: 27.48 KG/M2 | SYSTOLIC BLOOD PRESSURE: 111 MMHG | RESPIRATION RATE: 18 BRPM | HEART RATE: 82 BPM | DIASTOLIC BLOOD PRESSURE: 68 MMHG | WEIGHT: 214 LBS | TEMPERATURE: 98 F

## 2021-06-03 VITALS — HEIGHT: 74 IN | BODY MASS INDEX: 30.03 KG/M2 | WEIGHT: 234 LBS

## 2021-06-03 VITALS — BODY MASS INDEX: 31.07 KG/M2 | HEIGHT: 74 IN | WEIGHT: 242.06 LBS

## 2021-06-03 VITALS
BODY MASS INDEX: 29.49 KG/M2 | BODY MASS INDEX: 29.49 KG/M2 | BODY MASS INDEX: 29.49 KG/M2 | HEIGHT: 74 IN | BODY MASS INDEX: 29.49 KG/M2 | BODY MASS INDEX: 29.49 KG/M2 | WEIGHT: 229.72 LBS | WEIGHT: 229.72 LBS | WEIGHT: 229.72 LBS | BODY MASS INDEX: 29.49 KG/M2 | WEIGHT: 229.72 LBS | HEIGHT: 74 IN

## 2021-06-03 VITALS — WEIGHT: 242.6 LBS | BODY MASS INDEX: 31.15 KG/M2 | WEIGHT: 242.6 LBS | BODY MASS INDEX: 31.15 KG/M2

## 2021-06-03 VITALS — WEIGHT: 235 LBS | BODY MASS INDEX: 30.16 KG/M2 | HEIGHT: 74 IN

## 2021-06-03 VITALS — WEIGHT: 251 LBS | BODY MASS INDEX: 32.23 KG/M2 | WEIGHT: 251 LBS | BODY MASS INDEX: 32.23 KG/M2

## 2021-06-03 VITALS
WEIGHT: 234.3 LBS | HEIGHT: 74 IN | HEIGHT: 74 IN | BODY MASS INDEX: 29.89 KG/M2 | HEIGHT: 74 IN | BODY MASS INDEX: 30.6 KG/M2 | BODY MASS INDEX: 31.46 KG/M2 | WEIGHT: 245 LBS | BODY MASS INDEX: 30.6 KG/M2 | BODY MASS INDEX: 30.08 KG/M2 | WEIGHT: 232.81 LBS | BODY MASS INDEX: 30.6 KG/M2

## 2021-06-03 VITALS — WEIGHT: 237.7 LBS | BODY MASS INDEX: 30.52 KG/M2 | BODY MASS INDEX: 30.52 KG/M2 | WEIGHT: 237.7 LBS

## 2021-06-03 VITALS — WEIGHT: 221 LBS | BODY MASS INDEX: 28.37 KG/M2 | WEIGHT: 221 LBS | BODY MASS INDEX: 28.37 KG/M2

## 2021-06-03 VITALS — WEIGHT: 229.2 LBS | BODY MASS INDEX: 29.43 KG/M2 | BODY MASS INDEX: 29.43 KG/M2 | WEIGHT: 229.2 LBS

## 2021-06-03 VITALS — WEIGHT: 228.06 LBS | WEIGHT: 228.06 LBS | BODY MASS INDEX: 29.28 KG/M2 | BODY MASS INDEX: 29.28 KG/M2

## 2021-06-03 VITALS — WEIGHT: 249.3 LBS | WEIGHT: 249.3 LBS | BODY MASS INDEX: 32.01 KG/M2 | BODY MASS INDEX: 32.01 KG/M2

## 2021-06-03 VITALS
TEMPERATURE: 97.5 F | SYSTOLIC BLOOD PRESSURE: 90 MMHG | BODY MASS INDEX: 29.9 KG/M2 | RESPIRATION RATE: 16 BRPM | DIASTOLIC BLOOD PRESSURE: 60 MMHG | WEIGHT: 233 LBS | HEIGHT: 74 IN | HEART RATE: 88 BPM

## 2021-06-03 VITALS — WEIGHT: 235.5 LBS | BODY MASS INDEX: 30.24 KG/M2 | WEIGHT: 235.5 LBS | BODY MASS INDEX: 30.24 KG/M2

## 2021-06-03 VITALS
WEIGHT: 222.6 LBS | WEIGHT: 222.6 LBS | WEIGHT: 224.1 LBS | BODY MASS INDEX: 28.58 KG/M2 | WEIGHT: 224.1 LBS | BODY MASS INDEX: 28.77 KG/M2 | BODY MASS INDEX: 28.58 KG/M2 | BODY MASS INDEX: 28.77 KG/M2

## 2021-06-03 VITALS — WEIGHT: 216.5 LBS | BODY MASS INDEX: 27.8 KG/M2

## 2021-06-03 VITALS — WEIGHT: 245.1 LBS | BODY MASS INDEX: 31.47 KG/M2

## 2021-06-03 VITALS — BODY MASS INDEX: 28.48 KG/M2 | WEIGHT: 221.8 LBS | WEIGHT: 221.8 LBS | BODY MASS INDEX: 28.48 KG/M2

## 2021-06-03 VITALS — BODY MASS INDEX: 31.47 KG/M2 | WEIGHT: 245.1 LBS

## 2021-06-03 VITALS
WEIGHT: 232.59 LBS | BODY MASS INDEX: 25.93 KG/M2 | BODY MASS INDEX: 29.86 KG/M2 | WEIGHT: 232.59 LBS | WEIGHT: 201.94 LBS | BODY MASS INDEX: 29.86 KG/M2 | WEIGHT: 201.94 LBS | BODY MASS INDEX: 25.93 KG/M2

## 2021-06-03 VITALS — WEIGHT: 238.31 LBS | BODY MASS INDEX: 30.59 KG/M2 | HEIGHT: 74 IN

## 2021-06-03 VITALS — WEIGHT: 234.25 LBS | BODY MASS INDEX: 30.06 KG/M2 | HEIGHT: 74 IN

## 2021-06-03 VITALS — BODY MASS INDEX: 29.62 KG/M2 | HEIGHT: 75 IN | WEIGHT: 238.19 LBS

## 2021-06-03 VITALS
BODY MASS INDEX: 30 KG/M2 | BODY MASS INDEX: 29.92 KG/M2 | WEIGHT: 233.03 LBS | BODY MASS INDEX: 29.92 KG/M2 | WEIGHT: 233.69 LBS | BODY MASS INDEX: 30 KG/M2 | WEIGHT: 233.69 LBS | WEIGHT: 233.03 LBS

## 2021-06-03 VITALS — BODY MASS INDEX: 27.8 KG/M2 | WEIGHT: 216.5 LBS

## 2021-06-03 VITALS — WEIGHT: 227.29 LBS | BODY MASS INDEX: 29.18 KG/M2 | WEIGHT: 227.29 LBS | BODY MASS INDEX: 29.18 KG/M2

## 2021-06-03 VITALS — WEIGHT: 238.38 LBS | BODY MASS INDEX: 30.59 KG/M2 | HEIGHT: 74 IN

## 2021-06-03 VITALS — BODY MASS INDEX: 31.28 KG/M2 | WEIGHT: 243.6 LBS | WEIGHT: 243.6 LBS | BODY MASS INDEX: 31.28 KG/M2

## 2021-06-03 VITALS — WEIGHT: 231.06 LBS | BODY MASS INDEX: 29.67 KG/M2 | BODY MASS INDEX: 29.67 KG/M2 | WEIGHT: 231.06 LBS

## 2021-06-03 VITALS — WEIGHT: 231.6 LBS | BODY MASS INDEX: 29.74 KG/M2 | BODY MASS INDEX: 29.74 KG/M2 | WEIGHT: 231.6 LBS

## 2021-06-03 VITALS — WEIGHT: 220.5 LBS | BODY MASS INDEX: 28.31 KG/M2 | WEIGHT: 220.5 LBS | BODY MASS INDEX: 28.31 KG/M2

## 2021-06-03 VITALS — BODY MASS INDEX: 29.88 KG/M2 | WEIGHT: 232.7 LBS | BODY MASS INDEX: 29.88 KG/M2 | WEIGHT: 232.7 LBS

## 2021-06-03 VITALS — OXYGEN SATURATION: 96 % | SYSTOLIC BLOOD PRESSURE: 86 MMHG | DIASTOLIC BLOOD PRESSURE: 62 MMHG | HEART RATE: 72 BPM

## 2021-06-03 VITALS — BODY MASS INDEX: 27.48 KG/M2 | WEIGHT: 214 LBS | BODY MASS INDEX: 27.48 KG/M2 | WEIGHT: 214 LBS

## 2021-06-03 VITALS — BODY MASS INDEX: 29.2 KG/M2 | WEIGHT: 227.4 LBS | WEIGHT: 227.4 LBS | BODY MASS INDEX: 29.2 KG/M2

## 2021-06-03 VITALS — WEIGHT: 242.8 LBS | BODY MASS INDEX: 31.17 KG/M2 | WEIGHT: 242.8 LBS | BODY MASS INDEX: 31.17 KG/M2

## 2021-06-03 VITALS — BODY MASS INDEX: 28.14 KG/M2 | WEIGHT: 219.19 LBS | WEIGHT: 219.19 LBS | BODY MASS INDEX: 28.14 KG/M2

## 2021-06-03 VITALS — BODY MASS INDEX: 29.89 KG/M2 | HEIGHT: 74 IN

## 2021-06-03 NOTE — TELEPHONE ENCOUNTER
Type: 1 week PO CRTD implant  Presenting: AP-BiVP, 80bpm  Lead/Battery Status: LV threshold slightly elevated, all other lead measurements are stable.   Atrial Arrhythmias: None  Vent Arrhythmias: 6 VT episodes treated with ATP, all occurring on 11/6 and 11/7; EGMs show appropriate detection and successful treatment with ATPx1. 1 NSVT episode just slightly under VT treatment zone.   Anticoagulant: Eliquis  Comments: Normal device function. 95% BiVP. LV vector guide completed due to elevated LV threshold. Results are included in PDF. LV pacing polarity programmed to LVT>>RVC from LV3>>LV2. RA and RV output programmed to trend 2.5V. LV threshold programmed to 3.5V. ATR FB mode programmed to DDIR from VDIR.  See PO note in Epic. NAFISA   procedure r/t

## 2021-06-03 NOTE — PROGRESS NOTES
[facility] Patient Outreach:     Discharge Date::  11/27/19      Reason for TCU stay (discharge diagnosis)::  Bacterial endocarditis, V-tach, A-fib, implantable defibrillator      Are you feeling better, the same or worse since your discharge?:  Patient is feeling the same          As part of your discharge plan, did they discuss home care with you?: Yes        Have your seen them yet, or are they scheduled to visit?: No        Did you receive any new medications, or was there a change to your medications?: No (same meds as TCU.  )            Do you have any follow up visits scheduled with your PCP or Specialist?:  Yes, with PCP      (RN) Is it scheduled soon enough (3-5 days)?: No        (RN) Is the patient okay with moving appointment up (if RN feels appropriate)?: No    I'm glad to hear you're doing well and we want you to continue to do well. Your PCP would like to see you for a follow-up visit. Can we help set that up for your today?: No (seeing PCP on 12/4/19.  )        (RN) Provided patient the PCP's phone number to call if they have any questions or concerns?: No

## 2021-06-03 NOTE — TELEPHONE ENCOUNTER
Radha consulted on the pt. The pt saw Jose De Jesus most recently. Please schedule with either before 11/26.

## 2021-06-03 NOTE — PROGRESS NOTES
[facility]    NAME:  Florentino Fall             :  1945  MRN: [id number]  CODE STATUS:  FULL CODE    FACILITY:  [facility] [[id number]]         Chief Complaint   Patient presents with     Problem Visit     Bilateral lower extremity edema     HISTORY OF PRESENT ILLNESS: Florentino Fall is a 74 y.o. male  admitted for Defibrillator discharge, Anemia, unspecified type, and Acute congestive heart failure, unspecified heart failure type.  Echocardiogram showed endocarditis.        1.  Subacute bacterial endocarditis    Ray was admitted with diffuse malaise and ventricular tachycardia.  Echocardiogram showed endocarditis.  He was placed on IV antibiotics.  He underwent removal of his defibrillator and pacemaker lead which were infected, with laser ablation of a large tricuspid lesion on .  He also underwent video-assisted thorascopic surgical exploration of the right chest, and underwent angiographic suction of the tricuspid valve and right ventricular lead vegetation     Depending on timing of antibiotics, this may be considered to be the first date of appropriate treatment and he will then need 6 weeks there forth.  ID will coordinate further.  He will be discharged on ceftriaxone and ampicillin     2.   Ventricular tachycardia.  He has long-term intermittent ischemic ventricular tachycardia.  He underwent ablation of ventricular tachycardia after mapping on .  He continued to have intermittent episodes.  Medicines were adjusted by cardiology and he will continue home on mexiletine, sotalol, metoprolol     3.   Atrial fibrillation.  Underlying rhythm was found to be atrial fibrillation during this admission.  This is apparently new per conversation with Dr. Hernández.  He was appropriately initiated on anticoagulants after his pacemaker reinsertion     4.   Implantable defibrillator.  With his recurrent V. tach he has had a defibrillator.  This was removed on  October 14.  After clearance of infection, this was replaced on October 30 with implantation of a new biventricular ICD system     5.   Cholelithiasis.  He had intermittent episodes of abdominal pain.  CT scan showed cholelithiasis with some inflammation of the gallbladder.  Surgical consultation was requested and they elected to defer intervention at this time.  This will be monitored carefully     6.  Anemia.  Iron was briefly initiated but held due to GI upset     7.  Peripheral neuropathy.  Topiramate was decreased with due to gastrointestinal issues.  Neuropathy seem to remain under good control.  He also receives vitamin B12 shots periodically     8.  Positive Cologuard.  We have been unable to evaluate positive Cologuard due to his fear of a prep.  He was seen by GI who wished to defer colonoscopy.  Barium enema and CT scan were performed which showed no large obstructing lesions although right colon was not completely visualized.  This will be reassessed as an outpatient     9.  Chronic kidney disease.  Creatinine runs between 1.5 and 1.7 and this remains stable.  Diuretics should be used judiciously     10.  Congestive heart failure.  Volume status fluctuated but seems to be improving as his medical condition stabilized     11.  Back pain.  Complained of back pain throughout the admission.  We considered the possibility that there may be a focus of osteo-myelitis however treatment would be similar.  He is treated with gabapentin at night and a Lidoderm patch     Patient was stabilized and transferred to TCU for further rehabilitation and complete IV antibiotics for endocarditis through approximately November 26, 2019.  Infectious disease will follow.      Today, patient is seen at the bedside. He has some increased bilateral lower extremity edema most likely related to his long-standing CHF as well as some lymphatic insufficiency. His weights have increased from 234.1 lb - 239.1 lbs.  Charcot feet with gait  instability. He will continue his supportive devices.  Physical therapy is involved. He is s/p replacement of AICD/pacer complicated by hematoma.  Hematoma appears to be improving, no changes made at this time.    Past Medical History:   Diagnosis Date     Allergic rhinitis      Arthritis      Cardiomyopathy (H)      Cardiomyopathy with implantable cardioverter-defibrillator (H)      Charcot ankle     bilateral     Charcot's joint of foot     bilateral ankles     CHF (congestive heart failure) (H)      Chronic kidney disease     stage 3     Deviated septum      Diabetic ulcer of left midfoot associated with type 2 diabetes mellitus, limited to breakdown of skin (H)      Dyslipidemia      Eczema      Gout      H/O cardiac arrest      Hernia      History of transfusion      Hypertension      Migraine      Osteomyelitis of foot (H)     right     Systolic heart failure (H)      V-tach (H)     has icd     Past Surgical History:   Procedure Laterality Date     CARDIAC DEFIBRILLATOR PLACEMENT       EP ABLATION VT  10/7/2019          EP ABLATION VT N/A 10/7/2019    Procedure: EP Ablation Ventricular Tachycardia;  Surgeon: Sylvia Davies MD;  Location: [facility];  Service: Cardiology     EP ICD BIV INSERT N/A 10/30/2019    Procedure: EP ICD BIV Insert;  Surgeon: Ulises Hernández MD;  Location: [facility];  Service: Cardiology     EP INTRACARDIAC ECHO  10/14/2019          EP LEAD EXTRACTION LEVEL 3  10/14/2019          EP LEAD EXTRACTION LEVEL 3 N/A 10/14/2019    Procedure: EP Lead Extraction Level 3;  Surgeon: Sylvia Davies MD;  Location: [facility];  Service: Cardiology     EP TEMP PACEMAKER INSERT N/A 10/14/2019    Procedure: EP Temporary Pacemaker Insertion;  Surgeon: Sylvia Davies MD;  Location: [facility];  Service: Cardiology     FOOT AMPUTATION Left 1/2/2019    Procedure: AMPUTATION, 2nd digit left foot;  Surgeon: Phu Zaman DPM;  Location: [facility];   Service: Podiatry     HERNIA REPAIR       NOSE SURGERY       PICC  10/3/2019          PICC AND MIDLINE TEAM LINE INSERTION  2018          PICC AND MIDLINE TEAM LINE INSERTION  10/15/2019          VA COMPLEX DRAINAGE, WOUND Right 2018    Procedure: INCISION AND DRAINAGE, LOWER EXTREMITY with BONE BIOPSY/CULTURE ;  Surgeon: Denisse Fletcher DPM;  Location: [facility];  Service: Podiatry     VA INSJ/RPLCMT TEMP TRANSVNS 2CHMBR PACG ELTRDS SPX  10/14/2019          VA KNEE SCOPE,DIAGNOSTIC      Description: Arthroscopy Knee Left;  Recorded: 2011;     VA REMOVE TONSILS/ADENOIDS,<11 Y/O      Description: Tonsillectomy With Adenoidectomy;  Recorded: 2011;     VA SHLDR ARTHROSCOP,DIAGNOSTIC      Description: Arthroscopy Shoulder Right;  Recorded: 2011;     Family History   Problem Relation Age of Onset     Stroke Mother      Emphysema Mother      Goiter Mother      Cancer Father         Stomach     Alzheimer's disease Brother      Social History     Socioeconomic History     Marital status:      Spouse name: Not on file     Number of children: 3     Years of education: Not on file     Highest education level: Not on file   Occupational History     Not on file   Social Needs     Financial resource strain: Not on file     Food insecurity:     Worry: Not on file     Inability: Not on file     Transportation needs:     Medical: Not on file     Non-medical: Not on file   Tobacco Use     Smoking status: Former Smoker     Packs/day: 2.50     Years: 50.00     Pack years: 125.00     Types: Cigarettes, Pipe, Cigars     Last attempt to quit: 2000     Years since quittin.8     Smokeless tobacco: Never Used   Substance and Sexual Activity     Alcohol use: No     Comment: Last used 1979     Drug use: No     Sexual activity: Yes     Partners: Female     Birth control/protection: Post-menopausal   Lifestyle     Physical activity:     Days per week: Not on file     Minutes per  session: Not on file     Stress: Not on file   Relationships     Social connections:     Talks on phone: Not on file     Gets together: Not on file     Attends [employer] service: Not on file     Active member of club or organization: Not on file     Attends meetings of clubs or organizations: Not on file     Relationship status: Not on file     Intimate partner violence:     Fear of current or ex partner: Not on file     Emotionally abused: Not on file     Physically abused: Not on file     Forced sexual activity: Not on file   Other Topics Concern     Not on file   Social History Narrative     since 1964, 3 children. 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 Quit smoking in 1999.      Allergies   Allergen Reactions     Definity [Perflutren Lipid Microspheres] Other (See Comments)     Back pain      Venom-Honey Bee Swelling and Dizziness     Current Outpatient Medications   Medication Sig Dispense Refill     ampicillin 2 g in NaCl 0.9 % 0.9 % 100 mL IVPB Infuse 2 g into a venous catheter every 6 (six) hours for 24 days. 1 each 0     apixaban (ELIQUIS) 5 mg Tab tablet Take 1 tablet (5 mg total) by mouth 2 (two) times a day. 60 tablet 0     cefTRIAXone 2 g in NaCl 0.9 % 0.9 % 50 mL IVPB Infuse 2 g into a venous catheter every 12 (twelve) hours for 24 days. 1 each 0     DULoxetine (CYMBALTA) 60 MG capsule Take 1 capsule (60 mg total) by mouth daily. 30 capsule 0     furosemide (LASIX) 20 MG tablet Take 20 mg by mouth daily.       gabapentin (NEURONTIN) 300 MG capsule Take 1 capsule (300 mg total) by mouth at bedtime. 30 capsule 0     lidocaine 4 % patch Place 1 patch on the skin daily. Remove and discard patch with 12 hours or as directed by MD. 10 patch 0     metoprolol succinate (TOPROL-XL) 25 MG Take 1 tablet (25 mg total) by mouth daily. 30 tablet 0     mexiletine (MEXITIL) 200 MG capsule Take 1 capsule (200 mg total) by mouth every 12 (twelve) hours. 60 capsule 0     mirtazapine (REMERON) 15 MG tablet Take 1 tablet  (15 mg total) by mouth at bedtime. 30 tablet 0     omeprazole (PRILOSEC) 20 MG capsule Take 1 capsule (20 mg total) by mouth 2 (two) times a day before meals. 30 capsule 0     senna-docusate (SENNOSIDES-DOCUSATE SODIUM) 8.6-50 mg tablet Take 1 tablet by mouth daily.       sotalol (BETAPACE) 80 MG tablet Take 1 tablet (80 mg total) by mouth daily. 60 tablet 0     tamsulosin (FLOMAX) 0.4 mg cap Take 1 capsule (0.4 mg total) by mouth Daily after lunch. 30 capsule 0     topiramate (TOPAMAX) 50 MG tablet Take 1 tablet (50 mg total) by mouth 2 (two) times a day. 60 tablet 0     traMADol (ULTRAM) 50 mg tablet Take 1 tablet (50 mg total) by mouth 2 (two) times a day. 8 tablet 0     No current facility-administered medications for this visit.        REVIEW OF SYSTEMS:    Currently, no fever, chills, or rigors. Does not have any visual or hearing problems. Denies any chest pain, headaches, palpitations, lightheadedness, dizziness, shortness of breath, or cough. Appetite is good. Denies any GERD symptoms. Denies any difficulty with swallowing, nausea, or vomiting.  Denies any abdominal pain, diarrhea or constipation. Denies any urinary symptoms. No insomnia. No active bleeding. No rash.       PHYSICAL EXAMINATION:  Vitals:    11/13/19 1105   BP: 120/77   Pulse: 80   Resp: 16   Temp: 97.7  F (36.5  C)   SpO2: 94%   Weight: (!) 238 lb 8 oz (108.2 kg)       GENERAL: Awake, Alert, oriented x3, not in any form of acute distress, answers questions appropriately, follows simple commands, conversant.  Fatigue.  HEENT: Head is normocephalic with normal hair distribution. No evidence of trauma. Ears: No acute purulent discharge. Eyes: Conjunctivae pink with no scleral jaundice. Nose: Normal mucosa and septum. NECK: Supple with no cervical or supraclavicular lymphadenopathy. Trachea is midline.   CHEST: No tenderness or deformity, no crepitus. Chest wall is remarkable for swelling around his right chest hardware.  LUNG: Clear to  auscultation with good chest expansion. There are no crackles or wheezes, normal AP diameter.  BACK: No kyphosis of the thoracic spine. Symmetric, no curvature, ROM normal, no CVA tenderness, no spinal tenderness   CVS: There is good S1  S2, with soft systolic murmur PMI is palpable in the left chest pulses are palpable at the wrist and femoral areas, rubs, gallops, or heaves, rhythm is regular,  2+ pulses symmetric in all extremities.  ABDOMEN: Globular and soft, nontender to palpation, non distended, no masses, no organomegaly, good bowel sounds, no rebound or guarding, no peritoneal signs.   EXTREMITIES: Bilateral charcot's feet. Full range of motion on upper extremities,there is extensive bruising spilling down past the elbow and the right arm.   there is no tenderness to palpation, bilateral lower ectremtiy edema, no cyanosis or clubbing, no calf tenderness.  Pulses equal in all extremities, normal cap refill, no joint swelling.  SKIN: Warm and dry, no erythema noted.  Skin color, texture, no rashes or lesions.  NEUROLOGICAL: The patient is oriented to person, place and time. Strength and sensation are grossly intact. Face is symmetric.    LABS:      Lab Results   Component Value Date    WBC 3.6 (L) 11/13/2019    HGB 10.1 (L) 11/13/2019    HCT 32.2 (L) 11/13/2019     (H) 11/13/2019     (L) 11/13/2019     Results for orders placed or performed in visit on 11/06/19   Basic Metabolic Panel   Result Value Ref Range    Sodium 142 136 - 145 mmol/L    Potassium 4.3 3.5 - 5.0 mmol/L    Chloride 109 (H) 98 - 107 mmol/L    CO2 24 22 - 31 mmol/L    Anion Gap, Calculation 9 5 - 18 mmol/L    Glucose 90 70 - 125 mg/dL    Calcium 9.0 8.5 - 10.5 mg/dL    BUN 34 (H) 8 - 28 mg/dL    Creatinine 2.31 (H) 0.70 - 1.30 mg/dL    GFR MDRD Af Amer 34 (L) >60 mL/min/1.73m2    GFR MDRD Non Af Amer 28 (L) >60 mL/min/1.73m2     Lab Results   Component Value Date    HGBA1C 4.8 10/17/2019     Vitamin D, Total (25-Hydroxy)   Date  Value Ref Range Status   11/15/2016 41.6 30.0 - 80.0 ng/mL Final     Lab Results   Component Value Date    HQZARSJM08 909 (H) 11/15/2016       ASSESSMENT/PLAN:    1. Chronic systolic heart failure (H) - No overt signs or symptoms of decompensation, will continue Metoprolol and increase Lasix to 20  mg two times a day and check BMP in 1 week   2. ICD (implantable cardioverter-defibrillator), biventricular, in situ - This was removed on October 14, 2019.  After clearance of infection, this was replaced on October 30, 2019 with implantation of a new biventricular ICD system   3. Subacute bacterial endocarditis - Followed by ID. He underwent removal of his defibrillator and pacemaker lead which were infected, with laser ablation of a large tricuspid lesion on October 14, 2019.  He also underwent video-assisted thorascopic surgical exploration of the right chest, and underwent angiographic suction of the tricuspid valve and right ventricular lead vegetation. Continue CefTRIAXone and Ampicillin until 11/26/2019.   4. Anemia, unspecified type  - Iron supplementation stopped due to GI upset   5. Essential hypertension with goal blood pressure less than 140/90 - Blood pressures are within target range, will continue Lasix and Metoprolol   6. CKD (chronic kidney disease) stage 3, GFR 30-59 ml/min (H) - Last BMP revealed bun 34, Cr 2.31, and GFR 28             Electronically signed by:  Michaela Garcia CNP

## 2021-06-03 NOTE — TELEPHONE ENCOUNTER
Type: routine one month post-op CRT-D upgrade.  Presenting rhythm: AP-biVP and AP-biVS, rate 80 bpm.  Battery/lead status: stable  Arrhythmias: since 11/7/19, no mode switches. 26 slow VT episodes treated with ATP x 1 - 9.  Comments: normal ICD function. BiV pacing 93% RV, 92% LV. Routed to device RN.  Device/lead alerts: none. prd     Transmission reviewed, agree with above. Known HX slow VT. Recent total device explanted/re-implanted due to bacterial endocarditis. Now with several VT episodes noted on 11/29-12/1/19, majority occurring during hours of sleep on 12/1/19. Longest episode lasting 1 min 42 seconds and required 9 rounds of ATP on 12/1/19 @ ~5:30 AM. No shocks delivered. Currently on Toprol XL 25 mg, Sotalol 80 mg daily, and Mexitil 200 mg twice daily (per EHR).     Call placed to patient to review/assess. No answer and no call backs as of yet. Patient sees Dr. Hernández 12/5/19 in Device Clinic.     Will update Dr. Hernández about recent VT episodes.     Cassandra Cook, RN

## 2021-06-03 NOTE — PROGRESS NOTES
DEVICE CLINIC RN POST-OP NOTE    Reason for visit: 1 week PO CRTD check and wound assessment     Device: [employer] G247 VIGILANT X4 CRT-D  Procedure date: 10/30/2019  Implant Diagnosis: Ventricular Fibrillation, Secondary Prevention  Implanting Physician: Dr. Ulises Hernández      Assessment  Subjective: Patient reports feeling well and denies significant incisional discomfort  Vitals:   Vitals:    11/07/19 0913   BP: 90/60   Pulse: 88   Resp: 16   Temp: 97.5  F (36.4  C)     Heart Sounds: normal  Lung Sounds: clear to auscultation bilaterally  Incision/device pocket: Steri-strips removed. Area around incision was cleaned with adhesive remover. Incision is clean, dry, and intact with edges well approximated. There is no redness or drainage noted. Incision was wiped with alcohol wipe x1. Moderate size hematoma present, this is stable and patient reports that it has decreased slightly from discharge.         Device Findings  Interrogation of device reveals LV slightly elevated. All other lead measurements were stable. Changes were made to LV pacing polarity.   See the Paceart Report for a full summary of the device information.          Patient Education  Florentino Fall was not accompanied by anyone.     [facility]'s Partnership Agreement for Device Patients and Post-operative Checklist were presented and reviewed with patient at today's appointment.    Signs and symptoms of infection, poor wound healing, and device function were reviewed. Range of motion and weight restrictions for the right side are 4 weeks. He was issued a Presentigo remote monitor and instructed on its set-up and use for remote monitoring by the [employer] representative prior to hospital discharge. These instructions were reviewed with the patient at today s visit.       Plan  - 1 month device check scheduled 12/5/19 with Dr. Hernández  - 3 month PO check scheduled 1/30/2020

## 2021-06-03 NOTE — PATIENT INSTRUCTIONS - HE
Implantable Cardiac Defibrillator (ICD) Post-operative Checklist      The Device Registered Nurse (RN) reviewed the ICD function.      The Device RN did a wound assessment and wound care teaching.    Please call the Device Nurses with any signs of infection or questions regarding wound healing. Device Nurse Line: [phone], Option #3.      The Device RN demonstrated and displayed the specific remote monitoring system for your ICD.      The Device RN reviewed the Partnership Agreement Form.    You may ice the area of your device placement as needed due to swelling. Ice may be placed 10 minutes on at a time with ice off for at least 10 minutes in between.     Patient Instructions    Do not lift your  Right arm above the shoulder height, perform any vigorous arm movements such as swimming, golfing, washing windows, shoveling show, vacuuming or lifting greater than 10-15lbs with the affected arm for 4 weeks, from the date of implant.      To reduce the risk of infection, try to avoid any dental procedures for the first 6 weeks after your ICD implant. If you have an emergent or urgent dental need during this time, contact the device clinic for a prescription for an antibiotic.      You will receive a device identification (ID) card in the mail from the device  within 6 weeks to replace the temporary ID card you were given in the hospital.      You may travel by any mode of transportation; just show your ICD ID card. You may be subject to a hand search or use of a handheld wand, but official should not keep the wand over the implant site for greater than 5-10 seconds.       For any surgery, let your doctor know you have an ICD.      Your ICD is Not MRI safe       Most household appliances, including a microwave, will not interfere with your ICD function. If you suspect interference, simply move away from the source. Cell phones do not cause a problem. Please refer to the device booklet from the   or their website under the section on electromagnetic interference (JANETH) for further guidelines on things that may interfere with your ICD.       If you receive a shock from your device:  1. Keep a diary of your symptoms and activities at the time of the shock.  2. If you receive 1 shock, your symptoms subside and you feel well, call the Device Clinic. If this occurs after hours call the next morning.   3. If you receive 1 shock and your symptoms do not subside, or you receive multiple shocks: Call 911.      Device Clinic Contact Information  Device Nurses: [phone], Option #3.   Device Remote Specialists: [phone], Option #2. For questions about your Remote Transmission or Transmission Schedule  Device Schedulers: [phone], Option #1

## 2021-06-03 NOTE — TELEPHONE ENCOUNTER
Called [facility], scheduled pt 11/21/19 with Dr Garcia @10:30, clinic request morning appointment.  Facility unclear what labs are needed from discharge notes. CMP and CBC only?  Will call back when provider clarifies.    Ask for Binu

## 2021-06-03 NOTE — TELEPHONE ENCOUNTER
[facility] Nurse Triage Telephone Note      Provider: GILL Solomon  Facility: [facility]    Facility Type: TCU    Caller: Araceli  Call Back Number:  [phone]    Allergies: Definity [perflutren lipid microspheres] and Venom-honey bee    Reason for call: Nurse reporting that patient's PICC line is out about 4cm more than it was before.  Patient is due for an antibiotic now.  Patient had previously been assessed by the pharmacy IV team, who is not comfortable replacing his PICC line in-house due to intolerance of procedures.       Verbal Order/Direction given by Provider: Send patient to ER for PICC line assessment/replacement.  Also to eval recent blood vessel hemorhage in his eye.      Provider giving order: GILL Solomon    Verbal order given to: Araceli Ricks RN

## 2021-06-03 NOTE — PROGRESS NOTES
[facility]    NAME:  Florentino Fall             :  1945  MRN: [id number]  CODE STATUS:  FULL CODE    FACILITY:  [facility] [[id number]]         Chief Complaint   Patient presents with     Problem Visit     Endocarditis     HISTORY OF PRESENT ILLNESS: Florentino Fall is a 74 y.o. male  admitted for Defibrillator discharge, Anemia, unspecified type, and Acute congestive heart failure, unspecified heart failure type.  Echocardiogram showed endocarditis.        1.  Subacute bacterial endocarditis    Ray was admitted with diffuse malaise and ventricular tachycardia.  Echocardiogram showed endocarditis.  He was placed on IV antibiotics.  He underwent removal of his defibrillator and pacemaker lead which were infected, with laser ablation of a large tricuspid lesion on .  He also underwent video-assisted thorascopic surgical exploration of the right chest, and underwent angiographic suction of the tricuspid valve and right ventricular lead vegetation     Depending on timing of antibiotics, this may be considered to be the first date of appropriate treatment and he will then need 6 weeks there forth.  ID will coordinate further.  He will be discharged on ceftriaxone and ampicillin     2.   Ventricular tachycardia.  He has long-term intermittent ischemic ventricular tachycardia.  He underwent ablation of ventricular tachycardia after mapping on .  He continued to have intermittent episodes.  Medicines were adjusted by cardiology and he will continue home on mexiletine, sotalol, metoprolol     3.   Atrial fibrillation.  Underlying rhythm was found to be atrial fibrillation during this admission.  This is apparently new per conversation with Dr. Hernández.  He was appropriately initiated on anticoagulants after his pacemaker reinsertion     4.   Implantable defibrillator.  With his recurrent V. tach he has had a defibrillator.  This was removed on .  After  clearance of infection, this was replaced on October 30 with implantation of a new biventricular ICD system     5.   Cholelithiasis.  He had intermittent episodes of abdominal pain.  CT scan showed cholelithiasis with some inflammation of the gallbladder.  Surgical consultation was requested and they elected to defer intervention at this time.  This will be monitored carefully     6.  Anemia.  Iron was briefly initiated but held due to GI upset     7.  Peripheral neuropathy.  Topiramate was decreased with due to gastrointestinal issues.  Neuropathy seem to remain under good control.  He also receives vitamin B12 shots periodically     8.  Positive Cologuard.  We have been unable to evaluate positive Cologuard due to his fear of a prep.  He was seen by GI who wished to defer colonoscopy.  Barium enema and CT scan were performed which showed no large obstructing lesions although right colon was not completely visualized.  This will be reassessed as an outpatient     9.  Chronic kidney disease.  Creatinine runs between 1.5 and 1.7 and this remains stable.  Diuretics should be used judiciously     10.  Congestive heart failure.  Volume status fluctuated but seems to be improving as his medical condition stabilized     11.  Back pain.  Complained of back pain throughout the admission.  We considered the possibility that there may be a focus of osteo-myelitis however treatment would be similar.  He is treated with gabapentin at night and a Lidoderm patch     Patient was stabilized and transferred to TCU for further rehabilitation and complete IV antibiotics for endocarditis through approximately November 26, 2019.  Infectious disease will follow.      Today, patient is seen at the bedside.  He is s/p replacement of AICD/pacer complicated by hematoma.  Hematoma appears to be improving.  Weights are stable.  He has stopped wearing the ACE wraps due to inability to wear supportive devices on his feet.  Continues with gait  instability.  He reports some weakness but feels that therapy is helping and he is slowing regaining some strength.    Past Medical History:   Diagnosis Date     Allergic rhinitis      Arthritis      Cardiomyopathy (H)      Cardiomyopathy with implantable cardioverter-defibrillator (H)      Charcot ankle     bilateral     Charcot's joint of foot     bilateral ankles     CHF (congestive heart failure) (H)      Chronic kidney disease     stage 3     Deviated septum      Diabetic ulcer of left midfoot associated with type 2 diabetes mellitus, limited to breakdown of skin (H)      Dyslipidemia      Eczema      Gout      H/O cardiac arrest      Hernia      History of transfusion      Hypertension      Migraine      Osteomyelitis of foot (H)     right     Systolic heart failure (H)      V-tach (H)     has icd     Past Surgical History:   Procedure Laterality Date     CARDIAC DEFIBRILLATOR PLACEMENT       EP ABLATION VT  10/7/2019          EP ABLATION VT N/A 10/7/2019    Procedure: EP Ablation Ventricular Tachycardia;  Surgeon: Sylvia Davies MD;  Location: [facility];  Service: Cardiology     EP ICD BIV INSERT N/A 10/30/2019    Procedure: EP ICD BIV Insert;  Surgeon: Ulises Hernández MD;  Location: [facility];  Service: Cardiology     EP INTRACARDIAC ECHO  10/14/2019          EP LEAD EXTRACTION LEVEL 3  10/14/2019          EP LEAD EXTRACTION LEVEL 3 N/A 10/14/2019    Procedure: EP Lead Extraction Level 3;  Surgeon: Sylvia Davies MD;  Location: [facility];  Service: Cardiology     EP TEMP PACEMAKER INSERT N/A 10/14/2019    Procedure: EP Temporary Pacemaker Insertion;  Surgeon: Sylvia Davies MD;  Location: [facility];  Service: Cardiology     FOOT AMPUTATION Left 1/2/2019    Procedure: AMPUTATION, 2nd digit left foot;  Surgeon: Phu Zaman DPM;  Location: [facility];  Service: Podiatry     HERNIA REPAIR       NOSE SURGERY       PICC  10/3/2019          [facility] AND  MIDLINE TEAM LINE INSERTION  2018          PICC AND MIDLINE TEAM LINE INSERTION  10/15/2019          PICC AND MIDLINE TEAM LINE INSERTION  2019          IN COMPLEX DRAINAGE, WOUND Right 2018    Procedure: INCISION AND DRAINAGE, LOWER EXTREMITY with BONE BIOPSY/CULTURE ;  Surgeon: Denisse Fletcher DPM;  Location: [facility];  Service: Podiatry     IN INSJ/RPLCMT TEMP TRANSVNS 2CHMBR PACG ELTRDS SPX  10/14/2019          IN KNEE SCOPE,DIAGNOSTIC      Description: Arthroscopy Knee Left;  Recorded: 2011;     IN REMOVE TONSILS/ADENOIDS,<13 Y/O      Description: Tonsillectomy With Adenoidectomy;  Recorded: 2011;     IN SHLDR ARTHROSCOP,DIAGNOSTIC      Description: Arthroscopy Shoulder Right;  Recorded: 2011;     Family History   Problem Relation Age of Onset     Stroke Mother      Emphysema Mother      Goiter Mother      Cancer Father         Stomach     Alzheimer's disease Brother      Social History     Socioeconomic History     Marital status:      Spouse name: Not on file     Number of children: 3     Years of education: Not on file     Highest education level: Not on file   Occupational History     Not on file   Social Needs     Financial resource strain: Not on file     Food insecurity:     Worry: Not on file     Inability: Not on file     Transportation needs:     Medical: Not on file     Non-medical: Not on file   Tobacco Use     Smoking status: Former Smoker     Packs/day: 2.50     Years: 50.00     Pack years: 125.00     Types: Cigarettes, Pipe, Cigars     Last attempt to quit: 2000     Years since quittin.9     Smokeless tobacco: Never Used   Substance and Sexual Activity     Alcohol use: No     Comment: Last used 1979     Drug use: No     Sexual activity: Yes     Partners: Female     Birth control/protection: Post-menopausal   Lifestyle     Physical activity:     Days per week: Not on file     Minutes per session: Not on file     Stress: Not on  file   Relationships     Social connections:     Talks on phone: Not on file     Gets together: Not on file     Attends Jainism service: Not on file     Active member of club or organization: Not on file     Attends meetings of clubs or organizations: Not on file     Relationship status: Not on file     Intimate partner violence:     Fear of current or ex partner: Not on file     Emotionally abused: Not on file     Physically abused: Not on file     Forced sexual activity: Not on file   Other Topics Concern     Not on file   Social History Narrative     since 1964, 3 children. 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 Quit smoking in 1999.      Allergies   Allergen Reactions     Definity [Perflutren Lipid Microspheres] Other (See Comments)     Back pain      Venom-Honey Bee Swelling and Dizziness     Current Outpatient Medications   Medication Sig Dispense Refill     acetaminophen (TYLENOL) 500 MG tablet Take 1,000 mg by mouth every 6 (six) hours as needed for pain.       ampicillin 2 g in NaCl 0.9 % 0.9 % 100 mL IVPB Infuse 2 g into a venous catheter every 6 (six) hours for 24 days. 1 each 0     apixaban (ELIQUIS) 5 mg Tab tablet Take 1 tablet (5 mg total) by mouth 2 (two) times a day. 60 tablet 0     cefTRIAXone 2 g in NaCl 0.9 % 0.9 % 50 mL IVPB Infuse 2 g into a venous catheter every 12 (twelve) hours for 24 days. 1 each 0     DULoxetine (CYMBALTA) 60 MG capsule Take 1 capsule (60 mg total) by mouth daily. 30 capsule 0     furosemide (LASIX) 20 MG tablet Take 20 mg by mouth daily.       gabapentin (NEURONTIN) 300 MG capsule Take 1 capsule (300 mg total) by mouth at bedtime. 30 capsule 0     lidocaine 4 % patch Place 1 patch on the skin daily. Remove and discard patch with 12 hours or as directed by MD. 10 patch 0     metoprolol succinate (TOPROL-XL) 25 MG Take 1 tablet (25 mg total) by mouth daily. 30 tablet 0     mexiletine (MEXITIL) 200 MG capsule Take 1 capsule (200 mg total) by mouth every 12 (twelve) hours.  60 capsule 0     mirtazapine (REMERON) 15 MG tablet Take 1 tablet (15 mg total) by mouth at bedtime. 30 tablet 0     omeprazole (PRILOSEC) 20 MG capsule Take 1 capsule (20 mg total) by mouth 2 (two) times a day before meals. 30 capsule 0     senna-docusate (SENNOSIDES-DOCUSATE SODIUM) 8.6-50 mg tablet Take 1 tablet by mouth daily.       sotalol (BETAPACE) 80 MG tablet Take 1 tablet (80 mg total) by mouth daily. 60 tablet 0     tamsulosin (FLOMAX) 0.4 mg cap Take 1 capsule (0.4 mg total) by mouth Daily after lunch. 30 capsule 0     topiramate (TOPAMAX) 50 MG tablet Take 1 tablet (50 mg total) by mouth 2 (two) times a day. 60 tablet 0     traMADol (ULTRAM) 50 mg tablet Take 1 tablet (50 mg total) by mouth 2 (two) times a day. 8 tablet 0     No current facility-administered medications for this visit.        REVIEW OF SYSTEMS:    Currently, no fever, chills, or rigors. Does not have any visual or hearing problems. Denies any chest pain, headaches, palpitations, lightheadedness, dizziness, shortness of breath, or cough. Appetite is good. Denies any GERD symptoms. Denies any difficulty with swallowing, nausea, or vomiting.  Denies any abdominal pain, diarrhea or constipation. Denies any urinary symptoms. No insomnia. No active bleeding. No rash.       PHYSICAL EXAMINATION:  Vitals:    11/26/19 1025   BP: 123/66   Pulse: 81   Resp: 18   Temp: 98.2  F (36.8  C)   SpO2: 99%   Weight: (!) 238 lb 3.2 oz (108 kg)       GENERAL: Awake, Alert, oriented x3, not in any form of acute distress, answers questions appropriately, follows simple commands, conversant.  Fatigue.  HEENT: Head is normocephalic with normal hair distribution. No evidence of trauma. Ears: No acute purulent discharge. Eyes: Conjunctivae pink with no scleral jaundice. Nose: Normal mucosa and septum. NECK: Supple with no cervical or supraclavicular lymphadenopathy. Trachea is midline.   CHEST: No tenderness or deformity, no crepitus. Chest wall is remarkable for  swelling around his right chest hardware.  LUNG: Clear to auscultation with good chest expansion. There are no crackles or wheezes, normal AP diameter.  BACK: No kyphosis of the thoracic spine. Symmetric, no curvature, ROM normal, no CVA tenderness, no spinal tenderness   CVS: There is good S1  S2, with soft systolic murmur PMI is palpable in the left chest pulses are palpable at the wrist and femoral areas, rubs, gallops, or heaves, rhythm is regular,  2+ pulses symmetric in all extremities.  ABDOMEN: Globular and soft, nontender to palpation, non distended, no masses, no organomegaly, good bowel sounds, no rebound or guarding, no peritoneal signs.   EXTREMITIES: Bilateral charcot's feet. Full range of motion on upper extremities,there is extensive bruising spilling down past the elbow and the right arm.   there is no tenderness to palpation, bilateral lower ectremtiy edema, no cyanosis or clubbing, no calf tenderness.  Pulses equal in all extremities, normal cap refill, no joint swelling.  SKIN: Warm and dry, no erythema noted.  Skin color, texture, no rashes or lesions.  NEUROLOGICAL: The patient is oriented to person, place and time. Strength and sensation are grossly intact. Face is symmetric.    LABS:      Lab Results   Component Value Date    WBC 2.9 (L) 11/20/2019    HGB 10.5 (L) 11/20/2019    HCT 32.4 (L) 11/20/2019    MCV 99 11/20/2019     (L) 11/20/2019     Results for orders placed or performed in visit on 11/06/19   Basic Metabolic Panel   Result Value Ref Range    Sodium 142 136 - 145 mmol/L    Potassium 4.3 3.5 - 5.0 mmol/L    Chloride 109 (H) 98 - 107 mmol/L    CO2 24 22 - 31 mmol/L    Anion Gap, Calculation 9 5 - 18 mmol/L    Glucose 90 70 - 125 mg/dL    Calcium 9.0 8.5 - 10.5 mg/dL    BUN 34 (H) 8 - 28 mg/dL    Creatinine 2.31 (H) 0.70 - 1.30 mg/dL    GFR MDRD Af Amer 34 (L) >60 mL/min/1.73m2    GFR MDRD Non Af Amer 28 (L) >60 mL/min/1.73m2     Lab Results   Component Value Date    HGBA1C  4.8 10/17/2019     Vitamin D, Total (25-Hydroxy)   Date Value Ref Range Status   11/15/2016 41.6 30.0 - 80.0 ng/mL Final     Lab Results   Component Value Date    DIBMUDMX52 909 (H) 11/15/2016       ASSESSMENT/PLAN:    1. ICD (implantable cardioverter-defibrillator), biventricular, in situ This was removed on October 14, 2019.  After clearance of infection, this was replaced on October 30, 2019 with implantation of a new biventricular ICD system   2. Subacute bacterial endocarditis - Followed by ID. He underwent removal of his defibrillator and pacemaker lead which were infected, with laser ablation of a large tricuspid lesion on October 14, 2019.  He also underwent video-assisted thorascopic surgical exploration of the right chest, and underwent angiographic suction of the tricuspid valve and right ventricular lead vegetation. Continue CefTRIAXone and Ampicillin until 11/26/2019.   3. Essential hypertension with goal blood pressure less than 140/90 - Blood pressures are within target range, will continue Lasix and Metoprolol   4. CKD (chronic kidney disease) stage 3, GFR 30-59 ml/min (H) - Last BMP revealed bun 34, Cr 2.31, and GFR 28                   Electronically signed by:  Michaela Garcia CNP

## 2021-06-03 NOTE — TELEPHONE ENCOUNTER
Patient was seen in the ER last night and they want to know if he still needs to come in and see Dr. Garcia today at 10:30.

## 2021-06-03 NOTE — TELEPHONE ENCOUNTER
[facility] Nurse Triage Telephone Note      Provider: GILL Solomon  Facility: [facility]    Facility Type: TCU    Caller: Lyssa  Call Back Number:      Allergies: Definity [perflutren lipid microspheres] and Venom-honey bee    Reason for call: Pt was in PT & reported after that a vessel in eye had popped & the whole sclera is bright red blood. He is on Eliquis 5mg two times a day. No headache or vision changes. /61. Nov 13 Plt 112 (prev 120, 140).     Verbal Order/Direction given by Provider: Check with pt to see if he has Opthamalogist, if yes call them & explain & find out what they want to do. If not send to ER.    Provider giving order: GILL Solomon    Verbal order given to: Lyssa Sandra RN

## 2021-06-03 NOTE — PROGRESS NOTES
[facility]    Facility:   [facility] [[id number]]   Code Status: FULL CODE      CHIEF COMPLAINT/REASON FOR VISIT:  Chief Complaint   Patient presents with     H & P       HISTORY:      HPI: Florentino is a 74 y.o. male with a complex past medical history including cardiac arrest, severe ischemic cardiomyopathy (recent EF 13%), ventricular tachycardia resulting in biventricular pacer/AICD, newly diagnosed atrial fibrillation, recently symptomatic cholelithiasis, peripheral neuropathy/Charcot foot, positive Cologuard (has not been medically fit for follow-up is CKD 3, chronic systolic congestive heart failure, recent back pain, recently admitted to [facility] for what proved to be subacute bacterial endocarditis.    Hospital course was remarkable for organism identification, Enterococcus faecalis.  Patient required removal of his defibrillator/pacemaker due to lead infection as well as laser ablation of the large tricuspid valve lesion on the 14th.  He also underwent video-assisted thorascopic surgical exploration of the right chest and angiographic suction of the tricuspid valve and right ventricular lead vegetation.  Infectious disease saw the patient recommending ongoing ceftriaxone and ampicillin therapy.  He underwent a second procedure on October 30 with implantation of new biventricular ICD system in the right chest wall which was complicated by hematoma formation.  His course was further complicated by intermittent abdominal pain but limited to cholelithiasis which did not require surgical intervention, monitoring advised.  It was further complicated by anemia discharged at 8.7 on October 24.  He also complained of back pain, questionable mechanical versus osteomyelitis though the latter was never sought for diagnosis due to the fact it would not change his treatment protocol.    Since he is arrived at the care facility the patient notes that his back pain is improved.   His appetite is somewhat improved.  He had been sleeping poorly but reports good sleep the night before I see him.  He is concerned about increasing swelling in the feet which is been a recurrent problem for him.  He was discharged at 214 pounds and he is 214 pounds upon admission here.  However his Lasix was discontinued, he reports that he had long been on it due to his congestive heart failure.  He is not sure why it was discontinued.  We also discussed his new diagnosis of atrial fibrillation and new apixaban therapy.  We confirm his code wishes as full code.  He also reports that he may be starting to get a little constipated and would like to go on stool softener something he is done frequently in the past.  He also reports that he was treated with albuterol for a congested feeling in his throat and wonders if he could have that here via nebulizer.      Review of systems: Patient reports chronic hoarseness since his cardiac arrest about 6 years ago.  He attributes that to a traumatic intubation.  He remains weak but somewhat better than he was.  He denies headaches change in vision speaking swallowing or hearing.  He denies chest pains.  He denies shortness of breath but points out he is been quite inactive.  He had a large hematoma around the right chest hardware which feels less tense to him now.  He has had a lot of bruising around the left extremity PICC line but no new bleeding sources are identified.  His appetite has been lousy for a year though he said he did have some appetite today.  He denies orthopnea or PND.  He has worsening intermittent peripheral edema as noted above.  No abdominal pain nausea vomiting diarrhea.  He reports his mood is good despite the addition of Remeron.  He does not feel like he is ever been depressed through this.    Past Medical History:   Diagnosis Date     Allergic rhinitis      Arthritis      Cardiomyopathy (H)      Cardiomyopathy with implantable  cardioverter-defibrillator (H)      Charcot ankle     bilateral     Charcot's joint of foot     bilateral ankles     CHF (congestive heart failure) (H)      Chronic kidney disease     stage 3     Deviated septum      Diabetic ulcer of left midfoot associated with type 2 diabetes mellitus, limited to breakdown of skin (H)      Dyslipidemia      Eczema      Gout      H/O cardiac arrest      Hernia      History of transfusion      Hypertension      Migraine      Osteomyelitis of foot (H)     right     Systolic heart failure (H)      V-tach (H)     has icd             Family History   Problem Relation Age of Onset     Stroke Mother      Emphysema Mother      Goiter Mother      Cancer Father         Stomach     Alzheimer's disease Brother    Pertinent social history today includes that the patient was the primary caretaker for his wife but when he was hospitalized she had to be admitted to a memory care unit where she will stay.  He says he was getting to the end of his ability to care for her at home.  He says he intends to return home without a doubt.  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  He lives in [place].  Social History     Socioeconomic History     Marital status:      Spouse name: Not on file     Number of children: 3     Years of education: Not on file     Highest education level: Not on file   Occupational History     Not on file   Social Needs     Financial resource strain: Not on file     Food insecurity:     Worry: Not on file     Inability: Not on file     Transportation needs:     Medical: Not on file     Non-medical: Not on file   Tobacco Use     Smoking status: Former Smoker     Packs/day: 2.50     Years: 50.00     Pack years: 125.00     Types: Cigarettes, Pipe, Cigars     Last attempt to quit: 2000     Years since quittin.8     Smokeless tobacco: Never Used   Substance and Sexual Activity     Alcohol use: No     Comment: Last used      Drug use: No     Sexual activity:  Yes     Partners: Female     Birth control/protection: Post-menopausal   Lifestyle     Physical activity:     Days per week: Not on file     Minutes per session: Not on file     Stress: Not on file   Relationships     Social connections:     Talks on phone: Not on file     Gets together: Not on file     Attends Jewish service: Not on file     Active member of club or organization: Not on file     Attends meetings of clubs or organizations: Not on file     Relationship status: Not on file     Intimate partner violence:     Fear of current or ex partner: Not on file     Emotionally abused: Not on file     Physically abused: Not on file     Forced sexual activity: Not on file   Other Topics Concern     Not on file   Social History Narrative     since 1964, 3 children. 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 Quit smoking in 1999.          Review of Systems    Vitals:    11/05/19 1439   BP: 111/68   Pulse: 82   Resp: 18   Temp: 98  F (36.7  C)   SpO2: 98%   Weight: 214 lb (97.1 kg)       Physical Exam  Patient is alert oriented pleasant and normally conversant head is normocephalic atraumatic oropharynx is clear neck shows scarring in the right jugular venous area with some scabbing there which is healing well.  Neck is supple without elevation of the neck veins but he is sitting up in his chair at the moment.  Chest wall is remarkable for swelling around his right chest hardware which he says is improved and less tense.  Steri-Strips are in place.  There is extensive bruising spilling down past the elbow and the right arm.  Chest wall is nontender.  Lungs reveal minimal decreased breath sounds at bases but also moving air easily without rales rhonchi or wheezing.  Heart regular S1-S2 with soft systolic murmur PMI is palpable in the left chest pulses are palpable at the wrist and femoral areas.  His foot and ankle hardware is not removed so I did not check pedal pulses however his feet ankles are obviously edematous  with pitting to the mid shin.  Strength and movement are symmetric in the upper and lower extremities.  Skin is elsewhere warm and dry.  Overall patient looks pale and fatigued   lABS:   Most recent pertinent labs include October 31 creatinine 1.71.  Hemoglobin from October 24 8.7 other recent labs reviewed as well.    ASSESSMENT:    PLAN:    #1 subacute bacterial endocarditis due to Enterococcus faecalis.:  It is unclear what infectious disease considers to be day #1 of his IV antibiotic therapy, but 6 weeks total from that date is recommended.  It may have been October 14 but patient will need to follow-up with ID to determine ENd date.  I also could not tell whether or not he has an outpatient ID follow-up or whether they wanted one.  At the very least we will check his labs weekly and faxed to the infectious disease specialist for review until his formal follow-up.  PICC line is in place in the left upper extremity and is functioning.    #2 severe cardiomyopathy with chronic systolic heart failure  EF 13%..  Concerning increase in peripheral edema though he said it has been much worse in the past.  His weight is stable at 214 pounds.  Is unclear why he was discharged off of his Lasix which is a long-standing medication and he like it resumed and that seemed clinically appropriate assuming his kidneys can tolerate it.  He also takes metoprolol 25 mg daily which will be continued.  He also takes sotalol 80 mg daily which is continued.  He also takes topiramate 50 mg twice daily which is continued.    #3 atrial fibrillation  Rate is controlled on exam today  Continue apixaban for VTE prophylaxis barring bleeding.  Continue metoprolol for rate control    #4 replacement of AICD/pacer complicated by hematoma  Hematoma appears to be improving no changes made at this time  #5 anemia hemoglobin fell to 8.7.  One would expect improvement at this point.  He is got evidence of hematomas right-sided as well as bruising of  "the left PICC line area  Plan to recheck hemoglobin.  Could hold off on iron with his complaints of possible constipation but it would be appropriate to add that for 3 weeks as well    #5 constipation patient feels like it might just be starting request stool softener  Senna as ordered    #6 \"congestion\".  There is no wheezing on examination but he found benefit from the albuterol nebs which were used throughout his hospital stay.  I am okay with using them as needed here for the moment    #7 back pain question mechanical from prolonged bed rest versus undiagnosed osteomyelitis which would not require change in therapy.  Happily he is feeling better from the standpoint he does have some tramadol for pain if he needs it and is satisfied with his program    #8 depression patient denies any depression now he seems unaware of the mirtazapine.  As far as he knows is not bothering him.  Mirtazapine continued at 15 mg    #9 Charcot feet with gait instability  He will continue his supportive devices.  Physical therapy is involved.    #10 cholelithiasis which became symptomatic in the hospital.  He denies symptoms now and his abdomen is benign.  Only clinical monitoring indicated at this point.  He is a high risk surgical candidate for obvious reasons.    #11+ Cologuard April 2019.  Noncontributory    #12 CKD 3, Dr. Lepe advises judicious use of diuretics.  However I think his worsening edema takes precedence here and will just have to keep a close eye on his renal function, at least weekly with a new Lasix start up    #13 worsening peripheral edema, likely related to his long-standing CHF as well as some lymphatic insufficiency.  Lasix is restarted at 20 mg daily with labs ordered as above    Total 55 minutes of which greater than 50 % was spent counseling and coordination of care of the above plan.    Electronically signed by: Viktor Pinzon MD  "

## 2021-06-03 NOTE — TELEPHONE ENCOUNTER
Per D/C order and instruction, pt was to follow with ID regarding anbx, tx, and PICC line  PC back to nursing staff, discussed cardiology would not be the specialty to give orders regarding PICC line and anbx tx  Advised staff to contact ID regarding questions/concerns  Heidy  ----- Message from Lowell Sharma sent at 11/13/2019  1:21 PM CST -----  Regarding: care question  General phone call:    Caller: Farhana calling from [facility]    Primary cardiologist: Dr Hernández / [facility]    Detailed reason for call: Pt has Bacterial Endocarditis - Pick line - asking if the Pick line should be replaced - and if so where the Pt should go (as their facility cannot do it)    PLEASE CALL BACK    [phone] - nurse station

## 2021-06-03 NOTE — TELEPHONE ENCOUNTER
Who is calling:  [facility]  Reason for Call:  The home care is asking if Dr Lepe will follow patient in home care through phone and or fax?  Please advise   Date of last appointment with primary care: NA  Okay to leave a detailed message: No  [phone]

## 2021-06-03 NOTE — PROGRESS NOTES
Received labs from [facility]. Creatinine elevated to 2.71.    Will decrease ampicillin dose to 2gm iv q6hr (from q4hrs)  Include differential with cbc    Patient has appointment scheduled next week.

## 2021-06-03 NOTE — PROGRESS NOTES
[facility]    NAME:  Florentino Fall             :  1945  MRN: [id number]  CODE STATUS:  FULL CODE    FACILITY:  [facility] [[id number]]         Chief Complaint   Patient presents with     Problem Visit     Endocarditis     HISTORY OF PRESENT ILLNESS: Florentino Fall is a 74 y.o. male  admitted for Defibrillator discharge, Anemia, unspecified type, and Acute congestive heart failure, unspecified heart failure type.  Echocardiogram showed endocarditis.        1.  Subacute bacterial endocarditis    Ray was admitted with diffuse malaise and ventricular tachycardia.  Echocardiogram showed endocarditis.  He was placed on IV antibiotics.  He underwent removal of his defibrillator and pacemaker lead which were infected, with laser ablation of a large tricuspid lesion on .  He also underwent video-assisted thorascopic surgical exploration of the right chest, and underwent angiographic suction of the tricuspid valve and right ventricular lead vegetation     Depending on timing of antibiotics, this may be considered to be the first date of appropriate treatment and he will then need 6 weeks there forth.  ID will coordinate further.  He will be discharged on ceftriaxone and ampicillin     2.   Ventricular tachycardia.  He has long-term intermittent ischemic ventricular tachycardia.  He underwent ablation of ventricular tachycardia after mapping on .  He continued to have intermittent episodes.  Medicines were adjusted by cardiology and he will continue home on mexiletine, sotalol, metoprolol     3.   Atrial fibrillation.  Underlying rhythm was found to be atrial fibrillation during this admission.  This is apparently new per conversation with Dr. Hernández.  He was appropriately initiated on anticoagulants after his pacemaker reinsertion     4.   Implantable defibrillator.  With his recurrent V. tach he has had a defibrillator.  This was removed on .  After  clearance of infection, this was replaced on October 30 with implantation of a new biventricular ICD system     5.   Cholelithiasis.  He had intermittent episodes of abdominal pain.  CT scan showed cholelithiasis with some inflammation of the gallbladder.  Surgical consultation was requested and they elected to defer intervention at this time.  This will be monitored carefully     6.  Anemia.  Iron was briefly initiated but held due to GI upset     7.  Peripheral neuropathy.  Topiramate was decreased with due to gastrointestinal issues.  Neuropathy seem to remain under good control.  He also receives vitamin B12 shots periodically     8.  Positive Cologuard.  We have been unable to evaluate positive Cologuard due to his fear of a prep.  He was seen by GI who wished to defer colonoscopy.  Barium enema and CT scan were performed which showed no large obstructing lesions although right colon was not completely visualized.  This will be reassessed as an outpatient     9.  Chronic kidney disease.  Creatinine runs between 1.5 and 1.7 and this remains stable.  Diuretics should be used judiciously     10.  Congestive heart failure.  Volume status fluctuated but seems to be improving as his medical condition stabilized     11.  Back pain.  Complained of back pain throughout the admission.  We considered the possibility that there may be a focus of osteo-myelitis however treatment would be similar.  He is treated with gabapentin at night and a Lidoderm patch     Patient was stabilized and transferred to TCU for further rehabilitation and complete IV antibiotics for endocarditis through approximately November 26, 2019.  Infectious disease will follow.       Past Medical History:   Diagnosis Date     Allergic rhinitis      Arthritis      Cardiomyopathy (H)      Cardiomyopathy with implantable cardioverter-defibrillator (H)      Charcot ankle     bilateral     Charcot's joint of foot     bilateral ankles     CHF (congestive  heart failure) (H)      Chronic kidney disease     stage 3     Deviated septum      Diabetic ulcer of left midfoot associated with type 2 diabetes mellitus, limited to breakdown of skin (H)      Dyslipidemia      Eczema      Gout      H/O cardiac arrest      Hernia      History of transfusion      Hypertension      Migraine      Osteomyelitis of foot (H)     right     Systolic heart failure (H)      V-tach (H)     has icd     Past Surgical History:   Procedure Laterality Date     CARDIAC DEFIBRILLATOR PLACEMENT       EP ABLATION VT  10/7/2019          EP ABLATION VT N/A 10/7/2019    Procedure: EP Ablation Ventricular Tachycardia;  Surgeon: Sylvia Davies MD;  Location: [facility];  Service: Cardiology     EP ICD BIV INSERT N/A 10/30/2019    Procedure: EP ICD BIV Insert;  Surgeon: Ulises Hernández MD;  Location: [facility];  Service: Cardiology     EP INTRACARDIAC ECHO  10/14/2019          EP LEAD EXTRACTION LEVEL 3  10/14/2019          EP LEAD EXTRACTION LEVEL 3 N/A 10/14/2019    Procedure: EP Lead Extraction Level 3;  Surgeon: Sylvia Davies MD;  Location: [facility];  Service: Cardiology     EP TEMP PACEMAKER INSERT N/A 10/14/2019    Procedure: EP Temporary Pacemaker Insertion;  Surgeon: Sylvia Davies MD;  Location: [facility];  Service: Cardiology     FOOT AMPUTATION Left 1/2/2019    Procedure: AMPUTATION, 2nd digit left foot;  Surgeon: Phu Zaman DPM;  Location: [facility] OR;  Service: Podiatry     HERNIA REPAIR       NOSE SURGERY       PICC  10/3/2019          PICC AND MIDLINE TEAM LINE INSERTION  6/22/2018          PICC AND MIDLINE TEAM LINE INSERTION  10/15/2019          MN COMPLEX DRAINAGE, WOUND Right 6/20/2018    Procedure: INCISION AND DRAINAGE, LOWER EXTREMITY with BONE BIOPSY/CULTURE ;  Surgeon: Denisse Fletcher DPM;  Location: [facility] OR;  Service: Podiatry     MN INSJ/RPLCMT TEMP TRANSVNS 2CHMBR PACG ELTRDS SPX  10/14/2019           AR KNEE SCOPE,DIAGNOSTIC      Description: Arthroscopy Knee Left;  Recorded: 2011;     AR REMOVE TONSILS/ADENOIDS,<11 Y/O      Description: Tonsillectomy With Adenoidectomy;  Recorded: 2011;     AR SHLDR ARTHROSCOP,DIAGNOSTIC      Description: Arthroscopy Shoulder Right;  Recorded: 2011;     Family History   Problem Relation Age of Onset     Stroke Mother      Emphysema Mother      Goiter Mother      Cancer Father         Stomach     Alzheimer's disease Brother      Social History     Socioeconomic History     Marital status:      Spouse name: Not on file     Number of children: 3     Years of education: Not on file     Highest education level: Not on file   Occupational History     Not on file   Social Needs     Financial resource strain: Not on file     Food insecurity:     Worry: Not on file     Inability: Not on file     Transportation needs:     Medical: Not on file     Non-medical: Not on file   Tobacco Use     Smoking status: Former Smoker     Packs/day: 2.50     Years: 50.00     Pack years: 125.00     Types: Cigarettes, Pipe, Cigars     Last attempt to quit: 2000     Years since quittin.8     Smokeless tobacco: Never Used   Substance and Sexual Activity     Alcohol use: No     Comment: Last used      Drug use: No     Sexual activity: Yes     Partners: Female     Birth control/protection: Post-menopausal   Lifestyle     Physical activity:     Days per week: Not on file     Minutes per session: Not on file     Stress: Not on file   Relationships     Social connections:     Talks on phone: Not on file     Gets together: Not on file     Attends [employer] service: Not on file     Active member of club or organization: Not on file     Attends meetings of clubs or organizations: Not on file     Relationship status: Not on file     Intimate partner violence:     Fear of current or ex partner: Not on file     Emotionally abused: Not on file     Physically abused: Not on file      Forced sexual activity: Not on file   Other Topics Concern     Not on file   Social History Narrative     since 1964, 3 children. 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 Quit smoking in 1999.      Allergies   Allergen Reactions     Definity [Perflutren Lipid Microspheres] Other (See Comments)     Back pain      Venom-Honey Bee Swelling and Dizziness     Current Outpatient Medications   Medication Sig Dispense Refill     ampicillin 2 g in NaCl 0.9 % 0.9 % 100 mL IVPB Infuse 2 g into a venous catheter every 4 (four) hours for 24 days. 1 each 0     apixaban (ELIQUIS) 5 mg Tab tablet Take 1 tablet (5 mg total) by mouth 2 (two) times a day. 60 tablet 0     cefTRIAXone 2 g in NaCl 0.9 % 0.9 % 50 mL IVPB Infuse 2 g into a venous catheter every 12 (twelve) hours for 24 days. 1 each 0     DULoxetine (CYMBALTA) 60 MG capsule Take 1 capsule (60 mg total) by mouth daily. 30 capsule 0     furosemide (LASIX) 20 MG tablet Take 20 mg by mouth daily.       gabapentin (NEURONTIN) 300 MG capsule Take 1 capsule (300 mg total) by mouth at bedtime. 30 capsule 0     lidocaine 4 % patch Place 1 patch on the skin daily. Remove and discard patch with 12 hours or as directed by MD. 10 patch 0     metoprolol succinate (TOPROL-XL) 25 MG Take 1 tablet (25 mg total) by mouth daily. 30 tablet 0     mexiletine (MEXITIL) 200 MG capsule Take 1 capsule (200 mg total) by mouth every 12 (twelve) hours. 60 capsule 0     mirtazapine (REMERON) 15 MG tablet Take 1 tablet (15 mg total) by mouth at bedtime. 30 tablet 0     omeprazole (PRILOSEC) 20 MG capsule Take 1 capsule (20 mg total) by mouth 2 (two) times a day before meals. 30 capsule 0     senna-docusate (SENNOSIDES-DOCUSATE SODIUM) 8.6-50 mg tablet Take 1 tablet by mouth daily.       sotalol (BETAPACE) 80 MG tablet Take 1 tablet (80 mg total) by mouth daily. 60 tablet 0     tamsulosin (FLOMAX) 0.4 mg cap Take 1 capsule (0.4 mg total) by mouth Daily after lunch. 30 capsule 0     topiramate (TOPAMAX)  50 MG tablet Take 1 tablet (50 mg total) by mouth 2 (two) times a day. 60 tablet 0     traMADol (ULTRAM) 50 mg tablet Take 1 tablet (50 mg total) by mouth 2 (two) times a day. 8 tablet 0     No current facility-administered medications for this visit.        REVIEW OF SYSTEMS:    Currently, no fever, chills, or rigors. Does not have any visual or hearing problems. Denies any chest pain, headaches, palpitations, lightheadedness, dizziness, shortness of breath, or cough. Appetite is good. Denies any GERD symptoms. Denies any difficulty with swallowing, nausea, or vomiting.  Denies any abdominal pain, diarrhea or constipation. Denies any urinary symptoms. No insomnia. No active bleeding. No rash.       PHYSICAL EXAMINATION:  Vitals:    11/13/19 2031   BP: 110/65   Pulse: (!) 105   Resp: 17   Temp: 98.4  F (36.9  C)   SpO2: 94%   Weight: (!) 236 lb 4.8 oz (107.2 kg)       GENERAL: Awake, Alert, oriented x3, not in any form of acute distress, answers questions appropriately, follows simple commands, conversant.  Fatigue.  HEENT: Head is normocephalic with normal hair distribution. No evidence of trauma. Ears: No acute purulent discharge. Eyes: Conjunctivae pink with no scleral jaundice. Nose: Normal mucosa and septum. NECK: Supple with no cervical or supraclavicular lymphadenopathy. Trachea is midline.   CHEST: No tenderness or deformity, no crepitus. Chest wall is remarkable for swelling around his right chest hardware.  LUNG: Clear to auscultation with good chest expansion. There are no crackles or wheezes, normal AP diameter.  BACK: No kyphosis of the thoracic spine. Symmetric, no curvature, ROM normal, no CVA tenderness, no spinal tenderness   CVS: There is good S1  S2, with soft systolic murmur PMI is palpable in the left chest pulses are palpable at the wrist and femoral areas, rubs, gallops, or heaves, rhythm is regular,  2+ pulses symmetric in all extremities.  ABDOMEN: Globular and soft, nontender to palpation,  non distended, no masses, no organomegaly, good bowel sounds, no rebound or guarding, no peritoneal signs.   EXTREMITIES: Bilateral charcot's feet. Full range of motion on upper extremities,there is extensive bruising spilling down past the elbow and the right arm.   there is no tenderness to palpation, bilateral lower ectremtiy edema, no cyanosis or clubbing, no calf tenderness.  Pulses equal in all extremities, normal cap refill, no joint swelling.  SKIN: Warm and dry, no erythema noted.  Skin color, texture, no rashes or lesions.  NEUROLOGICAL: The patient is oriented to person, place and time. Strength and sensation are grossly intact. Face is symmetric.    LABS:      Lab Results   Component Value Date    WBC 3.6 (L) 11/13/2019    HGB 10.1 (L) 11/13/2019    HCT 32.2 (L) 11/13/2019     (H) 11/13/2019     (L) 11/13/2019     Results for orders placed or performed in visit on 11/06/19   Basic Metabolic Panel   Result Value Ref Range    Sodium 142 136 - 145 mmol/L    Potassium 4.3 3.5 - 5.0 mmol/L    Chloride 109 (H) 98 - 107 mmol/L    CO2 24 22 - 31 mmol/L    Anion Gap, Calculation 9 5 - 18 mmol/L    Glucose 90 70 - 125 mg/dL    Calcium 9.0 8.5 - 10.5 mg/dL    BUN 34 (H) 8 - 28 mg/dL    Creatinine 2.31 (H) 0.70 - 1.30 mg/dL    GFR MDRD Af Amer 34 (L) >60 mL/min/1.73m2    GFR MDRD Non Af Amer 28 (L) >60 mL/min/1.73m2     Lab Results   Component Value Date    HGBA1C 4.8 10/17/2019     Vitamin D, Total (25-Hydroxy)   Date Value Ref Range Status   11/15/2016 41.6 30.0 - 80.0 ng/mL Final     Lab Results   Component Value Date    [id number] 909 (H) 11/15/2016       ASSESSMENT/PLAN:    1. Subacute bacterial endocarditis - Followed by ID. He underwent removal of his defibrillator and pacemaker lead which were infected, with laser ablation of a large tricuspid lesion on October 14, 2019.  He also underwent video-assisted thorascopic surgical exploration of the right chest, and underwent angiographic suction of  the tricuspid valve and right ventricular lead vegetation. Continue CefTRIAXone and Ampicillin until 11/26/2019.   2. PVT (paroxysmal ventricular tachycardia) (H) -   He underwent ablation of ventricular tachycardia after mapping on October 7, 2019.  He continued to have intermittent episodes.  Medicines were adjusted by cardiology and he will continue home on Mexiletine, Sotalol, and Metoprolol   3. Chronic atrial fibrillation - New onset,  He was appropriately initiated on anticoagulants, Apixaban, after his pacemaker reinsertion   4. ICD (implantable cardioverter-defibrillator), biventricular, in situ -  This was removed on October 14, 2019.  After clearance of infection, this was replaced on October 30, 2019 with implantation of a new biventricular ICD system   5. Anemia, unspecified type - Iron supplementation stopped due to GI upset   6. Chronic systolic heart failure (H) - No overt signs or symptoms of decompensation         Electronically signed by:  Michaela Garcia CNP    Total time spent on the unit was 40 minutes of which 25 minutes was spent in counseling Antibiotic therapy - duration and side effects, New diagnosis of Afib and treatment with Apixaban, PPM restrictions and safety precautions  and coordination of care of the above plan with nursing staff, patient, and therapy.

## 2021-06-03 NOTE — TELEPHONE ENCOUNTER
[facility] Nurse Triage Telephone Note      Provider: GILL Solomon  Facility: [facility]    Facility Type: TCU    Caller: Peggy  Call Back Number:  [phone]    Allergies: Definity [perflutren lipid microspheres] and Venom-honey bee    Reason for call: Yesterday & today Picc line sluggish, Pharmacy IV team out & eval'd & believe it has migrated out 5-6cm. And they not comfortable with his history of procedures in replacing it at facility.  Also wanted to verify end date of antibiotics, and see in ID note 11/26.    Verbal Order/Direction given by Provider: Notify Cardiology & ID of picc line out 5-6cm & see what they advise.    Provider giving order: GILL Solomon    Verbal order given to: Peggy Sandra RN

## 2021-06-03 NOTE — TELEPHONE ENCOUNTER
Kelley duarte/[facility] called.     Please call Kelley to discuss lab orders.    Kelley @ [phone]

## 2021-06-03 NOTE — PROGRESS NOTES
[facility]    NAME:  Florentino Fall             :  1945  MRN: [id number]  CODE STATUS:  FULL CODE    FACILITY:  [facility] [[id number]]         Chief Complaint   Patient presents with     Problem Visit     Endocarditis     HISTORY OF PRESENT ILLNESS: Florentino Fall is a 74 y.o. male  admitted for Defibrillator discharge, Anemia, unspecified type, and Acute congestive heart failure, unspecified heart failure type.  Echocardiogram showed endocarditis.        1.  Subacute bacterial endocarditis    Ray was admitted with diffuse malaise and ventricular tachycardia.  Echocardiogram showed endocarditis.  He was placed on IV antibiotics.  He underwent removal of his defibrillator and pacemaker lead which were infected, with laser ablation of a large tricuspid lesion on .  He also underwent video-assisted thorascopic surgical exploration of the right chest, and underwent angiographic suction of the tricuspid valve and right ventricular lead vegetation     Depending on timing of antibiotics, this may be considered to be the first date of appropriate treatment and he will then need 6 weeks there forth.  ID will coordinate further.  He will be discharged on ceftriaxone and ampicillin     2.   Ventricular tachycardia.  He has long-term intermittent ischemic ventricular tachycardia.  He underwent ablation of ventricular tachycardia after mapping on .  He continued to have intermittent episodes.  Medicines were adjusted by cardiology and he will continue home on mexiletine, sotalol, metoprolol     3.   Atrial fibrillation.  Underlying rhythm was found to be atrial fibrillation during this admission.  This is apparently new per conversation with Dr. Hernández.  He was appropriately initiated on anticoagulants after his pacemaker reinsertion     4.   Implantable defibrillator.  With his recurrent V. tach he has had a defibrillator.  This was removed on .  After  clearance of infection, this was replaced on October 30 with implantation of a new biventricular ICD system     5.   Cholelithiasis.  He had intermittent episodes of abdominal pain.  CT scan showed cholelithiasis with some inflammation of the gallbladder.  Surgical consultation was requested and they elected to defer intervention at this time.  This will be monitored carefully     6.  Anemia.  Iron was briefly initiated but held due to GI upset     7.  Peripheral neuropathy.  Topiramate was decreased with due to gastrointestinal issues.  Neuropathy seem to remain under good control.  He also receives vitamin B12 shots periodically     8.  Positive Cologuard.  We have been unable to evaluate positive Cologuard due to his fear of a prep.  He was seen by GI who wished to defer colonoscopy.  Barium enema and CT scan were performed which showed no large obstructing lesions although right colon was not completely visualized.  This will be reassessed as an outpatient     9.  Chronic kidney disease.  Creatinine runs between 1.5 and 1.7 and this remains stable.  Diuretics should be used judiciously     10.  Congestive heart failure.  Volume status fluctuated but seems to be improving as his medical condition stabilized     11.  Back pain.  Complained of back pain throughout the admission.  We considered the possibility that there may be a focus of osteo-myelitis however treatment would be similar.  He is treated with gabapentin at night and a Lidoderm patch     Patient was stabilized and transferred to TCU for further rehabilitation and complete IV antibiotics for endocarditis through approximately November 26, 2019.  Infectious disease will follow.      Today, patient is seen at the bedside.  He is s/p replacement of AICD/pacer complicated by hematoma.  Hematoma has gone down significantly.  No pain or signs and symptoms of secondary infection.  He has stopped wearing the ACE wraps due to inability to wear supportive  devices on his feet. Continues with Lasix. No sleep disturbance. He is on Sotalol for ventricular arrhythmias.  Denies any Chest pain, Tachycardia, Dizziness,  Nausea, Shortness of breath, or any episodes of Loss of consciousness.    Past Medical History:   Diagnosis Date     Allergic rhinitis      Arthritis      Cardiomyopathy (H)      Cardiomyopathy with implantable cardioverter-defibrillator (H)      Charcot ankle     bilateral     Charcot's joint of foot     bilateral ankles     CHF (congestive heart failure) (H)      Chronic kidney disease     stage 3     Deviated septum      Diabetic ulcer of left midfoot associated with type 2 diabetes mellitus, limited to breakdown of skin (H)      Dyslipidemia      Eczema      Gout      H/O cardiac arrest      Hernia      History of transfusion      Hypertension      Migraine      Osteomyelitis of foot (H)     right     Systolic heart failure (H)      V-tach (H)     has icd     Past Surgical History:   Procedure Laterality Date     CARDIAC DEFIBRILLATOR PLACEMENT       EP ABLATION VT  10/7/2019          EP ABLATION VT N/A 10/7/2019    Procedure: EP Ablation Ventricular Tachycardia;  Surgeon: Sylvia Davies MD;  Location: [facility] Cath Lab;  Service: Cardiology     EP ICD BIV INSERT N/A 10/30/2019    Procedure: EP ICD BIV Insert;  Surgeon: Ulises Hernández MD;  Location: [facility] Cath Lab;  Service: Cardiology     EP INTRACARDIAC ECHO  10/14/2019          EP LEAD EXTRACTION LEVEL 3  10/14/2019          EP LEAD EXTRACTION LEVEL 3 N/A 10/14/2019    Procedure: EP Lead Extraction Level 3;  Surgeon: Sylvia Davies MD;  Location: [facility] Cath Lab;  Service: Cardiology     EP TEMP PACEMAKER INSERT N/A 10/14/2019    Procedure: EP Temporary Pacemaker Insertion;  Surgeon: Sylvia Daveis MD;  Location: [facility] Cath Lab;  Service: Cardiology     FOOT AMPUTATION Left 1/2/2019    Procedure: AMPUTATION, 2nd digit left foot;  Surgeon: Phu Zaman DPM;  Location: [facility]  [facility] Main OR;  Service: Podiatry     HERNIA REPAIR       NOSE SURGERY       PICC  10/3/2019          PICC AND MIDLINE TEAM LINE INSERTION  2018          PICC AND MIDLINE TEAM LINE INSERTION  10/15/2019          PICC AND MIDLINE TEAM LINE INSERTION  2019          NE COMPLEX DRAINAGE, WOUND Right 2018    Procedure: INCISION AND DRAINAGE, LOWER EXTREMITY with BONE BIOPSY/CULTURE ;  Surgeon: Denisse Fletcher DPM;  Location: [facility] Main OR;  Service: Podiatry     NE INSJ/RPLCMT TEMP TRANSVNS 2CHMBR PACG ELTRDS SPX  10/14/2019          NE KNEE SCOPE,DIAGNOSTIC      Description: Arthroscopy Knee Left;  Recorded: 2011;     NE REMOVE TONSILS/ADENOIDS,<13 Y/O      Description: Tonsillectomy With Adenoidectomy;  Recorded: 2011;     NE SHLDR ARTHROSCOP,DIAGNOSTIC      Description: Arthroscopy Shoulder Right;  Recorded: 2011;     Family History   Problem Relation Age of Onset     Stroke Mother      Emphysema Mother      Goiter Mother      Cancer Father         Stomach     Alzheimer's disease Brother      Social History     Socioeconomic History     Marital status:      Spouse name: Not on file     Number of children: 3     Years of education: Not on file     Highest education level: Not on file   Occupational History     Not on file   Social Needs     Financial resource strain: Not on file     Food insecurity:     Worry: Not on file     Inability: Not on file     Transportation needs:     Medical: Not on file     Non-medical: Not on file   Tobacco Use     Smoking status: Former Smoker     Packs/day: 2.50     Years: 50.00     Pack years: 125.00     Types: Cigarettes, Pipe, Cigars     Last attempt to quit: 2000     Years since quittin.9     Smokeless tobacco: Never Used   Substance and Sexual Activity     Alcohol use: No     Comment: Last used 1979     Drug use: No     Sexual activity: Yes     Partners: Female     Birth control/protection: Post-menopausal   Lifestyle      Physical activity:     Days per week: Not on file     Minutes per session: Not on file     Stress: Not on file   Relationships     Social connections:     Talks on phone: Not on file     Gets together: Not on file     Attends [employer] service: Not on file     Active member of club or organization: Not on file     Attends meetings of clubs or organizations: Not on file     Relationship status: Not on file     Intimate partner violence:     Fear of current or ex partner: Not on file     Emotionally abused: Not on file     Physically abused: Not on file     Forced sexual activity: Not on file   Other Topics Concern     Not on file   Social History Narrative     since 1964, 3 children. 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 Quit smoking in 1999.      Allergies   Allergen Reactions     Definity [Perflutren Lipid Microspheres] Other (See Comments)     Back pain      Venom-Honey Bee Swelling and Dizziness     Current Outpatient Medications   Medication Sig Dispense Refill     acetaminophen (TYLENOL) 500 MG tablet Take 1,000 mg by mouth every 6 (six) hours as needed for pain.       ampicillin 2 g in NaCl 0.9 % 0.9 % 100 mL IVPB Infuse 2 g into a venous catheter every 6 (six) hours for 24 days. 1 each 0     apixaban (ELIQUIS) 5 mg Tab tablet Take 1 tablet (5 mg total) by mouth 2 (two) times a day. 60 tablet 0     DULoxetine (CYMBALTA) 60 MG capsule Take 1 capsule (60 mg total) by mouth daily. 30 capsule 0     furosemide (LASIX) 20 MG tablet Take 20 mg by mouth daily.       gabapentin (NEURONTIN) 300 MG capsule Take 1 capsule (300 mg total) by mouth at bedtime. 30 capsule 0     lidocaine 4 % patch Place 1 patch on the skin daily. Remove and discard patch with 12 hours or as directed by MD. 10 patch 0     metoprolol succinate (TOPROL-XL) 25 MG Take 1 tablet (25 mg total) by mouth daily. 30 tablet 0     mexiletine (MEXITIL) 200 MG capsule Take 1 capsule (200 mg total) by mouth every 12 (twelve) hours. 60 capsule 0      mirtazapine (REMERON) 15 MG tablet Take 1 tablet (15 mg total) by mouth at bedtime. 30 tablet 0     omeprazole (PRILOSEC) 20 MG capsule Take 1 capsule (20 mg total) by mouth 2 (two) times a day before meals. 30 capsule 0     senna-docusate (SENNOSIDES-DOCUSATE SODIUM) 8.6-50 mg tablet Take 1 tablet by mouth daily.       sotalol (BETAPACE) 80 MG tablet Take 1 tablet (80 mg total) by mouth daily. 60 tablet 0     tamsulosin (FLOMAX) 0.4 mg cap Take 1 capsule (0.4 mg total) by mouth Daily after lunch. 30 capsule 0     topiramate (TOPAMAX) 50 MG tablet Take 1 tablet (50 mg total) by mouth 2 (two) times a day. 60 tablet 0     traMADol (ULTRAM) 50 mg tablet Take 1 tablet (50 mg total) by mouth 2 (two) times a day. 8 tablet 0     No current facility-administered medications for this visit.        REVIEW OF SYSTEMS:    Currently, no fever, chills, or rigors. Does not have any visual or hearing problems. Denies any chest pain, headaches, palpitations, lightheadedness, dizziness, shortness of breath, or cough. Appetite is good. Denies any GERD symptoms. Denies any difficulty with swallowing, nausea, or vomiting.  Denies any abdominal pain, diarrhea or constipation. Denies any urinary symptoms. No insomnia. No active bleeding. No rash.       PHYSICAL EXAMINATION:  Vitals:    11/30/19 1222   BP: 105/71   Pulse: 80   Resp: 20   Temp: 98.4  F (36.9  C)   SpO2: 93%   Weight: (!) 237 lb 8 oz (107.7 kg)       GENERAL: Awake, Alert, oriented x3, not in any form of acute distress, answers questions appropriately, follows simple commands, conversant.  Fatigue.  HEENT: Head is normocephalic with normal hair distribution. No evidence of trauma. Ears: No acute purulent discharge. Eyes: Conjunctivae pink with no scleral jaundice. Nose: Normal mucosa and septum. NECK: Supple with no cervical or supraclavicular lymphadenopathy. Trachea is midline.   CHEST: No tenderness or deformity, no crepitus. Chest wall is remarkable for swelling around  his right chest hardware.  LUNG: Clear to auscultation with good chest expansion. There are no crackles or wheezes, normal AP diameter.  BACK: No kyphosis of the thoracic spine. Symmetric, no curvature, ROM normal, no CVA tenderness, no spinal tenderness   CVS: There is good S1  S2, with soft systolic murmur PMI is palpable in the left chest pulses are palpable at the wrist and femoral areas, rubs, gallops, or heaves, rhythm is regular,  2+ pulses symmetric in all extremities.  ABDOMEN: Globular and soft, nontender to palpation, non distended, no masses, no organomegaly, good bowel sounds, no rebound or guarding, no peritoneal signs.   EXTREMITIES: Bilateral charcot's feet. Full range of motion on upper extremities,there is extensive bruising spilling down past the elbow and the right arm.   there is no tenderness to palpation, bilateral lower ectremtiy edema, no cyanosis or clubbing, no calf tenderness.  Pulses equal in all extremities, normal cap refill, no joint swelling.  SKIN: Warm and dry, no erythema noted.  Skin color, texture, no rashes or lesions.  NEUROLOGICAL: The patient is oriented to person, place and time. Strength and sensation are grossly intact. Face is symmetric.    LABS:      Lab Results   Component Value Date    WBC 3.5 (L) 11/27/2019    HGB 10.9 (L) 11/27/2019    HCT 35.5 (L) 11/27/2019     (H) 11/27/2019     (L) 11/27/2019     Results for orders placed or performed in visit on 11/21/19   Basic Metabolic Panel   Result Value Ref Range    Sodium 145 136 - 145 mmol/L    Potassium 4.2 3.5 - 5.0 mmol/L    Chloride 112 (H) 98 - 107 mmol/L    CO2 26 22 - 31 mmol/L    Anion Gap, Calculation 7 5 - 18 mmol/L    Glucose 88 70 - 125 mg/dL    Calcium 8.3 (L) 8.5 - 10.5 mg/dL    BUN 34 (H) 8 - 28 mg/dL    Creatinine 2.77 (H) 0.70 - 1.30 mg/dL    GFR MDRD Af Amer 27 (L) >60 mL/min/1.73m2    GFR MDRD Non Af Amer 22 (L) >60 mL/min/1.73m2     Lab Results   Component Value Date    HGBA1C 4.8  10/17/2019     Vitamin D, Total (25-Hydroxy)   Date Value Ref Range Status   11/15/2016 41.6 30.0 - 80.0 ng/mL Final     Lab Results   Component Value Date    [id number] 909 (H) 11/15/2016       ASSESSMENT/PLAN:    1. Subacute bacterial endocarditis - Followed by ID. He underwent removal of his defibrillator and pacemaker lead which were infected, with laser ablation of a large tricuspid lesion on October 14, 2019.  He also underwent video-assisted thorascopic surgical exploration of the right chest, and underwent angiographic suction of the tricuspid valve and right ventricular lead vegetation. Continue CefTRIAXone and Ampicillin until 11/26/2019.   2. Ventricular tachycardia (H) - Rate controlled on Sotalol   3. Chronic atrial fibrillation - Rate controlled on Eliquis   4. ICD (implantable cardioverter-defibrillator), biventricular, in situ This was removed on October 14, 2019.  After clearance of infection, this was replaced on October 30, 2019 with implantation of a new biventricular ICD system   5. Essential hypertension with goal blood pressure less than 140/90 - Blood pressures are within target range, will continue Lasix and Metoprolol                         Electronically signed by:  Michaela Garcia CNP

## 2021-06-03 NOTE — TELEPHONE ENCOUNTER
Kelley duarte/ [facility] called.    Please advise on when the patient is supposed to schedule a f/u and with who.    Kelley or any nurse @ [phone]

## 2021-06-03 NOTE — PROGRESS NOTES
[facility] INFECTIOUS DISEASE CLINIC [facility]    Date: 11/21/2019  Patient Name: Florentino Fall   YOB: 1945  MRN: [id number]      ASSESSMENT:  74-year-old man with a history of cardiomyopathy, hypertension, chronic kidney disease, peripheral vascular disease, BPH, Charcot neuropathy admitted with syncope and firing of his defibrillator.     1. ICD lead endocarditis, tricuspid valve large mobile vegetation, Enterococcus endocarditis. Patient had continuous bacteremia-- Enterococcus. Last positive culture on 10/4 . S/P laser lead extraction for infected CRT-D system on 10/14/19. Angiovac removal/Suction of tricuspid valve and R ventricular lead vegetation.  Cardiac device reimplantation was done prior to discharge.  2. Leukopenia. Steady decline, likely related to antibiotics. Not yet neutropenic.   3. CKD. Elevated creatinine  4. Charcot foot/neuropathy  5. Conjunctival hemorrhage  6. Pacemaker pocket hematoma    PLAN:  -Continue ampicillin and ceftriaxone until 11/26  -Ampicillin dose reduced for renal function (2 g every 6 hours)  -Once antibiotics are completed PICC line can be removed  -Watch for new signs of fevers or other infection.  -Patient should have surveillance cultures about a week after stopping antibiotics  -Patient to follow-up with Dr. Hernández 12/5.  Recommend having a follow-up echo for documentation of his current baseline.    Return to clinic as needed.    Jose G Garcia MD  [facility]   Clinic phone: [phone]   Clinic fax: [phone]     ______________________________________________________________________    HISTORY OF PRESENT ILLNESS:   From inpatient ID consult on 10/3:    Florentino Fall is a 74 y.o. man with a history of cardiomyopathy, hypertension, chronic kidney disease, peripheral vascular disease, Charcot neuropathy, BPH is admitted with syncope and defibrillator firing.  Following admission the patient mentioned that he has been having  subjective fevers.  An echo was performed which showed decrease in LV function along with possible vegetation.  Vegetation was confirmed on JERSON this afternoon, along with vegetation on the device lead.     Following admission the patient had a rapid response called, with sustained ventricular tachycardia and hypotension.  He was placed on a lidocaine drip.  CT scan of the abdomen was ordered.  The patient was started on Zosyn, unfortunately no cultures were collected prior to this.     The patient is in a wheelchair chronically due to Charcot deformities in both feet.  He tells me that over the past several months he has had increasing upper extremity weakness, making it difficult to mobilize in the wheelchair.  He had a fall a few weeks ago which led to vertebral fracture.  He followed up with his primary about 2 weeks ago and was given muscle relaxant and a course of prednisone.  Over the past 1 to 2 weeks he has been feeling weaker and having intermittent night sweats.  This seems to have been a significant change compared to previous.  He denies any recent surgeries or dental procedures.  He denies any active lesions on his feet.    Interval History:  Patient was hospitalized from 10/2 to 11/2.  He underwent ICD extraction and angiovac on 10/14.  Cultures were positive for enterococcus.  He was treated with ampicillin and ceftriaxone and discharged to complete a 6-week course.  He is tolerating antibiotics well.  Yesterday he was in the ER because his PICC line had nearly dislodged.  This was replaced.  He has noted that since being on antibiotics his blood pressure has been elevated and he has had increased swelling.  His white count has also been decreasing.  Patient is staying at [place], and is hoping to discharge home soon.    Of note the patient says that he has been having swelling over his ICD pocket site for a while.  He thinks he has a hematoma there.  He says that it has been getting better over the  past week or so.      Review of Systems:  Ten systems reviewed and negative except for what is noted in the HPI     Past Medical History:  Past Medical History:   Diagnosis Date     Allergic rhinitis      Arthritis      Cardiomyopathy (H)      Cardiomyopathy with implantable cardioverter-defibrillator (H)      Charcot ankle     bilateral     Charcot's joint of foot     bilateral ankles     CHF (congestive heart failure) (H)      Chronic kidney disease     stage 3     Deviated septum      Diabetic ulcer of left midfoot associated with type 2 diabetes mellitus, limited to breakdown of skin (H)      Dyslipidemia      Eczema      Gout      H/O cardiac arrest      Hernia      History of transfusion      Hypertension      Migraine      Osteomyelitis of foot (H)     right     Systolic heart failure (H)      V-tach (H)     has icd       Past Surgical History:  Past Surgical History:   Procedure Laterality Date     CARDIAC DEFIBRILLATOR PLACEMENT       EP ABLATION VT  10/7/2019          EP ABLATION VT N/A 10/7/2019    Procedure: EP Ablation Ventricular Tachycardia;  Surgeon: Sylvia Davies MD;  Location: [facility];  Service: Cardiology     EP ICD BIV INSERT N/A 10/30/2019    Procedure: EP ICD BIV Insert;  Surgeon: Ulises Hernández MD;  Location: [facility];  Service: Cardiology     EP INTRACARDIAC ECHO  10/14/2019          EP LEAD EXTRACTION LEVEL 3  10/14/2019          EP LEAD EXTRACTION LEVEL 3 N/A 10/14/2019    Procedure: EP Lead Extraction Level 3;  Surgeon: Sylvia Davies MD;  Location: [facility];  Service: Cardiology     EP TEMP PACEMAKER INSERT N/A 10/14/2019    Procedure: EP Temporary Pacemaker Insertion;  Surgeon: Sylvia Davies MD;  Location: [facility];  Service: Cardiology     FOOT AMPUTATION Left 1/2/2019    Procedure: AMPUTATION, 2nd digit left foot;  Surgeon: Phu Zaman DPM;  Location: [facility] OR;  Service: Podiatry     HERNIA REPAIR       NOSE  SURGERY       PICC  10/3/2019          PICC AND MIDLINE TEAM LINE INSERTION  6/22/2018          PICC AND MIDLINE TEAM LINE INSERTION  10/15/2019          PICC AND MIDLINE TEAM LINE INSERTION  11/20/2019          HI COMPLEX DRAINAGE, WOUND Right 6/20/2018    Procedure: INCISION AND DRAINAGE, LOWER EXTREMITY with BONE BIOPSY/CULTURE ;  Surgeon: Denisse Fletcher DPM;  Location: [facility];  Service: Podiatry     HI INSJ/RPLCMT TEMP TRANSVNS 2CHMBR PACG ELTRDS SPX  10/14/2019          HI KNEE SCOPE,DIAGNOSTIC      Description: Arthroscopy Knee Left;  Recorded: 06/30/2011;     HI REMOVE TONSILS/ADENOIDS,<13 Y/O      Description: Tonsillectomy With Adenoidectomy;  Recorded: 06/30/2011;     HI SHLDR ARTHROSCOP,DIAGNOSTIC      Description: Arthroscopy Shoulder Right;  Recorded: 06/30/2011;       Allergies:  Allergies   Allergen Reactions     Definity [Perflutren Lipid Microspheres] Other (See Comments)     Back pain      Venom-Honey Bee Swelling and Dizziness       Medications:    Current Outpatient Medications:      acetaminophen (TYLENOL) 500 MG tablet, Take 1,000 mg by mouth every 6 (six) hours as needed for pain., Disp: , Rfl:      ampicillin 2 g in NaCl 0.9 % 0.9 % 100 mL IVPB, Infuse 2 g into a venous catheter every 6 (six) hours for 24 days., Disp: 1 each, Rfl: 0     apixaban (ELIQUIS) 5 mg Tab tablet, Take 1 tablet (5 mg total) by mouth 2 (two) times a day., Disp: 60 tablet, Rfl: 0     cefTRIAXone 2 g in NaCl 0.9 % 0.9 % 50 mL IVPB, Infuse 2 g into a venous catheter every 12 (twelve) hours for 24 days., Disp: 1 each, Rfl: 0     DULoxetine (CYMBALTA) 60 MG capsule, Take 1 capsule (60 mg total) by mouth daily., Disp: 30 capsule, Rfl: 0     furosemide (LASIX) 20 MG tablet, Take 20 mg by mouth daily., Disp: , Rfl:      gabapentin (NEURONTIN) 300 MG capsule, Take 1 capsule (300 mg total) by mouth at bedtime., Disp: 30 capsule, Rfl: 0     lidocaine 4 % patch, Place 1 patch on the skin daily. Remove and  discard patch with 12 hours or as directed by MD., Disp: 10 patch, Rfl: 0     metoprolol succinate (TOPROL-XL) 25 MG, Take 1 tablet (25 mg total) by mouth daily., Disp: 30 tablet, Rfl: 0     mexiletine (MEXITIL) 200 MG capsule, Take 1 capsule (200 mg total) by mouth every 12 (twelve) hours., Disp: 60 capsule, Rfl: 0     mirtazapine (REMERON) 15 MG tablet, Take 1 tablet (15 mg total) by mouth at bedtime., Disp: 30 tablet, Rfl: 0     omeprazole (PRILOSEC) 20 MG capsule, Take 1 capsule (20 mg total) by mouth 2 (two) times a day before meals., Disp: 30 capsule, Rfl: 0     senna-docusate (SENNOSIDES-DOCUSATE SODIUM) 8.6-50 mg tablet, Take 1 tablet by mouth daily., Disp: , Rfl:      tamsulosin (FLOMAX) 0.4 mg cap, Take 1 capsule (0.4 mg total) by mouth Daily after lunch., Disp: 30 capsule, Rfl: 0     topiramate (TOPAMAX) 50 MG tablet, Take 1 tablet (50 mg total) by mouth 2 (two) times a day., Disp: 60 tablet, Rfl: 0     traMADol (ULTRAM) 50 mg tablet, Take 1 tablet (50 mg total) by mouth 2 (two) times a day., Disp: 8 tablet, Rfl: 0     sotalol (BETAPACE) 80 MG tablet, Take 1 tablet (80 mg total) by mouth daily., Disp: 60 tablet, Rfl: 0  No current facility-administered medications for this visit.     Social History:  Social History     Socioeconomic History     Marital status:      Spouse name: Not on file     Number of children: 3     Years of education: Not on file     Highest education level: Not on file   Occupational History     Not on file   Social Needs     Financial resource strain: Not on file     Food insecurity:     Worry: Not on file     Inability: Not on file     Transportation needs:     Medical: Not on file     Non-medical: Not on file   Tobacco Use     Smoking status: Former Smoker     Packs/day: 2.50     Years: 50.00     Pack years: 125.00     Types: Cigarettes, Pipe, Cigars     Last attempt to quit: 2000     Years since quittin.9     Smokeless tobacco: Never Used   Substance and Sexual  Activity     Alcohol use: No     Comment: Last used 1979     Drug use: No     Sexual activity: Yes     Partners: Female     Birth control/protection: Post-menopausal   Lifestyle     Physical activity:     Days per week: Not on file     Minutes per session: Not on file     Stress: Not on file   Relationships     Social connections:     Talks on phone: Not on file     Gets together: Not on file     Attends [employer] service: Not on file     Active member of club or organization: Not on file     Attends meetings of clubs or organizations: Not on file     Relationship status: Not on file     Intimate partner violence:     Fear of current or ex partner: Not on file     Emotionally abused: Not on file     Physically abused: Not on file     Forced sexual activity: Not on file   Other Topics Concern     Not on file   Social History Narrative     since 1964, 3 children. 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 Quit smoking in 1999.         Family History:  Family History   Problem Relation Age of Onset     Stroke Mother      Emphysema Mother      Goiter Mother      Cancer Father         Stomach     Alzheimer's disease Brother            PHYSICAL EXAM:    Vitals:    11/21/19 1054   BP: 102/68   Pulse: 83   Temp: 99.1  F (37.3  C)   SpO2: 93%       GENERAL:  well-developed, well-nourished, in no acute distress.   HENT:  Head is normocephalic, atraumatic. Oropharynx is moist without exudates or ulcers.  EYES:  Eyes have anicteric sclerae.  Right conjunctival injection without purulence.   LUNGS:  Clear to auscultation.  CARDIOVASCULAR:  Regular rate and rhythm with no murmurs, gallops or rubs.  ABDOMEN:  Normal bowel sounds, soft, nontender.   MUSCULOSKELETAL: Both feet are in boots  SKIN:  No acute rashes. right upper extremity PICC line is in place without any surrounding erythema. No stigmata of endocarditis.  NEUROLOGIC: Grossly nonfocal. Normal gait and station        Pertinent labs:    Results from last 7 days   Lab Units  11/20/19  1620 11/20/19  1100   LN-WHITE BLOOD CELL COUNT thou/uL 2.9* 3.6*   LN-HEMOGLOBIN g/dL 10.5* 10.6*   LN-HEMATOCRIT % 32.4* 35.1*   LN-PLATELET COUNT thou/uL 125* 128*     Results from last 7 days   Lab Units 11/20/19  1100   LN-SODIUM mmol/L 145  145   LN-POTASSIUM mmol/L 3.8  3.8   LN-CHLORIDE mmol/L 109*  109*   LN-CO2 mmol/L 26  26   LN-BLOOD UREA NITROGEN mg/dL 36*  36*   LN-CREATININE mg/dL 2.97*  2.97*   LN-CALCIUM mg/dL 8.9  8.9     Lab Results   Component Value Date    CRP 1.0 (H) 11/20/2019         Lab Results   Component Value Date    ALT 18 11/20/2019    ALT 18 11/20/2019    AST 23 11/20/2019    ALKPHOS 68 11/20/2019    BILITOT 0.2 11/20/2019    BILITOT 0.2 11/20/2019         MICROBIOLOGY DATA:  Reviewed    RADIOLOGY:  Reviewed

## 2021-06-03 NOTE — TELEPHONE ENCOUNTER
Left Ludmila detailed message relaying pcp message. Advised Ludmila to call back with further questions.

## 2021-06-03 NOTE — TELEPHONE ENCOUNTER
Discussed with MABLE Acevedo, all pt on iv abx:  CBC with diff  Creatinine/BMP/CMP  CRP optional  ESR optional    Spoke to MABLE Walters at facility. They will have these labs done tomorrow and set as weekly for now until updated from provider.    Text page on call provider Dr Delgado.

## 2021-06-03 NOTE — TELEPHONE ENCOUNTER
[facility] Nurse Triage Telephone Note      Provider: GILL Solomon  Facility: [facility]    Facility Type: TCU    Caller: Cynthia  Call Back Number:  [phone]    Allergies: Definity [perflutren lipid microspheres] and Venom-honey bee    Reason for call: Nurse reporting BMP results.       Verbal Order/Direction given by Provider: No new orders.      Provider giving order: GILL Solomon    Verbal order given to: Cynthia Ricks RN

## 2021-06-04 VITALS
BODY MASS INDEX: 30.49 KG/M2 | TEMPERATURE: 98.4 F | RESPIRATION RATE: 20 BRPM | DIASTOLIC BLOOD PRESSURE: 71 MMHG | WEIGHT: 237.5 LBS | SYSTOLIC BLOOD PRESSURE: 105 MMHG | OXYGEN SATURATION: 93 % | HEART RATE: 80 BPM

## 2021-06-04 VITALS
SYSTOLIC BLOOD PRESSURE: 102 MMHG | TEMPERATURE: 99.1 F | WEIGHT: 236 LBS | BODY MASS INDEX: 30.3 KG/M2 | HEART RATE: 83 BPM | OXYGEN SATURATION: 93 % | DIASTOLIC BLOOD PRESSURE: 68 MMHG

## 2021-06-04 VITALS
WEIGHT: 230 LBS | HEART RATE: 80 BPM | OXYGEN SATURATION: 99 % | SYSTOLIC BLOOD PRESSURE: 96 MMHG | DIASTOLIC BLOOD PRESSURE: 72 MMHG | BODY MASS INDEX: 29.53 KG/M2

## 2021-06-04 VITALS
DIASTOLIC BLOOD PRESSURE: 62 MMHG | OXYGEN SATURATION: 97 % | SYSTOLIC BLOOD PRESSURE: 104 MMHG | HEIGHT: 74 IN | HEART RATE: 86 BPM | WEIGHT: 230 LBS | BODY MASS INDEX: 29.52 KG/M2

## 2021-06-04 VITALS
RESPIRATION RATE: 17 BRPM | SYSTOLIC BLOOD PRESSURE: 110 MMHG | DIASTOLIC BLOOD PRESSURE: 65 MMHG | BODY MASS INDEX: 30.34 KG/M2 | WEIGHT: 236.3 LBS | OXYGEN SATURATION: 94 % | HEART RATE: 105 BPM | TEMPERATURE: 98.4 F

## 2021-06-04 VITALS — DIASTOLIC BLOOD PRESSURE: 60 MMHG | OXYGEN SATURATION: 97 % | SYSTOLIC BLOOD PRESSURE: 100 MMHG | HEART RATE: 84 BPM

## 2021-06-04 VITALS
BODY MASS INDEX: 30.52 KG/M2 | SYSTOLIC BLOOD PRESSURE: 118 MMHG | WEIGHT: 237.7 LBS | HEART RATE: 82 BPM | RESPIRATION RATE: 16 BRPM | TEMPERATURE: 45.5 F | OXYGEN SATURATION: 94 % | DIASTOLIC BLOOD PRESSURE: 87 MMHG

## 2021-06-04 VITALS
SYSTOLIC BLOOD PRESSURE: 120 MMHG | HEART RATE: 80 BPM | DIASTOLIC BLOOD PRESSURE: 77 MMHG | TEMPERATURE: 97.7 F | WEIGHT: 238.5 LBS | OXYGEN SATURATION: 94 % | BODY MASS INDEX: 30.62 KG/M2 | RESPIRATION RATE: 16 BRPM

## 2021-06-04 VITALS
RESPIRATION RATE: 16 BRPM | DIASTOLIC BLOOD PRESSURE: 72 MMHG | WEIGHT: 230 LBS | BODY MASS INDEX: 29.52 KG/M2 | HEART RATE: 80 BPM | HEIGHT: 74 IN | SYSTOLIC BLOOD PRESSURE: 102 MMHG

## 2021-06-04 VITALS
TEMPERATURE: 98.2 F | DIASTOLIC BLOOD PRESSURE: 66 MMHG | HEART RATE: 81 BPM | BODY MASS INDEX: 30.58 KG/M2 | SYSTOLIC BLOOD PRESSURE: 123 MMHG | OXYGEN SATURATION: 99 % | RESPIRATION RATE: 18 BRPM | WEIGHT: 238.2 LBS

## 2021-06-04 VITALS
WEIGHT: 239 LBS | SYSTOLIC BLOOD PRESSURE: 88 MMHG | RESPIRATION RATE: 18 BRPM | HEART RATE: 80 BPM | DIASTOLIC BLOOD PRESSURE: 62 MMHG | HEIGHT: 74 IN | OXYGEN SATURATION: 93 % | BODY MASS INDEX: 30.67 KG/M2

## 2021-06-04 NOTE — TELEPHONE ENCOUNTER
"On 12/31/2019:  Type: alert remote CRT-D transmission for accelerated ventricular arrhythmia episode.  Presenting rhythm: AP-biVP 80 bpm.  Battery/lead status: stable.  Arrhythmias: since 12/26/19; 82 VT-1 episodes, majority occurring on 12/30/19 from 13:37-14:03, appears to be very slow VT, at times toggled therapy zone, longest lasting 1min, treat with ATP 1-6. One NSVT(no EGM). One other VT episode, self-terminated. No mode switch episodes detected.   Comments: appears to be normal ICD function. Device biVP 94%. Routed to device RN for review.   Device/lead alerts: none. HARSHA    Addendum: Transmission reviewed.  Continued episodes of slow VT as seen in the past (in the VT-1 Zone w/ATP only, no shocks programmed), VT rates 119-123 bpm.  Call to Mr. Fall.  We discussed yesterday's VT episodes.  He reports, \"Oh, I was in the shower then, that's the time exactly.\"  \"It's a lot of work, taking a shower for me is.  You get a little bit out of breath but it's a lot of work because I'm in a wheelchair and have to put on special braces.  Well, my amiodarone is once a day now, yesterday was the last day I took it 3 times a day.  I'm seeing Dr. Hernández on Monday the 6th, so I'll find out more then.\"  Mr. Fall denies any lightheadedness, dizziness, syncope, near syncope, chest, neck or back pain yesterday.  He confirms taking all of his medications as prescribed.  Reviewed when to seek EMS.  Mr. Fall verbalizes understanding of these instructions and is agreeable to the plan. Rerouted to Dr. Hernández with today's information.  Jen Kelly, RN, MA  Device Nurse  [facility]    "

## 2021-06-04 NOTE — TELEPHONE ENCOUNTER
Spoke with patient regarding Amiodarone medication that was started inpatient.  Looking through the chart pt was started on amiodarone 3 times daily on 12/9/19. Per Dr. Davies he would like this continued for a total of 21 days.  Pt educated to continue taking 3x daily until 12/30/19 and at that time go down to 200mg daily.    Informed patient our Amiodarone Coordinator will be calling him regarding education on starting amiodarone medication and continued monitoring while taking medication.    Anayeli- Looks like this patient was on amiodarone therapy in the past but had some concern of hepatic toxicity.  I don't see documentation from you in the chart so I am sending to you so you are aware!    No further questions or concerns at this time.    Ana

## 2021-06-04 NOTE — PATIENT INSTRUCTIONS - HE
Florentino Fall    It was a pleasure to see you at [facility] today.    ALT was drawn today  Increase metoprolol succinate to 1 whole pill daily (100 mg)  Call if increased shortness of breath weakness or sustained rapid heart beating greater than 120 bpm.  Call if shocks from your defibrillator  Follow up appointment:   Dr. Hernández in 3 months in device clinic    Contact Tatyana at [phone] with questions or concerns.    Ulises Hernández MD

## 2021-06-04 NOTE — PROGRESS NOTES
Pt is back on amio and was called to review amio protocol  Labs are current not due until 2/2020  Reviewed sunscreen usage with pt  Review with pt he is to call if issues with rash,cough, shortness of breath,  tremors and visual issues  Pt understands and agrees to plan and has my direct number if needed.

## 2021-06-04 NOTE — TELEPHONE ENCOUNTER
I met with Ray today to discuss his medication changes after dicharge. Was started on amiodarone 200 mg three times a day for 21 days then 200 mg daily. He wonders when the 21 day time frame started -- 12/7 when he was admitted or 12/11 when he was discharged? Thanks!     Jacqueline Howard, Pharm.D., BCACP  Medication Therapy Management Pharmacist  [facility]

## 2021-06-04 NOTE — PROGRESS NOTES
[facility]    NAME:  Florentino Fall             :  1945  MRN: [id number]  CODE STATUS:  FULL CODE    VISIT TYPE: DISCHARGE SUMMARY  FACILYTY: [facility] [[id number]]                    PRIMARY CARE PROVIDER: Mario Lepe MD    DISCHARGE DIAGNOSIS:      1. Subacute bacterial endocarditis    2. ICD (implantable cardioverter-defibrillator), biventricular, in situ    3. Essential hypertension with goal blood pressure less than 140/90    4. CKD (chronic kidney disease) stage 3, GFR 30-59 ml/min (H)    5. Chronic systolic heart failure (H)    6. Anemia, unspecified type    7. PVT (paroxysmal ventricular tachycardia) (H)    8. Chronic atrial fibrillation    9. Generalized muscle weakness    10. Cardiomyopathy, nonischemic (H)         DISCHARGE MEDICATIONS:      Current Outpatient Medications   Medication Sig Dispense Refill     albuterol (PROAIR HFA;PROVENTIL HFA;VENTOLIN HFA) 90 mcg/actuation inhaler Inhale 2 puffs every 6 (six) hours as needed for wheezing. 1 Inhaler 11     amiodarone (PACERONE) 200 MG tablet Take 1 tablet (200 mg total) by mouth 3 (three) times a day for 21 days, THEN 1 tablet (200 mg total) daily. 93 tablet 0     aspirin 81 MG EC tablet Take 1 tablet (81 mg total) by mouth daily.  0     DULoxetine (CYMBALTA) 60 MG capsule Take 1 capsule (60 mg total) by mouth daily. 30 capsule 0     furosemide (LASIX) 20 MG tablet Take 1 tablet (20 mg total) by mouth daily.  0     Lactobacillus rhamnosus GG (CULTURELLE) 10-15 Billion cell capsule Take 1 capsule by mouth daily.       metoprolol succinate (TOPROL-XL) 100 MG 24 hr tablet Take 0.5 tablets (50 mg total) by mouth daily. 90 tablet 3     omeprazole (PRILOSEC) 20 MG capsule Take 1 capsule (20 mg total) by mouth daily before breakfast. 30 capsule 11     potassium chloride (K-DUR,KLOR-CON) 20 MEQ tablet Take 1 tablet (20 mEq total) by mouth daily. 30 tablet 0     senna-docusate (SENNOSIDES-DOCUSATE SODIUM)  8.6-50 mg tablet Take 1 tablet by mouth daily.       spironolactone (ALDACTONE) 25 MG tablet Take 1 tablet (25 mg total) by mouth daily. 30 tablet 0     tamsulosin (FLOMAX) 0.4 mg cap Take 1 capsule (0.4 mg total) by mouth Daily after lunch. 30 capsule 0     topiramate (TOPAMAX) 100 MG tablet Take 1 tablet (100 mg total) by mouth 2 (two) times a day. 60 tablet 0     traMADol (ULTRAM) 50 mg tablet Take 2 tablets (100 mg total) by mouth 2 (two) times a day. 120 tablet 3     No current facility-administered medications for this visit.        HISTORY OF PRESENT ILLNESS: Florentino Fall is a 74 y.o. male  admitted for Defibrillator discharge, Anemia, unspecified type, and Acute congestive heart failure, unspecified heart failure type.  Echocardiogram showed endocarditis.        1.  Subacute bacterial endocarditis    Todd was admitted with diffuse malaise and ventricular tachycardia.  Echocardiogram showed endocarditis.  He was placed on IV antibiotics.  He underwent removal of his defibrillator and pacemaker lead which were infected, with laser ablation of a large tricuspid lesion on October 14.  He also underwent video-assisted thorascopic surgical exploration of the right chest, and underwent angiographic suction of the tricuspid valve and right ventricular lead vegetation     Depending on timing of antibiotics, this may be considered to be the first date of appropriate treatment and he will then need 6 weeks there forth.  ID will coordinate further.  He will be discharged on ceftriaxone and ampicillin     2.   Ventricular tachycardia.  He has long-term intermittent ischemic ventricular tachycardia.  He underwent ablation of ventricular tachycardia after mapping on October 7.  He continued to have intermittent episodes.  Medicines were adjusted by cardiology and he will continue home on mexiletine, sotalol, metoprolol     3.   Atrial fibrillation.  Underlying rhythm was found to be atrial fibrillation during this  admission.  This is apparently new per conversation with Dr. Hernández.  He was appropriately initiated on anticoagulants after his pacemaker reinsertion     4.   Implantable defibrillator.  With his recurrent V. tach he has had a defibrillator.  This was removed on October 14.  After clearance of infection, this was replaced on October 30 with implantation of a new biventricular ICD system     5.   Cholelithiasis.  He had intermittent episodes of abdominal pain.  CT scan showed cholelithiasis with some inflammation of the gallbladder.  Surgical consultation was requested and they elected to defer intervention at this time.  This will be monitored carefully     6.  Anemia.  Iron was briefly initiated but held due to GI upset     7.  Peripheral neuropathy.  Topiramate was decreased with due to gastrointestinal issues.  Neuropathy seem to remain under good control.  He also receives vitamin B12 shots periodically     8.  Positive Cologuard.  We have been unable to evaluate positive Cologuard due to his fear of a prep.  He was seen by GI who wished to defer colonoscopy.  Barium enema and CT scan were performed which showed no large obstructing lesions although right colon was not completely visualized.  This will be reassessed as an outpatient     9.  Chronic kidney disease.  Creatinine runs between 1.5 and 1.7 and this remains stable.  Diuretics should be used judiciously     10.  Congestive heart failure.  Volume status fluctuated but seems to be improving as his medical condition stabilized     11.  Back pain.  Complained of back pain throughout the admission.  We considered the possibility that there may be a focus of osteo-myelitis however treatment would be similar.  He is treated with gabapentin at night and a Lidoderm patch     Patient was stabilized and transferred to TCU for further rehabilitation and complete IV antibiotics for endocarditis through approximately November 26, 2019. Infectious disease will  follow.      SKILLED NURSING FACILITY COURSE:  During this TCU stay, patient completed all anticipated goals of therapy.      PHYSICAL EXAMINATION:    Vitals:    12/15/19 1124   BP: 118/87   Pulse: 82   Resp: 16   Temp: (!) 45.5  F (7.5  C)   SpO2: 94%   Weight: (!) 237 lb 11.2 oz (107.8 kg)     GENERAL: Awake, Alert, oriented x3, not in any form of acute distress, answers questions appropriately, follows simple commands, conversant.  Fatigue.  HEENT: Head is normocephalic with normal hair distribution. No evidence of trauma. Ears: No acute purulent discharge. Eyes: Conjunctivae pink with no scleral jaundice. Nose: Normal mucosa and septum. NECK: Supple with no cervical or supraclavicular lymphadenopathy. Trachea is midline.   CHEST: No tenderness or deformity, no crepitus. Chest wall is remarkable for swelling around his right chest hardware.  LUNG: Clear to auscultation with good chest expansion. There are no crackles or wheezes, normal AP diameter.  BACK: No kyphosis of the thoracic spine. Symmetric, no curvature, ROM normal, no CVA tenderness, no spinal tenderness   CVS: There is good S1  S2, with soft systolic murmur PMI is palpable in the left chest pulses are palpable at the wrist and femoral areas, rubs, gallops, or heaves, rhythm is regular,  2+ pulses symmetric in all extremities.  ABDOMEN: Globular and soft, nontender to palpation, non distended, no masses, no organomegaly, good bowel sounds, no rebound or guarding, no peritoneal signs.   EXTREMITIES: Bilateral charcot's feet. Full range of motion on upper extremities,there is extensive bruising spilling down past the elbow and the right arm.   there is no tenderness to palpation, bilateral lower ectremtiy edema, no cyanosis or clubbing, no calf tenderness.  Pulses equal in all extremities, normal cap refill, no joint swelling.  SKIN: Warm and dry, no erythema noted.  Skin color, texture, no rashes or lesions.  NEUROLOGICAL: The patient is oriented to  person, place and time. Strength and sensation are grossly intact. Face is symmetric.    LABS:  All labs reviewed in the nursing home record.    DISCHARGE PLAN:  I certify that this patient is under my care and that I or the nurse practitioner working with me, had a face-to-face encounter that meets the physician face-to-face encounter requirements with this patient.   Date of Face-to-Face Encounter: 11/21/2019    This patient is homebound because: The patient cannot leave home without  considerable and taxing effort.  Patient requires the aid of a wheelchair and needs the assistance of another person..    I certify that, based on my findings, the following services are medically necessary home health services: PT, OT, and RN    My clinical findings support the need for the above skilled services because:  Patient to be followed by home care for physical therapy to eval and treat for strengthening, balance, endurance, and safety with mobility, and ambulation.  Patient to be followed by home care for occupational therapy to eval and treat for strengthening, ADL needs, adaptive equipment, and safety.  Patient to be followed by home care for nursing services for medication set up and teaching, symptom and disease processes monitoring and education.        The patient is, or has been, under my care and I have initiated the establishment of the plan of care. This patient will be followed by a physician who will periodically review the plan of care.     Planned discharge.  All therapy goals have been met.  Family will assist with discharge and transportation.  Patient will follow up with PCP within 7-10 days after discharge for medication mangagment and appropriate lab studies.      Patient received a hard script for Tramadol # 20      Electronically signed by:  Michaela Garcia CNP      For documentation purposes, chart review, medication management, and discharge coordination of care was greater than 35 minutes

## 2021-06-04 NOTE — PROGRESS NOTES
Second attempt to reach patient for hospital discharge follow up.  No answer.  Left VM message reminding pt of appt on 12/20. RN has made two unsuccessful attempts to reach patient.   Encounter closed.

## 2021-06-04 NOTE — PATIENT INSTRUCTIONS - HE
Probiotic is good idea    Stop Gabapentin.  This is causing some swelling in leg    Stop tylenol.  It is not helping    Update blood tests today off the antibiotics    Increase tramadol back to 100 mgs twice a day for pain    Stop lidocaine patch    Increase Metoprolol to 50 mgs, one half of 100 mgs daily    Change Mexilitene back to 150 mgs 3 times a day    Decrease Omeprazole to 20 mgs once a day    Stop blood thinner when you finish the supply.  Your pacer shows no atrial fib the last month    Albuterol inhaler for breathing and cough    Increase Furosemide to 40 mgs once a day until swelling is better, then go back to 20 mgs daily

## 2021-06-04 NOTE — PROGRESS NOTES
ASSESSMENT:  1. Subacute bacterial endocarditis  Done with antibiotics.  Update labs.  Question eosinophilia/allergic reaction with worsening renal function.  Surveillance blood cultures as requested by infectious disease  - Culture, Blood  - HM1(CBC and Differential)  - Comprehensive Metabolic Panel  - traMADol (ULTRAM) 50 mg tablet; Take 2 tablets (100 mg total) by mouth 2 (two) times a day.  Dispense: 120 tablet; Refill: 3  - omeprazole (PRILOSEC) 20 MG capsule; Take 1 capsule (20 mg total) by mouth daily before breakfast.  Dispense: 30 capsule; Refill: 11  - mirtazapine (REMERON) 15 MG tablet; Take 1 tablet (15 mg total) by mouth at bedtime.  Dispense: 30 tablet; Refill: 11  - HM1 (CBC with Diff)  - albuterol (PROAIR HFA;PROVENTIL HFA;VENTOLIN HFA) 90 mcg/actuation inhaler; Inhale 2 puffs every 6 (six) hours as needed for wheezing.  Dispense: 1 Inhaler; Refill: 11  - Culture, Blood    2. Paroxysmal ventricular tachycardia (H)  Recurrent by event monitor.  Okay to change mexiletine back to 150 mg tabs.  Increase metoprolol slightly  - mexiletine (MEXITIL) 150 MG capsule; Take 1 capsule (150 mg total) by mouth 3 (three) times a day.  Dispense: 270 capsule; Refill: 3  - metoprolol succinate (TOPROL-XL) 100 MG 24 hr tablet; Take 0.5 tablets (50 mg total) by mouth daily.  Dispense: 90 tablet; Refill: 3    3. Major depressive disorder with single episode, in full remission (H)  Mood stable despite extensive hospitalization.  Wife Jeanne placed in memory care    4. Essential hypertension with goal blood pressure less than 140/90  Blood pressure still low    5. Chronic atrial fibrillation  New atrial fibrillation in hospital.  Event monitor shows no recurrence.  May be able to discontinue anticoagulants    6. Neuropathy  Recheck uric acid.  Increase tramadol.  Discontinue gabapentin due to swelling  - Uric Acid      PLAN:  Patient Instructions   Probiotic is good idea    Stop Gabapentin.  This is causing some swelling in  leg    Stop tylenol.  It is not helping    Update blood tests today off the antibiotics    Increase tramadol back to 100 mgs twice a day for pain    Stop lidocaine patch    Increase Metoprolol to 50 mgs, one half of 100 mgs daily    Change Mexilitene back to 150 mgs 3 times a day    Decrease Omeprazole to 20 mgs once a day    Stop blood thinner when you finish the supply.  Your pacer shows no atrial fib the last month    Albuterol inhaler for breathing and cough    Increase Furosemide to 40 mgs once a day until swelling is better, then go back to 20 mgs daily      Orders Placed This Encounter   Procedures     Culture, Blood     Comprehensive Metabolic Panel     Uric Acid     HM1 (CBC with Diff)     Culture, Blood     Medications Discontinued During This Encounter   Medication Reason     ampicillin 2 g in NaCl 0.9 % 0.9 % 100 mL IVPB      mexiletine (MEXITIL) 200 MG capsule      metoprolol succinate (TOPROL-XL) 25 MG      acetaminophen (TYLENOL) 500 MG tablet      gabapentin (NEURONTIN) 300 MG capsule      lidocaine 4 % patch      traMADol (ULTRAM) 50 mg tablet      omeprazole (PRILOSEC) 20 MG capsule      mirtazapine (REMERON) 15 MG tablet Reorder       Return in about 4 weeks (around 1/1/2020) for for a full review of everything we did today.    CHIEF COMPLAINT:  Chief Complaint   Patient presents with     Hospital Visit Follow Up     [facility] 10/2 - 11/2 [facility] 11/2-11/26        HISTORY OF PRESENT ILLNESS:  Florentino is a 74 y.o. male presenting to the clinic today for review of his extended hospitalization.  He was admitted to the hospital from October 2 to November 2 for an extended hospital stay which included endocarditis, removal of right tricuspid vegetation, removal of infected defibrillator, placement of new defibrillator, and a IV antibiotics    Upon completion of hospitalization he was discharged to TCU where he completed antibiotics.  He is now home    Neuropathy has improved slightly but  "diffuse pain has worsened.  Tramadol was decreased in the hospital and he would like to increase the dose    Renal function worsened.  Antibiotics may have caused eosinophilia    Legs are more swollen with left much worse than right    Back continues to hurt.  He is does not believe that the lidocaine patch helps    He has had no further recurrence of nausea or vomiting    REVIEW OF SYSTEMS:   No nausea.  No abdominal pain.  Occasional loose cough and shortness of breath.  Worsening peripheral edema.  Good mood.  Good sleep all other systems are negative.    PFSH:  Wife Jeanne has been placed in memory care while he was in the hospital    TOBACCO USE:  Social History     Tobacco Use   Smoking Status Former Smoker     Packs/day: 2.50     Years: 50.00     Pack years: 125.00     Types: Cigarettes, Pipe, Cigars     Last attempt to quit: 2000     Years since quittin.9   Smokeless Tobacco Never Used       VITALS:  Vitals:    19 1413   BP: (!) 88/62   Patient Site: Left Arm   Patient Position: Sitting   Cuff Size: Adult Large   Pulse: 80   Resp: 18   SpO2: 93%   Weight: (!) 239 lb (108.4 kg)   Height: 6' 2\" (1.88 m)     Wt Readings from Last 3 Encounters:   19 (!) 230 lb (104.3 kg)   19 (!) 239 lb (108.4 kg)   19 (!) 237 lb 8 oz (107.7 kg)     Body mass index is 30.69 kg/m .    PHYSICAL EXAM:  Constitutional:  Reveals a pleasant garrulous elderly man sitting in a wheelchair.  Brace on right foot.  Vitals:  Per nursing notes.    Cardiac:  Regular rate and rhythm without murmurs, rubs, or gallops. Carotids without bruits. Legs without edema. Palpation of the radial pulse regular.  Left leg 2+ edema  Lungs: Clear.  Respiratory effort normal.  Abdomen:   Bowel sounds positive, nontender, nondistended.  Neither liver nor spleen palpable.  Skin:   Without rash, bruise, or palpable lesions.  Rheumatologic: Normal joints and nails of the hands.  Neurologic:  Cranial nerves II-XII intact.   "   Psychiatric:  Mood appropriate, memory intact.        ADDITIONAL HISTORY SUMMARIZED (2): Reviewed hospital discharge summary and infectious disease follow-up note  CARE EVERYWHERE/ EXTRA INFORMATION (1): None.   RADIOLOGY TESTS (1):   LABS (1): Update today  CARDIOLOGY/MEDICINE TESTS (1): Event device report showing no A. fib but slow V. tach  INDEPENDENT REVIEW (2 each): None.     Total data points:5      The visit lasted a total of 35 minutes face to face with the patient. Over 50% of the time was spent counseling and educating the patient about medications, medication adjustments, medication side effects, and lab testing.        MEDICATIONS:  Current Outpatient Medications   Medication Sig Dispense Refill     albuterol (PROAIR HFA;PROVENTIL HFA;VENTOLIN HFA) 90 mcg/actuation inhaler Inhale 2 puffs every 6 (six) hours as needed for wheezing. 1 Inhaler 11     apixaban (ELIQUIS) 5 mg Tab tablet Take 1 tablet (5 mg total) by mouth 2 (two) times a day. 60 tablet 0     DULoxetine (CYMBALTA) 60 MG capsule Take 1 capsule (60 mg total) by mouth daily. 30 capsule 0     furosemide (LASIX) 20 MG tablet Take 40 mg by mouth daily.        metoprolol succinate (TOPROL-XL) 100 MG 24 hr tablet Take 0.5 tablets (50 mg total) by mouth daily. 90 tablet 3     mexiletine (MEXITIL) 150 MG capsule Take 1 capsule (150 mg total) by mouth 3 (three) times a day. 270 capsule 3     mirtazapine (REMERON) 15 MG tablet Take 1 tablet (15 mg total) by mouth at bedtime. 30 tablet 11     omeprazole (PRILOSEC) 20 MG capsule Take 1 capsule (20 mg total) by mouth daily before breakfast. 30 capsule 11     senna-docusate (SENNOSIDES-DOCUSATE SODIUM) 8.6-50 mg tablet Take 1 tablet by mouth daily.       sotalol (BETAPACE) 80 MG tablet Take 1 tablet (80 mg total) by mouth daily. 60 tablet 0     tamsulosin (FLOMAX) 0.4 mg cap Take 1 capsule (0.4 mg total) by mouth Daily after lunch. 30 capsule 0     topiramate (TOPAMAX) 50 MG tablet Take 1 tablet (50 mg  total) by mouth 2 (two) times a day. 60 tablet 0     traMADol (ULTRAM) 50 mg tablet Take 2 tablets (100 mg total) by mouth 2 (two) times a day. 120 tablet 3     No current facility-administered medications for this visit.

## 2021-06-04 NOTE — PROGRESS NOTES
Hospital discharge follow up call to pt, no answer.  Left VM message reminding pt of appt on 12/13.  RN will attempt call back at another time.

## 2021-06-04 NOTE — PROGRESS NOTES
"[facility] Transition of Care Encounter  Assessment & Plan                                                     Post Discharge Medication Reconciliation Status: discharge medications reconciled and changed, per note/orders (see AVS)    Paroxysmal ventricular tachycardia: Stable, feeling great right now. Per chart review, cardiology did ok d/cing Eliiquis. Recommend follow up with cardiology. Will clarify end date of amiodarone three times a day dosing with cardiology.     Hypokalemia: Last K WNL. Recommend recheck today to ensure it is not too high with combo of K + spir.     Edema/CHF: Stable. Continue to monitor swelling and weight.     Neuropathy/Pain: Stable. Some worsening off gabapentin, but he would prefer to remain off. No change in symptoms with higher dose of topiramate, but continue to monitor.  Could consider increasing duloxetine in the future to see if that helps his neuropathy.     GERD: Stable. Recommended to continue current regimen.     BPH:Stable. Recommended to continue current regimen.     shortness of breath: Stable. Recommended to continue current regimen and continue to use PRN.     Follow Up  As needed with MTM     Subjective & Objective                                                       Florentino Fall is a 74 y.o. male called for a transitions of care visit. he was discharged from [facility] on 12/11/19 for vta.    Chief Complaint: pretty good since home.     Medication Adherence/Access: Was knowledgeable about his medications.     Paroxysmal ventricular tachycardia: Admitted with shock from his new defibrillator.  Placed on telemetry and started on amiodarone 200 mg 3 times daily for 3 weeks, then 200 mg daily. He wonders when the start date of three times a day 21 days was.  Prior amiodarone therapy had some concern of hepatic toxicity. Does feel like amiodarone works when he needs it, but when he does not need it, it \"knocks him down.\" Right now feels like better, healthy, good on " "medications. Mexiletine and sotalol were discontinued -- confirms he is no longer taking.  Continued current metoprolol dose of succinate 100 mg half tablet (50 mg) daily.  Last ablation was in October.  Will be following up with cardiology in January.  Eliquis was also discontinued and aspirin 81 mg started. Told them he didn't like the Eliquis and was ok'ed by cardiology to stop. Made his blood too thin. Whenever his cat would get on he would bleed.     Hypokalemia: Potassium was 3.2 on admission.  Patient is on furosemide 20 mg daily.  Was started on spironolactone 25 mg daily and potassium 20 M EQ daily. No gynecomania.   Last potassium level = 4.1 on 12/11/2019    Edema/CHF: On 12/4/2019, furosemide dose was increased from 20 mg to 40 mg which is helpful for edema, but at discharge decreased back to 20 mg.  Gabapentin was discontinued which also helped the edema.  Continues furosemide 20 mg daily, spironolactone, and metoprolol. Swelling is down, keeps a close eye on it. Denies lightheadedness/dizziness.    ICD removed due to history of endocarditis.  New CRT-D implant.   ECHO: Date 12/9/19, EF 20%    Neuropathy/Pain: No longer on gabapentin due to edema. Somewhat wosening off gabapentin, but not worth restarting. He can handle the neuropathy.  During admission, topiramate was increased from 50 mg twice daily to 100 mg twice daily. Did not notice a difference. No word finding difficulties.  Continues duloxetine 60 mg and tramadol 100 mg twice daily. Take tramadol on a schedule -- takes the edge off and does not want anything stronger. Reports duloxetine is also for depression/anxiety which is well controlled. Reports he \"went through a lot of crap 3-4 years ago\" and duloxetine has been helpful for that. Is on senna docusate daily for bowel regimen -- is in a wheelchair and has a BM daily.     GERD: Currently taking omeprazole 20 mg daily helpful for GERD symtpms     BPH: Currently taking tamsulosin 0.4 mg " daily. Works good, helpful.     shortness of breath: Has not picked up albuterol yet, but planning on it. Has not needed recently. Using due to post nasal drip which gets down into lung and every now and then therefore hard for breath. Prefers HFA over a neb. Helpful when uses.         PMH: reviewed in EPIC   Allergies/ADRs: reviewed in EPIC   Alcohol: reviewed in EPIC  Tobacco:   Social History     Tobacco Use   Smoking Status Former Smoker     Packs/day: 2.50     Years: 50.00     Pack years: 125.00     Types: Cigarettes, Pipe, Cigars     Last attempt to quit: 2000     Years since quittin.9   Smokeless Tobacco Never Used     Recent Vitals:   BP Readings from Last 3 Encounters:   19 98/62   19 102/72   19 (!) 88/62      Wt Readings from Last 3 Encounters:   19 213 lb 9.6 oz (96.9 kg)   19 (!) 230 lb (104.3 kg)   19 (!) 239 lb (108.4 kg)     ----------------    The patient declined an after visit summary    I spent 20 minutes with this patient today;  . All changes were made via collaborative practice agreement with Mario Lepe MD. A copy of the visit note was provided to the patient's provider.     Jacqueline Howard, Pharm.D., [facility] Center[facility]  Medication Therapy Management Pharmacist  [facility]     Current Outpatient Medications   Medication Sig Dispense Refill     albuterol (PROAIR HFA;PROVENTIL HFA;VENTOLIN HFA) 90 mcg/actuation inhaler Inhale 2 puffs every 6 (six) hours as needed for wheezing. 1 Inhaler 11     amiodarone (PACERONE) 200 MG tablet Take 1 tablet (200 mg total) by mouth 3 (three) times a day for 21 days, THEN 1 tablet (200 mg total) daily. 93 tablet 0     aspirin 81 MG EC tablet Take 1 tablet (81 mg total) by mouth daily.  0     DULoxetine (CYMBALTA) 60 MG capsule Take 1 capsule (60 mg total) by mouth daily. 30 capsule 0     furosemide (LASIX) 20 MG tablet Take 1 tablet (20 mg total) by mouth daily.  0     Lactobacillus rhamnosus GG  (CULTURELLE) 10-15 Billion cell capsule Take 1 capsule by mouth daily.       metoprolol succinate (TOPROL-XL) 100 MG 24 hr tablet Take 0.5 tablets (50 mg total) by mouth daily. 90 tablet 3     omeprazole (PRILOSEC) 20 MG capsule Take 1 capsule (20 mg total) by mouth daily before breakfast. 30 capsule 11     potassium chloride (K-DUR,KLOR-CON) 20 MEQ tablet Take 1 tablet (20 mEq total) by mouth daily. 30 tablet 0     senna-docusate (SENNOSIDES-DOCUSATE SODIUM) 8.6-50 mg tablet Take 1 tablet by mouth daily.       spironolactone (ALDACTONE) 25 MG tablet Take 1 tablet (25 mg total) by mouth daily. 30 tablet 0     tamsulosin (FLOMAX) 0.4 mg cap Take 1 capsule (0.4 mg total) by mouth Daily after lunch. 30 capsule 0     topiramate (TOPAMAX) 100 MG tablet Take 1 tablet (100 mg total) by mouth 2 (two) times a day. 60 tablet 0     traMADol (ULTRAM) 50 mg tablet Take 2 tablets (100 mg total) by mouth 2 (two) times a day. 120 tablet 3     No current facility-administered medications for this visit.

## 2021-06-04 NOTE — PROGRESS NOTES
ASSESSMENT:  1. Paroxysmal ventricular tachycardia (H)  Feeling occasional palpitations but no further shocks since hospitalized.  Reviewed hospitalization and medicine adjustments.    Assessment of atrial fibrillation issue and cessation of anticoagulants will need to be reassessed at next device and after seeing number of mode switches  - cyanocobalamin injection 1,000 mcg    2. Major depressive disorder with single episode, in full remission (H)  Stable on duloxetine  - cyanocobalamin injection 1,000 mcg    3. Essential hypertension with goal blood pressure less than 140/90  Blood pressure slightly low but stable    4. CKD (chronic kidney disease) stage 3, GFR 30-59 ml/min (H)  Worsened renal function perhaps due to allergic interstitial nephritis or Spironolactone or both.  Monitor carefully for now        PLAN:  Patient Instructions   Your device will tell us if you are having atrial fib.  If you are, you be staying on the blood thinners    Update blood tests today    Do you really need the pulmonary tests again?    Cancel me jan 2    Cancel pulmonary jan 24    See Hernández jan 6    See me 1 month      Orders Placed This Encounter   Procedures     HM2(CBC w/o Differential)     Basic Metabolic Panel     Standing Status:   Future     Number of Occurrences:   1     Standing Expiration Date:   12/20/2020     There are no discontinued medications.  Administrations This Visit     cyanocobalamin injection 1,000 mcg     Admin Date  12/20/2019 Action  Given Dose  1000 mcg Route  Intramuscular Administered By  Lydia Liz LPN              Return in about 4 weeks (around 1/17/2020) for for a full review of everything we did today.        Post Discharge Medication Reconciliation: discharge medications reconciled and changed, per note/orders (see AVS)  Plan of care communicated with: patient    CHIEF COMPLAINT:  Chief Complaint   Patient presents with     Follow-up       HISTORY OF PRESENT ILLNESS:  Florentino is a 74  "y.o. male presenting to the clinic today for a hospital follow up. Today Todd states that he has been feeling light fluttering and mixed heart beats. He wonders if his device is keeping track of all these instances and whether there has been some improvement with his atrial fibrilation. He denies peripheral edema when the feet are elevated. Todd states his mood is good with al things considered and thanks his duloxetine medication. He has been directed to call the ED if he gets two or more shocks from his advice and then present after two shocks. Todd has been on a blood thinner before but did not like how easily he would bruise and \"leak\".     Hospital/Nursing Home/IP Rehab Facility: [facility]  Date of Admission: 12/7/2019   Date of Discharge:12/11/2019  Reason(s) for Admission:Paroxysmal ventricular tachycardia.     Hospital Summary provided by [id number] Discharge Note and 12/7/2019 H&P:     The patient was getting rady for bed around midnight, while washing his hands he felt 2 subsequent shocks.  Shortly after that, he felt 3 subsequent shocks and he called EMS.  In the ED, he was tachycardic up to 140s with blood pressure 105/65 and patient otherwise awake and alert.  He was given IV amiodarone 150 mg bolus followed by drip with successful rate control.     Paroxysmal ventricular tachycardia (H) : Todd was admitted with shock from his new defibrillator.  He was placed on telemetry and started on amiodarone.  Mexiletine and sotalol were discontinued.  Metoprolol dose remain the same  There was some consideration regarding repeat attempt at ablation.  His last ablation was in October.  He was seen by cardiology, and EP consultation was requested. He was seen by EP this morning.  Recommendations were to discharge home, take amiodarone 600 mg daily for 3 weeks then 200 mg daily, and see in follow-up.  Discharges apparently are somewhat different from previous ventricular tachycardia.  Potassium and magnesium " were replaced    Hypokalemia.  Potassium level was 3.2 on admission.  This was probably due to a recent increase in furosemide.  He was placed on potassium protocol and will be discharged home on Spironolactone and potassium once daily    Edema.  When seen in the office last week furosemide was increased from 20 to 40 mg daily.  This helped the edema. Gabapentin had also been discontinued which helped edema.  Furosemide was decreased back to 20 mg daily. Spironolactone was resumed.     Congestive cardiomyopathy.  Echocardiogram was updated showing ejection fraction of 20%.  Metoprolol was continued. Spironolactone was resumed    Endocarditis.  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  Surveillance blood cultures were normal      REVIEW OF SYSTEMS:   Admits to heart fluttering, atrial fibrillation, and intermittent edema.  All other systems are negative.    PFSH:  Todd has had 5 hospitalization back to back.     TOBACCO USE:  Social History     Tobacco Use   Smoking Status Former Smoker     Packs/day: 2.50     Years: 50.00     Pack years: 125.00     Types: Cigarettes, Pipe, Cigars     Last attempt to quit: 2000     Years since quittin.9   Smokeless Tobacco Never Used       VITALS:  Vitals:    19 1346   BP: 96/72   Patient Site: Left Arm   Patient Position: Sitting   Cuff Size: Adult Large   Pulse: 80   SpO2: 99%   Weight: (!) 230 lb (104.3 kg)     Wt Readings from Last 3 Encounters:   19 (!) 230 lb (104.3 kg)   19 213 lb 9.6 oz (96.9 kg)   19 (!) 230 lb (104.3 kg)     Body mass index is 29.53 kg/m .    PHYSICAL EXAM:  Constitutional:  Reveals present, alert male. Present in a wheel chair.   Vitals:  Per nursing notes.  Cardiac:  Regular rate and rhythm without murmurs, rubs, or gallops. Carotids without bruits. Legs without edema. Palpation of the radial pulse regular.  Lungs: Clear.  Respiratory effort normal.  Psychiatric:  Mood appropriate,  memory intact.       MEDICATIONS:  Current Outpatient Medications   Medication Sig Dispense Refill     albuterol (PROAIR HFA;PROVENTIL HFA;VENTOLIN HFA) 90 mcg/actuation inhaler Inhale 2 puffs every 6 (six) hours as needed for wheezing. 1 Inhaler 11     amiodarone (PACERONE) 200 MG tablet Take 1 tablet (200 mg total) by mouth daily. 90 tablet 2     aspirin 81 MG EC tablet Take 1 tablet (81 mg total) by mouth daily.  0     DULoxetine (CYMBALTA) 60 MG capsule Take 1 capsule (60 mg total) by mouth daily. 30 capsule 0     furosemide (LASIX) 20 MG tablet Take 1 tablet (20 mg total) by mouth daily.  0     Lactobacillus rhamnosus GG (CULTURELLE) 10-15 Billion cell capsule Take 1 capsule by mouth daily.       metoprolol succinate (TOPROL-XL) 100 MG 24 hr tablet Take 0.5 tablets (50 mg total) by mouth daily. 90 tablet 3     omeprazole (PRILOSEC) 20 MG capsule Take 1 capsule (20 mg total) by mouth daily before breakfast. 30 capsule 11     potassium chloride (K-DUR,KLOR-CON) 20 MEQ tablet Take 1 tablet (20 mEq total) by mouth daily. 30 tablet 0     senna-docusate (SENNOSIDES-DOCUSATE SODIUM) 8.6-50 mg tablet Take 1 tablet by mouth daily.       spironolactone (ALDACTONE) 25 MG tablet Take 1 tablet (25 mg total) by mouth daily. 30 tablet 0     tamsulosin (FLOMAX) 0.4 mg cap Take 1 capsule (0.4 mg total) by mouth Daily after lunch. 30 capsule 0     topiramate (TOPAMAX) 100 MG tablet Take 1 tablet (100 mg total) by mouth 2 (two) times a day. 60 tablet 0     traMADol (ULTRAM) 50 mg tablet Take 2 tablets (100 mg total) by mouth 2 (two) times a day. 120 tablet 3     Current Facility-Administered Medications   Medication Dose Route Frequency Provider Last Rate Last Dose     cyanocobalamin injection 1,000 mcg  1,000 mcg Intramuscular Q30 Days Mario Lepe MD   1,000 mcg at 12/20/19 1450       ADDITIONAL HISTORY SUMMARIZED (2): Reviewed 12/9/2019 Remote Device check report.  Reviewed 12/7-12/11/2019 Hospital Discharge  Report.  DECISION TO OBTAIN EXTRA INFORMATION (1): None.   RADIOLOGY TESTS (1): None.  LABS (1): Reviewed.   MEDICINE TESTS (1): None.  INDEPENDENT REVIEW (2 each): None.     The visit lasted a total of 18 minutes face to face with the patient. Over 50% of the time was spent counseling and educating the patient about hospital encounter.    IMichaela, am scribing for and in the presence of, Dr. Lepe.    I, Dr. Lepe, personally performed the services described in this documentation, as scribed by Michaela Hendrix in my presence, and it is both accurate and complete.    Total data points: 3

## 2021-06-04 NOTE — PATIENT INSTRUCTIONS - HE
Florentino Fall    It was a pleasure to see you at [facility] today.    You have slow ventricular tachycardia episodes primarily at night to are probably asymptomatic.  ICD was adjusted for more effective antitachycardia pacing to stop these episodes   Continue current medications, okay to stop Eliquis and start aspirin 81 mg daily after current prescription is finished    Follow up appointment:   Dr. Hernández in device clinic in 2 months    Contact Tatyana at [phone] with questions or concerns.    Ulises Hernández MD

## 2021-06-04 NOTE — PATIENT INSTRUCTIONS - HE
Your device will tell us if you are having atrial fib.  If you are, you be staying on the blood thinners    Update blood tests today    Do you really need the pulmonary tests again?    Cancel me jan 2    Cancel pulmonary jan 24    See Edgar jan 6    See me 1 month

## 2021-06-05 NOTE — TELEPHONE ENCOUNTER
Controlled Substance Refill Request  Medication Name:   Requested Prescriptions     Pending Prescriptions Disp Refills     traMADol (ULTRAM) 50 mg tablet [Pharmacy Med Name: [employer] TRAMADOL 50 MG TAB (Bottle, 500.0 Tablets)] 120 tablet 0     Sig: Take 2 tablets (100 mg total) by mouth 2 (two) times a day.     Date Last Fill: 12/30/19  [facility]ed [facility]: [facility]  Submit electronically to pharmacy  Controlled Substance Agreement on file:   Encounter-Level CSA Scan Date - 08/07/2018:    Scan on 8/10/2018  6:43 AM           Encounter-Level CSA Scan Date - 01/16/2017:    Scan on 1/18/2017  1:41 PM           Encounter-Level CSA Scan Date - 11/12/2015:    Scan on 11/13/2015  1:29 PM        Last office visit: 12/20/19

## 2021-06-05 NOTE — TELEPHONE ENCOUNTER
RN cannot approve Refill Request    RN can NOT refill this medication med is not covered by policy/route to provider. Last office visit: 1/17/2020 Mario Lepe MD Last Physical: 12/14/2018 Last MTM visit: Visit date not found Last visit same specialty: 1/17/2020 Mario Lepe MD.  Next visit within 3 mo: Visit date not found  Next physical within 3 mo: Visit date not found      Bonnie Wade, [facility] Triage/Med Refill 1/30/2020    Requested Prescriptions   Pending Prescriptions Disp Refills     naltrexone (DEPADE) 50 mg tablet [Pharmacy Med Name: NALTREXONE 50 MG TAB] 7 tablet 0     Sig: Take 0.5 tablets (25 mg total) by mouth at bedtime for 14 days.       There is no refill protocol information for this order

## 2021-06-05 NOTE — PROGRESS NOTES
ASSESSMENT:  1. Chronic bilateral low back pain without sciatica  Diffuse arthralgias of unclear etiology.  Check uric acid and resume allopurinol.  Trial of low-dose naltrexone with discussion regarding interaction with tramadol.  Add back low-dose meloxicam now off anticoagulants  - Uric Acid  - naltrexone (DEPADE) 50 mg tablet; Take 0.5 tablets (25 mg total) by mouth at bedtime for 14 days.  Dispense: 7 tablet; Refill: 0  - meloxicam (MOBIC) 7.5 MG tablet; Take 1 tablet (7.5 mg total) by mouth daily.  Dispense: 30 tablet; Refill: 11    2. Acquired absence of other right toe(s) (H)  Reviewed previous amputation and culture of foot in June 2018 growing Enterococcus faecalis.  Reviewed blood culture in October growing Enterococcus faecalis and question relation    3. Major depressive disorder with single episode, in full remission (H)  Stable on current duloxetine.  Feeling better and better    4. CKD (chronic kidney disease) stage 3, GFR 30-59 ml/min (H)  Recheck now on current medications and attempt to decrease diuretics if possible    5. Idiopathic chronic gout of multiple sites without tophus  Medicine list no longer has allopurinol.  Will resume after checking uric acid    6. Dilated cardiomyopathy (H)  Stable on current medication.  Metoprolol dose increased.  Reviewed recurrent V. tach and cardiology follow-up    7. Recurrent ventricular tachycardia (H)  As above.  Metoprolol dose increased    8. Medication monitoring encounter  Recheck on amiodarone  - HM2(CBC w/o Differential)  - Comprehensive Metabolic Panel  - Thyroid Stimulating Hormone (TSH)    9. Pruritus, unspecified   - Thyroid Stimulating Hormone (TSH)        PLAN:  Patient Instructions   Resume Allopurinol 300 mgs daily to help joint pain    Try low dose anti inflammatory Meloxicam 7.5 mgs daily to help pain    Try Naltrexone one half pill daily at about lunch time for a week or so.  If it helps, you will want to continue and decrease tramadol.   If it does not, stop it          Orders Placed This Encounter   Procedures     Uric Acid     HM2(CBC w/o Differential)     Comprehensive Metabolic Panel     Thyroid Stimulating Hormone (TSH)     There are no discontinued medications.  Administrations This Visit     cyanocobalamin injection 1,000 mcg     Admin Date  01/17/2020 Action  Given Dose  1000 mcg Route  Intramuscular Administered By  Brittani Sotelo CMA              Return in about 6 weeks (around 2/28/2020).      CHIEF COMPLAINT:  Chief Complaint   Patient presents with     Follow-up     routine       HISTORY OF PRESENT ILLNESS:  Florentino is a 74 y.o. male presenting to the clinic today for a four week follow up to Paroxysmal ventricular tachycardia treatment. He states he feels great today. Florentino notes he had a follow up with his cardiologist on 1/6/20202 that went great. He states  was dumbfounded that Florentino was doing so well. He further notes the doctor said Florentino has not had any cardiac event or tachycardic heart rates since his hospitalization. Florentino in not what is working for what but was told to to keep on his regimen without changes. The only changes made to his medication list was going from a half tablet of metoprolol to a full tablet and discontinuing the blood thinner. Florentino has completed his last therapy session this week but would like to be referred for out patient physical therapy.     Joint Pain:  Todd experiences joint pain all over. He lists his ankles, elbows, back and all joints as problematic. He has been on tramadol for as long as he can remember and confirms taking it at night. Todd notes he is getting low on his tramadol so he would like a refill. He admits his pain is worse in the morning when he wakes up, and  is affected by cold weather.     REVIEW OF SYSTEMS:   Admits to swelling, and oint pain.  Denies atrial fibrillation, heart flutters, stomach pain, memory loss, and diabetes.   All other systems are  negative.    [facility]:  Had 5 hospitalizations for cardiac events in 2019.    TOBACCO USE:  Social History     Tobacco Use   Smoking Status Former Smoker     Packs/day: 2.50     Years: 50.00     Pack years: 125.00     Types: Cigarettes, Pipe, Cigars     Last attempt to quit: 2000     Years since quittin.0   Smokeless Tobacco Never Used       VITALS:  Vitals:    20 1336   BP: 100/60   Patient Site: Left Arm   Patient Position: Sitting   Cuff Size: Adult Regular   Pulse: 84   SpO2: 97%     Wt Readings from Last 3 Encounters:   19 (!) 230 lb (104.3 kg)   19 213 lb 9.6 oz (96.9 kg)   19 (!) 230 lb (104.3 kg)     There is no height or weight on file to calculate BMI.    PHYSICAL EXAM:  Constitutional:  Reveals pleasant, alert male. Present in a wheelchair wearing walking stabilization boots bilaterally. . Vitals:  Per nursing notes.  Psychiatric:  Mood appropriate, memory intact.       MEDICATIONS:  Current Outpatient Medications   Medication Sig Dispense Refill     albuterol (PROAIR HFA;PROVENTIL HFA;VENTOLIN HFA) 90 mcg/actuation inhaler Inhale 2 puffs every 6 (six) hours as needed for wheezing. 1 Inhaler 11     amiodarone (PACERONE) 200 MG tablet Take 1 tablet (200 mg total) by mouth daily. 90 tablet 2     aspirin 81 MG EC tablet Take 1 tablet (81 mg total) by mouth daily.  0     DULoxetine (CYMBALTA) 60 MG capsule Take 1 capsule (60 mg total) by mouth daily. 30 capsule 0     furosemide (LASIX) 20 MG tablet Take 1 tablet (20 mg total) by mouth daily.  0     Lactobacillus rhamnosus GG (CULTURELLE) 10-15 Billion cell capsule Take 1 capsule by mouth daily.       metoprolol succinate (TOPROL-XL) 100 MG 24 hr tablet Take 1 tablet (100 mg total) by mouth daily. 90 tablet 3     omeprazole (PRILOSEC) 20 MG capsule Take 1 capsule (20 mg total) by mouth daily before breakfast. 30 capsule 11     potassium chloride (K-DUR,KLOR-CON) 20 MEQ tablet Take 1 tablet (20 mEq total) by mouth daily. 30 tablet 0      senna-docusate (SENNOSIDES-DOCUSATE SODIUM) 8.6-50 mg tablet Take 1 tablet by mouth daily. 90 tablet 3     spironolactone (ALDACTONE) 25 MG tablet Take 1 tablet (25 mg total) by mouth daily. 90 tablet 3     tamsulosin (FLOMAX) 0.4 mg cap Take 1 capsule (0.4 mg total) by mouth Daily after lunch. 30 capsule 0     topiramate (TOPAMAX) 100 MG tablet Take 1 tablet (100 mg total) by mouth 2 (two) times a day. 60 tablet 0     [START ON 2/12/2020] traMADol (ULTRAM) 50 mg tablet Take 2 tablets (100 mg total) by mouth 2 (two) times a day. 120 tablet 0     meloxicam (MOBIC) 7.5 MG tablet Take 1 tablet (7.5 mg total) by mouth daily. 30 tablet 11     naltrexone (DEPADE) 50 mg tablet Take 0.5 tablets (25 mg total) by mouth at bedtime for 14 days. 7 tablet 0     Current Facility-Administered Medications   Medication Dose Route Frequency Provider Last Rate Last Dose     cyanocobalamin injection 1,000 mcg  1,000 mcg Intramuscular Q30 Days Mario Lepe MD   1,000 mcg at 01/17/20 1503       ADDITIONAL HISTORY SUMMARIZED (2): Reviewed 1/6/2020 [facility] electrophysiology note.    DECISION TO OBTAIN EXTRA INFORMATION (1): None.   RADIOLOGY TESTS (1): None.  LABS (1): Reviewed and ordered  MEDICINE TESTS (1): Reviewed 1/6/2020 [facility] device check.   INDEPENDENT REVIEW (2 each): None.     The visit lasted a total of 24 minutes face to face with the patient. Over 50% of the time was spent counseling and educating the patient about joint pain.    I, Michaela Hendrix, am scribing for and in the presence of, Dr. Lepe.    I, Dr. Lepe, personally performed the services described in this documentation, as scribed by Michaela Hendrix in my presence, and it is both accurate and complete.    Total data points: 4

## 2021-06-05 NOTE — TELEPHONE ENCOUNTER
Refill Approved    Rx renewed per Medication Renewal Policy. Medication was last renewed on 11/2/19.    Angelina Gonzalez, [facility] Triage/Med Refill 1/23/2020     Requested Prescriptions   Pending Prescriptions Disp Refills     tamsulosin (FLOMAX) 0.4 mg cap 30 capsule 0     Sig: Take 1 capsule (0.4 mg total) by mouth Daily after lunch.       Alfuzosin/Tamsulosin/Silodosin Refill Protocol  Passed - 1/22/2020  4:26 PM        Passed - PCP or prescribing provider visit in past 12 months       Last office visit with prescriber/PCP: 1/17/2020 Mario Lepe MD OR same dept: 1/17/2020 Mario Lepe MD OR same specialty: 1/17/2020 Mario Lepe MD  Last physical: 12/14/2018 Last MTM visit: Visit date not found   Next visit within 3 mo: Visit date not found  Next physical within 3 mo: Visit date not found  Prescriber OR PCP: Mario Lepe MD  Last diagnosis associated with med order: 1. Subacute bacterial endocarditis  - tamsulosin (FLOMAX) 0.4 mg cap; Take 1 capsule (0.4 mg total) by mouth Daily after lunch.  Dispense: 30 capsule; Refill: 0    If protocol passes may refill for 12 months if within 3 months of last provider visit (or a total of 15 months).

## 2021-06-05 NOTE — TELEPHONE ENCOUNTER
RN cannot approve Refill Request    RN can NOT refill this medication med is not covered by policy/route to provider. Last office visit: 1/17/2020 Mario Lepe MD Last Physical: 12/14/2018 Last MTM visit: Visit date not found Last visit same specialty: 1/17/2020 Mario Lepe MD.  Next visit within 3 mo: Visit date not found  Next physical within 3 mo: Visit date not found      Armida Escobedo, [facility] Triage/Med Refill 2/3/2020    Requested Prescriptions   Pending Prescriptions Disp Refills     naltrexone (DEPADE) 50 mg tablet [Pharmacy Med Name: NALTREXONE 50 MG TAB] 7 tablet 0     Sig: Take 0.5 tablets (25 mg total) by mouth at bedtime for 14 days.       There is no refill protocol information for this order

## 2021-06-05 NOTE — TELEPHONE ENCOUNTER
Pt is not due until 2/14/2020  Rx pended to be released 2/12/2020  This way Pill pack will have the prescription

## 2021-06-05 NOTE — PATIENT INSTRUCTIONS - HE
Resume Allopurinol 300 mgs daily to help joint pain    Try low dose anti inflammatory Meloxicam 7.5 mgs daily to help pain    Try Naltrexone one half pill daily at about lunch time for a week or so.  If it helps, you will want to continue and decrease tramadol.  If it does not, stop it    Update blood work    Repeat b 12 shot

## 2021-06-06 NOTE — PATIENT INSTRUCTIONS - HE
Take Duloxetine 60 mgs once a day, or 30 mgs twice a day, but not 60 mgs twice a day    Update blood tests and potassium    Clarify you are taking one half potassium pills daily    Decrease Metoprolol to 50 mgs (one half of 100 mg pill).  Cardiology increased this to a full pill on Jan 6    Are you taking Digoxin???  If not, ok.  If so, keep doing it, and we will contact Dr Hernández and see if that is what he wants

## 2021-06-06 NOTE — TELEPHONE ENCOUNTER
Controlled Substance Refill Request  Medication Name:   Requested Prescriptions     Pending Prescriptions Disp Refills     traMADoL (ULTRAM) 50 mg tablet [Pharmacy Med Name: TRAMADOL 50 MG TAB] 120 tablet 0     Sig: Take 2 tablets (100 mg total) by mouth 2 (two) times a day.     Date Last Fill: 2/24/20  Requested Pharmacy: pill pack  Submit electronically to pharmacy  Controlled Substance Agreement on file:   Encounter-Level CSA Scan Date - 08/07/2018:    Scan on 8/10/2018  6:43 AM           Encounter-Level CSA Scan Date - 01/16/2017:    Scan on 1/18/2017  1:41 PM           Encounter-Level CSA Scan Date - 11/12/2015:    Scan on 11/13/2015  1:29 PM        Last office visit:  2/28/20

## 2021-06-06 NOTE — PROGRESS NOTES
ASSESSMENT:  1. SOB (shortness of breath)  Subacute over the last 2 months.  No evidence of volume increase.  Consider amiodarone lung toxicity.  Consider side effect from January increase in metoprolol dosage.  Trial of decreased metoprolol.  Discussed pulmonary embolism off anticoagulants, or pneumothorax.    2. Chronic bilateral low back pain without sciatica  Markedly improved with addition of allopurinol.  Unfortunately this has not resulted in a decrease in tramadol use.  Referral to therapy.  Side effects on naltrexone  - Ambulatory referral to Adult PT- Internal    3. Idiopathic chronic gout of multiple sites without tophus  Continue allopurinol.  Optimize uric acid  - Uric Acid    4. Ventricular tachycardia (H)  Update labs on amiodarone.  Clarify that he is taking 1/2 tablet potassium  - HM2(CBC w/o Differential)  - Comprehensive Metabolic Panel  - Thyroid Stimulating Hormone (TSH)  - potassium chloride (K-DUR,KLOR-CON) 20 MEQ tablet; Take 0.5 tablets (10 mEq total) by mouth daily.  Dispense: 30 tablet; Refill: 0    5. Paroxysmal ventricular tachycardia (H)  Trial of decreased metoprolol now on month 3 of amiodarone.  No symptoms  - metoprolol succinate (TOPROL-XL) 100 MG 24 hr tablet; Take 0.5 tablets (50 mg total) by mouth daily.  Dispense: 90 tablet; Refill: 3    6. Medication monitoring encounter  - Thyroid Stimulating Hormone (TSH)    7. Paroxysmal atrial fibrillation (H)   - Thyroid Stimulating Hormone (TSH)    8.  Depression.  Clarified total duloxetine dose 60 mg.    Asked to review medication and clarify whether he is taking digoxin    PLAN:  Patient Instructions   Take Duloxetine 60 mgs once a day, or 30 mgs twice a day, but not 60 mgs twice a day    Update blood tests and potassium    Clarify you are taking one half potassium pills daily    Decrease Metoprolol to 50 mgs (one half of 100 mg pill).  Cardiology increased this to a full pill on Jan 6    Are you taking Digoxin???  If not, ok.  If  so, keep doing it, and we will contact Dr Hernández and see if that is what he wants          Orders Placed This Encounter   Procedures     HM2(CBC w/o Differential)     Comprehensive Metabolic Panel     Thyroid Stimulating Hormone (TSH)     Uric Acid     Ambulatory referral to Adult PT- Internal     Referral Priority:   Routine     Referral Type:   Physical Therapy     Referral Reason:   Evaluation and Treatment     Requested Specialty:   Physical Therapy     Number of Visits Requested:   1     Medications Discontinued During This Encounter   Medication Reason     metoprolol succinate (TOPROL-XL) 100 MG 24 hr tablet      Administrations This Visit     cyanocobalamin injection 1,000 mcg     Admin Date  02/28/2020 Action  Given Dose  1000 mcg Route  Intramuscular Administered By  Brittani Patino LPN              Return in about 2 months (around 4/28/2020) for for a full review of medications and lab testing.      CHIEF COMPLAINT:  Chief Complaint   Patient presents with     Follow-up       HISTORY OF PRESENT ILLNESS:  Florentino is a 74 y.o. male presenting to the clinic today for a 6 week follow up. He states he is doing well but has been bothered by some shortness of breath when walking and completing tasks. Todd notes this is a new occurrence because he has been doing normal daily activity and completing his workout regimen without the shortness of breath. These instances have been occurring the last two weeks. Todd denies retaining water but is open to the idea of a slow heart rate causing the shortness of breath. Todd is open to medication changes but is scared of messing with his amiodarone. His metoprolol was increased from a half tablet to full tablet January 6th. Todd denies feeling heart irregularities.     Todd wonders if he should stay at his current dosage of duloxetine because he has been feeling better.     Todd complains that despite continued exercise he has not regained muscle mass in his legs. He wonders if  "there is outpatient therapy he can do because while in the [facility] he worked on a modified stationary bike that was helping.     REVIEW OF SYSTEMS:   Admits to shortness of breath on exertion, and controlled mood.   Denies wheezing, water retention.   All other systems are negative.    [facility]:  His wife was transitioned to a nursing home     TOBACCO USE:  Social History     Tobacco Use   Smoking Status Former Smoker     Packs/day: 2.50     Years: 50.00     Pack years: 125.00     Types: Cigarettes, Pipe, Cigars     Last attempt to quit: 2000     Years since quittin.1   Smokeless Tobacco Never Used       VITALS:  Vitals:    20 1322   BP: 104/62   Patient Site: Left Arm   Patient Position: Sitting   Cuff Size: Adult Regular   Pulse: 86   SpO2: 97%   Weight: (!) 230 lb (104.3 kg)   Height: 6' 2\" (1.88 m)     Wt Readings from Last 3 Encounters:   20 (!) 230 lb (104.3 kg)   19 (!) 230 lb (104.3 kg)   19 213 lb 9.6 oz (96.9 kg)     Body mass index is 29.53 kg/m .    PHYSICAL EXAM:  Constitutional:  Reveals alert, elderly male who is present in a wheelchair and wearing stabilizing boots on both feet.  Vitals:  Per nursing notes.  Cardiac:  Slow, regular rate and rhythm without murmurs, rubs, or gallops. Carotids without bruits. Legs without edema. Palpation of the radial pulse regular.  Neurologic:  Cranial nerves II-XII intact.     Psychiatric:  Mood appropriate, memory intact.       MEDICATIONS:  Current Outpatient Medications   Medication Sig Dispense Refill     albuterol (PROAIR HFA;PROVENTIL HFA;VENTOLIN HFA) 90 mcg/actuation inhaler Inhale 2 puffs every 6 (six) hours as needed for wheezing. 1 Inhaler 11     allopurinoL (ZYLOPRIM) 300 MG tablet Take 1 tablet (300 mg total) by mouth daily. 90 tablet 3     amiodarone (PACERONE) 200 MG tablet Take 1 tablet (200 mg total) by mouth daily. 90 tablet 1     aspirin 81 MG EC tablet Take 1 tablet (81 mg total) by mouth daily.  0     DULoxetine " (CYMBALTA) 60 MG capsule Take 1 capsule (60 mg total) by mouth daily. 30 capsule 0     furosemide (LASIX) 20 MG tablet Take 1 tablet (20 mg total) by mouth daily.  0     Lactobacillus rhamnosus GG (CULTURELLE) 10-15 Billion cell capsule Take 1 capsule by mouth daily.       meloxicam (MOBIC) 7.5 MG tablet Take 1 tablet (7.5 mg total) by mouth daily. 30 tablet 11     metoprolol succinate (TOPROL-XL) 100 MG 24 hr tablet Take 0.5 tablets (50 mg total) by mouth daily. 90 tablet 3     omeprazole (PRILOSEC) 20 MG capsule Take 1 capsule (20 mg total) by mouth daily before breakfast. 30 capsule 11     senna-docusate (SENNOSIDES-DOCUSATE SODIUM) 8.6-50 mg tablet Take 1 tablet by mouth daily. 90 tablet 3     spironolactone (ALDACTONE) 25 MG tablet Take 1 tablet (25 mg total) by mouth daily. 90 tablet 3     tamsulosin (FLOMAX) 0.4 mg cap Take 1 capsule (0.4 mg total) by mouth Daily after lunch. 90 capsule 3     topiramate (TOPAMAX) 100 MG tablet Take 1 tablet (100 mg total) by mouth 2 (two) times a day. 60 tablet 0     traMADoL (ULTRAM) 50 mg tablet Take 2 tablets (100 mg total) by mouth 2 (two) times a day. 120 tablet 0     potassium chloride (K-DUR,KLOR-CON) 20 MEQ tablet Take 0.5 tablets (10 mEq total) by mouth daily. 30 tablet 0     Current Facility-Administered Medications   Medication Dose Route Frequency Provider Last Rate Last Dose     cyanocobalamin injection 1,000 mcg  1,000 mcg Intramuscular Q30 Days Mario Lepe MD   1,000 mcg at 02/28/20 1421           ADDITIONAL HISTORY SUMMARIZED (2): Reviewed 2/24/2020 and 1/26/2020 PT Message for medication questions.   Reviewed 1/6/2020 Pacemaker device report for heart irregularities.   Reviewed 10/2-11/2/2019 Hospital admissions report for medications.   DECISION TO OBTAIN EXTRA INFORMATION (1): None.   RADIOLOGY TESTS (1): Reviewed .  LABS (1): Reviewed and ordered  MEDICINE TESTS (1): None.  INDEPENDENT REVIEW (2 each): None.     The visit lasted a total of 32  minutes face to face with the patient. Over 50% of the time was spent counseling and educating the patient about shortness of breath.    I, Michaela Hendrix, am scribing for and in the presence of, Dr. Lepe.    I, Dr. Lepe, personally performed the services described in this documentation, as scribed by Michaela Hendrix in my presence, and it is both accurate and complete.    Total data points: 4

## 2021-06-07 NOTE — TELEPHONE ENCOUNTER
Refill Approved    Rx renewed per Medication Renewal Policy. Medication was last renewed on 12/11/19.2/28/20.    Angelina Gonzalez, [facility] Triage/Med Refill 5/6/2020     Requested Prescriptions   Pending Prescriptions Disp Refills     metoprolol succinate (TOPROL-XL) 100 MG 24 hr tablet [Pharmacy Med Name: Metoprolol Succinate 100mg Extended-Release Tablet] 90 tablet 3     Sig: Take 1 tablet by mouth daily.       Beta-Blockers Refill Protocol Passed - 5/4/2020  6:32 AM        Passed - PCP or prescribing provider visit in past 12 months or next 3 months     Last office visit with prescriber/PCP: 2/28/2020 Mario Lepe MD OR same dept: 2/28/2020 Mario Lepe MD OR same specialty: 2/28/2020 Mario Lepe MD  Last physical: 12/14/2018 Last MTM visit: Visit date not found   Next visit within 3 mo: Visit date not found  Next physical within 3 mo: Visit date not found  Prescriber OR PCP: Mario Lepe MD  Last diagnosis associated with med order: 1. Paroxysmal ventricular tachycardia (H)  - metoprolol succinate (TOPROL-XL) 100 MG 24 hr tablet [Pharmacy Med Name: Metoprolol Succinate 100mg Extended-Release Tablet]; Take 1 tablet by mouth daily.  Dispense: 90 tablet; Refill: 3    2. Ventricular tachycardia (H)  - furosemide (LASIX) 20 MG tablet [Pharmacy Med Name: Furosemide 20mg Tablet]; Take 1 tablet by mouth daily.  Dispense: 90 tablet; Refill: 0    If protocol passes may refill for 12 months if within 3 months of last provider visit (or a total of 15 months).             Passed - Blood pressure filed in past 12 months     BP Readings from Last 1 Encounters:   02/28/20 104/62                furosemide (LASIX) 20 MG tablet [Pharmacy Med Name: Furosemide 20mg Tablet] 90 tablet 0     Sig: Take 1 tablet by mouth daily.       Diuretics/Combination Diuretics Refill Protocol  Passed - 5/4/2020  6:32 AM        Passed - Visit with PCP or prescribing provider visit in past 12 months     Last office  visit with prescriber/PCP: 2/28/2020 Mario Lepe MD OR same dept: 2/28/2020 Mario Lepe MD OR same specialty: 2/28/2020 Mario Lepe MD  Last physical: 12/14/2018 Last MTM visit: Visit date not found   Next visit within 3 mo: Visit date not found  Next physical within 3 mo: Visit date not found  Prescriber OR PCP: Mario Lepe MD  Last diagnosis associated with med order: 1. Paroxysmal ventricular tachycardia (H)  - metoprolol succinate (TOPROL-XL) 100 MG 24 hr tablet [Pharmacy Med Name: Metoprolol Succinate 100mg Extended-Release Tablet]; Take 1 tablet by mouth daily.  Dispense: 90 tablet; Refill: 3    2. Ventricular tachycardia (H)  - furosemide (LASIX) 20 MG tablet [Pharmacy Med Name: Furosemide 20mg Tablet]; Take 1 tablet by mouth daily.  Dispense: 90 tablet; Refill: 0    If protocol passes may refill for 12 months if within 3 months of last provider visit (or a total of 15 months).             Passed - Serum Potassium in past 12 months      Lab Results   Component Value Date    Potassium 4.5 04/30/2020             Passed - Serum Sodium in past 12 months      Lab Results   Component Value Date    Sodium 140 04/30/2020             Passed - Blood pressure on file in past 12 months     BP Readings from Last 1 Encounters:   02/28/20 104/62             Passed - Serum Creatinine in past 12 months      Creatinine   Date Value Ref Range Status   04/30/2020 2.85 (H) 0.70 - 1.30 mg/dL Final

## 2021-06-07 NOTE — TELEPHONE ENCOUNTER
"Not seen by MTM since 12/13/2019.     Last follow up with PCP on 2/28/20.   \"Take Duloxetine 60 mgs once a day, or 30 mgs twice a day, but not 60 mgs twice a day\"     Next scheduled visit with PCP on 4/28/20.     Refills sent x 6 months.     Pérez Marquez, PharmD  Medication Therapy Management (MTM) Pharmacist  [facility]      "

## 2021-06-07 NOTE — PROGRESS NOTES
Review of Systems - History obtained from the patient  General ROS: negative  Psychological ROS: negative  Ophthalmic ROS: negative  ENT ROS: negative  Hematological and Lymphatic ROS: negative  Respiratory ROS: negative  Cardiovascular ROS: positive for - leg swelling   Gastrointestinal ROS: negative  Genito-Urinary ROS: negative  Musculoskeletal ROS: positive for - joint pain  due to arthritis  Neurological ROS: negative  Dermatological ROS: positive for poor wound healing

## 2021-06-07 NOTE — TELEPHONE ENCOUNTER
Controlled Substance Refill Request  Medication Name:   Requested Prescriptions     Pending Prescriptions Disp Refills     traMADoL (ULTRAM) 50 mg tablet [Pharmacy Med Name: Tramadol Hydrochloride 50mg Tablet] 120 tablet 0     Sig: Take 2 tablets (100 mg total) by mouth 2 (two) times a day.     Date Last Fill: 3/24/20  [facility]ed [email or web address]  Submit electronically to pharmacy  Controlled Substance Agreement on file:   Encounter-Level CSA Scan Date - 08/07/2018:    Scan on 8/10/2018  6:43 AM           Encounter-Level CSA Scan Date - 01/16/2017:    Scan on 1/18/2017  1:41 PM           Encounter-Level CSA Scan Date - 11/12/2015:    Scan on 11/13/2015  1:29 PM        Last office visit:  2/28/20

## 2021-06-07 NOTE — TELEPHONE ENCOUNTER
Ale Rogers from [facility] called asking for a refill prescription for DULoxetine (CYMBALTA) 60 MG capsule. The best reachable phone number is [phone] and fax #[phone].    Thank you,  See CHAUNCEY Fernando Pharmacy Coordinator

## 2021-06-07 NOTE — TELEPHONE ENCOUNTER
Patient Returning Call  Reason for call:  Patient called back.  Information relayed to patient:  Informed of message listed below.  Patient states he never got this medication on 3/11/2020. Wants to know what pharmacy this was sent to. Please call him back.  Patient has additional questions:  Yes  If YES, what are your questions/concerns:  As above.  Okay to leave a detailed message?: Yes

## 2021-06-07 NOTE — TELEPHONE ENCOUNTER
Tramadol could have refills added so that [facility] can more easily add this to his packs. He has been on a stable dose.

## 2021-06-07 NOTE — PATIENT INSTRUCTIONS - HE
Continue medications same    Update B12 shot    Update labs    Phone contact 3 months    Cardiology follow-up 6 months

## 2021-06-07 NOTE — TELEPHONE ENCOUNTER
Dennis Peter, [facility] is requesting refills on Tramadol and Duloxetine   The shipment will not get to me til the middle of May but but they need to have the paperwork for they're system. I hope this clears up any problem  thanks Ray

## 2021-06-07 NOTE — PROGRESS NOTES
Remote device check.  Please see link for full device report.  Patient was informed of results and next follow up via mail.    Type: Routine CRT-D remote transmission.   Presenting: AP BiVP 83 bpm.  Lead/Battery Status: Stable.   Atrial Arrhythmias: Since 1/6/2020; No mode switches detected.   Vent Arrhythmias: Since 1/6/2020; 1488 high ventricular arrhythmias detected. 750 NSVT; 217 untreated episodes, 521 VT-1 therapy, 4/6/2020 - 13 episodes, 8 treated with ATP X1, no EGMs for review. 4/7/2020 - 35 episodes, 22 treated with ATP, 1-4 bursts. All episodes fell into VT- zone. Patient reports being asymptomatic. Patient did notice at the beginning to mid March his heart was racing/pounding/fluttering. Per patient he was started on Metoprolol end of February by his PMD, feeling a lot better since starting medication.     Comments: Normal device function. BiVP: 95%. Routed to Dr. Hernández, patient has telephone visit scheduled for 4/15/2020 with Dr. Hernández. DWAYNE Hernández, please review, pt has telephone visit with you 4/15/2020. -AJM

## 2021-06-07 NOTE — PATIENT INSTRUCTIONS - HE
Florentino Fall    It was a pleasure to talk to you for a telephone clinic visit.    Decrease amiodarone to 1 pill 5 days/week skipping Mondays and Fridays.  Call if fainting or shocks from your implantable defibrillator  Follow up appointment: Dr. Hernández in device clinic in 6 months    Contact Tatyana at [phone] with questions or concerns.    Ulises Hernández MD

## 2021-06-07 NOTE — TELEPHONE ENCOUNTER
Medication: Tramadol  Last Date Filled 3/30/20   pulled: YES    Only PCP Prescribing? : YES  Taken as prescribed from physician notes? YES    CSA in last year: YES  Random Utox in last year: NO  (if any of the above answer NO - schedule with PCP)     Opioids + benzodiazepines? YES  (if the above answer YES - schedule with PCP every 6 months)     Is patient on the Executive Review Team? no      All responses suggest: Refilling prescription

## 2021-06-08 NOTE — TELEPHONE ENCOUNTER
Who is calling:  [place] [facility]  Reason for Call:  Patient has passed away.  Date of death is 20.  Patient  at home.  Will Dr. Lepe sign off on the death certificate?   If so, please go to the [place] Department of Health websitePatient's family is waiting for patient to be cremated.    Date of last appointment with primary care: 20  Okay to leave a detailed message: Yes

## 2021-06-08 NOTE — TELEPHONE ENCOUNTER
Who is calling:    [facility]    Reason for Call:    Requesting PCP to sign the patients death certificate.  DOD 05/12/2020  Natural Cause    Date of last appointment with primary care: 04/28/2020    Okay to leave a detailed message: Yes    Please call to confirm PCP will sign death certificate.

## 2021-06-08 NOTE — PROGRESS NOTES
ASSESSMENT:  1. Major Depression, Single Episode  Much improved with Cymbalta.  Neuropathy much improved.  Will complete conversion of sertraline to Cymbalta  - DULoxetine (CYMBALTA) 60 MG capsule; Take 1 capsule (60 mg total) by mouth daily.  Dispense: 90 capsule; Refill: 3    Ventricular tachycardia.  Reviewed cardiology note.  Reviewed intermittent nature.  Reviewed side effect of amiodarone.  Agree with next step sotalol if symptoms persist however right now he is feeling well.  Driving ban currently reviewed    3.  Sinusitis.  Status post second round of Augmentin through urgent care.  Symptoms partially improved.  Trial of Allegra to replace Claritin.  If no improvement, trial of antibiotic either different from Augmentin or extended course of Augmentin    PLAN:  Patient Instructions   Stop sertraline tonight    Take an extra cymbalta today, and then 60 each morning starting tomorrow    If it is too much, we decrease back to just 30 still without the sertraline    If it is just right, stay on 60 mgs without the sertraline    If you do need to go on Sotalol, no worry    Allegra 180 mgs daily          No orders of the defined types were placed in this encounter.    Medications Discontinued During This Encounter   Medication Reason     DULoxetine (CYMBALTA) 30 MG capsule Reorder       No Follow-up on file.    CHIEF COMPLAINT:  Chief Complaint   Patient presents with     Follow-up       HISTORY OF PRESENT ILLNESS:  Florentino is a 71 y.o. male presenting to the clinic today for a follow up visit.  He has both neuropathy and major depression; on 12/5/16 he was started on Cymbalta for both. He reports a significant decrease in his pain from peripheral neuropathy on the Cymbalta.  His depression is a little better, though mostly stable. He notes that his attitude improved since starting Cymbalta. He is still on 50 mg of Zoloft in addition to the Cymbalta. The plan on 12/5/ was to increase the Cymbalta if it works and to  "consider discontinuing the Zoloft, to which he is amenable. He noticed benefit from Cymbalta after about 5 days, particularly noticing few \"shocking\" pains in his feet. He recalls that he experienced no benefit from B12 shot  and prednisone did not help either.     Wound: He continues to have an open wound on his left foot, augmentin helped prevent further infection. He was given Augmentin again by urgent care in the past 2 weeks after he developed a cough and sore throat.     Systolic and Diastolic Congestive Heart Failure:  He feels that his shortness of breath has not bothered him for significantly for awhile. He has mild bilateral swelling, but this is better than previously; he is taking spironolactone and furosemide.     Cardiomyopathy: He has a pacemaker/ICD. He has a recent history in November of VT and ICD firing.  He continues off Amiodarone, though sotalol was discussed with cardiology.  He has not had any ICD firing since then.  He followed up with Dr Hernández on 12/28 and it was noted that Sotalol may be an option if VT attacks resume.  Currently, his taking Coreg and digoxin.     Gout:  His uric acid was 5.8 on 12/5/16 off of ramipril.  He is taking allopurinol.  He denies any gout attacks or arthritic symptoms.     Nasal congestion: He has had some nasal congestion, he debbie to an urgent care a few weeks ago and was prescribed Augmentin; he feels better, but there is still some internal sinus pressure especially on his right side near his ear.  He states that he has remained congested and that Claritan is not working. He complains of a plugged right ear for the past 1.5 weeks. He is amenable to a trial of Allegra. He cotniues to use Flonase.     REVIEW OF SYSTEMS:   He continues to wear a permanent  boot brace that was recommended by orthopedics on his right foot due to an accident many years ago.   All other systems are negative.    PFSH:  He enjoys deer hunting in the [place] in [place].    TOBACCO USE:  History   Smoking Status     Former Smoker     Packs/day: 2.50     Years: 50.00     Types: Cigarettes, Pipe, Cigars     Quit date: 1/1/2000   Smokeless Tobacco     Never Used       VITALS:  Vitals:    01/16/17 1250   BP: 102/54   Patient Site: Left Arm   Patient Position: Sitting   Cuff Size: Adult Large   Pulse: 72   Weight: (!) 275 lb (124.7 kg)     Wt Readings from Last 3 Encounters:   01/16/17 (!) 275 lb (124.7 kg)   12/28/16 (!) 281 lb (127.5 kg)   12/05/16 (!) 276 lb 4.8 oz (125.3 kg)     Body mass index is 35.31 kg/(m^2).    PHYSICAL EXAM:  Constitutional:  Reveals an alert, pleasant, talkative male who appears in high spirits.  Vitals:  Per nursing notes.  Ears:  Clear.  Oropharynx:  Without posterior nasal drainage or thrush.  Neck:  Supple, thyroid not palpable.  Cardiac:  Regular rate and rhythm without murmurs, rubs, or gallops. Legs without edema. Palpation of the radial pulse regular.  Lungs: Clear.  Respiratory effort normal.  Abdomen:   Bowel sounds positive, nontender, nondistended.  Neither liver nor spleen palpable.  Neurologic:  Cranial nerves II-XII intact.     Psychiatric:  Mood appropriate, memory intact.     Student Physical Exam:  General: alert, pleasant, male, in no acute distress.  Eyes: anicteric, conjunctiva pink.  Ears: TM pearly white bilaterally.  Nose: clear.  Oropharynx: clear post nasal drip, no erythema, exudate, or lesions throughout, tonsils non-enflamed.  Chest/Lungs: clear.  Cardiac: RRR, no murmurs/gallops.  Extremities: +2 edema on left lower extremity. Right foot not seen- in brace.  Abdomen: soft non tender, no splenic or liver enlargement.   Skin: no rashes.     QUALITY MEASURES:  The following high BMI interventions were performed this visit: encouragement to exercise and weight monitoring    ADDITIONAL HISTORY SUMMARIZED (2): History obtained from Nic Aragon obtained regarding mood.  DECISION TO OBTAIN EXTRA INFORMATION (1): Care  everywhere accessed.   RADIOLOGY TESTS (1): None.  LABS (1):  Labs reviewed from 12/2016.  MEDICINE TESTS (1): None.  INDEPENDENT REVIEW (2 each): None.     The visit lasted a total of 16 minutes face to face with the patient. Over 50% of the time was spent counseling and educating the patient about Depression, Neuropathy, and cardiomyopathy. An untimed portion of this visit was spent with Nic Aragon, a NP student. This portion was spent gathering history and this history along with the history of Dr. Lepe has been merged as seen above in the HPI, ROS, and PFSH.    I, Florin Frausto, am scribing for and in the presence of, Dr. Lepe.    I, Dr. Lepe, personally performed the services described in this documentation, as scribed by Florin Frausto in my presence, and it is both accurate and complete.    MEDICATIONS:  Current Outpatient Prescriptions   Medication Sig Dispense Refill     acetaminophen (TYLENOL) 325 MG tablet Take 325-650 mg by mouth every 6 (six) hours as needed for pain.       allopurinol (ZYLOPRIM) 300 MG tablet Take 1 tablet (300 mg total) by mouth daily. 90 tablet 3     amoxicillin (AMOXIL) 500 MG tablet Take 4 tablets (2,000 mg total) by mouth once as needed. TAKE 4 TABLETS 1 HOUR BEFORE DENTAL APPOINTMENT. 8 tablet 1     ascorbic acid (ASCORBIC ACID WITH JAVIER HIPS) 500 MG tablet Take 500 mg by mouth daily.        aspirin 81 MG EC tablet Take 81 mg by mouth daily.       b complex vitamins capsule 1 capsule every other day.        carvedilol (COREG) 25 MG tablet 1 tablet twice daily 180 tablet 3     cholecalciferol, vitamin D3, 1,000 unit tablet Take 1 tablet (1,000 Units total) by mouth daily. 90 tablet 3     CINNAMON BARK (CINNAMON ORAL) 1,000 tablets 2 (two) times a day.        coenzyme Q10 100 mg capsule Take 200 mg by mouth daily.        digoxin (DIGOX) 125 mcg tablet TAKE ONE TABLET BY MOUTH ONE TIME DAILY 90 tablet 3     DULoxetine (CYMBALTA) 60 MG capsule Take 1 capsule (60 mg  total) by mouth daily. 90 capsule 3     EPINEPHrine (EPIPEN) 0.3 mg/0.3 mL atIn Inject 0.3 mL (0.3 mg total) into the shoulder, thigh, or buttocks as needed. 1 Pre-filled Pen Syringe 0     furosemide (LASIX) 40 MG tablet TAKE ONE TABLET BY MOUTH ONE TIME DAILY 90 tablet 3     gabapentin (NEURONTIN) 300 MG capsule Take 1 capsule (300 mg total) by mouth bedtime. 90 capsule 3     HYDROcodone-acetaminophen 5-325 mg per tablet TAKE 1 TABLET BY MOUTH EVERY 6 HOURS AS NEEDED FOR PAIN 60 tablet 0     KRILL OIL ORAL Take 1,000 mg by mouth.        loratadine (CLARITIN) 10 mg tablet Take 10 mg by mouth daily.       magnesium oxide 250 mg Tab Take 250 mg by mouth daily.        meloxicam (MOBIC) 15 MG tablet Take one tablet by mouth one time daily 90 tablet 3     multivitamin (MULTIVITAMIN) per tablet 1 tablet every other day.        mupirocin (BACTROBAN) 2 % ointment Apply topically 2 (two) times a day. To affected area(s). 22 g 3     omeprazole (PRILOSEC) 20 MG capsule Take 20 mg by mouth as needed.        SAW PALMETTO ORAL 2 capsules daily.       sertraline (ZOLOFT) 100 MG tablet Take 0.5 tablets (50 mg total) by mouth daily. Take one tablet by mouth one time daily 90 tablet 3     sildenafil (VIAGRA) 50 MG tablet 50 mg. Take 1 tablet 1 hour before needed       simethicone (MYLICON) 125 MG chewable tablet Use As Directed.       spironolactone (ALDACTONE) 25 MG tablet Take one tablet by mouth one time daily 90 tablet 3     traMADol (ULTRAM) 50 mg tablet TAKE TWO TABLETS BY MOUTH THREE TIMES DAILY 180 tablet 5     triamcinolone (KENALOG) 0.5 % cream as needed. Apply 1 inch        fexofenadine (ALLEGRA) 180 MG tablet Take 1 tablet (180 mg total) by mouth daily. 90 tablet 3     Current Facility-Administered Medications   Medication Dose Route Frequency Provider Last Rate Last Dose     cyanocobalamin injection 1,000 mcg  1,000 mcg Intramuscular Q30 Days Mario Lepe MD   1,000 mcg at 11/15/16 1752       Total data points: 4.

## 2021-06-08 NOTE — TELEPHONE ENCOUNTER
Who is calling:  Brittani from [facility]  Reason for Call:  Calling to check on below request. Reports this is the third day and the death certificate needs to be signed asap by going online. Emails have been sent to the provider through the [facility]. Please advise asap!  Date of last appointment with primary care: 4/28/2020, virtual visit  Okay to leave a detailed message: Yes

## 2021-06-08 NOTE — TELEPHONE ENCOUNTER
FYI - Status Update  Who is Calling: Brittnai from [facility]  Update: Caller was checking the status of her request. Caller was informed Mario Lepe MD is aware and will sign it. Caller was informed an e-mail was sent to Mario Lepe MD as he may not be in the clinic.  Okay to leave a detailed message?:  No

## 2021-06-08 NOTE — TELEPHONE ENCOUNTER
Type: alert remote CRT-D transmission 5/9/20 and 5/11/20 for multiple accelerated ventricular arrhythmia episodes and ventricular shock therapy delivered to convert arrhythmia; combined reports.  Presenting rhythm: AP-biVP and AP-biVS, rate 82 bpm, with 7 bt run of slow VT.  Battery/lead status: stable trends. Threshold and lead impedance data not measured today. Predicted battery longevity at last clinic visit 1/6/20 was 10 yrs, now 7 yrs.   Arrhythmias: since 4/14/20, no AT/AF detected. 541 slow VT treated with 1-11 ATP delivered with varying success. Episode 4580 on 5/10/20 at 12:54am shows 11 ATP, accelerating rate to VF zone, 41J was successful with eventual return of VT. 2520 nonsustained and 217 other untreated VT episodes.  Anticoagulant: Eliquis  Comments: normal ICD function. BiV pacing 93%. Routed to device RN.  Device/lead alerts: none. prd       Transmission reviewed, agree with above, numerous episodes of slower VT noted since 5/8/20, treated with anywhere from 1-11 ATP cycles and 1 episode requiring 41 J shock on at ~ 1 AM on 5/10/20. Logbook shows that intermittent VT continues currently and brief run noted on presenting as well. Known history of VT and on amiodarone. VT ablation on 10/7/2019.  Recent telephone visit on 4/15/20 with Dr. Hernández shows Amiodarone was decreased to 1 pill 5 days/week, skipping Mondays and Fridays.      Call placed to patient to review findings and assess. Unable to reach patient. Left VM for callback. Does not appear that he went to ER at all over weekend.     Will review with Dr. Hernández in meantime.     Cassandra Cook, RN

## 2021-06-08 NOTE — TELEPHONE ENCOUNTER
Who is calling:  [facility]   Reason for Call:  Patient passed away on 5/11/20. Will PCP sing the death certificate?. Please advise.  Date of last appointment with primary care: 4/28/20  Okay to leave a detailed message: Yes

## 2021-06-09 NOTE — PROGRESS NOTES
ASSESSMENT:  1. Acute maxillary sinusitis, recurrence not specified  No improvement after 3 rounds of antibiotics-Augmentin ×2 and cefuroxime.  Treat with Levaquin.  Not currently on prednisone but had received these before    2. Encounter for screening colonoscopy  Encouraged to do stool Hemoccults    3. Allergic Rhinitis  - mupirocin (BACTROBAN) 2 % ointment; Apply topically 2 (two) times a day. To affected area(s).  Dispense: 22 g; Refill: 3    4. Dermatitis  - mupirocin (BACTROBAN) 2 % ointment; Apply topically 2 (two) times a day. To affected area(s).  Dispense: 22 g; Refill: 3      Cough.  Add bronchodilator and cough suppressants short-term    PLAN:  Patient Instructions   You have 2 rounds of augmentin and one round of cefuroxime since December    Add Levofloxacin 750 mgs once a day for 2 weeks    Do the stool cards for colon cancer screen    Cough medicine with codeine to use as needed    Use an asthma inhaler for the next 2 weeks---advair one puff twice a day for 2 weeks    You should be feeling a lot better within 2-3 days.  If not, I need to hear about it      No orders of the defined types were placed in this encounter.    Medications Discontinued During This Encounter   Medication Reason     mupirocin (BACTROBAN) 2 % ointment Reorder       No Follow-up on file.    CHIEF COMPLAINT:  Chief Complaint   Patient presents with     Ear Pain     painfull, plugged, x2.5 months     Cough     x2 weeks, productive - green mucos       HISTORY OF PRESENT ILLNESS:  Florentino is a 71 y.o. male presenting to the clinic today with concerns of a cough and chest congestion.     He had a cough and ear pain in February, and was prescribed cefuroxime. His symptoms resolved initially, but returned within a few weeks. He also developed diarrhea 1 week ago. He has not had a fever, but has been producing yellow, gray, and green discharge from his nose. Drainage is worse on the right. This has been severe over the last 2 weeks. He  "feels chest congestion, and feels short of breath. He has had severe coughing fits, and has coughed to the point of feeling faint. He has also felt dizzy at times, which is worse with laying down. He has been taking Mucinex for his cough.     Health maintenance: He is overdue for colon cancer screening. He does have the stool card test at home, but has not completed this yet.     REVIEW OF SYSTEMS:   He denies throat pain. All other systems are negative.    PFSH:  His wife has been ill recently as well.     TOBACCO USE:  History   Smoking Status     Former Smoker     Packs/day: 2.50     Years: 50.00     Types: Cigarettes, Pipe, Cigars     Quit date: 1/1/2000   Smokeless Tobacco     Never Used       VITALS:  Vitals:    03/27/17 1435   BP: 96/64   Pulse: 60   Temp: 98.5  F (36.9  C)   TempSrc: Oral   Weight: (!) 270 lb (122.5 kg)   Height: 6' 2\" (1.88 m)     Wt Readings from Last 3 Encounters:   03/27/17 (!) 270 lb (122.5 kg)   01/16/17 (!) 275 lb (124.7 kg)   12/28/16 (!) 281 lb (127.5 kg)     Body mass index is 34.67 kg/(m^2).    PHYSICAL EXAM:  Constitutional:  Reveals an alert, talkative male.  Vitals:  Per nursing notes.  Ears:  Clear.  Oropharynx:  Post nasal drainage, notably more so on the right side.   Neck:  Supple, thyroid not palpable.  Cardiac:  Regular rate and rhythm without murmurs, rubs, or gallops. Palpation of the radial pulse regular.  Lungs: Clear.  Respiratory effort normal.  Neurologic:  Cranial nerves II-XII intact.     Psychiatric:  Mood appropriate, memory intact.     ADDITIONAL HISTORY SUMMARIZED (2): Reviewed telephone note from 2/13/17 regarding cefuroxime.   DECISION TO OBTAIN EXTRA INFORMATION (1): None.   RADIOLOGY TESTS (1): None.  LABS (1): Reviewed labs from 12/5/16.   MEDICINE TESTS (1): None.  INDEPENDENT REVIEW (2 each): None.     The visit lasted a total of 12 minutes face to face with the patient. Over 50% of the time was spent counseling and educating the patient about sinus " infections.    I, Peggy Lopez, am scribing for and in the presence of, Dr. Lepe.    I, Dr. Lepe, personally performed the services described in this documentation, as scribed by Peggy Lopez in my presence, and it is both accurate and complete.    MEDICATIONS:  Current Outpatient Prescriptions   Medication Sig Dispense Refill     acetaminophen (TYLENOL) 325 MG tablet Take 325-650 mg by mouth every 6 (six) hours as needed for pain.       allopurinol (ZYLOPRIM) 300 MG tablet Take 1 tablet (300 mg total) by mouth daily. 90 tablet 3     amoxicillin (AMOXIL) 500 MG tablet Take 4 tablets (2,000 mg total) by mouth once as needed. TAKE 4 TABLETS 1 HOUR BEFORE DENTAL APPOINTMENT. 8 tablet 1     ascorbic acid (ASCORBIC ACID WITH JAVIER HIPS) 500 MG tablet Take 500 mg by mouth daily.        aspirin 81 MG EC tablet Take 81 mg by mouth daily.       b complex vitamins capsule 1 capsule every other day.        carvedilol (COREG) 25 MG tablet 1 tablet twice daily 180 tablet 3     cholecalciferol, vitamin D3, 1,000 unit tablet Take 1 tablet (1,000 Units total) by mouth daily. 90 tablet 3     CINNAMON BARK (CINNAMON ORAL) 1,000 tablets 2 (two) times a day.        coenzyme Q10 100 mg capsule Take 200 mg by mouth daily.        digoxin (DIGOX) 125 mcg tablet TAKE ONE TABLET BY MOUTH ONE TIME DAILY 90 tablet 3     DULoxetine (CYMBALTA) 60 MG capsule Take 1 capsule (60 mg total) by mouth daily. 90 capsule 3     EPINEPHrine (EPIPEN) 0.3 mg/0.3 mL atIn Inject 0.3 mL (0.3 mg total) into the shoulder, thigh, or buttocks as needed. 1 Pre-filled Pen Syringe 0     fexofenadine (ALLEGRA) 180 MG tablet Take 1 tablet (180 mg total) by mouth daily. 90 tablet 3     furosemide (LASIX) 40 MG tablet TAKE ONE TABLET BY MOUTH ONE TIME DAILY 90 tablet 3     gabapentin (NEURONTIN) 300 MG capsule Take 1 capsule (300 mg total) by mouth bedtime. 90 capsule 3     HYDROcodone-acetaminophen 5-325 mg per tablet TAKE 1 TABLET BY MOUTH EVERY 6 HOURS  AS NEEDED FOR PAIN 60 tablet 0     KRILL OIL ORAL Take 1,000 mg by mouth.        magnesium oxide 250 mg Tab Take 250 mg by mouth daily.        meloxicam (MOBIC) 15 MG tablet Take one tablet by mouth one time daily 90 tablet 3     multivitamin (MULTIVITAMIN) per tablet 1 tablet every other day.        mupirocin (BACTROBAN) 2 % ointment Apply topically 2 (two) times a day. To affected area(s). 22 g 3     omeprazole (PRILOSEC) 20 MG capsule Take 20 mg by mouth as needed.        SAW PALMETTO ORAL 2 capsules daily.       sildenafil (VIAGRA) 50 MG tablet 50 mg. Take 1 tablet 1 hour before needed       simethicone (MYLICON) 125 MG chewable tablet Use As Directed.       spironolactone (ALDACTONE) 25 MG tablet Take one tablet by mouth one time daily 90 tablet 3     traMADol (ULTRAM) 50 mg tablet TAKE TWO TABLETS BY MOUTH THREE TIMES DAILY 180 tablet 5     triamcinolone (KENALOG) 0.5 % cream as needed. Apply 1 inch        codeine-guaiFENesin (GUAIFENESIN AC)  mg/5 mL liquid Take 10 mL by mouth 3 (three) times a day as needed for cough. 240 mL 1     fluticasone-salmeterol (ADVAIR DISKUS) 100-50 mcg/dose DISKUS Inhale 1 puff 2 (two) times a day. 1 each 1     levoFLOXacin (LEVAQUIN) 750 MG tablet Take 1 tablet (750 mg total) by mouth daily for 14 days. 14 tablet 0     loratadine (CLARITIN) 10 mg tablet Take 10 mg by mouth daily.       sertraline (ZOLOFT) 100 MG tablet Take 0.5 tablets (50 mg total) by mouth daily. Take one tablet by mouth one time daily 90 tablet 3     Current Facility-Administered Medications   Medication Dose Route Frequency Provider Last Rate Last Dose     cyanocobalamin injection 1,000 mcg  1,000 mcg Intramuscular Q30 Days Mario Lepe MD   1,000 mcg at 11/15/16 1752       Total data points: 3

## 2021-06-13 NOTE — PROGRESS NOTES
ASSESSMENT:  1. SOB (shortness of breath)  With subjective and objective evidence of increased volume.  Ramipril discontinued last year however well compensated until the last month.  Resume ramipril.  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  Increase carvedilol.  Increase furosemide with close attention to symptoms.  Cardiology follow-up already planned  - XR Chest PA and Lateral  - Electrocardiogram Perform - Clinic  - Comprehensive Metabolic Panel  - HM2(CBC w/o Differential)    2. Influenza vaccination given  - Influenza High Dose, Seasonal 65+ yrs    3. Allergic Rhinitis  - mupirocin (BACTROBAN) 2 % ointment; Apply topically 2 (two) times a day. To affected area(s).  Dispense: 22 g; Refill: 3    4. Dermatitis  - mupirocin (BACTROBAN) 2 % ointment; Apply topically 2 (two) times a day. To affected area(s).  Dispense: 22 g; Refill: 3    5. Major Depression, Single Episode  Stable on Cymbalta.  Clarify that he is no longer taking sertraline  - DULoxetine (CYMBALTA) 60 MG capsule; Take 1 capsule (60 mg total) by mouth daily.  Dispense: 90 capsule; Refill: 3    6. Neuropathy  - gabapentin (NEURONTIN) 300 MG capsule; Take 1 capsule (300 mg total) by mouth at bedtime.  Dispense: 90 capsule; Refill: 3    7. Pain  - HYDROcodone-acetaminophen 5-325 mg per tablet; TAKE 1 TABLET BY MOUTH EVERY 6 HOURS AS NEEDED FOR PAIN  Dispense: 60 tablet; Refill: 0    8. Fatigue, unspecified type  - Thyroid Stimulating Hormone (TSH)    9. Acute maxillary sinusitis, recurrence not specified  Persisting symptoms.  No significant improvement after stopping ramipril.  Left sinus seems clearly worsened.  This may also be driving dyspnea.  Doubt pneumonia.  Add antibiotic  - XR Sinus 3 or More VWS    10. Hypertension  Resume ramipril for blood pressure and congestive cardiomyopathy  - ramipril (ALTACE) 10 MG capsule; TAKE ONE CAPSULE BY MOUTH ONE TIME DAILY  Dispense: 90 capsule; Refill: 3    11. Alcoholic  cardiomyopathy  Increase furosemide until edema resolved  - furosemide (LASIX) 40 MG tablet; Take 1.5 pills twice daily  Dispense: 270 tablet; Refill: 3    12. Cardiomyopathy  - furosemide (LASIX) 40 MG tablet; Take 1.5 pills twice daily  Dispense: 270 tablet; Refill: 3      13.  Otitis.  Some degree of external symptoms.  Add topical drops in addition to oral antibiotics    PLAN:  Patient Instructions   Increase furosemide to one and a half pills twice a day, morning and midafternoon.  It works best on an empty stomach, and it lasts around 6 hours    Do that until the swelling is gone, then decrease to one and a half once a day    Resume Ramipril 10 mgs daily to help a weak heart pump stronger    Increase the Carvedilol to one and a half twice a day to lower the heart rate and decrease the skips    Repeat levofloxacin for sinus infection for 14 days    Ear drops for swimmers ear    Cardiology appt October 17    See me after cardiology appt        Orders Placed This Encounter   Procedures     XR Chest PA and Lateral     Order Specific Question:   Can the procedure be changed per Radiologist protocol?     Answer:   Yes     XR Sinus 3 or More VWS     Order Specific Question:   Can the procedure be changed per Radiologist protocol?     Answer:   Yes     Influenza High Dose, Seasonal 65+ yrs     Comprehensive Metabolic Panel     Thyroid Stimulating Hormone (TSH)     HM2(CBC w/o Differential)     Electrocardiogram Perform - Clinic     Medications Discontinued During This Encounter   Medication Reason     magnesium oxide 250 mg Tab Therapy completed     mupirocin (BACTROBAN) 2 % ointment Reorder     DULoxetine (CYMBALTA) 60 MG capsule Reorder     gabapentin (NEURONTIN) 300 MG capsule Reorder     HYDROcodone-acetaminophen 5-325 mg per tablet Reorder     allopurinol (ZYLOPRIM) 300 MG tablet Reorder     carvedilol (COREG) 25 MG tablet Reorder     digoxin (DIGOX) 125 mcg tablet Reorder     meloxicam (MOBIC) 15 MG tablet  Reorder     spironolactone (ALDACTONE) 25 MG tablet Reorder     traMADol (ULTRAM) 50 mg tablet Reorder     sertraline (ZOLOFT) 100 MG tablet Therapy completed     carvedilol (COREG) 25 MG tablet Reorder     furosemide (LASIX) 40 MG tablet Reorder       No Follow-up on file.    CHIEF COMPLAINT:  Chief Complaint   Patient presents with     Shortness of Breath     increase fluids builing up increased SOB at night       HISTORY OF PRESENT ILLNESS:  Florentino is a 72 y.o. male presenting to the clinic today for shortness of breath and edema. He is accompanied by his wife. He has a history of hypertension, ventricular fibrillation with ICD implant, cardiomyopathy, hyperlipidemia, and systolic congestive heart failure. He reports that for the past week, he has been short of breath with lying in bed at night. He has had to get up out of bed and says that last night, he was unable to sleep. He is also experiencing left ankle swelling, which is unusual. He does get swelling on the right but not generally the left. To help with the edema, he upped his dose of furosemide from 40mg to 60mg which has helped some. He does not notice a lot of urination with 40mg. He is also taking spironolactone 25mg, carvedilol 50mg, and digoxin 125mcg.     Ventricular Fibrillation: He has a pacemaker/defibrillator implant. He mentions that two weeks ago he was contacted by Dr. Hernández's office following a device check showing one episode of ventricular tachycardia and multiple episodes of non-sustained ventricular tachycardia. He took amiodarone last summer but stopped due to side effects. He does notice his heart thumping, particularly when he is angry. He has a follow up appointment with cardiology October 17.     Depression: He is not currently taking Zoloft. He continues on Cymbalta 60mg daily.     REVIEW OF SYSTEMS:   He has had his left ear flushed twice at walk-in care but he still cannot hear out of the ear. Wax was removed during both  "flushings. He does have fluid coming out of the ear. He also has tinnitus.   He reports a history of recurrent sinus infections. His last infection in March was eventually successfully treated with levofloxacin.   He takes tramadol 100mg four times daily for pain.   All other systems are negative.    PFSH:    TOBACCO USE:  History   Smoking Status     Former Smoker     Packs/day: 2.50     Years: 50.00     Types: Cigarettes, Pipe, Cigars     Quit date: 1/1/2000   Smokeless Tobacco     Never Used       VITALS:  Vitals:    09/25/17 1114   BP: 100/70   Patient Site: Left Arm   Patient Position: Sitting   Cuff Size: Adult Large   Pulse: (!) 106   Resp: 20   SpO2: 93%   Weight: (!) 270 lb 7 oz (122.7 kg)   Height: 6' 2\" (1.88 m)     Wt Readings from Last 3 Encounters:   09/25/17 (!) 270 lb 7 oz (122.7 kg)   05/31/17 (!) 267 lb (121.1 kg)   03/27/17 (!) 270 lb (122.5 kg)     Body mass index is 34.72 kg/(m^2).    PHYSICAL EXAM:  Constitutional:  Reveals an alert, talkative man seen in a orthopedic boot on the right and a brace with compression stocking on the left. Vitals:  Per nursing notes.  Ears:  Left ear: minor cerumen.   Cardiac:  Mostly regular. 2+ edema on left.   Lungs: Crackles on the right.   Skin:   No abnormal pallor.   Rheumatologic: Normal joints and nails of the hands.  Neurologic:  Cranial nerves II-XII intact.     Psychiatric:  Mood appropriate, memory intact.   X-ray of sinuses: circumferential thickening with central clearing in the left maxillary sinuses.   X-ray of chest: increased pleural fluid bilaterally, right over left.   EKG: Normal rhythm. Paced ventricular beats at 80. Unifocal PVCs.      QUALITY MEASURES:  The following are part of a depression follow up plan for the patient:  mental health care management    ADDITIONAL HISTORY SUMMARIZED (2): Reviewed note of 11/15/16 regarding ramipril use. Reviewed cardiology note of 5/31/17. Reviewed Dr. Schulte note of 3/29/16.   DECISION TO OBTAIN EXTRA " INFORMATION (1): None.   RADIOLOGY TESTS (1): X-rays of chest and sinuses ordered.  LABS (1): Labs ordered.   MEDICINE TESTS (1): EKG ordered.  INDEPENDENT REVIEW (2 each): X-rays personally interpreted. EKG personally interpreted.      The visit lasted a total of 44 minutes face to face with the patient. Over 50% of the time was spent counseling and educating the patient about cardiac issues. An additional 3 minutes was spent discussing depression.     IUlices, am scribing for and in the presence of, Dr. Lepe.    I, Dr. Lepe, personally performed the services described in this documentation, as scribed by Ulices Awad in my presence, and it is both accurate and complete.    MEDICATIONS:  Current Outpatient Prescriptions   Medication Sig Dispense Refill     acetaminophen (TYLENOL) 325 MG tablet Take 325-650 mg by mouth every 6 (six) hours as needed for pain.       allopurinol (ZYLOPRIM) 300 MG tablet Take 1 tablet (300 mg total) by mouth daily. 90 tablet 3     amoxicillin (AMOXIL) 500 MG tablet Take 4 tablets (2,000 mg total) by mouth once as needed. TAKE 4 TABLETS 1 HOUR BEFORE DENTAL APPOINTMENT. 8 tablet 1     ascorbic acid (ASCORBIC ACID WITH JAVIER HIPS) 500 MG tablet Take 500 mg by mouth daily.        aspirin 81 MG EC tablet Take 81 mg by mouth daily.       carvedilol (COREG) 25 MG tablet 1.5 tabs twice a day 270 tablet 3     cholecalciferol, vitamin D3, 1,000 unit tablet Take 1 tablet (1,000 Units total) by mouth daily. 90 tablet 3     CINNAMON BARK (CINNAMON ORAL) 1,000 tablets 2 (two) times a day.        coenzyme Q10 100 mg capsule Take 200 mg by mouth daily.        digoxin (DIGOX) 125 mcg tablet TAKE ONE TABLET BY MOUTH ONE TIME DAILY 90 tablet 3     DULoxetine (CYMBALTA) 60 MG capsule Take 1 capsule (60 mg total) by mouth daily. 90 capsule 3     EPINEPHrine (EPIPEN) 0.3 mg/0.3 mL atIn Inject 0.3 mL (0.3 mg total) into the shoulder, thigh, or buttocks as needed. 1 Pre-filled Pen Syringe 0      fexofenadine (ALLEGRA) 180 MG tablet Take 1 tablet (180 mg total) by mouth daily. 90 tablet 3     furosemide (LASIX) 40 MG tablet Take 1.5 pills twice daily 270 tablet 3     gabapentin (NEURONTIN) 300 MG capsule Take 1 capsule (300 mg total) by mouth at bedtime. 90 capsule 3     HYDROcodone-acetaminophen 5-325 mg per tablet TAKE 1 TABLET BY MOUTH EVERY 6 HOURS AS NEEDED FOR PAIN 60 tablet 0     KRILL OIL ORAL Take 1,000 mg by mouth.        meloxicam (MOBIC) 15 MG tablet Take one tablet by mouth one time daily 90 tablet 3     multivitamin (MULTIVITAMIN) per tablet 1 tablet every other day.        mupirocin (BACTROBAN) 2 % ointment Apply topically 2 (two) times a day. To affected area(s). 22 g 3     omeprazole (PRILOSEC) 20 MG capsule Take 20 mg by mouth as needed.        sildenafil (VIAGRA) 50 MG tablet 50 mg. Take 1 tablet 1 hour before needed       simethicone (MYLICON) 125 MG chewable tablet Use As Directed.       spironolactone (ALDACTONE) 25 MG tablet Take one tablet by mouth one time daily 90 tablet 3     traMADol (ULTRAM) 50 mg tablet TAKE TWO TABLETS BY MOUTH THREE TIMES DAILY 180 tablet 0     triamcinolone (KENALOG) 0.5 % cream as needed. Apply 1 inch        b complex vitamins capsule 1 capsule every other day.        levoFLOXacin (LEVAQUIN) 750 MG tablet Take 1 tablet (750 mg total) by mouth daily for 14 days. 14 tablet 0     loratadine (CLARITIN) 10 mg tablet Take 10 mg by mouth daily.       neomycin-polymyxin-hydrocortisone (CORTISPORIN) otic solution Administer 3 drops into both ears 3 (three) times a day for 10 days. 10 mL 11     ramipril (ALTACE) 10 MG capsule TAKE ONE CAPSULE BY MOUTH ONE TIME DAILY 90 capsule 3     SAW PALMETTO ORAL 2 capsules daily.       Current Facility-Administered Medications   Medication Dose Route Frequency Provider Last Rate Last Dose     cyanocobalamin injection 1,000 mcg  1,000 mcg Intramuscular Q30 Days Mario Lepe MD   1,000 mcg at 11/15/16 8402       Total data  points: 7

## 2021-06-13 NOTE — PROGRESS NOTES
In clinic device check with Device Nurse followed by office visit with Dr. Hernández.  Please see link for full device report.  Patient was informed of results and next follow up during today's visit.

## 2021-06-13 NOTE — PROGRESS NOTES
ASSESSMENT:  1. Chronic systolic heart failure  Edema markedly improved.  Weight down.  Feels very well.  Blood pressure occasionally too low with resumption of ramipril but continue for now    2. PSVT (paroxysmal supraventricular tachycardia)  Reviewed device monitor and cardiology note.  Todd believes that each of his episodes has been explainable.  He does not wish to undergo ablation.  He cannot feel his palpitations or PVCs.  He does not want to explore this further but rather requests that his medication adjustments be based not on data but on symptoms and quality of life.  This is reasonable as long as he does not have defibrillator firing.  Postpone further cardiology workup.      PLAN:  Patient Instructions   Cancel holter    Lets plan on NO ablation or further work on the heart as long as you feel well    Cancel follow up appt    meds stay the same          No orders of the defined types were placed in this encounter.    Medications Discontinued During This Encounter   Medication Reason     loratadine (CLARITIN) 10 mg tablet Therapy completed     SAW PALMETTO ORAL Therapy completed     b complex vitamins capsule Therapy completed       Return in about 4 months (around 2/18/2018).    CHIEF COMPLAINT:  Chief Complaint   Patient presents with     Follow-up     from 9/25/17       HISTORY OF PRESENT ILLNESS:  Florentino is a 72 y.o. male presenting to the clinic today to follow up on his appointment from 9/25/2017. He is accompanied by his wife.     Ventricular Tachycardia: He has a history of ventricular tachycardia and cardiomyopathy with an ICD in place. He was short of breath the last time he was here in the clinic on 9/25/2017. Some medication changes were made at that time, and he has felt much better since then. He increased his dose of furosemide to 60 mg twice daily at that time, but he is back down to 60 mg once daily now because his swelling has been fairly well controlled. He restarted ramipril and  "increased his dose in carvedilol as well, which he has tolerated well. He went in for a cardiology appointment yesterday, but he is not happy with how it went. He was told he needs an ablation and a Holter monitor, but he feels good, so he does not think anything needs to be done. He was told he has frequent PVC's as well. He is not symptomatic of the PVC's, so he does not feel like this needs to be looked into any further. The only time he ever had severe symptoms related to his heart was in July. He was able to sit down and calm himself down during that episode. Every time his ICD has shocked him or has been close to shocking him, he admits he has been doing something he knows he should not be doing.     REVIEW OF SYSTEMS:   The ear drops have worked very well for him. His sinus infection mostly cleared up after the course of antibiotics. He always has some residual symptoms. His feet bother him. All other systems are negative.    PFSH:  Reviewed, as below.     TOBACCO USE:  History   Smoking Status     Former Smoker     Packs/day: 2.50     Years: 50.00     Types: Cigarettes, Pipe, Cigars     Quit date: 1/1/2000   Smokeless Tobacco     Never Used       VITALS:  Vitals:    10/18/17 1135   BP: 90/62   Patient Site: Left Arm   Patient Position: Sitting   Cuff Size: Adult Regular   Pulse: (!) 58   Weight: (!) 264 lb 8 oz (120 kg)   Height: 6' 2\" (1.88 m)     Wt Readings from Last 3 Encounters:   10/18/17 (!) 264 lb 8 oz (120 kg)   09/25/17 (!) 270 lb 7 oz (122.7 kg)   05/31/17 (!) 267 lb (121.1 kg)     Body mass index is 33.96 kg/(m^2).    PHYSICAL EXAM:  Constitutional:  Reveals an alert, pleasant, talkative man. Hard boot on right foot and brace on left ankle. Vitals:  Per nursing notes.  Oropharynx: Small amount of exudate in posterior oropharynx.   Neck:  Supple, thyroid not palpable.  Cardiac:  Regular rate and rhythm with occasional skipped beats. Legs without edema. Palpation of the radial pulse regular. "   Lungs: Clear.  Respiratory effort normal.  Neurologic:  Cranial nerves II-XII intact.     Psychiatric:  Mood appropriate, memory intact.     ADDITIONAL HISTORY SUMMARIZED (2): Reviewed 10/18/2017 cardiology note regarding ventricular tachycardia.   DECISION TO OBTAIN EXTRA INFORMATION (1): None.   RADIOLOGY TESTS (1): None.  LABS (1): Labs from 9/25/2017 reviewed.   MEDICINE TESTS (1): Reviewed 10/17/2017 device check.   INDEPENDENT REVIEW (2 each): None.     The visit lasted a total of 22 minutes face to face with the patient. Over 50% of the time was spent counseling and educating the patient about his ventricular tachycardia.    Mitch HARPER, am scribing for and in the presence of, Dr. Lepe.    I, Dr. Lepe, personally performed the services described in this documentation, as scribed by Mitch Vines in my presence, and it is both accurate and complete.    MEDICATIONS:  Current Outpatient Prescriptions   Medication Sig Dispense Refill     acetaminophen (TYLENOL) 325 MG tablet Take 325-650 mg by mouth every 6 (six) hours as needed for pain.       allopurinol (ZYLOPRIM) 300 MG tablet Take 1 tablet (300 mg total) by mouth daily. 90 tablet 3     amoxicillin (AMOXIL) 500 MG tablet Take 4 tablets (2,000 mg total) by mouth once as needed. TAKE 4 TABLETS 1 HOUR BEFORE DENTAL APPOINTMENT. 8 tablet 1     ascorbic acid (ASCORBIC ACID WITH JAVIER HIPS) 500 MG tablet Take 500 mg by mouth daily.        aspirin 81 MG EC tablet Take 81 mg by mouth daily.       carvedilol (COREG) 25 MG tablet 1.5 tabs twice a day 270 tablet 3     cholecalciferol, vitamin D3, 1,000 unit tablet Take 1 tablet (1,000 Units total) by mouth daily. 90 tablet 3     CINNAMON BARK (CINNAMON ORAL) 1,000 tablets 2 (two) times a day.        coenzyme Q10 100 mg capsule Take 200 mg by mouth daily.        digoxin (DIGOX) 125 mcg tablet TAKE ONE TABLET BY MOUTH ONE TIME DAILY 90 tablet 3     DULoxetine (CYMBALTA) 60 MG capsule Take 1 capsule (60 mg  total) by mouth daily. 90 capsule 3     EPINEPHrine (EPIPEN) 0.3 mg/0.3 mL atIn Inject 0.3 mL (0.3 mg total) into the shoulder, thigh, or buttocks as needed. 1 Pre-filled Pen Syringe 0     fexofenadine (ALLEGRA) 180 MG tablet Take 1 tablet (180 mg total) by mouth daily. 90 tablet 3     furosemide (LASIX) 40 MG tablet Take 1.5 pills twice daily 270 tablet 3     gabapentin (NEURONTIN) 300 MG capsule Take 1 capsule (300 mg total) by mouth at bedtime. 90 capsule 3     HYDROcodone-acetaminophen 5-325 mg per tablet TAKE 1 TABLET BY MOUTH EVERY 6 HOURS AS NEEDED FOR PAIN 60 tablet 0     KRILL OIL ORAL Take 1,000 mg by mouth.        meloxicam (MOBIC) 15 MG tablet Take one tablet by mouth one time daily 90 tablet 3     multivitamin (MULTIVITAMIN) per tablet 1 tablet every other day.        mupirocin (BACTROBAN) 2 % ointment Apply topically 2 (two) times a day. To affected area(s). 22 g 3     omeprazole (PRILOSEC) 20 MG capsule Take 20 mg by mouth as needed.        ramipril (ALTACE) 10 MG capsule TAKE ONE CAPSULE BY MOUTH ONE TIME DAILY 90 capsule 3     sildenafil (VIAGRA) 50 MG tablet 50 mg. Take 1 tablet 1 hour before needed       simethicone (MYLICON) 125 MG chewable tablet Use As Directed.       spironolactone (ALDACTONE) 25 MG tablet Take one tablet by mouth one time daily 90 tablet 3     traMADol (ULTRAM) 50 mg tablet TAKE TWO TABLETS BY MOUTH THREE TIMES DAILY 180 tablet 0     triamcinolone (KENALOG) 0.5 % cream as needed. Apply 1 inch        Current Facility-Administered Medications   Medication Dose Route Frequency Provider Last Rate Last Dose     cyanocobalamin injection 1,000 mcg  1,000 mcg Intramuscular Q30 Days Mario Lepe MD   1,000 mcg at 11/15/16 1752       Total data points: 4

## 2021-06-16 NOTE — TELEPHONE ENCOUNTER
Telephone Encounter by Lydia Liz LPN at 3/24/2020  1:24 PM     Author: Lydia Liz LPN Service: -- Author Type: Licensed Nurse    Filed: 3/24/2020  1:28 PM Encounter Date: 3/12/2020 Status: Signed    : Lydia Liz LPN (Licensed Nurse)       Medication: Tramadol  Last Date Filled 3/11/20   pulled: YES         Only PCP Prescribing? : NO  Taken as prescribed from physician notes? YES 2/28/20    2. Chronic bilateral low back pain without sciatica  Markedly improved with addition of allopurinol.  Unfortunately this has not resulted in a decrease in tramadol use.  Referral to therapy.  Side effects on naltrexone  - Ambulatory referral to Adult PT- Internal    CSA in last year: YES  Random Utox in last year: NO  (if any of the above answer NO - schedule with PCP)     Opioids + benzodiazepines? NO  (if the above answer YES - schedule with PCP every 6 months)     Is patient on the Executive Review Team? no      All responses suggest: Refilling prescription

## 2021-06-16 NOTE — TELEPHONE ENCOUNTER
Telephone Encounter by Cassandra Cook RN at 12/4/2019  3:18 PM     Author: Cassandra Cook RN Service: -- Author Type: Registered Nurse    Filed: 12/4/2019  3:19 PM Encounter Date: 12/3/2019 Status: Signed    : Cassandra Cook RN (Registered Nurse)       Ulises Hernández MD Gebel, Mandy L, RN   Caller: Unspecified (Yesterday, 11:41 AM)             ok    Previous Messages      ----- Message -----   From: Cassandra Cook RN   Sent: 12/3/2019   4:54 PM CST   To: Ulises Hernández MD     ----- Message from Cassandra Cook RN sent at 12/3/2019  4:54 PM CST -----   I couldn't reach Ray today, will try again tomorrow. Sees you 12/5/19 in device clinic this week.   Thanks,   Cassandra         Attempted again to reach Ray to assess for any correlating symptoms, LVMx2. No calls back at this time. Patient is scheduled to see Dr. Hernández tomorrow.     Cassandra Cook RN

## 2021-06-16 NOTE — TELEPHONE ENCOUNTER
"Telephone Encounter by Cassandra Cook RN at 12/26/2019  1:30 PM     Author: Cassandra Cook RN Service: -- Author Type: Registered Nurse    Filed: 12/26/2019  2:06 PM Encounter Date: 12/26/2019 Status: Signed    : Cassandra Cook RN (Registered Nurse)       Type: alert remote CRT-D transmission for accelerated ventricular arrhythmia episode.  Presenting rhythm: AP-biVP, rate 81 bpm.  Battery/lead status: stable  Arrhythmias: since 12/9/19, one mode switch, duration 5 seconds, no EGM available. 52 slow VT, treated with ATP x 1-10, at times toggled therapy zone, longest 19 minutes. Two other untreated VT and 7 nonsustained VT.  Anticoagulant: Eliquis  Comments: normal ICD function. BiV pacing 94% RV, 93% LV. Routed to device RN.   Device/lead alerts: none. prd        Transmission reviewed, numerous episodes of slow VT recorded in over the last week, requiring several round of ATPs or timed out at Max of 10 rounds. EGMs show often terminated with ATP but recurs within several seconds.   Longest duration ~19 minutes, in/out VT. All episodes falling in VT-1 zone (no shocks programmed on in this zone). Todd was discharged home from hospital on 12/11/19 with recommendations to take amiodarone 600 mg daily for 3 weeks then 200 mg daily, and see in follow-up. Next follow up with Dr. Hernández on 1/6/2020.    Call placed to Todd to assess for symptoms and medication compliance. States he has been, able to tell he is in VT on/off quite a bit since DC due to more rapid palpitations but denies any lightheadedness or near-syncope. Reviewed the most recent Episodes on 12/24 and 12/25 with Todd, states he was with family for the Holiday and really didn't seem to have too much troubles with all the VT, specifically yesterday afternoon when numerous rounds of ATP were delivered. Again states has palpitations and intermittent unusual chest sensations, \"not pain but just something different that I cannot describe on Left side of " "chest\" but no near-syncope/syncope.      Ray confirms he is taking his Amiodarone 200 mg x3 daily and also 50 mg Toprol XL.  Advised to call with any worsening symptoms, and is aware of when to seek ER. Denies any questions or concerns at this time.  Will review with Dr. Hernández.   Cassandra Cook RN    Full device report/settinsg/EGMs scanned in.   Settings:              "

## 2021-06-16 NOTE — TELEPHONE ENCOUNTER
Telephone Encounter by Lydia Liz LPN at 6/11/2019  3:27 PM     Author: Lydia Liz LPN Service: -- Author Type: Licensed Nurse    Filed: 6/11/2019  3:48 PM Encounter Date: 6/10/2019 Status: Signed    : Lydia Liz LPN (Licensed Nurse)       Medication: Tramadol  Last Date Filled 5/10/19   pulled: YES         Only PCP Prescribing? : YES  Taken as prescribed from physician notes? unknown    CSA in last year:YES  Random Utox in last year: NO  (if any of the above answer NO - schedule with PCP)     Opioids + benzodiazepines? NO  (if the above answer YES - schedule with PCP every 6 months)     Is patient on the Executive Review Team? No      All responses suggest: Scheduling with PCP for further intervention Pt has appt on 6/26/19 with PCP.  Will need to get UTOX at this appt and update CSA.

## 2021-06-16 NOTE — TELEPHONE ENCOUNTER
Telephone Encounter by Lydia Liz LPN at 8/13/2019  1:58 PM     Author: Lydia Liz LPN Service: -- Author Type: Licensed Nurse    Filed: 8/13/2019  2:11 PM Encounter Date: 8/12/2019 Status: Signed    : Lydia Liz LPN (Licensed Nurse)       Medication: Tramadol  Last Date Filled 6/11/19   pulled: YES         Only PCP Prescribing? : NO  Taken as prescribed from physician notes? Unknown    CSA in last year: YES  Random Utox in last year: NO  (if any of the above answer NO - schedule with PCP)     Opioids + benzodiazepines? NO  (if the above answer YES - schedule with PCP every 6 months)     Is patient on the Executive Review Team? No      All responses suggest: Refilling prescription

## 2021-06-16 NOTE — TELEPHONE ENCOUNTER
Telephone Encounter by Brittani Sotelo CMA at 3/24/2020 12:00 PM     Author: Brittani Sotelo CMA Service: -- Author Type: Certified Medical Assistant    Filed: 3/24/2020 12:01 PM Encounter Date: 3/12/2020 Status: Signed    : Brittani Sotelo CMA (Certified Medical Assistant)

## 2021-06-16 NOTE — TELEPHONE ENCOUNTER
Telephone Encounter by Lydia Liz LPN at 2/24/2020 12:10 PM     Author: Lydia Liz LPN Service: -- Author Type: Licensed Nurse    Filed: 2/24/2020 12:14 PM Encounter Date: 2/24/2020 Status: Signed    : Lydia Liz LPN (Licensed Nurse)       Medication: Tramadol  Last Date Filled 2/2/20   pulled: YES         Only PCP Prescribing? : NO  Taken as prescribed from physician notes? YES    CSA in last year: YES  Random Utox in last year: NO  (if any of the above answer NO - schedule with PCP)     Opioids + benzodiazepines? NO  (if the above answer YES - schedule with PCP every 6 months)     Is patient on the Executive Review Team? No      All responses suggest: Refilling prescription-pt is requesting refill through mail order pharmacy, okay to fill.

## 2021-06-16 NOTE — TELEPHONE ENCOUNTER
Telephone Encounter by Brittani Sotelo CMA at 1/13/2020  9:23 AM     Author: Brittani Sotelo CMA Service: -- Author Type: Certified Medical Assistant    Filed: 1/13/2020  1:45 PM Encounter Date: 1/8/2020 Status: Addendum    : Brittani Sotelo CMA (Certified Medical Assistant)    Related Notes: Original Note by Brittani Sotelo CMA (Certified Medical Assistant) filed at 1/13/2020  9:43 AM       [employer] message sent to pt    Pt not due until 2/14/2020      Medication: Tramadol  Last Date Filled 1/4/2020   pulled: YES    Only PCP Prescribing? : NO  Taken as prescribed from physician notes? NO    CSA in last year: YES  Random Utox in last year: NO  (if any of the above answer NO - schedule with PCP)     Opioids + benzodiazepines? NO  (if the above answer YES - schedule with PCP every 6 months)     Is patient on the Executive Review Team? NO      All responses suggest: Refilling prescription     Return in about 4 weeks (around 1/17/2020) for for a full review of everything we did today.    Scheduled 1/17/2020

## 2021-06-16 NOTE — TELEPHONE ENCOUNTER
Telephone Encounter by Lydia Liz LPN at 12/30/2019 11:13 AM     Author: Lydia Liz LPN Service: -- Author Type: Licensed Nurse    Filed: 12/30/2019 11:17 AM Encounter Date: 12/30/2019 Status: Signed    : Lydia Liz LPN (Licensed Nurse)       Medication: Tramadol  Last Date Filled 11/27/19   pulled: YES         Only PCP Prescribing? : NO  Taken as prescribed from physician notes? YES 12/4/19    CSA in last year: YES  Random Utox in last year: NO  (if any of the above answer NO - schedule with PCP)     Opioids + benzodiazepines? NO  (if the above answer YES - schedule with PCP every 6 months)     Is patient on the Executive Review Team? no      All responses suggest: Refilling prescription, pt has chosen new pharmacy

## 2021-06-17 NOTE — PATIENT INSTRUCTIONS - HE
Patient Instructions by Phu Zaman DPM at 1/9/2019  1:40 PM     Author: Phu Zaman DPM Service: -- Author Type: Physician    Filed: 1/9/2019  1:58 PM Encounter Date: 1/9/2019 Status: Signed    : Phu Zaman DPM (Physician)       Limited walking bilateral feet: surgical shoe right and cage splint left.    Dressing Application of  Endoform    1. Endoform should be cut to the size of the wound.  It should touch the edges of the ulcer. It is okay if it overlap the edges a small amount.  Then, moisten the Endoform with saline.(If it is easier for you, you can moisten it before laying it in the wound)    2. Cover the Endoform with Adaptic Touch, then gauze and secure with roll gauze.     3. Endoform is naturally used by the body over time so you may not find it in the wound when you change your dressing.  If you do find some, gently cleanse the wound with saline. Do not remove the remaining Endoform, which may appear as an off-white to pedro gel, just add Endoform on top.  Usually, more Endoform will need to be added every 5-7 days.     4. Change your top dressing every 1-2 days or as needed depending on drainage.    -Endoform is a collagen dressing created from ovine (sheep) fore-stomach tissue. The collagen extracellular matrix transforms into a soft conforming sheet, which is naturally incorporated into the wound over time.  Collagen dressings are used to stimulate wound healing.   ,

## 2021-06-17 NOTE — TELEPHONE ENCOUNTER
"Telephone Encounter by Cassandra Cook RN at 5/11/2020  4:51 PM     Author: Cassandra Cook RN Service: -- Author Type: Registered Nurse    Filed: 5/11/2020  5:09 PM Encounter Date: 5/11/2020 Status: Signed    : Cassandra Cook RN (Registered Nurse)       Ulises Hernández MD  [facility] Ep Rn Support Pool    Cc: Cassandra Cook RN    Caller: Unspecified (Today, 11:06 AM)               Frequent VT noted usually paced terminated but 1 ICD shock for VT acceleration was delivered.   Telephone message was left with the patient to increase amiodarone to 2 tablets daily for 5 days then then 1 tablet daily.  Patient is to call if he gets recurrent shocks.  Please call him later to confirm these instructions with the patient.  Ulises Hernández       Call placed to patient again before clinic close to see if patient was able to get message about increasing Amiodarone. His daughter Farhana answered the phone and unfortunately tells me he passed away sometime in the night. States she was with him yesterday afternoon and he wasn't feeling the best but was adamant that he did not want to go to ER. He wanted to wait to call Device Clinic in morning on Monday.      Daughter states she tried calling him first thing this morning and when he didn't answer she went to his home. Said she found him in the hallway on the floor not breathing. No repeat alerts were received for other shocks ,but patient may have been too far from monitor when he passed.      Many condolences given to Farhana rios.  She had questions about timing of events, reviewed logbook with her which gave her some peace of mind as she regrets not making him go to ER last night.  Daughter states she is so grateful for the device clinic's care of her father, especially Dr. Hernández's care \"she has saved his life multiple times.\"     Will update Dr. Hernández.   Cassandra Cook RN         "

## 2021-06-17 NOTE — PATIENT INSTRUCTIONS - HE
Patient Instructions by Phu Zaman DPM at 1/24/2019  9:20 AM     Author: Phu Zaman DPM Service: -- Author Type: Physician    Filed: 1/24/2019  9:46 AM Encounter Date: 1/24/2019 Status: Signed    : Phu Zaman DPM (Physician)       Dressing Application of  Endoform    1. Endoform should be cut to the size of the wound.  It should touch the edges of the ulcer. It is okay if it overlap the edges a small amount.  Then, moisten the Endoform with saline.(If it is easier for you, you can moisten it before laying it in the wound)    2. Cover the Endoform with Adaptic Touch, then gauze and secure with roll gauze.     3. Endoform is naturally used by the body over time so you may not find it in the wound when you change your dressing.  If you do find some, gently cleanse the wound with saline. Do not remove the remaining Endoform, which may appear as an off-white to pedro gel, just add Endoform on top.  Usually, more Endoform will need to be added every 5-7 days.     4. Change your top dressing every 1-2 days or as needed depending on drainage.    -Endoform is a collagen dressing created from ovine (sheep) fore-stomach tissue. The collagen extracellular matrix transforms into a soft conforming sheet, which is naturally incorporated into the wound over time.  Collagen dressings are used to stimulate wound healing.   ,

## 2021-06-17 NOTE — TELEPHONE ENCOUNTER
Telephone Encounter by Nemo Richardson CMA at 4/6/2020  1:57 PM     Author: Nemo Richardson CMA Service: -- Author Type: Certified Medical Assistant    Filed: 4/6/2020  2:02 PM Encounter Date: 4/4/2020 Status: Signed    : Nemo Richardson CMA (Certified Medical Assistant)

## 2021-06-17 NOTE — PATIENT INSTRUCTIONS - HE
Patient Instructions by Phu Zaman DPM at 3/22/2019 10:20 AM     Author: Phu Zaman DPM Service: -- Author Type: Physician    Filed: 3/22/2019 10:47 AM Encounter Date: 3/22/2019 Status: Signed    : Phu Zaman DPM (Physician)       - Dressing Application of  Endoform    1. Endoform should be cut to the size of the wound.  It should touch the edges of the ulcer. It is okay if it overlap the edges a small amount.  Then, moisten the Endoform with saline.(If it is easier for you, you can moisten it before laying it in the wound)    2. Cover the wound with Endoform, followed by dry gauze, and secure with roll gauze if needed.     3. Endoform is naturally used by the body over time so you may not find it in the wound when you change your dressing.  If you do find some, gently cleanse the wound with saline. Do not remove the remaining Endoform, which may appear as an off-white to pedro gel, just add Endoform on top.  Usually, more Endoform will need to be added every 5-7 days.     4. Change your top dressing every 1-2 days or as needed depending on drainage.    -Endoform is a collagen dressing created from ovine (sheep) fore-stomach tissue. The collagen extracellular matrix transforms into a soft conforming sheet, which is naturally incorporated into the wound over time.  Collagen dressings are used to stimulate wound healing.   ,

## 2021-06-17 NOTE — PROGRESS NOTES
ASSESSMENT:  1. Cardiomyopathy, nonischemic  Reviewed cardiology note, recurrent V. tach, cessation of ramipril, improved blood pressure and renal function.  Volume status appears excellent.  Continue Spironolactone and furosemide.  Last echo shows significant cardiomyopathy  - Basic Metabolic Panel    2. Screen for colon cancer  - Cologuard; Future    3. Sustained VT (ventricular tachycardia)  Recurrent.  Now that he feels better, push carvedilol back up.  He decreased dose to 25 mg twice daily but I do not see that cardiology actually recommended that.  If he tolerates 37.5 twice daily I would push further up to 50 to try to decrease episodes of V. tach  - carvedilol (COREG) 25 MG tablet; Take 1.5 tablets (37.5 mg total) by mouth 2 (two) times a day with meals.  Dispense: 270 tablet; Refill: 3    4. ICD (implantable cardioverter-defibrillator), biventricular, in situ  Reviewed device reports and cardiology notes    5. Major depressive disorder with single episode, in partial remission  Not quite as good on Cymbalta as on sertraline.  Push Cymbalta further for this and neuropathy and pain  - DULoxetine (CYMBALTA) 60 MG capsule; Take 1 capsule (60 mg total) by mouth 2 (two) times a day.  Dispense: 180 capsule; Refill: 3    6. Neuropathy  Trial of topiramate to replace gabapentin.  Gabapentin and higher doses it caused edema  - topiramate (TOPAMAX) 25 MG tablet; Take 1 tablet (25 mg total) by mouth 2 (two) times a day.  Dispense: 180 tablet; Refill: 3    7. Cerumen debris on tympanic membrane, unspecified laterality  - Ear wax removal        PLAN:  Patient Instructions   Stop Gabapentin    Add Topirimate 25 mgs twice a day for neuropathy and pain control.  It will NOT make swelling worse.  It can help decrease appetite.  We can a lot higher if needed    Increase Duloxetine to 60 mgs twice a day.  Better pain control, better mood, better neuropathy    Increase Carvedilol to one and a half pills twice a day.  If no  side effects, we may go up to 50 mgs twice a day (max)    Continue Furosemide at just 40 mgs daily for swelling.  Increase as needed if swelling worse    Stay off Ramipril    cologuard for colon cancer screening.        Orders Placed This Encounter   Procedures     Basic Metabolic Panel     Cologuard     Standing Status:   Future     Standing Expiration Date:   4/13/2019     Ear wax removal     Medications Discontinued During This Encounter   Medication Reason     HYDROcodone-acetaminophen 5-325 mg per tablet Therapy completed     gabapentin (NEURONTIN) 300 MG capsule      amoxicillin (AMOXIL) 500 MG tablet Reorder     DULoxetine (CYMBALTA) 60 MG capsule Reorder     carvedilol (COREG) 25 MG tablet Reorder       No Follow-up on file.    CHIEF COMPLAINT:  Chief Complaint   Patient presents with     Follow-up     From appt with Cardiology 4/4/18     Routine Health Maintenance     pt due for colonoscopy; pt refused; order for colonoscopy and cologuard pended for PCP       HISTORY OF PRESENT ILLNESS:  Florentino is a 72 y.o. male presenting to the clinic today for follow up for chronic systolic heart failure and PSVT. He is accompanied by his wife. His last visit to the clinic was 10/18/17. Since his last visit, device checks on 1/29 and 3/5 showed episodes of ventricular tachycardia. He saw Dr. Hernández on 2/28/18 and 4/4/18. At his last visit, ramipril was discontinued for hypotension and hypoperfusion on labs. Carvedilol was continued at 37.5mg daily, but due to misunderstanding, he decreased to 25mg daily. Also, minor adjustments to his ICD programming occurred. He is also taking digoxin 125mcg daily.     Edema: He is taking one furosemide 40mg once daily. His ankle swelling is controlled.      Pain: He has pain in his right ankle from chronic sprain and pain in his shoulders due to arthritis. He is taking tramadol 100mg twice daily. He is also taking gabapentin 300mg at night for this and neuropathy, although he is  "unsure of the benefit, and meloxicam 15mg. He is not taking Vicodin.     Neuropathy: He continues to have occasional shooting pain in his feet and constant numbness. He says if he cuts his foot, he may not know it.     Depression/Irritation: He is currently taking Cymbalta 60mg. He notes that his mood is slightly worsened with Cymbalta as compared to Zoloft. He does not feel Cymbalta takes the edge off as well as Zoloft did. His wife agrees that he is not as patient.     Bowel Issues: He reports loose stools with some urgency. He says his bowel movements have not been normal since a bout of stomach virus last January.     REVIEW OF SYSTEMS:   He is not taking omeprazole.   He continues on simethicone nightly.   He has diminished hearing on the left.   All other systems are negative.    PFSH:  He is .     TOBACCO USE:  History   Smoking Status     Former Smoker     Packs/day: 2.50     Years: 50.00     Types: Cigarettes, Pipe, Cigars     Quit date: 1/1/2000   Smokeless Tobacco     Never Used       VITALS:  Vitals:    04/13/18 1205   BP: 130/72   Patient Site: Left Arm   Patient Position: Sitting   Cuff Size: Adult Regular   Pulse: 72   SpO2: 93%   Height: 6' 2\" (1.88 m)     Wt Readings from Last 3 Encounters:   04/04/18 (!) 260 lb (117.9 kg)   02/08/18 (!) 259 lb (117.5 kg)   10/18/17 (!) 264 lb 8 oz (120 kg)     There is no height or weight on file to calculate BMI.    MEDICATIONS:  Current Outpatient Prescriptions   Medication Sig Dispense Refill     acetaminophen (TYLENOL) 325 MG tablet Take 325-650 mg by mouth every 6 (six) hours as needed for pain.       allopurinol (ZYLOPRIM) 300 MG tablet Take 1 tablet (300 mg total) by mouth daily. 90 tablet 3     amoxicillin (AMOXIL) 500 MG tablet Take 4 tablets (2,000 mg total) by mouth once as needed. TAKE 4 TABLETS 1 HOUR BEFORE DENTAL APPOINTMENT. 8 tablet 1     ascorbic acid (ASCORBIC ACID WITH JAVIER HIPS) 500 MG tablet Take 500 mg by mouth daily.        aspirin 81 " MG EC tablet Take 81 mg by mouth daily.       CHOLECALCIFEROL 1,000 unit tablet TAKE 1 TABLET BY MOUTH DAILY. 90 tablet 3     CINNAMON BARK (CINNAMON ORAL) 1,000 tablets 2 (two) times a day.        coenzyme Q10 100 mg capsule Take 200 mg by mouth daily.        digoxin (DIGOX) 125 mcg tablet TAKE ONE TABLET BY MOUTH ONE TIME DAILY 90 tablet 3     DULoxetine (CYMBALTA) 60 MG capsule Take 1 capsule (60 mg total) by mouth 2 (two) times a day. 180 capsule 3     EPINEPHrine (EPIPEN) 0.3 mg/0.3 mL atIn Inject 0.3 mL (0.3 mg total) into the shoulder, thigh, or buttocks as needed. 1 Pre-filled Pen Syringe 0     fexofenadine (ALLEGRA) 180 MG tablet TAKE 1 TABLET (180 MG TOTAL) BY MOUTH DAILY. 90 tablet 3     furosemide (LASIX) 40 MG tablet Take 1 tablet (40 mg total) by mouth daily. Dose decreased by DND 4/6. 270 tablet 3     KRILL OIL ORAL Take 1,000 mg by mouth.        meloxicam (MOBIC) 15 MG tablet Take one tablet by mouth one time daily 90 tablet 3     multivitamin (MULTIVITAMIN) per tablet 1 tablet every other day.        mupirocin (BACTROBAN) 2 % ointment Apply topically 2 (two) times a day. To affected area(s). 22 g 3     omeprazole (PRILOSEC) 20 MG capsule Take 20 mg by mouth as needed.        simethicone (MYLICON) 125 MG chewable tablet Use As Directed.       spironolactone (ALDACTONE) 25 MG tablet Take one tablet by mouth one time daily 90 tablet 3     triamcinolone (KENALOG) 0.5 % cream as needed. Apply 1 inch        carvedilol (COREG) 25 MG tablet Take 1.5 tablets (37.5 mg total) by mouth 2 (two) times a day with meals. 270 tablet 3     sildenafil (VIAGRA) 50 MG tablet 50 mg. Take 1 tablet 1 hour before needed       topiramate (TOPAMAX) 25 MG tablet Take 1 tablet (25 mg total) by mouth 2 (two) times a day. 180 tablet 3     traMADol (ULTRAM) 50 mg tablet TAKE TWO TABLETS BY MOUTH THREE TIMES DAILY 180 tablet 5     Current Facility-Administered Medications   Medication Dose Route Frequency Provider Last Rate Last  Dose     cyanocobalamin injection 1,000 mcg  1,000 mcg Intramuscular Q30 Days Mario Lepe MD   1,000 mcg at 11/15/16 1752       PHYSICAL EXAM:  Constitutional:  Reveals an alert, pleasant, talkative man.  Vitals:  Per nursing notes.  Ears: Occluded with cerumen on left.   Cardiac:  Regular rate and rhythm without murmurs, rubs, or gallops. Legs without edema. Palpation of the radial pulse regular.  Extremities: Orthopedic boot present on right foot.    Neurologic:  Cranial nerves II-XII intact.     Psychiatric:  Mood appropriate, memory intact.     QUALITY MEASURES:  The following are part of a depression follow up plan for the patient:  mental health care management    ADDITIONAL HISTORY SUMMARIZED (2): Reviewed cardiology notes of 2/8/18 and 4/4/18 regarding ventricular tachycardia, reviewed past note of 9/5/17 regarding cardiac medication changes. Reviewed note of 11/12//15 for gabapentin initiation.   DECISION TO OBTAIN EXTRA INFORMATION (1): None.   RADIOLOGY TESTS (1): None.  LABS (1): Labs ordered.   MEDICINE TESTS (1): Last echo, and interval device reports reviewed  INDEPENDENT REVIEW (2 each): None.     The visit lasted a total of 35 minutes face to face with the patient. Over 50% of the time was spent counseling and educating the patient about cardiac issues. An additional 5 minutes was spent discussing depression.     I, Ulices Awad, am scribing for and in the presence of, Dr. Lepe.    I, Dr Lepe, personally performed the services described in this documentation, as scribed by Ulices Awad in my presence, and it is both accurate and complete.    Total data points: 4

## 2021-06-17 NOTE — TELEPHONE ENCOUNTER
Telephone Encounter by Brittani Sotelo CMA at 4/21/2020 10:35 AM     Author: Brittani Sotelo CMA Service: -- Author Type: Certified Medical Assistant    Filed: 4/21/2020 10:54 AM Encounter Date: 4/21/2020 Status: Signed    : Brittani Sotelo CMA (Certified Medical Assistant)       Medication: Tramadol  Last Date Filled 3/30/2020   pulled: YES    Only PCP Prescribing? : NO  Taken as prescribed from physician notes? Barbara to advise    CSA in last year: YES  Random Utox in last year: NO  (if any of the above answer NO - schedule with PCP)     Opioids + benzodiazepines? NO  (if the above answer YES - schedule with PCP every 6 months)     Is patient on the Executive Review Team? NO      All responses suggest: Scheduling with PCP for further intervention    Return in about 2 months (around 4/28/2020) for for a full review of medications and lab testing.     Scheduled with Dr Lepe 4/28/2020

## 2021-06-17 NOTE — PATIENT INSTRUCTIONS - HE
Patient Instructions by Phu Zaman DPM at 2/22/2019  9:20 AM     Author: hPu Zaman DPM Service: -- Author Type: Physician    Filed: 2/22/2019 10:01 AM Encounter Date: 2/22/2019 Status: Signed    : Phu Zaman DPM (Physician)       Continue with cage splint.    - Dressing Application of  Endoform    1. Endoform should be cut to the size of the wound.  It should touch the edges of the ulcer. It is okay if it overlap the edges a small amount.  Then, moisten the Endoform with saline.(If it is easier for you, you can moisten it before laying it in the wound)    2. Cover the wound with Endoform, followed by dry gauze, and secure with roll gauze if needed.     3. Endoform is naturally used by the body over time so you may not find it in the wound when you change your dressing.  If you do find some, gently cleanse the wound with saline. Do not remove the remaining Endoform, which may appear as an off-white to pedro gel, just add Endoform on top.  Usually, more Endoform will need to be added every 5-7 days.     4. Change your top dressing every 1-2 days or as needed depending on drainage.    -Endoform is a collagen dressing created from ovine (sheep) fore-stomach tissue. The collagen extracellular matrix transforms into a soft conforming sheet, which is naturally incorporated into the wound over time.  Collagen dressings are used to stimulate wound healing.   ,

## 2021-06-18 NOTE — ANESTHESIA POSTPROCEDURE EVALUATION
Patient: Florentino Fall  INCISION AND DRAINAGE, LOWER EXTREMITY with BONE BIOPSY/CULTURE   Anesthesia type: MAC    Patient location: OR 6  Last vitals:   Vitals:    06/20/18 1110   BP: 109/53   Pulse: (!) 58   Resp: 20   Temp: 37.1  C (98.7  F)   SpO2: 94%   Pt had minimal sedation, very alert and conversant, plan is to go directly to the floor.  Post vital signs: stable  Level of consciousness: alert and conversing  Post-anesthesia pain: pain controlled  Post-anesthesia nausea and vomiting: no  Pulmonary: unassisted, return to baseline  Cardiovascular: stable and blood pressure at baseline  Hydration: adequate  Anesthetic events: no    QCDR Measures:  ASA# 11 - Laury-op Cardiac Arrest: ASA11B - Patient did NOT experience unanticipated cardiac arrest  ASA# 12 - Laury-op Mortality Rate: ASA12B - Patient did NOT die  ASA# 13 - PACU Re-Intubation Rate: ASA13B - Patient did NOT require a new airway mgmt  ASA# 10 - Composite Anes Safety: ASA10A - No serious adverse event    Additional Notes:

## 2021-06-18 NOTE — PROGRESS NOTES
[facility]    DATE: 2018    NAME:  Florentino Fall             :  1945  MRN: [id number]  CODE STATUS:  FULL CODE    FACILITY:  [facility] [[id number]]       ROOM:   119    CHIEF COMPLAINT/REASON FOR VISIT:  Chief Complaint   Patient presents with     Problem Visit     Abscess of right foot     HISTORY OF PRESENT ILLNESS: Florentino Fall is a 73 y.o. male  with Non-ischemic cardiomyopathy and HTN with chronic foot deformities and wounds who presented to vascular clinic to see Dr. Zaman (podiatry). Concern for foot wounds with possible osteomyelitis.  Known Charcot foot.  He was admitted to the hospital.      Right foot wounds infection   - With Osteomyelitis  - Appreciate ID consult  - Started initially on IV Vanco and Zosyn  - Appreciate podiatry consult  - Dr. Zaman requested MRI however unable to do with defibrillator   - C T foot is negative for osteomyelitis  - S/P incision and drainage   - Started wound VAC yesterday  - Negative blood culture but wound culture grow Enterococcus faecalis  - Patient will need 6 weeks of IV Zosyn as recommended by ID  - Patient will require long-term IV antibiotic, PICC line was placed yesterday  - Follow up with ID and podiatry as scheduled      Non-ischemic cardiomyopathy  - Last echo shows EF 27% on 16  - does not appear to be in acute exacerbation  - continue coreg, spironolactone and furosemide      Sustained Vtach with ICD biventricular  - noted on ICD interrogation  - recently had adjustments 18 to device  - Sees Dr. Hernández  - interrogation just yesterday with recent episode of VT where shock was given from device  - Normal mag and potassium level  - Appreciate cardiology consult      Essential HTN  - Stable blood pressure after surgery  - Resume home meds with hold parameters      Chronic kidney disease  - Stage III  - Stable creatinine  - Avoid nephrotoxic drugs  - Continue to monitor renal  function      Diarrhea  - Improving  - continue to monitor      Peripheral neuropathy  - History of Charcot joints  - Resume home meds of topiramate, tramadol      Weakness and deconditioning  - Secondary to above  - PT/OT evaluation  - Plan for TCU on discharge as patient is nonweightbearing on his right foot         Patient was stabilized and transferred to TCU for continued rehabilitation.  Staff reports that patient had an unwitnessed fall on Saturday without any injuries.    Past Medical History:   Diagnosis Date     Arthritis      Cardiomyopathy (H)      Cardiomyopathy with implantable cardioverter-defibrillator (H)      Charcot's joint of foot     bilateral ankles     CHF (congestive heart failure) (H)      Deviated septum      Eczema      Hernia      History of transfusion      Hypertension      Migraine      Systolic heart failure (H)      V-tach (H)     has icd     Past Surgical History:   Procedure Laterality Date     CARDIAC PACEMAKER PLACEMENT      ICD/PACEMAKER     PICC AND MIDLINE TEAM LINE INSERTION  6/22/2018          DC COMPLEX DRAINAGE, WOUND Right 6/20/2018    Procedure: INCISION AND DRAINAGE, LOWER EXTREMITY with BONE BIOPSY/CULTURE ;  Surgeon: Denisse Fletcher DPM;  Location: [facility];  Service: Podiatry     DC KNEE SCOPE,DIAGNOSTIC      Description: Arthroscopy Knee Left;  Recorded: 06/30/2011;     DC REMOVE TONSILS/ADENOIDS,<11 Y/O      Description: Tonsillectomy With Adenoidectomy;  Recorded: 06/30/2011;     DC SHLDR ARTHROSCOP,DIAGNOSTIC      Description: Arthroscopy Shoulder Right;  Recorded: 06/30/2011;     TONSILLECTOMY      and adenoids     Family History   Problem Relation Age of Onset     Stroke Mother      Emphysema Mother      Goiter Mother      Cancer Father      Stomach     Alzheimer's disease Brother      Social History     Social History     Marital status:      Spouse name: N/A     Number of children: 3     Years of education: N/A     Occupational  History     Not on file.     Social History Main Topics     Smoking status: Former Smoker     Packs/day: 2.50     Years: 50.00     Types: Cigarettes, Pipe, Cigars     Quit date: 1/1/2000     Smokeless tobacco: Never Used     Alcohol use No      Comment: Last used 1979     Drug use: No     Sexual activity: Yes     Partners: Female     Birth control/ protection: Post-menopausal     Other Topics Concern     Not on file     Social History Narrative     since 1964, 3 children. 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 Quit smoking in 1999.      Allergies   Allergen Reactions     Venom-Honey Bee Swelling and Dizziness     Current Outpatient Prescriptions   Medication Sig Dispense Refill     acetaminophen (TYLENOL) 325 MG tablet Take 650 mg by mouth 2 (two) times a day as needed for pain.        allopurinol (ZYLOPRIM) 300 MG tablet Take 1 tablet (300 mg total) by mouth daily. 90 tablet 3     ascorbic acid (ASCORBIC ACID WITH JAVIER HIPS) 500 MG tablet Take 500 mg by mouth daily.        aspirin 81 MG EC tablet Take 81 mg by mouth daily.       carvedilol (COREG) 25 MG tablet Take 1.5 tablets (37.5 mg total) by mouth 2 (two) times a day with meals. 270 tablet 3     CHOLECALCIFEROL 1,000 unit tablet TAKE 1 TABLET BY MOUTH DAILY. 90 tablet 3     coenzyme Q10 100 mg capsule Take 200 mg by mouth daily.        digoxin (DIGOX) 125 mcg tablet TAKE ONE TABLET BY MOUTH ONE TIME DAILY 90 tablet 3     DULoxetine (CYMBALTA) 60 MG capsule Take 1 capsule (60 mg total) by mouth 2 (two) times a day. 180 capsule 3     EPINEPHrine (EPIPEN/ADRENACLICK) 0.3 mg/0.3 mL injection Inject 0.3 mL (0.3 mg total) as directed as needed for anaphylaxis. Inject into thigh. 2 Pre-filled Pen Syringe 0     fexofenadine (ALLEGRA) 180 MG tablet TAKE 1 TABLET (180 MG TOTAL) BY MOUTH DAILY. 90 tablet 3     furosemide (LASIX) 40 MG tablet Take 1 tablet (40 mg total) by mouth daily. Dose decreased by DND 4/6. 270 tablet 3     milk thistle 175 mg tablet Take 175 mg by  mouth daily.       mupirocin (BACTROBAN) 2 % ointment Apply topically 2 (two) times a day. To affected area(s). 22 g 3     oxymetazoline (AFRIN) 0.05 % nasal spray 2 sprays into each nostril 2 (two) times a day.       piperacillin-tazobactam 3.375 g in NaCl 0.9 % 0.9 % 50 mL IVPB Infuse 3.375 g into a venous catheter every 8 (eight) hours. 1 each 0     simethicone (MYLICON) 125 MG chewable tablet Chew 125 mg every 6 (six) hours as needed for flatulence.       spironolactone (ALDACTONE) 25 MG tablet Take one tablet by mouth one time daily 90 tablet 3     topiramate (TOPAMAX) 25 MG tablet Take 1 tablet (25 mg total) by mouth 2 (two) times a day. 180 tablet 3     traMADol (ULTRAM) 50 mg tablet Take 2 tablets (100 mg total) by mouth 2 (two) times a day. 10 tablet 0     CINNAMON BARK (CINNAMON ORAL) 1,000 tablets 2 (two) times a day.        KRILL OIL ORAL Take 1,000 mg by mouth.        multivitamin (MULTIVITAMIN) per tablet 1 tablet every other day.        No current facility-administered medications for this visit.      REVIEW OF SYSTEMS:    Currently, no fever, chills, or rigors. Does not have any visual or hearing problems. Denies any chest pain, headaches, palpitations, lightheadedness, dizziness, shortness of breath, or cough. Appetite is good. Denies any GERD symptoms. Denies any difficulty with swallowing, nausea, or vomiting.  Denies any abdominal pain, diarrhea or constipation. Denies any urinary symptoms. No insomnia. No active bleeding. No rash.     PHYSICAL EXAMINATION:  Vitals:    06/27/18 1342   BP: 108/62   Pulse: 77   Resp: 18   Temp: 97.3  F (36.3  C)   SpO2: 96%   Weight: (!) 234 lb 8 oz (106.4 kg)       GENERAL: Awake, Alert, oriented x3, not in any form of acute distress, answers questions appropriately, follows simple commands, conversant  HEENT: Head is normocephalic with normal hair distribution. No evidence of trauma. Ears: No acute purulent discharge. Eyes: Conjunctivae pink with no scleral  jaundice. Nose: Normal mucosa and septum. NECK: Supple with no cervical or supraclavicular lymphadenopathy. Trachea is midline.   CHEST: No tenderness or deformity, no crepitus  LUNG: Clear to auscultation with good chest expansion. There are no crackles or wheezes, normal AP diameter.  BACK: No kyphosis of the thoracic spine. Symmetric, no curvature, ROM normal, no CVA tenderness, no spinal tenderness   CVS: There is good S1  S2, there are no murmurs, rubs, gallops, or heaves, rhythm is regular,  2+ pulses symmetric in all extremities.  ABDOMEN: Globular and soft, nontender to palpation, non distended, no masses, no organomegaly, good bowel sounds, no rebound or guarding, no peritoneal signs.   EXTREMITIES: Limited range of motion on both upper and lower extremities, there is no tenderness to palpation, bilateral pedal edema, no cyanosis or clubbing, no calf tenderness.  Pulses equal in all extremities, normal cap refill, no joint swelling. PICC line in place.  SKIN: Warm and dry, no erythema noted.  Skin color, texture, no rashes or lesions.  NEUROLOGICAL: The patient is oriented to person, place and time. Strength and sensation are grossly intact. Face is symmetric.    LABS:      Lab Results   Component Value Date    WBC 8.9 06/25/2018    HGB 13.5 (L) 06/25/2018    HCT 38.6 (L) 06/25/2018    MCV 95 06/25/2018     06/25/2018     Results for orders placed or performed in visit on 06/25/18   Basic Metabolic Panel   Result Value Ref Range    Sodium 141 136 - 145 mmol/L    Potassium 4.0 3.5 - 5.0 mmol/L    Chloride 106 98 - 107 mmol/L    CO2 24 22 - 31 mmol/L    Anion Gap, Calculation 11 5 - 18 mmol/L    Glucose 94 70 - 125 mg/dL    Calcium 10.1 8.5 - 10.5 mg/dL    BUN 33 (H) 8 - 28 mg/dL    Creatinine 1.56 (H) 0.70 - 1.30 mg/dL    GFR MDRD Af Amer 53 (L) >60 mL/min/1.73m2    GFR MDRD Non Af Amer 44 (L) >60 mL/min/1.73m2     Vitamin D, Total (25-Hydroxy)   Date Value Ref Range Status   11/15/2016 41.6 30.0 -  80.0 ng/mL Final     Lab Results   Component Value Date    [id number] 909 (H) 11/15/2016     ASSESSMENT/PLAN:    1. Abscess of right foot - S/P incision and drainage - Wound VAC in place - Negative blood culture but wound culture grow Enterococcus faecalis - Patient will need 6 weeks of IV Zosyn as recommended by ID -  PICC line in place - Followed by ID and podiatry   2. Essential hypertension with goal blood pressure less than 140/90 - Blood pressures are within target range, will continue    3. PSVT (paroxysmal supraventricular tachycardia) (H) - Continue Digoxin   4. Generalized muscle weakness - Continue PT/OT   5. Neuropathy (H) - Stable         Electronically signed by:  Michaela Garcia CNP    35 minutes TT of which 50% was spent in counseling and coordination of care of the above plan.    Time spent in interview and examination of patient, review of available records, and discussion with nursing staff. Continue care plan, efforts at therapy, and monitor nutritional status.

## 2021-06-18 NOTE — PROGRESS NOTES
S: Patient was seen at the [place] [facility] today for the evaluation of bilateral AFOs on orders from Dr. Nat DPM for the treatment of Dx: subluxation deformity of both feet and charcot foot presentation of both feet.    O: Patient was accompanied by his wife at his appointment today. Patient presents with severe Charcot deformity of both feet with both ankles entirely subluxed. Patient has been wearing a CROW boot on his right foot that was reportedly delivered to him 2 years ago through [facility]. Patient claims he will not wear that orthosis because it is painful to wear and he blames it for his current ulceration. Patient right foot currently has an wound on the medial aspect of his longitudinal arch near his navicular. Patient left foot does not have any wounds but presents with just as severe of a charcot deformity as his right. Patient has been using a custom insert on his left foot also provided from [facility].    G: To stabilize the patient's foot and ankle complex in order to prevent further deformity and reduce the amount of pain the patient has while walking.    A: I took a cast of the patient left foot for the fabrication of a custom AFO. I also took bilateral casts of the patient's feet during our appointment. I did not take a cast of the patient's right foot dur to the prescience of his current wound and that he is being referred to the [place] vascular center in order for his wound to be check at a later date. I will follow up with the patient when he is seen at the vascular center to determine if another AFO needs to be fabricated or if the one from [facility] must be adjusted due to it only being 2 years old. The patient, his wife and I discussed the plan going forward and that I need to look at a few options for what we are going to provide him with to support his foot and ankle on his left side. Patient is adamant that he will not wear another CROW on either foot. We also  discussed that what we provide him with on his right foot and ankle needs to be determined after his evaluation from vascular.  Patient and his wife understood and were thankful to be included in the decision making process.     P: Patient was given my card and asked to call my office if there are any concerns in the meantime before he is seen at [facility]. I will follow up with the patient when he is seen at the vascular clinic and will cast his right side after it has been evaluated by Vascular staff.

## 2021-06-18 NOTE — ANESTHESIA CARE TRANSFER NOTE
Last vitals:   See intra op VItals-report given to Floor RN and patient transferred directly to floor.    Patient's level of consciousness is awake, oriented x 3, answering questions appropriately.  Spontaneous respirations: yes  Maintains airway independently: yes  Dentition unchanged: yes  Oropharynx: oropharynx clear of all foreign objects    QCDR Measures:  ASA# 20 - Surgical Safety Checklist: WHO surgical safety checklist completed prior to induction  PQRS# 430 - Adult PONV Prevention: 4558F - Pt received => 2 anti-emetic agents (different classes) preop & intraop  ASA# 8 - Peds PONV Prevention: NA - Not pediatric patient, not GA or 2 or more risk factors NOT present  PQRS# 424 - Laury-op Temp Management: NA - MAC anesthesia or case < 60 minutes  PQRS# 426 - PACU Transfer Protocol:NA - Patient did not go to PACU  ASA# 14 - Acute Post-op Pain: ASA14B - Patient did NOT experience pain >= 7 out of 10-patient denied pain.

## 2021-06-18 NOTE — PROGRESS NOTES
SUBJECTIVE FINDINGS:  Mr. Fall returned to the clinic today complaining of  a painful flat feet.  The patient stated that over the years his feet progressed to the point where his ankles are now affected.  He has been wearing some bracing which has caused a wound in the arch of the right foot.  He has been applying topical antibiotic and a small bandage.        OBJECTIVE FINDINGS: Nails bilateral feet are normal length and color.   There is a necrotic lesion on the  medial longitudinal arch of the right he had near the talonavicular joint.  There is good granulation tissue at the base of this lesion.  There is no active drainage or bleeding.  There is no cellulitis noted.  There is medial ptosis of the talar head noted bilaterally.  There is complete subluxation of the bilateral ankles.  There is some active and passive dorsiflexion both ankles.  DP PT pulses are palpable.  Bilaterally.  Capillary refill less than 2 seconds bilaterally.  Negative  clonus.  Negative Babinski bilaterally.  Range of motion within normal limits  bilaterally.  Muscle power +4/5 bilaterally within all compartments.      ASSESSMENT: Trophic ulcer right foot, severe Pes Valgo Planus both feet.      PLAN:  I have recommended AFO for support.  The patient was referred to the wound care center for local wound care of the trophic ulcer.

## 2021-06-19 NOTE — LETTER
Letter by Jose G Garcia MD at      Author: Jose G Garcia MD Service: -- Author Type: --    Filed:  Encounter Date: 11/21/2019 Status: Signed         Mario Lepe MD  [address] [facility] [address]                                  November 21, 2019    Patient: Florentino Fall   MR Number: [id number]   YOB: 1945   Date of Visit: 11/21/2019     Dear Dr. Roberth MD:    Thank you for referring Florentino Fall to me for evaluation. Below are the relevant portions of my assessment and plan of care.    If you have questions, please do not hesitate to call me. I look forward to following Florentino along with you.    Sincerely,        Jose G Garcia MD            MD Radha Oconnell Edwin C, MD  11/21/2019 12:20 PM  Sign when Signing Visit  [facility]    Date: 11/21/2019  Patient Name: Florentino Fall   YOB: 1945  MRN: [id number]      ASSESSMENT:  74-year-old man with a history of cardiomyopathy, hypertension, chronic kidney disease, peripheral vascular disease, BPH, Charcot neuropathy admitted with syncope and firing of his defibrillator.     1. ICD lead endocarditis, tricuspid valve large mobile vegetation, Enterococcus endocarditis. Patient had continuous bacteremia-- Enterococcus. Last positive culture on 10/4 . S/P laser lead extraction for infected CRT-D system on 10/14/19. Angiovac removal/Suction of tricuspid valve and R ventricular lead vegetation.  Cardiac device reimplantation was done prior to discharge.  2. Leukopenia. Steady decline, likely related to antibiotics. Not yet neutropenic.   3. CKD. Elevated creatinine  4. Charcot foot/neuropathy  5. Conjunctival hemorrhage  6. Pacemaker pocket hematoma    PLAN:  -Continue ampicillin and ceftriaxone until 11/26  -Ampicillin dose reduced for renal function (2 g every 6 hours)  -Once antibiotics are completed PICC line can be removed  -Watch for new signs of fevers or  other infection.  -Patient should have surveillance cultures about a week after stopping antibiotics  -Patient to follow-up with Dr. Hernández 12/5.  Recommend having a follow-up echo for documentation of his current baseline.    Return to clinic as needed.    Jose G Garcia MD  [facility]   Clinic phone: [phone]   Clinic fax: [phone]     ______________________________________________________________________    HISTORY OF PRESENT ILLNESS:   From inpatient ID consult on 10/3:    Florentino Fall is a 74 y.o. man with a history of cardiomyopathy, hypertension, chronic kidney disease, peripheral vascular disease, Charcot neuropathy, BPH is admitted with syncope and defibrillator firing.  Following admission the patient mentioned that he has been having subjective fevers.  An echo was performed which showed decrease in LV function along with possible vegetation.  Vegetation was confirmed on JERSON this afternoon, along with vegetation on the device lead.     Following admission the patient had a rapid response called, with sustained ventricular tachycardia and hypotension.  He was placed on a lidocaine drip.  CT scan of the abdomen was ordered.  The patient was started on Zosyn, unfortunately no cultures were collected prior to this.     The patient is in a wheelchair chronically due to Charcot deformities in both feet.  He tells me that over the past several months he has had increasing upper extremity weakness, making it difficult to mobilize in the wheelchair.  He had a fall a few weeks ago which led to vertebral fracture.  He followed up with his primary about 2 weeks ago and was given muscle relaxant and a course of prednisone.  Over the past 1 to 2 weeks he has been feeling weaker and having intermittent night sweats.  This seems to have been a significant change compared to previous.  He denies any recent surgeries or dental procedures.  He denies any active lesions on his  feet.    Interval History:  Patient was hospitalized from 10/2 to 11/2.  He underwent ICD extraction and angiovac on 10/14.  Cultures were positive for enterococcus.  He was treated with ampicillin and ceftriaxone and discharged to complete a 6-week course.  He is tolerating antibiotics well.  Yesterday he was in the ER because his PICC line had nearly dislodged.  This was replaced.  He has noted that since being on antibiotics his blood pressure has been elevated and he has had increased swelling.  His white count has also been decreasing.  Patient is staying at [facility], and is hoping to discharge home soon.    Of note the patient says that he has been having swelling over his ICD pocket site for a while.  He thinks he has a hematoma there.  He says that it has been getting better over the past week or so.      Review of Systems:  Ten systems reviewed and negative except for what is noted in the HPI     Past Medical History:  Past Medical History:   Diagnosis Date   ? Allergic rhinitis    ? Arthritis    ? Cardiomyopathy (H)    ? Cardiomyopathy with implantable cardioverter-defibrillator (H)    ? Charcot ankle     bilateral   ? Charcot's joint of foot     bilateral ankles   ? CHF (congestive heart failure) (H)    ? Chronic kidney disease     stage 3   ? Deviated septum    ? Diabetic ulcer of left midfoot associated with type 2 diabetes mellitus, limited to breakdown of skin (H)    ? Dyslipidemia    ? Eczema    ? Gout    ? H/O cardiac arrest    ? Hernia    ? History of transfusion    ? Hypertension    ? Migraine    ? Osteomyelitis of foot (H)     right   ? Systolic heart failure (H)    ? V-tach (H)     has icd       Past Surgical History:  Past Surgical History:   Procedure Laterality Date   ? CARDIAC DEFIBRILLATOR PLACEMENT     ? EP ABLATION VT  10/7/2019        ? EP ABLATION VT N/A 10/7/2019    Procedure: EP Ablation Ventricular Tachycardia;  Surgeon: Sylvia Davies MD;  Location: [facility] Lab;  Service:  Cardiology   ? EP ICD BIV INSERT N/A 10/30/2019    Procedure: EP ICD BIV Insert;  Surgeon: Ulises Hernández MD;  Location: [facility];  Service: Cardiology   ? EP INTRACARDIAC ECHO  10/14/2019        ? EP LEAD EXTRACTION LEVEL 3  10/14/2019        ? EP LEAD EXTRACTION LEVEL 3 N/A 10/14/2019    Procedure: EP Lead Extraction Level 3;  Surgeon: Sylvia Davies MD;  Location: [facility] Cath Lab;  Service: Cardiology   ? EP TEMP PACEMAKER INSERT N/A 10/14/2019    Procedure: EP Temporary Pacemaker Insertion;  Surgeon: Sylvia Davies MD;  Location: [facility] Cath Lab;  Service: Cardiology   ? FOOT AMPUTATION Left 1/2/2019    Procedure: AMPUTATION, 2nd digit left foot;  Surgeon: Phu Zaman DPM;  Location: [facility];  Service: Podiatry   ? HERNIA REPAIR     ? NOSE SURGERY     ? PICC  10/3/2019        ? PICC AND MIDLINE TEAM LINE INSERTION  6/22/2018        ? PICC AND MIDLINE TEAM LINE INSERTION  10/15/2019        ? PICC AND MIDLINE TEAM LINE INSERTION  11/20/2019        ? MS COMPLEX DRAINAGE, WOUND Right 6/20/2018    Procedure: INCISION AND DRAINAGE, LOWER EXTREMITY with BONE BIOPSY/CULTURE ;  Surgeon: Denisse Fletcher DPM;  Location: [facility];  Service: Podiatry   ? MS INSJ/RPLCMT TEMP TRANSVNS 2CHMBR PACG ELTRDS SPX  10/14/2019        ? MS KNEE SCOPE,DIAGNOSTIC      Description: Arthroscopy Knee Left;  Recorded: 06/30/2011;   ? MS REMOVE TONSILS/ADENOIDS,<13 Y/O      Description: Tonsillectomy With Adenoidectomy;  Recorded: 06/30/2011;   ? MS SHLDR ARTHROSCOP,DIAGNOSTIC      Description: Arthroscopy Shoulder Right;  Recorded: 06/30/2011;       Allergies:  Allergies   Allergen Reactions   ? Definity [Perflutren Lipid Microspheres] Other (See Comments)     Back pain    ? Venom-Honey Bee Swelling and Dizziness       Medications:    Current Outpatient Medications:   ?  acetaminophen (TYLENOL) 500 MG tablet, Take 1,000 mg by mouth every 6 (six) hours as needed for pain., Disp: ,  Rfl:   ?  ampicillin 2 g in NaCl 0.9 % 0.9 % 100 mL IVPB, Infuse 2 g into a venous catheter every 6 (six) hours for 24 days., Disp: 1 each, Rfl: 0  ?  apixaban (ELIQUIS) 5 mg Tab tablet, Take 1 tablet (5 mg total) by mouth 2 (two) times a day., Disp: 60 tablet, Rfl: 0  ?  cefTRIAXone 2 g in NaCl 0.9 % 0.9 % 50 mL IVPB, Infuse 2 g into a venous catheter every 12 (twelve) hours for 24 days., Disp: 1 each, Rfl: 0  ?  DULoxetine (CYMBALTA) 60 MG capsule, Take 1 capsule (60 mg total) by mouth daily., Disp: 30 capsule, Rfl: 0  ?  furosemide (LASIX) 20 MG tablet, Take 20 mg by mouth daily., Disp: , Rfl:   ?  gabapentin (NEURONTIN) 300 MG capsule, Take 1 capsule (300 mg total) by mouth at bedtime., Disp: 30 capsule, Rfl: 0  ?  lidocaine 4 % patch, Place 1 patch on the skin daily. Remove and discard patch with 12 hours or as directed by MD., Disp: 10 patch, Rfl: 0  ?  metoprolol succinate (TOPROL-XL) 25 MG, Take 1 tablet (25 mg total) by mouth daily., Disp: 30 tablet, Rfl: 0  ?  mexiletine (MEXITIL) 200 MG capsule, Take 1 capsule (200 mg total) by mouth every 12 (twelve) hours., Disp: 60 capsule, Rfl: 0  ?  mirtazapine (REMERON) 15 MG tablet, Take 1 tablet (15 mg total) by mouth at bedtime., Disp: 30 tablet, Rfl: 0  ?  omeprazole (PRILOSEC) 20 MG capsule, Take 1 capsule (20 mg total) by mouth 2 (two) times a day before meals., Disp: 30 capsule, Rfl: 0  ?  senna-docusate (SENNOSIDES-DOCUSATE SODIUM) 8.6-50 mg tablet, Take 1 tablet by mouth daily., Disp: , Rfl:   ?  tamsulosin (FLOMAX) 0.4 mg cap, Take 1 capsule (0.4 mg total) by mouth Daily after lunch., Disp: 30 capsule, Rfl: 0  ?  topiramate (TOPAMAX) 50 MG tablet, Take 1 tablet (50 mg total) by mouth 2 (two) times a day., Disp: 60 tablet, Rfl: 0  ?  traMADol (ULTRAM) 50 mg tablet, Take 1 tablet (50 mg total) by mouth 2 (two) times a day., Disp: 8 tablet, Rfl: 0  ?  sotalol (BETAPACE) 80 MG tablet, Take 1 tablet (80 mg total) by mouth daily., Disp: 60 tablet, Rfl: 0  No  current facility-administered medications for this visit.     Social History:  Social History     Socioeconomic History   ? Marital status:      Spouse name: Not on file   ? Number of children: 3   ? Years of education: Not on file   ? Highest education level: Not on file   Occupational History   ? Not on file   Social Needs   ? Financial resource strain: Not on file   ? Food insecurity:     Worry: Not on file     Inability: Not on file   ? Transportation needs:     Medical: Not on file     Non-medical: Not on file   Tobacco Use   ? Smoking status: Former Smoker     Packs/day: 2.50     Years: 50.00     Pack years: 125.00     Types: Cigarettes, Pipe, Cigars     Last attempt to quit: 2000     Years since quittin.9   ? Smokeless tobacco: Never Used   Substance and Sexual Activity   ? Alcohol use: No     Comment: Last used    ? Drug use: No   ? Sexual activity: Yes     Partners: Female     Birth control/protection: Post-menopausal   Lifestyle   ? Physical activity:     Days per week: Not on file     Minutes per session: Not on file   ? Stress: Not on file   Relationships   ? Social connections:     Talks on phone: Not on file     Gets together: Not on file     Attends Jehovah's witness service: Not on file     Active member of club or organization: Not on file     Attends meetings of clubs or organizations: Not on file     Relationship status: Not on file   ? Intimate partner violence:     Fear of current or ex partner: Not on file     Emotionally abused: Not on file     Physically abused: Not on file     Forced sexual activity: Not on file   Other Topics Concern   ? Not on file   Social History Narrative     since , 3 children. Recovering alcoholic since . Quit smoking in .         Family History:  Family History   Problem Relation Age of Onset   ? Stroke Mother    ? Emphysema Mother    ? Goiter Mother    ? Cancer Father         Stomach   ? Alzheimer's disease Brother            PHYSICAL  EXAM:    Vitals:    11/21/19 1054   BP: 102/68   Pulse: 83   Temp: 99.1  F (37.3  C)   SpO2: 93%       GENERAL:  well-developed, well-nourished, in no acute distress.   HENT:  Head is normocephalic, atraumatic. Oropharynx is moist without exudates or ulcers.  EYES:  Eyes have anicteric sclerae.  Right conjunctival injection without purulence.   LUNGS:  Clear to auscultation.  CARDIOVASCULAR:  Regular rate and rhythm with no murmurs, gallops or rubs.  ABDOMEN:  Normal bowel sounds, soft, nontender.   MUSCULOSKELETAL: Both feet are in boots  SKIN:  No acute rashes. right upper extremity PICC line is in place without any surrounding erythema. No stigmata of endocarditis.  NEUROLOGIC: Grossly nonfocal. Normal gait and station        Pertinent labs:    Results from last 7 days   Lab Units 11/20/19  1620 11/20/19  1100   LN-WHITE BLOOD CELL COUNT thou/uL 2.9* 3.6*   LN-HEMOGLOBIN g/dL 10.5* 10.6*   LN-HEMATOCRIT % 32.4* 35.1*   LN-PLATELET COUNT thou/uL 125* 128*     Results from last 7 days   Lab Units 11/20/19  1100   LN-SODIUM mmol/L 145  145   LN-POTASSIUM mmol/L 3.8  3.8   LN-CHLORIDE mmol/L 109*  109*   LN-CO2 mmol/L 26  26   LN-BLOOD UREA NITROGEN mg/dL 36*  36*   LN-CREATININE mg/dL 2.97*  2.97*   LN-CALCIUM mg/dL 8.9  8.9     Lab Results   Component Value Date    CRP 1.0 (H) 11/20/2019         Lab Results   Component Value Date    ALT 18 11/20/2019    ALT 18 11/20/2019    AST 23 11/20/2019    ALKPHOS 68 11/20/2019    BILITOT 0.2 11/20/2019    BILITOT 0.2 11/20/2019         MICROBIOLOGY DATA:  Reviewed    RADIOLOGY:  Reviewed

## 2021-06-19 NOTE — PROGRESS NOTES
[facility]    Facility:   [facility] [[id number]]   Code Status: FULL CODE      CHIEF COMPLAINT/REASON FOR VISIT:  Chief Complaint   Patient presents with     Review Of Multiple Medical Conditions       HISTORY:      HPI: Florentino is a 73 y.o. male who was most recently hospitalized for cardiac arrhythmia triggering his ICD.  More than half a dozen times was the occurrence.  When he was hospitalized his potassium was noted to be at 3.5 and he was given extra potassium.  Most importantly was the start of amiodarone and then his ventricular tachycardia improved.  Recommendation was to follow-up as an outpatient at the [facility] for ablation therapy.     He has a complex medical history who previously was hospitalized due to abscess of his right foot which subsequently was determined to be osteomyelitis after incision and drainage and biopsy of bone.  He is on a nonweightbearing status and has a wound VAC to the area and is requiring IV antibiotics. Underlying medical history includes nonischemic cardiomyopathy with chronic systolic congestive heart failure with an ejection fraction of 27%.  He has a history of sustained ventricular tachycardia and subsequent ICD biventricular pacemaker insertion.  He has essential hypertension.  He has chronic kidney disease stage III.  He has past history of alcohol abuse and stopped drinking alcohol in 1979.  He quit smoking and 1999 and prior to that time was a 2 pack per day smoker.    Upon review of systems currently, he did go to the [facility] cardiology appointment yesterday and this was a consultation rather than the actual ablation procedure.  He does have follow-up infectious disease consultation next week with hopes of improving weightbearing status over time regarding his osteomyelitis.  He is not having fevers or chills.  He does not have sore throat or congestion nor cough or shortness of breath nor chest pain and  he has not had further palpitations.  He does not have abdominal pain or nausea.    Past Medical History:   Diagnosis Date     Arthritis      Cardiomyopathy (H)      Cardiomyopathy with implantable cardioverter-defibrillator (H)      Charcot's joint of foot     bilateral ankles     CHF (congestive heart failure) (H)      Deviated septum      Eczema      Hernia      History of transfusion      Hypertension      Migraine      Systolic heart failure (H)      V-tach (H)     has icd             Family History   Problem Relation Age of Onset     Stroke Mother      Emphysema Mother      Goiter Mother      Cancer Father      Stomach     Alzheimer's disease Brother      Social History     Social History     Marital status:      Spouse name: N/A     Number of children: 3     Years of education: N/A     Social History Main Topics     Smoking status: Former Smoker     Packs/day: 2.50     Years: 50.00     Types: Cigarettes, Pipe, Cigars     Quit date: 1/1/2000     Smokeless tobacco: Never Used     Alcohol use No      Comment: Last used 1979     Drug use: No     Sexual activity: Yes     Partners: Female     Birth control/ protection: Post-menopausal     Other Topics Concern     Not on file     Social History Narrative     since 1964, 3 children. 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 Quit smoking in 1999.          Review of Systems    .  Vitals:    07/11/18 1616   BP: 111/56   Pulse: 67   Resp: 16   Temp: 97.8  F (36.6  C)   SpO2: 93%       Physical Exam   Constitutional: No distress.   Eyes: Right eye exhibits no discharge. Left eye exhibits no discharge.   Neck: No JVD present.   Cardiovascular: Normal heart sounds.    Pulmonary/Chest: Breath sounds normal. No respiratory distress.   Abdominal: Soft. Bowel sounds are normal. There is no tenderness.   Neurological: He is alert.   Psychiatric: He has a normal mood and affect.   Nursing note and vitals reviewed.        LABS:   No new laboratory testing.    ASSESSMENT:       ICD-10-CM    1. Ventricular tachycardia (H) I47.2    2. ICD (implantable cardioverter-defibrillator), biventricular, in situ Z95.810    3. Subacute osteomyelitis of right foot (H) M86.271    4. Cardiomyopathy, nonischemic (H) I42.8        PLAN:    Continue current plans of observing his medical conditions and continuing with the wound VAC at this point.      Electronically signed by: Paxton Kessler MD

## 2021-06-19 NOTE — PROGRESS NOTES
[facility]    DATE: 2018    NAME:  Florentino Fall             :  1945  MRN: [id number]  CODE STATUS:  FULL CODE    FACILITY:  [facility] [[id number]]       ROOM:   119    CHIEF COMPLAINT/REASON FOR VISIT:  Chief Complaint   Patient presents with     Problem Visit     Right foot Osteomyelitis and Constipation     HISTORY OF PRESENT ILLNESS: Florentino Fall is a 73 y.o. male  with Non-ischemic cardiomyopathy and HTN with chronic foot deformities and wounds who presented to vascular clinic to see Dr. Zaman (podiatry). Concern for foot wounds with possible Osteomyelitis.  Known Charcot foot.  He was admitted to the hospital.      Right foot wounds infection   - With Osteomyelitis  - Appreciate ID consult  - Started initially on IV Vanco and Zosyn  - Appreciate podiatry consult  - Dr. Zaman requested MRI however unable to do with defibrillator   - C T foot is negative for osteomyelitis  - S/P incision and drainage   - Started wound VAC yesterday  - Negative blood culture but wound culture grow Enterococcus faecalis  - Patient will need 6 weeks of IV Zosyn as recommended by ID  - Patient will require long-term IV antibiotic, PICC line was placed yesterday  - Follow up with ID and podiatry as scheduled      Non-ischemic cardiomyopathy  - Last echo shows EF 27% on 16  - does not appear to be in acute exacerbation  - continue coreg, spironolactone and furosemide      Sustained Vtach with ICD biventricular  - noted on ICD interrogation  - recently had adjustments 18 to device  - Sees Dr. Hernández  - interrogation just yesterday with recent episode of VT where shock was given from device  - Normal mag and potassium level  - Appreciate cardiology consult      Essential HTN  - Stable blood pressure after surgery  - Resume home meds with hold parameters      Chronic kidney disease  - Stage III  - Stable creatinine  - Avoid nephrotoxic drugs  - Continue to monitor renal  function      Diarrhea  - Improving  - continue to monitor      Peripheral neuropathy  - History of Charcot joints  - Resume home meds of topiramate, tramadol      Weakness and deconditioning  - Secondary to above  - PT/OT evaluation  - Plan for TCU on discharge as patient is nonweightbearing on his right foot         Patient was stabilized and transferred to TCU for continued rehabilitation. He was recently hospitalized on 6/30/2018 for ventricular tachycardia.  Patient had about 7 episodes of ICD discharge. En route to the [facility], medics witnessed a 30 second run of vtach.  ICD was interrogated in the ER and noted to have multiple episodes of sustained ventricular tachycardia with the device trying to override. Patient was started on Amiodarone.  Patient responded well to Amiodarone with only brief runs of NSVT with no ICD firing over past 48 hours.  Cardiologist discussed with EP physician at [facility] who suggested outpatient consultation.  Patient was continued on Zosyn for right foot Osteomyelitis which he is supposed to use for 1 more month.  Patient developed diarrhea for which C. difficile was checked and was negative.  Diarrhea improved with Loperamide.  Patient was discharged back to TCU with advice to continue Amiodarone.  Patient has an appointment with [facility] for outpatient consultation with EP physician on 7/11/2018.    Today, patient is seen at the bedside.  He reports some problems with constipation.      Past Medical History:   Diagnosis Date     Arthritis      Cardiomyopathy (H)      Cardiomyopathy with implantable cardioverter-defibrillator (H)      Charcot's joint of foot     bilateral ankles     CHF (congestive heart failure) (H)      Deviated septum      Eczema      Hernia      History of transfusion      Hypertension      Migraine      Systolic heart failure (H)      V-tach (H)     has icd     Past Surgical History:   Procedure Laterality Date     CARDIAC PACEMAKER PLACEMENT      ICD/PACEMAKER      PICC AND MIDLINE TEAM LINE INSERTION  6/22/2018          TX COMPLEX DRAINAGE, WOUND Right 6/20/2018    Procedure: INCISION AND DRAINAGE, LOWER EXTREMITY with BONE BIOPSY/CULTURE ;  Surgeon: Denisse Fletcher DPM;  [place] [facility]: [place]iatry     TX KNEE SCOPE,DIAGNOSTIC      Description: Arthroscopy Knee Left;  Recorded: 06/30/2011;     TX REMOVE TONSILS/ADENOIDS,<11 Y/O      Description: Tonsillectomy With Adenoidectomy;  Recorded: 06/30/2011;     TX SHLDR ARTHROSCOP,DIAGNOSTIC      Description: Arthroscopy Shoulder Right;  Recorded: 06/30/2011;     TONSILLECTOMY      and adenoids     Family History   Problem Relation Age of Onset     Stroke Mother      Emphysema Mother      Goiter Mother      Cancer Father      Stomach     Alzheimer's disease Brother      Social History     Social History     Marital status:      Spouse name: N/A     Number of children: 3     Years of education: N/A     Occupational History     Not on file.     Social History Main Topics     Smoking status: Former Smoker     Packs/day: 2.50     Years: 50.00     Types: Cigarettes, Pipe, Cigars     Quit date: 1/1/2000     Smokeless tobacco: Never Used     Alcohol use No      Comment: Last used 1979     Drug use: No     Sexual activity: Yes     Partners: Female     Birth control/ protection: Post-menopausal     Other Topics Concern     Not on file     Social History Narrative     since 1964, 3 children. 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 Quit smoking in 1999.      Allergies   Allergen Reactions     Venom-Honey Bee Swelling and Dizziness     Current Outpatient Prescriptions   Medication Sig Dispense Refill     senna-docusate (PERICOLACE) 8.6-50 mg tablet Take 1 tablet by mouth 2 (two) times a day. And two times a day prn       acetaminophen (TYLENOL) 325 MG tablet Take 650 mg by mouth 2 (two) times a day as needed for pain.        allopurinol (ZYLOPRIM) 300 MG tablet Take 1 tablet (300 mg total) by mouth  daily. 90 tablet 3     amiodarone (PACERONE) 200 MG tablet Take 1 tablet (200 mg total) by mouth 3 (three) times a day. 21 tablet 0     ascorbic acid (ASCORBIC ACID WITH JAVIER HIPS) 500 MG tablet Take 500 mg by mouth daily.        aspirin 81 MG EC tablet Take 81 mg by mouth daily.       carvedilol (COREG) 25 MG tablet Take 1.5 tablets (37.5 mg total) by mouth 2 (two) times a day with meals. 270 tablet 3     CHOLECALCIFEROL 1,000 unit tablet TAKE 1 TABLET BY MOUTH DAILY. 90 tablet 3     CINNAMON BARK (CINNAMON ORAL) 1,000 tablets 2 (two) times a day.        coenzyme Q10 100 mg capsule Take 200 mg by mouth daily.        DULoxetine (CYMBALTA) 60 MG capsule Take 1 capsule (60 mg total) by mouth 2 (two) times a day. 180 capsule 3     EPINEPHrine (EPIPEN/ADRENACLICK) 0.3 mg/0.3 mL injection Inject 0.3 mL (0.3 mg total) as directed as needed for anaphylaxis. Inject into thigh. 2 Pre-filled Pen Syringe 0     famotidine (PEPCID) 20 MG tablet Take 1 tablet (20 mg total) by mouth 2 (two) times a day.  0     fexofenadine (ALLEGRA) 180 MG tablet TAKE 1 TABLET (180 MG TOTAL) BY MOUTH DAILY. 90 tablet 3     furosemide (LASIX) 40 MG tablet Take 1 tablet (40 mg total) by mouth daily. Dose decreased by DND 4/6. (Patient taking differently: Take 20 mg by mouth daily. Dose decreased to 30 mg on 7/10/18. Give 1.5 tablets for a total of 30 mg daily) 270 tablet 3     KRILL OIL ORAL Take 1,000 mg by mouth.        loperamide (IMODIUM) 2 mg capsule Take 1 capsule (2 mg total) by mouth 3 (three) times a day as needed for diarrhea.  0     milk thistle 175 mg tablet Take 175 mg by mouth daily.       multivitamin (MULTIVITAMIN) per tablet 1 tablet every other day.        mupirocin (BACTROBAN) 2 % ointment Apply topically 2 (two) times a day. To affected area(s). 22 g 3     oxymetazoline (AFRIN) 0.05 % nasal spray 2 sprays into each nostril 2 (two) times a day as needed.        piperacillin-tazobactam 3.375 g in NaCl 0.9 % 0.9 % 50 mL IVPB Infuse  3.375 g into a venous catheter every 8 (eight) hours. 1 each 0     potassium chloride (K-DUR,KLOR-CON) 20 MEQ tablet Take 20 mEq by mouth 2 (two) times a day.       simethicone (MYLICON) 125 MG chewable tablet Chew 125 mg every 6 (six) hours as needed for flatulence.       spironolactone (ALDACTONE) 25 MG tablet Take one tablet by mouth one time daily 90 tablet 3     topiramate (TOPAMAX) 25 MG tablet Take 1 tablet (25 mg total) by mouth 2 (two) times a day. 180 tablet 3     traMADol (ULTRAM) 50 mg tablet Take 2 tablets (100 mg total) by mouth 2 (two) times a day. 10 tablet 0     No current facility-administered medications for this visit.      REVIEW OF SYSTEMS:    Currently, no fever, chills, or rigors. Does not have any visual or hearing problems. Denies any chest pain, headaches, palpitations, lightheadedness, dizziness, shortness of breath, or cough. Appetite is good. Denies any GERD symptoms. Denies any difficulty with swallowing, nausea, or vomiting.  Denies any abdominal pain, diarrhea or constipation. Denies any urinary symptoms. No insomnia. No active bleeding. No rash.     PHYSICAL EXAMINATION:  Vitals:    07/11/18 2155   BP: 111/56   Pulse: 67   Resp: 16   Temp: 97.8  F (36.6  C)   SpO2: 93%   Weight: (!) 226 lb 8 oz (102.7 kg)       GENERAL: Awake, Alert, oriented x3, not in any form of acute distress, answers questions appropriately, follows simple commands, conversant  HEENT: Head is normocephalic with normal hair distribution. No evidence of trauma. Ears: No acute purulent discharge. Eyes: Conjunctivae pink with no scleral jaundice. Nose: Normal mucosa and septum. NECK: Supple with no cervical or supraclavicular lymphadenopathy. Trachea is midline.   CHEST: No tenderness or deformity, no crepitus  LUNG: Clear to auscultation with good chest expansion. There are no crackles or wheezes, normal AP diameter.  BACK: No kyphosis of the thoracic spine. Symmetric, no curvature, ROM normal, no CVA tenderness,  no spinal tenderness   CVS: There is good S1  S2, there are no murmurs, rubs, gallops, or heaves, rhythm is regular,  2+ pulses symmetric in all extremities.  ABDOMEN: Globular and soft, nontender to palpation, non distended, no masses, no organomegaly, good bowel sounds, no rebound or guarding, no peritoneal signs.   EXTREMITIES: Limited range of motion on both upper and lower extremities, there is no tenderness to palpation, bilateral pedal edema, no cyanosis or clubbing, no calf tenderness.  Pulses equal in all extremities, normal cap refill, no joint swelling. PICC line in place.  SKIN: Warm and dry, no erythema noted.  Skin color, texture, no rashes or lesions.  NEUROLOGICAL: The patient is oriented to person, place and time. Strength and sensation are grossly intact. Face is symmetric.    LABS:      Lab Results   Component Value Date    WBC 6.9 07/09/2018    HGB 14.0 07/09/2018    HCT 41.4 07/09/2018    MCV 96 07/09/2018     07/09/2018     Results for orders placed or performed in visit on 07/09/18   Basic Metabolic Panel   Result Value Ref Range    Sodium 139 136 - 145 mmol/L    Potassium 4.1 3.5 - 5.0 mmol/L    Chloride 105 98 - 107 mmol/L    CO2 26 22 - 31 mmol/L    Anion Gap, Calculation 8 5 - 18 mmol/L    Glucose 81 70 - 125 mg/dL    Calcium 10.1 8.5 - 10.5 mg/dL    BUN 32 (H) 8 - 28 mg/dL    Creatinine 1.61 (H) 0.70 - 1.30 mg/dL    GFR MDRD Af Amer 51 (L) >60 mL/min/1.73m2    GFR MDRD Non Af Amer 42 (L) >60 mL/min/1.73m2     Vitamin D, Total (25-Hydroxy)   Date Value Ref Range Status   11/15/2016 41.6 30.0 - 80.0 ng/mL Final     Lab Results   Component Value Date    CVQGZJBS21 909 (H) 11/15/2016     ASSESSMENT/PLAN:    1. Ventricular tachycardia (H) - Started on Amiodarone and will follow up with [facility] for an ablation on 7/11/2018.  Continue Digoxin   2. ICD (implantable cardioverter-defibrillator), biventricular, in situ - See above   3. Cardiomyopathy, nonischemic (H) - Does not appear to be in  acute exacerbation.  Last echo shows EF 27% on 4/22/16.  Will continue Coreg, Spironolactone and Furosemide   4. Subacute osteomyelitis of right foot (H) - S/P incision and drainage - Wound VAC in place - Negative blood culture but wound culture grow Enterococcus faecalis - Patient will need 6 weeks of IV Zosyn as recommended by ID -  PICC line in place - Followed by ID and podiatry   5. Constipation - Start Senna S 1 tab two times a day and BID prn   6. CKD (chronic kidney disease) stage 3, GFR 30-59 ml/min - Lasix decreased due to worsening kidney function.  Will check BMP in 1 week         Electronically signed by:  Michaela Garcia CNP    35 minutes TT of which 50% was spent in counseling and coordination of care of the above plan.    Time spent in interview and examination of patient, review of available records, and discussion with nursing staff. Continue care plan, efforts at therapy, and monitor nutritional status.

## 2021-06-19 NOTE — PROGRESS NOTES
[facility]    Facility:   [facility] [[id number]]   Code Status: FULL CODE      CHIEF COMPLAINT/REASON FOR VISIT:  Chief Complaint   Patient presents with     Review Of Multiple Medical Conditions       HISTORY:      HPI: Florentino is a 73 y.o. male who was most recently hospitalized for cardiac arrhythmia triggering his ICD.  More than half a dozen times was the occurrence.  When he was hospitalized his potassium was noted to be at 3.5 and he was given extra potassium.  Most importantly was the start of amiodarone and then his ventricular tachycardia improved.  Recommendation was to follow-up as an outpatient at the [facility] for ablation therapy.      He has a complex medical history who previously was hospitalized due to abscess of his right foot which subsequently was determined to be osteomyelitis after incision and drainage and biopsy of bone.  He is on a nonweightbearing status and has a wound VAC to the area and is requiring IV antibiotics. Underlying medical history includes nonischemic cardiomyopathy with chronic systolic congestive heart failure with an ejection fraction of 27%.  He has a history of sustained ventricular tachycardia and subsequent ICD biventricular pacemaker insertion.  He has essential hypertension.  He has chronic kidney disease stage III.  He has past history of alcohol abuse and stopped drinking alcohol in 1979.  He quit smoking and 1999 and prior to that time was a 2 pack per day smoker.    Upon current review of systems, he is not having fevers or chills or sore throat or congestion nor cough or shortness of breath or chest pain nor palpitations.  He is not having abdominal pain or nausea and not having diarrhea.  No dysuria.  He is feeling better than he has for the past 2 years.  He is anxious to return home soon.  This is just an update visit after the recent discharge summary dictation.    Past Medical History:   Diagnosis Date      Arthritis      Cardiomyopathy (H)      Cardiomyopathy with implantable cardioverter-defibrillator (H)      Charcot's joint of foot     bilateral ankles     CHF (congestive heart failure) (H)      Deviated septum      Eczema      Hernia      History of transfusion      Hypertension      Migraine      Systolic heart failure (H)      V-tach (H)     has icd             Family History   Problem Relation Age of Onset     Stroke Mother      Emphysema Mother      Goiter Mother      Cancer Father      Stomach     Alzheimer's disease Brother      Social History     Social History     Marital status:      Spouse name: N/A     Number of children: 3     Years of education: N/A     Social History Main Topics     Smoking status: Former Smoker     Packs/day: 2.50     Years: 50.00     Types: Cigarettes, Pipe, Cigars     Quit date: 1/1/2000     Smokeless tobacco: Never Used     Alcohol use No      Comment: Last used 1979     Drug use: No     Sexual activity: Yes     Partners: Female     Birth control/ protection: Post-menopausal     Other Topics Concern     Not on file     Social History Narrative     since 1964, 3 children. 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 Quit smoking in 1999.          Review of Systems    .  Vitals:    08/01/18 1036   BP: 116/72   Pulse: 61   Temp: 97.8  F (36.6  C)   SpO2: 97%       Physical Exam   Constitutional: No distress.   Eyes: Right eye exhibits no discharge. Left eye exhibits no discharge.   Neck: No JVD present.   Cardiovascular: Normal heart sounds.    Pulmonary/Chest: Breath sounds normal. No respiratory distress.   Abdominal: Bowel sounds are normal. There is no tenderness.   Musculoskeletal:   Wound VAC present on the affected foot.   Neurological: He is alert.   Psychiatric: He has a normal mood and affect.   Nursing note and vitals reviewed.        LABS:   CBC shows hemoglobin of 12.6 and white blood cell count of 4500.  Basic metabolic profile shows creatinine level of 1.57.   Chloride level 110.  All other factors are normal.  CRP is 0.2.    ASSESSMENT:      ICD-10-CM    1. Subacute osteomyelitis of right foot (H) M86.271    2. Sustained VT (ventricular tachycardia) (H) I47.2    3. ICD (implantable cardioverter-defibrillator), biventricular, in situ Z95.810    4. Chronic systolic heart failure (H) I50.22    5. Cardiomyopathy, nonischemic (H) I42.8        PLAN:    Continue with plans for discharge.  The PICC line will be removed within 24 hours and he will be able to discharge home.  He does have follow-up plans for cardiology consultation regarding ablation procedure in the future.    Electronically signed by: Paxton Kessler MD

## 2021-06-19 NOTE — PROGRESS NOTES
[facility]      Facility:    [facility] [[id number]]  Code Status: FULL CODE      Chief Complaint/Reason for Visit:  Chief Complaint   Patient presents with     H & P       HPI:   Florentino is a 73 y.o. male who has a complex medical history who was most recently hospitalized due to abscess of his right foot which subsequently was determined to be osteomyelitis after incision and drainage and biopsy of bone.  He is on a nonweightbearing status and has a wound VAC to the area and is requiring IV antibiotics.    Underlying medical history includes nonischemic cardiomyopathy with chronic systolic congestive heart failure with an ejection fraction of 27%.  He has a history of sustained ventricular tachycardia and subsequent ICD biventricular pacemaker insertion.  He has essential hypertension.  He has chronic kidney disease stage III.  He has past history of alcohol abuse and stopped drinking alcohol in 1979.  He quit smoking and 1999 and prior to that time was a 2 pack per day smoker.    Upon current review of systems, he does have some chronic diarrhea that has been intermittent for the past 2 years.  He has been eating yogurt since the time of the recent I&D of his osteomyelitis of the right foot and IV antibiotics.  He has had weight loss intentionally from 280 to 235.  Upon other review of systems he is not having fevers or chills and is not having sore throat nor cough or shortness of breath or chest pain or palpitations or abdominal pain or nausea.    Past Medical History:  Past Medical History:   Diagnosis Date     Arthritis      Cardiomyopathy (H)      Cardiomyopathy with implantable cardioverter-defibrillator (H)      Charcot's joint of foot     bilateral ankles     CHF (congestive heart failure) (H)      Deviated septum      Eczema      Hernia      History of transfusion      Hypertension      Migraine      Systolic heart failure (H)      V-tach (H)     has icd           Surgical  History:  Past Surgical History:   Procedure Laterality Date     CARDIAC PACEMAKER PLACEMENT      ICD/PACEMAKER     PICC AND MIDLINE TEAM LINE INSERTION  6/22/2018          NV COMPLEX DRAINAGE, WOUND Right 6/20/2018    Procedure: INCISION AND DRAINAGE, LOWER EXTREMITY with BONE BIOPSY/CULTURE ;  Surgeon: Denisse Fletcher DPM;  Location: [facility];  Service: Podiatry     NV KNEE SCOPE,DIAGNOSTIC      Description: Arthroscopy Knee Left;  Recorded: 06/30/2011;     NV REMOVE TONSILS/ADENOIDS,<11 Y/O      Description: Tonsillectomy With Adenoidectomy;  Recorded: 06/30/2011;     NV SHLDR ARTHROSCOP,DIAGNOSTIC      Description: Arthroscopy Shoulder Right;  Recorded: 06/30/2011;     TONSILLECTOMY      and adenoids       Family History:   Family History   Problem Relation Age of Onset     Stroke Mother      Emphysema Mother      Goiter Mother      Cancer Father      Stomach     Alzheimer's disease Brother        Social History:    Social History     Social History     Marital status:      Spouse name: N/A     Number of children: 3     Years of education: N/A     Social History Main Topics     Smoking status: Former Smoker     Packs/day: 2.50     Years: 50.00     Types: Cigarettes, Pipe, Cigars     Quit date: 1/1/2000     Smokeless tobacco: Never Used     Alcohol use No      Comment: Last used 1979     Drug use: No     Sexual activity: Yes     Partners: Female     Birth control/ protection: Post-menopausal     Other Topics Concern     Not on file     Social History Narrative     since 1964, 3 children. 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 Quit smoking in 1999.           Review of Systems   All other systems reviewed and are negative.      Vitals:    06/27/18 1443   BP: 105/84   Pulse: 66   Resp: 16   Temp: 97.7  F (36.5  C)   SpO2: 95%       Physical Exam   Constitutional: No distress.   HENT:   Mouth/Throat: Oropharynx is clear and moist.   Eyes: Right eye exhibits no discharge. Left eye exhibits  no discharge.   Neck: No JVD present. No thyromegaly present.   Cardiovascular: Normal heart sounds.    Pulmonary/Chest: Breath sounds normal. No respiratory distress.   Abdominal: Soft. Bowel sounds are normal. He exhibits no distension. There is no tenderness.   Musculoskeletal: He exhibits no edema.   Lymphadenopathy:     He has no cervical adenopathy.   Neurological: He is alert.   Skin:   Pictures in the computer were reviewed regarding the area of incision and drainage as well as pictures that he has on his smart phone.   Psychiatric: He has a normal mood and affect.   Nursing note and vitals reviewed.      Medication List:  No current outpatient prescriptions on file.       Labs:  No new laboratory testing    Assessment:    ICD-10-CM    1. Abscess of right foot L02.611    2. Chronic systolic heart failure (H) I50.22    3. Cardiomyopathy, nonischemic (H) I42.8    4. Neuropathy (H) G62.9    5. Sustained VT (ventricular tachycardia) (H) I47.2    6. History of cardiac arrest Z86.74    7. History of alcohol abuse Z87.898    8. ICD (implantable cardioverter-defibrillator), biventricular, in situ Z95.810    9. Osteomyelitis of right foot (H) M86.9        Plan:  Probiotics started as lactobacillus 10 billion cells daily.  Continue current cares.      Electronically signed by: Paxton Kessler MD

## 2021-06-19 NOTE — LETTER
Letter by Mario Lepe MD at      Author: Mario Lepe MD Service: -- Author Type: --    Filed:  Encounter Date: 6/26/2019 Status: (Other)         [facility] INTERNAL MEDICINE  06/26/19    Patient: Florentino Fall  YOB: 1945  Medical Record Number: [id number]                                                                  Opioid / Opioid Plus Controlled Substance Agreement    I understand that my care provider has prescribed an opioid (narcotic) controlled substance to help manage my condition(s). I am taking this medicine to help me function or work. I know this is strong medicine, and that it can cause serious side effects. Opioid medicine can be sedating, addicting and may cause a dependency on the drug. They can affect my ability to drive or think, and cause depression. They need to be taken exactly as prescribed. Combining opioids with certain medicines or chemicals (such as cocaine, sedatives and tranquilizers, sleeping pills, meth) can be dangerous or even fatal. Also, if I stop opioids suddenly, I may have severe withdrawal symptoms. Last, I understand that opioids do not work for all types of pain nor for all patients. If not helpful, I may be asked to stop them.        The risks, benefits, and side effects of these medicine(s) were explained to me. I agree that:    1. I will take part in other treatments as advised by my care team. This may be psychiatry or counseling, physical therapy, behavioral therapy, group treatment or a referral to a pain clinic. I will reduce or stop my medicine when my care team tells me to do so.  2. I will take my medicines as prescribed. I will not change the dose or schedule unless my care team tells me to. There will be no refills if I run out early.  I may be contactedwithout warning and asked to complete a urine drug test or pill count at any time.   3. I will keep all my appointments, and understand this is part of the monitoring of opioids. My care  team may require an office visit for EVERY opioid/controlled substance refill. If I miss appointments or dont follow instructions, my care team may stop my medicine.  4. I will not ask other providers to prescribe controlled substances, and I will not accept controlled substances from other people. If I need another prescribed controlled substance for a new reason, I will tell my care team within 1 business day.  5. I will use one pharmacy to fill all of my controlled substance prescriptions, and it is up to me to make sure that I do not run out of my medicines on weekends or holidays. If my care team is willing to refill my opioid prescription without a visit, I must request refills only during office hours, refills may take up to 3 days to process, and it may take up to 5 to 7 days for my medicine to be mailed and ready at my pharmacy. Prescriptions will not be mailed anywhere except my pharmacy.        [id number]  Rev 12/18         Registration to scan to EHR                             Page 1 of 2               Controlled Substance Agreement Opioid        [facility]  06/26/19  Patient: Florentino Fall  YOB: 1945  Medical Record Number: [id number]                                                                  6. I am responsible for my prescriptions. If the medicine/prescription is lost or stolen, it will not be replaced. I also agree not to share controlled substance medicines with anyone.  7. I agree to not use ANY illegal or recreational drugs. This includes marijuana, cocaine, bath salts or other drugs. I agree not to use alcohol unless my care team says I may.          I agree to give urine samples whenever asked. If I dont give a urine sample, the care team may stop my medicine.    8. If I enroll in the [place] Medical Marijuana program, I will tell my care team. I will also sign an agreement to share my medical records with my care team.   9. I will bring in my list of medicines (or  my medicine bottles) each time I come to the clinic.   10. I will tell my care team right away if I become pregnant or have a new medical problem treated outside of my regular clinic.  11. I understand that this medicine can affect my thinking and judgment. It may be unsafe for me to drive, use machinery and do dangerous tasks. I will not do any of these things until I know how the medicine affects me. If my dose changes, I will wait to see how it affects me. I will contact my care team if I have concerns about medicine side effects.    I understand that if I do not follow any of the conditions above, my prescriptions or treatment may be stopped.      I agree that my provider, clinic care team, and pharmacy may work with any city, state or federal law enforcement agency that investigates the misuse, sale, or other diversion of my controlled medicine. I will allow my provider to discuss my care with or share a copy of this agreement with any other treating provider, pharmacy or emergency room where I receive care. I agree to give up (waive) any right of privacy or confidentiality with respect to these consents.     I have read this agreement and have asked questions about anything I did not understand.      ________________________________________________________________________  Patient signature - Date/Time -  Florentino Fall                                      ________________________________________________________________________  Witness signature                                                            ________________________________________________________________________  Provider signature - Mario Lepe MD      [id number]  Rev 12/18         Registration to scan to EHR                         Page 2 of 2                   Controlled Substance Agreement Opioid           Page 1 of 2  Opioid Pain Medicines (also known as Narcotics)  What You Need to Know    What are opioids?   Opioids are pain  medicines that must be prescribed by a doctor.  They are also known as narcotics.    Examples are:     morphine (MS Contin, Tarah)    oxycodone (Oxycontin)    oxycodone and acetaminophen (Percocet)    hydrocodone and acetaminophen (Vicodin, Norco)     fentanyl patch (Duragesic)     hydromorphone (Dilaudid)     methadone     What do opioids do well?   Opioids are best for short-term pain after a surgery or injury. They also work well for cancer pain. Unlike other pain medicines, they do not cause liver or kidney failure or ulcers. They may help some people with long-lasting (chronic) pain.     What do opioids NOT do well?   Opioids never get rid of pain entirely, and they do not work well for most patients with chronic pain. Opioids do not reduce swelling, one of the causes of pain. They also dont work well for nerve pain.                           For informational purposes only.  Not to replace the advice of your care provider.  Copyright 201 [employer]. All right reserved. [employer] [id number]-[id number] 02/18.      Page 2 of 2    Risks and side effects   Talk to your doctor before you start or decide to keep taking one of these medicines. Side effects include:    Lowering your breathing rate enough to cause death    Overdose, including death, especially if taking higher than prescribed doses    Long-term opioid use    Worse depression symptoms; less pleasure in things you usually enjoy    Feeling tired or sluggish    Slower thoughts or cloudy thinking    Being more sensitive to pain over time; pain is harder to control    Trouble sleeping or restless sleep    Changes in hormone levels (for example, less testosterone)    Changes in sex drive or ability to have sex    Constipation    Unsafe driving    Itching and sweating    Feeling dizzy    Nausea, vomiting and dry mouth    What else should I know about opioids?  When someone takes opioids for too long or too often, they become dependent. This means that if  you stop or reduce the medicine too quickly, you will have withdrawal symptoms.    Dependence is not the same as addiction. Addiction is when people keep using a substance that harms their body, their mind or their relations with others. If you have a history of drug or alcohol abuse, taking opioids can cause a relapse.    Over time, opioids dont work as well. Most people will need higher and higher doses. The higher the dose, the more serious the side effects. We dont know the long-term effects of opioids.      Prescribed opioids aren't the best way to manage chronic pain    Other ways to manage pain include:      Ibuprofen or acetaminophen.  You should always try this first.      Treat health problems that may be causing pain.      acupuncture or massage, deep breathing, meditation, visual imagery, aromatherapy.      Use heat or ice at the pain site      Physical therapy and exercise      Stop smoking      See a counselor or therapist                                                  People who have used opioids for a long time may have a lower quality of life, worse depression, higher levels of pain and more visits to doctors.    Never share your opioids with others. Be sure to store opioids in a secure place, locked if possible.Young children can easily swallow them and overdose.     You can overdose on opioids.  Signs of overdose include decrease or loss of consciousness, slowed breathing, trouble waking and blue lips.  If someone is worried about overdose, they should call 911.    If you are at risk for overdose, you may get naloxone (Narcan, a medicine that reverses the effects of opioids.  If you overdose, a friend or family member can give you Narcan while waiting for the ambulance.  They need to know the signs of overdose and how to give Narcan.    While you're taking opioids:    Don't use alcohol or street drugs. Taking them together can cause death.    Don't take any of these medicines unless your doctor  says its okay.  Taking these with opioids can cause death.    Benzodiazepines (such as lorazepam         or diazepam)    Muscle relaxers (such as cyclobenzaprine)    sleeping pills    other opioids    Safe disposal of opioids  Find your area drug take-back program, your pharmacy mail-back program, buy a special disposal bag (such as Deterra) from your pharmacy or flush them down the toilet.  Use the guidelines at:  www.fda.gov/drugs/resourcesforyou

## 2021-06-19 NOTE — PROGRESS NOTES
[facility] post-hospital stay follow up.    Name: Florentino Fall  :   1945  MRN:   [id number]  PCP:    Paxton Kessler MD  DOS:    18      CC: Post Hospital Stay follow up for right foot osteomyelitis    HPI/Interval History:  Florentino Fall is a 73 y.o. male with the history of Charcot foot bilaterally who was admitted to the hospital in 2018 with osteomyelitis of the right foot associated with Charcot foot.  Superficial and Intra-Op culture came back with ampicillin susceptible enterococcus as well as anaerobes and Eikenella.  The patient was discharged on IV Zosyn for 4-6 weeks.  He was later readmitted to [facility] with side effect from amiodarone.  I saw him at the time with clear evidence of improvement of his foot infection.  The plan was kept the same.  The patient is in clinic today for follow-up.  He is doing well.  The wound is slowly healing he still has a wound VAC in place.  He denies any fever, chills, night sweats, skin rashes, diarrhea, issues with PICC line.  He is very pleased with how his foot is looking.  2018 will be 6 weeks of antibiotic therapy.  He is currently at a TCU.    PMH:  Past Medical History:   Diagnosis Date     Arthritis      Cardiomyopathy (H)      Cardiomyopathy with implantable cardioverter-defibrillator (H)      Charcot's joint of foot     bilateral ankles     CHF (congestive heart failure) (H)      Deviated septum      Eczema      Hernia      History of transfusion      Hypertension      Migraine      Systolic heart failure (H)      V-tach (H)     has icd       PSH:  Past Surgical History:   Procedure Laterality Date     CARDIAC PACEMAKER PLACEMENT      ICD/PACEMAKER     PICC AND MIDLINE TEAM LINE INSERTION  2018          VT COMPLEX DRAINAGE, WOUND Right 2018    Procedure: INCISION AND DRAINAGE, LOWER EXTREMITY with BONE BIOPSY/CULTURE ;  Surgeon: Denisse Fletcher DPM;  Location:  [facility] Main OR;  Service: Podiatry     OH KNEE SCOPE,DIAGNOSTIC      Description: Arthroscopy Knee Left;  Recorded: 06/30/2011;     OH REMOVE TONSILS/ADENOIDS,<11 Y/O      Description: Tonsillectomy With Adenoidectomy;  Recorded: 06/30/2011;     OH SHLDR ARTHROSCOP,DIAGNOSTIC      Description: Arthroscopy Shoulder Right;  Recorded: 06/30/2011;     TONSILLECTOMY      and adenoids       Social History/Family History:  .  Does not smoke or drink.  No family history contributory current disease process.    Allergies:  Allergies   Allergen Reactions     Venom-Honey Bee Swelling and Dizziness       Medications:  Current Outpatient Prescriptions on File Prior to Visit   Medication Sig Dispense Refill     acetaminophen (TYLENOL) 325 MG tablet Take 650 mg by mouth 2 (two) times a day as needed for pain.        allopurinol (ZYLOPRIM) 300 MG tablet Take 1 tablet (300 mg total) by mouth daily. 90 tablet 3     amiodarone (PACERONE) 200 MG tablet Take 1 tablet (200 mg total) by mouth 3 (three) times a day. 21 tablet 0     ascorbic acid, vitamin C, (ASCORBIC ACID WITH JAVIER HIPS) 500 MG tablet Take 500 mg by mouth daily. (start after guiacs obtained)       aspirin 81 MG EC tablet Take 81 mg by mouth daily.       CHOLECALCIFEROL 1,000 unit tablet TAKE 1 TABLET BY MOUTH DAILY. 90 tablet 3     coenzyme Q10 100 mg capsule Take 200 mg by mouth daily.        DULoxetine (CYMBALTA) 60 MG capsule Take 1 capsule (60 mg total) by mouth 2 (two) times a day. 180 capsule 3     EPINEPHrine (EPIPEN/ADRENACLICK) 0.3 mg/0.3 mL injection Inject 0.3 mL (0.3 mg total) as directed as needed for anaphylaxis. Inject into thigh. 2 Pre-filled Pen Syringe 0     famotidine (PEPCID) 20 MG tablet Take 1 tablet (20 mg total) by mouth 2 (two) times a day.  0     ferrous sulfate 325 (65 FE) MG tablet Take 1 tablet by mouth daily with breakfast. (start after guiacs obtained)       fexofenadine (ALLEGRA) 180 MG tablet TAKE 1 TABLET (180 MG TOTAL) BY MOUTH  DAILY. 90 tablet 3     furosemide (LASIX) 40 MG tablet Take 1 tablet (40 mg total) by mouth daily. Dose decreased by DND 4/6. (Patient taking differently: Take 20 mg by mouth daily. Dose decreased to 30 mg on 7/10/18. Give 1.5 tablets for a total of 30 mg daily) 270 tablet 3     KRILL OIL ORAL Take 1,000 mg by mouth.        loperamide (IMODIUM) 2 mg capsule Take 1 capsule (2 mg total) by mouth 3 (three) times a day as needed for diarrhea.  0     milk thistle 175 mg tablet Take 175 mg by mouth daily.       multivitamin (MULTIVITAMIN) per tablet 1 tablet every other day.        mupirocin (BACTROBAN) 2 % ointment Apply topically 2 (two) times a day. To affected area(s). 22 g 3     oxymetazoline (AFRIN) 0.05 % nasal spray 2 sprays into each nostril 2 (two) times a day as needed.        piperacillin-tazobactam 3.375 g in NaCl 0.9 % 0.9 % 50 mL IVPB Infuse 3.375 g into a venous catheter every 8 (eight) hours. 1 each 0     potassium chloride (K-DUR,KLOR-CON) 20 MEQ tablet Take 20 mEq by mouth 2 (two) times a day.       senna-docusate (PERICOLACE) 8.6-50 mg tablet Take 1 tablet by mouth 2 (two) times a day. And two times a day prn       simethicone (MYLICON) 125 MG chewable tablet Chew 125 mg every 6 (six) hours as needed for flatulence.       spironolactone (ALDACTONE) 25 MG tablet Take one tablet by mouth one time daily 90 tablet 3     topiramate (TOPAMAX) 25 MG tablet Take 1 tablet (25 mg total) by mouth 2 (two) times a day. 180 tablet 3     traMADol (ULTRAM) 50 mg tablet Take 2 tablets (100 mg total) by mouth 2 (two) times a day. 10 tablet 0     carvedilol (COREG) 25 MG tablet Take 1.5 tablets (37.5 mg total) by mouth 2 (two) times a day with meals. 270 tablet 3     CINNAMON BARK (CINNAMON ORAL) 1,000 tablets 2 (two) times a day.        No current facility-administered medications on file prior to visit.      Review of System:  12 points review of system is negative except for findings in the HPI.    Exam  VS:   Vitals:     07/24/18 0957   BP: 128/64   Pulse: 60   Temp: 98.4  F (36.9  C)   SpO2: 97%       Gen: Pleasant in no acute distress.  HEENT: NCAT. EOMI.  Neck: No LAD.  Lungs: Clear to auscultation bilaterally with no crackles or wheezes.   Card: RRR. No RMG. Peripheral pulses present and symmetrical. No edema.   Abd: Soft NT ND. No hepatomegaly or splenomegaly.  Ext: Right foot with wound VAC in place.  VAC size looks smaller.  Swelling of the foot overall much improved from last time I saw him in the hospital.  Lymph: No cervical or supraclavicular adenopathy.  Skin: No rash  Neuro: Alert and oriented to place time and person. Cranial nerves grossly intact.     Labs:  Reviewed    Imaging:  Reviewed    Assessment:  Florentino Fall is a 73 y.o. male right foot osteomyelitis with ampicillin susceptible enterococcus, anaerobes, Eikenella improving on IV Zosyn monotherapy.  CRP normalized.  Wound is healing.  6 weeks to and on 8/1/2018.  Has mild leukopenia and thrombocytopenia that I suspect from Zosyn.  This should be okay over the next week before completion of the therapy.  Given really bad foot and risk of recurrent osteomyelitis with wound in place, the patient and I admitted decision to transition him to oral Augmentin once the IV is completed to minimize his risk of recurrent osteomyelitis.    Recommendations:  -Continue IV Zosyn until 8/1/18  -PICC to be removed after 8/1.  -On 8/2 start PO Augmentin 875/125 twice a day for 2-3 weeks until the wound heals.   -Same weekly labs.  -No follow up needed      RAHUL Delgado  [facility]  [facility]  Office Telephone [phone].  Fax [phone]

## 2021-06-19 NOTE — PROGRESS NOTES
[facility]      Facility:    [facility] [[id number]]  Code Status: FULL CODE      Chief Complaint/Reason for Visit:  Chief Complaint   Patient presents with     H & P       HPI:   Folrentino is a 73 y.o. male who was most recently hospitalized for cardiac arrhythmia triggering his ICD.  More than half a dozen times was the occurrence.  When he was hospitalized his potassium was noted to be at 3.5 and he was given extra potassium.  Most importantly was the start of amiodarone and then his ventricular tachycardia improved.  Recommendation was to follow-up as an outpatient at the [facility] for ablation therapy.    He has a complex medical history who previously was hospitalized due to abscess of his right foot which subsequently was determined to be osteomyelitis after incision and drainage and biopsy of bone.  He is on a nonweightbearing status and has a wound VAC to the area and is requiring IV antibiotics.   Underlying medical history includes nonischemic cardiomyopathy with chronic systolic congestive heart failure with an ejection fraction of 27%.  He has a history of sustained ventricular tachycardia and subsequent ICD biventricular pacemaker insertion.  He has essential hypertension.  He has chronic kidney disease stage III.  He has past history of alcohol abuse and stopped drinking alcohol in 1979.  He quit smoking and 1999 and prior to that time was a 2 pack per day smoker.    Upon current review of systems, he is feeling well.  No fever or chill.  No cough or chest pain or palpitations.  Some constipation, but no abdominal pain or nausea.    Past Medical History:  Past Medical History:   Diagnosis Date     Arthritis      Cardiomyopathy (H)      Cardiomyopathy with implantable cardioverter-defibrillator (H)      Charcot's joint of foot     bilateral ankles     CHF (congestive heart failure) (H)      Deviated septum      Eczema      Hernia      History of transfusion       Hypertension      Migraine      Systolic heart failure (H)      V-tach (H)     has icd           Surgical History:  Past Surgical History:   Procedure Laterality Date     CARDIAC PACEMAKER PLACEMENT      ICD/PACEMAKER     PICC AND MIDLINE TEAM LINE INSERTION  6/22/2018          ID COMPLEX DRAINAGE, WOUND Right 6/20/2018    Procedure: INCISION AND DRAINAGE, LOWER EXTREMITY with BONE BIOPSY/CULTURE ;  Surgeon: Denisse Fletcher DPM;  [facility];  Service: Podiatry     ID KNEE SCOPE,DIAGNOSTIC      Description: Arthroscopy Knee Left;  Recorded: 06/30/2011;     ID REMOVE TONSILS/ADENOIDS,<13 Y/O      Description: Tonsillectomy With Adenoidectomy;  Recorded: 06/30/2011;     ID SHLDR ARTHROSCOP,DIAGNOSTIC      Description: Arthroscopy Shoulder Right;  Recorded: 06/30/2011;     TONSILLECTOMY      and adenoids       Family History:   Family History   Problem Relation Age of Onset     Stroke Mother      Emphysema Mother      Goiter Mother      Cancer Father      Stomach     Alzheimer's disease Brother        Social History:    Social History     Social History     Marital status:      Spouse name: N/A     Number of children: 3     Years of education: N/A     Social History Main Topics     Smoking status: Former Smoker     Packs/day: 2.50     Years: 50.00     Types: Cigarettes, Pipe, Cigars     Quit date: 1/1/2000     Smokeless tobacco: Never Used     Alcohol use No      Comment: Last used 1979     Drug use: No     Sexual activity: Yes     Partners: Female     Birth control/ protection: Post-menopausal     Other Topics Concern     Not on file     Social History Narrative     since 1964, 3 children. 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 Quit smoking in 1999.           Review of Systems   All other systems reviewed and are negative.      Vitals:    07/04/18 0452   BP: 118/62   Pulse: 66   Resp: 18   Temp: 97.5  F (36.4  C)   SpO2: 96%       Physical Exam   Constitutional: No distress.    HENT:   Mouth/Throat: Oropharynx is clear and moist.   Eyes: Right eye exhibits no discharge. Left eye exhibits no discharge.   Neck: No thyromegaly present.   Cardiovascular: Normal heart sounds.    Pulmonary/Chest: Breath sounds normal. No respiratory distress.   Abdominal: Soft. Bowel sounds are normal. He exhibits no distension. There is no tenderness.   Musculoskeletal: He exhibits no edema.   Lymphadenopathy:     He has no cervical adenopathy.   Neurological: He is alert.   Skin:   Wound vac in place   Psychiatric: He has a normal mood and affect.   Nursing note and vitals reviewed.      Medication List:  Current Outpatient Prescriptions   Medication Sig     acetaminophen (TYLENOL) 325 MG tablet Take 650 mg by mouth 2 (two) times a day as needed for pain.      allopurinol (ZYLOPRIM) 300 MG tablet Take 1 tablet (300 mg total) by mouth daily.     amiodarone (PACERONE) 200 MG tablet Take 1 tablet (200 mg total) by mouth 3 (three) times a day.     ascorbic acid (ASCORBIC ACID WITH JAVIER HIPS) 500 MG tablet Take 500 mg by mouth daily.      aspirin 81 MG EC tablet Take 81 mg by mouth daily.     carvedilol (COREG) 25 MG tablet Take 1.5 tablets (37.5 mg total) by mouth 2 (two) times a day with meals.     CHOLECALCIFEROL 1,000 unit tablet TAKE 1 TABLET BY MOUTH DAILY.     CINNAMON BARK (CINNAMON ORAL) 1,000 tablets 2 (two) times a day.      coenzyme Q10 100 mg capsule Take 200 mg by mouth daily.      DULoxetine (CYMBALTA) 60 MG capsule Take 1 capsule (60 mg total) by mouth 2 (two) times a day.     EPINEPHrine (EPIPEN/ADRENACLICK) 0.3 mg/0.3 mL injection Inject 0.3 mL (0.3 mg total) as directed as needed for anaphylaxis. Inject into thigh.     famotidine (PEPCID) 20 MG tablet Take 1 tablet (20 mg total) by mouth 2 (two) times a day.     fexofenadine (ALLEGRA) 180 MG tablet TAKE 1 TABLET (180 MG TOTAL) BY MOUTH DAILY.     furosemide (LASIX) 40 MG tablet Take 1 tablet (40 mg total) by mouth daily. Dose decreased by DND  4/6.     KRILL OIL ORAL Take 1,000 mg by mouth.      loperamide (IMODIUM) 2 mg capsule Take 1 capsule (2 mg total) by mouth 3 (three) times a day as needed for diarrhea.     milk thistle 175 mg tablet Take 175 mg by mouth daily.     multivitamin (MULTIVITAMIN) per tablet 1 tablet every other day.      mupirocin (BACTROBAN) 2 % ointment Apply topically 2 (two) times a day. To affected area(s).     oxymetazoline (AFRIN) 0.05 % nasal spray 2 sprays into each nostril 2 (two) times a day as needed.      piperacillin-tazobactam 3.375 g in NaCl 0.9 % 0.9 % 50 mL IVPB Infuse 3.375 g into a venous catheter every 8 (eight) hours.     potassium chloride (K-DUR,KLOR-CON) 20 MEQ tablet Take 20 mEq by mouth 2 (two) times a day.     simethicone (MYLICON) 125 MG chewable tablet Chew 125 mg every 6 (six) hours as needed for flatulence.     spironolactone (ALDACTONE) 25 MG tablet Take one tablet by mouth one time daily     topiramate (TOPAMAX) 25 MG tablet Take 1 tablet (25 mg total) by mouth 2 (two) times a day.     traMADol (ULTRAM) 50 mg tablet Take 2 tablets (100 mg total) by mouth 2 (two) times a day.       Labs:  No laboratory testing    Assessment:    ICD-10-CM    1. Ventricular tachycardia (H) I47.2    2. Cardiomyopathy, nonischemic (H) I42.8    3. ICD (implantable cardioverter-defibrillator), biventricular, in situ Z95.810    4. Subacute osteomyelitis of right foot (H) M86.271        Plan:  Continue current plans for care of the foot.      Electronically signed by: Paxton Kessler MD

## 2021-06-19 NOTE — PROGRESS NOTES
ASSESSMENT:  1. Sustained VT (ventricular tachycardia) (H)  Reviewed hospitalization and initiation of amiodarone in early July.  Continue twice daily.  Update labs.  [facility] consultation reviewed to consider ablation    2. Major depressive disorder with single episode, in full remission (H)  Stable on 120 mg of Cymbalta    3. Essential hypertension with goal blood pressure less than 140/90  Stable.  With weight loss.  May need less diuretic    4. Anemia in stage 3 chronic kidney disease  Monitor on iron.  Clarify dose of iron once daily  - HM2(CBC w/o Differential)    5. Cardiomyopathy, nonischemic (H)  Stable.  Reviewed defibrillator firing    6. ICD (implantable cardioverter-defibrillator), biventricular, in situ    7. Neuropathy (H)  Success on topiramate with marked improvement.  Push dose.  Check uric acid off allopurinol  - Uric Acid  - topiramate (TOPAMAX) 50 MG tablet; Take 1 tablet (50 mg total) by mouth 2 (two) times a day.  Dispense: 180 tablet; Refill: 3    8. Generalized muscle weakness  Update labs  - Comprehensive Metabolic Panel  - Thyroid Stimulating Hormone (TSH)    9. Skin ulcer of right foot with fat layer exposed (H)  Reviewed hospitalization, intravenous Zosyn, and current oral Augmentin.  Recurrence of sweats concerning.  Infectious disease notes reviewed  - C-Reactive Protein  - Erythrocyte Sedimentation Rate      PLAN:  Patient Instructions   Check the allopurinol.  I will check the uric acid today    Increase Topiramate to 50 mgs twice a day for neuropathy pain and arthritis pain and decreased appetite    Update amiodorone blood    Take one iron pill daily    Take the prescription 20 meq one a day, not the OTC           Orders Placed This Encounter   Procedures     Comprehensive Metabolic Panel     HM2(CBC w/o Differential)     Uric Acid     Thyroid Stimulating Hormone (TSH)     C-Reactive Protein     Erythrocyte Sedimentation Rate     Medications Discontinued During This  Encounter   Medication Reason     potassium chloride (K-DUR,KLOR-CON) 20 MEQ tablet Reorder     topiramate (TOPAMAX) 25 MG tablet Reorder       Return in about 4 weeks (around 9/4/2018).    All patient medications were reconciled.     CHIEF COMPLAINT:  Chief Complaint   Patient presents with     Follow-up     Pt would like to go over medications with PCP       HISTORY OF PRESENT ILLNESS:  Florentino is a 73 y.o. male presenting to the clinic today to follow up on his osteomyelitis and medications. He is accompanied by his wife.     Osteomyelitis: He wears boots on his feet for his charcot deformities. The boot he was wearing on his right foot created a sore that became infected. He did not notice how bad it was getting because of his neuropathy and it progressed to the point of osteomyelitis. He went to the hospital in June and required surgery for this. He was then sent to transitional care where he had a wound vac, received IV antibiotics for six weeks, and did physical therapy. He is not able to stand on his right foot much and presents in a wheelchair today. He has finished IV antibiotics, but he continues to take oral Augmentin. He went home from the TCU last week. He has not seen the wound clinic for a follow up yet, but he saw infectious disease at the end of July. 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 His right first started to bother him about 25 years ago. His left started to develop ten years ago.    Ventricular Tachycardia: He went to the ER a second time in June because his ICD fired about ten times one night. He was started on amiodarone, which seems to help. He did not tolerate amiodarone in the past, but it is working well this time. He was taken off of digoxin. He was seen at the [facility] to be evaluated for possible ablation, but they are waiting for his infection to clear before proceeding with any procedures. He is going to see Dr. Hernández in September.     Arthralgias: He  continues to have some arthritic pain in his back, shoulders, elbows, and knees. His pain is about the same as it has always been. He has been taking tramadol twice daily for pain. He has not had any gout flares but is fairly sure he has not been taking allopurinol since getting home from the TCU.    Neuropathy: Stopping gabapentin and starting topiramate in April was somewhat helpful for his neuropathy and pain. He still has numbness but has not been getting the electrical shock sensations that he used to get.     Anxiety/Depression: He increased his dose of duloxetine to 60 mg twice daily in April, which seemed to be helpful for both mood and pain.      REVIEW OF SYSTEMS:   He has been constipated. He lost a lot some weight. He has been taking an iron supplement. He has been taking potassium but is not sure about the dose he is taking. He occasionally woke up with severe night sweats at the TCU. This has not been as bad since getting home, but he has still had a few episodes. He inquires what makes his urine smell like sulphur. He does not think he has a urinary tract infection. All other systems are negative.    PFSH:  Reviewed, as below.     TOBACCO USE:  History   Smoking Status     Former Smoker     Packs/day: 2.50     Years: 50.00     Types: Cigarettes, Pipe, Cigars     Quit date: 1/1/2000   Smokeless Tobacco     Never Used       VITALS:  Vitals:    08/07/18 1046   BP: 104/68   Patient Site: Left Arm   Patient Position: Sitting   Cuff Size: Adult Regular   Pulse: 60   Resp: (!) 95     Wt Readings from Last 3 Encounters:   08/01/18 (!) 233 lb 4.8 oz (105.8 kg)   07/24/18 (!) 226 lb (102.5 kg)   07/25/18 (!) 233 lb 4.8 oz (105.8 kg)     There is no height or weight on file to calculate BMI.    PHYSICAL EXAM:  Constitutional:  Reveals an alert, pleasant, talkative man. Presents in a wheelchair. Wearing brace on right ankle. Not wearing left shoe.  Vitals:  Per nursing notes..  Cardiac:  Regular rate and rhythm  without murmurs, rubs, or gallops. Legs without edema. Palpation of the radial pulse regular.  Extremities: Prominent varicosities on the left, boot on the right, visible charcot deformities through the socks. No peripheral edema.   Neurologic:  Cranial nerves II-XII intact.     Psychiatric:  Mood appropriate, memory intact.     MEDICATIONS:  Current Outpatient Prescriptions   Medication Sig Dispense Refill     acetaminophen (TYLENOL) 325 MG tablet Take 650 mg by mouth 2 (two) times a day as needed for pain.        allopurinol (ZYLOPRIM) 300 MG tablet Take 1 tablet (300 mg total) by mouth daily. 90 tablet 3     amiodarone (PACERONE) 200 MG tablet Take 1 tablet (200 mg total) by mouth 2 (two) times a day. 180 tablet 0     amoxicillin-clavulanate (AUGMENTIN) 875-125 mg per tablet Take 1 tablet by mouth 2 (two) times a day for 21 days. 42 tablet 0     ascorbic acid, vitamin C, (ASCORBIC ACID WITH JAVIER HIPS) 500 MG tablet Take 500 mg by mouth daily. (start after guiacs obtained)       aspirin 81 MG EC tablet Take 81 mg by mouth daily.       carvedilol (COREG) 25 MG tablet Take 1.5 tablets (37.5 mg total) by mouth 2 (two) times a day with meals. 270 tablet 3     CHOLECALCIFEROL 1,000 unit tablet TAKE 1 TABLET BY MOUTH DAILY. 90 tablet 3     CINNAMON BARK (CINNAMON ORAL) 1,000 tablets 2 (two) times a day.        coenzyme Q10 100 mg capsule Take 200 mg by mouth daily.        DULoxetine (CYMBALTA) 60 MG capsule Take 1 capsule (60 mg total) by mouth 2 (two) times a day. 180 capsule 3     EPINEPHrine (EPIPEN/ADRENACLICK) 0.3 mg/0.3 mL injection Inject 0.3 mL (0.3 mg total) as directed as needed for anaphylaxis. Inject into thigh. 2 Pre-filled Pen Syringe 0     famotidine (PEPCID) 20 MG tablet Take 1 tablet (20 mg total) by mouth 2 (two) times a day.  0     ferrous sulfate 325 (65 FE) MG tablet Take 1 tablet by mouth daily with breakfast. (start after guiacs obtained)       fexofenadine (ALLEGRA) 180 MG tablet TAKE 1 TABLET  (180 MG TOTAL) BY MOUTH DAILY. 90 tablet 3     furosemide (LASIX) 40 MG tablet Take 1 tablet (40 mg total) by mouth daily. Dose decreased by DND 4/6. (Patient taking differently: Take 20 mg by mouth daily. Dose decreased to 30 mg on 7/10/18. Give 1.5 tablets for a total of 30 mg daily) 270 tablet 3     KRILL OIL ORAL Take 1,000 mg by mouth.        Lactobacillus rhamnosus GG (CULTURELLE) 10-15 Billion cell capsule Take 1 capsule by mouth daily.       loperamide (IMODIUM) 2 mg capsule Take 1 capsule (2 mg total) by mouth 3 (three) times a day as needed for diarrhea.  0     milk thistle 175 mg tablet Take 175 mg by mouth daily.       multivitamin (MULTIVITAMIN) per tablet 1 tablet every other day.        mupirocin (BACTROBAN) 2 % ointment Apply topically 2 (two) times a day. To affected area(s). 22 g 3     oxymetazoline (AFRIN) 0.05 % nasal spray 2 sprays into each nostril 2 (two) times a day as needed.        potassium chloride (K-DUR,KLOR-CON) 20 MEQ tablet Take 1 tablet (20 mEq total) by mouth daily. 90 tablet 3     senna-docusate (PERICOLACE) 8.6-50 mg tablet Take 1 tablet by mouth 2 (two) times a day. And two times a day prn       simethicone (MYLICON) 125 MG chewable tablet Chew 125 mg every 6 (six) hours as needed for flatulence.       spironolactone (ALDACTONE) 25 MG tablet Take one tablet by mouth one time daily 90 tablet 3     topiramate (TOPAMAX) 50 MG tablet Take 1 tablet (50 mg total) by mouth 2 (two) times a day. 180 tablet 3     traMADol (ULTRAM) 50 mg tablet Take 2 tablets (100 mg total) by mouth 2 (two) times a day. 10 tablet 0     No current facility-administered medications for this visit.        ADDITIONAL HISTORY SUMMARIZED (2): Reviewed 7/11/2018 [facility] cardiology note regarding ventricular tachycardia and ablation discussion. Reviewed June 2018 ED notes regarding osteomyelitis and ventricular tachycardia. Reviewed Dr. Hernández note from June regarding amiodarone dosing.   DECISION TO OBTAIN EXTRA  INFORMATION (1): Care Everywhere accessed.   RADIOLOGY TESTS (1): None.  LABS (1): Labs from June and July reviewed. Labs ordered.   MEDICINE TESTS (1): None.  INDEPENDENT REVIEW (2 each): None.     The visit lasted a total of 31 minutes face to face with the patient. Over 50% of the time was spent counseling and educating the patient about his neuropathy and osteomyelitis.    IMitch, am scribing for and in the presence of, Dr. Lepe.    I, Dr. Lepe, personally performed the services described in this documentation, as scribed by Mitch Vines in my presence, and it is both accurate and complete.    Total data points: 4

## 2021-06-19 NOTE — PROGRESS NOTES
[facility]    DATE: 2018    NAME:  Florentino Fall             :  1945  MRN: [id number]  CODE STATUS:  FULL CODE    FACILITY:  [facility] [[id number]]       ROOM:   119    CHIEF COMPLAINT/REASON FOR VISIT:  Chief Complaint   Patient presents with     Problem Visit     Osteomyelitis and Anemia     HISTORY OF PRESENT ILLNESS: Florentino Fall is a 73 y.o. male  with Non-ischemic cardiomyopathy and HTN with chronic foot deformities and wounds who presented to vascular clinic to see Dr. Zaman (podiatry). Concern for foot wounds with possible Osteomyelitis.  Known Charcot foot.  He was admitted to the hospital.      Right foot wounds infection   - With Osteomyelitis  - Appreciate ID consult  - Started initially on IV Vanco and Zosyn  - Appreciate podiatry consult  - Dr. Zaman requested MRI however unable to do with defibrillator   - CT foot is negative for osteomyelitis  - S/P incision and drainage   - Started wound VAC yesterday  - Negative blood culture but wound culture grow Enterococcus faecalis  - Patient will need 6 weeks of IV Zosyn as recommended by ID  - Patient will require long-term IV antibiotic, PICC line was placed yesterday  - Follow up with ID and podiatry as scheduled      Non-ischemic cardiomyopathy  - Last echo shows EF 27% on 16  - does not appear to be in acute exacerbation  - continue coreg, spironolactone and furosemide      Sustained Vtach with ICD biventricular  - noted on ICD interrogation  - recently had adjustments 18 to device  - Sees Dr. Hernández  - interrogation just yesterday with recent episode of VT where shock was given from device  - Normal mag and potassium level  - Appreciate cardiology consult      Essential HTN  - Stable blood pressure after surgery  - Resume home meds with hold parameters      Chronic kidney disease  - Stage III  - Stable creatinine  - Avoid nephrotoxic drugs  - Continue to monitor renal  function      Diarrhea  - Improving  - continue to monitor      Peripheral neuropathy  - History of Charcot joints  - Resume home meds of Topiramate, Tramadol      Weakness and deconditioning  - Secondary to above  - PT/OT evaluation  - Plan for TCU on discharge as patient is nonweightbearing on his right foot         Patient was stabilized and transferred to TCU for continued rehabilitation. He was recently hospitalized on 6/30/2018 for ventricular tachycardia.  Patient had about 7 episodes of ICD discharge. En route to the \[facility]\"", medics witnessed a 30 second run of vtach.  ICD was interrogated in the ER and noted to have multiple episodes of sustained ventricular tachycardia with the device trying to override. Patient was started on Amiodarone.  Patient responded well to Amiodarone with only brief runs of NSVT with no ICD firing over past 48 hours.  Cardiologist discussed with EP physician at [facility] who suggested outpatient consultation.  Patient was continued on Zosyn for right foot Osteomyelitis which he is supposed to use for 1 more month.  Patient developed diarrhea for which C. difficile was checked and was negative.  Diarrhea improved with Loperamide.  Patient was discharged back to TCU with advice to continue Amiodarone.  Patient has an appointment with [facility] for outpatient consultation with EP physician on 7/11/2018.    Today, patient is seen at the bedside.  He is scheduled to discharge to home later this week (Thursday).  He is to have his last IV antibiotic on Tuesday and will start oral antibiotics on Wednesday.   Due to his Osteomyelitis and nonweightbearing status, standard wheelchair is required to assist in mobility related activities of daily living upon discharge. He denies any pain to his right foot and continues with wound vac.     Past Medical History:   Diagnosis Date     Arthritis      Cardiomyopathy (H)      Cardiomyopathy with implantable cardioverter-defibrillator (H)      Charcot's  joint of foot     bilateral ankles     CHF (congestive heart failure) (H)      Deviated septum      Eczema      Hernia      History of transfusion      Hypertension      Migraine      Systolic heart failure (H)      V-tach (H)     has icd     Past Surgical History:   Procedure Laterality Date     CARDIAC PACEMAKER PLACEMENT      ICD/PACEMAKER     PICC AND MIDLINE TEAM LINE INSERTION  6/22/2018          ND COMPLEX DRAINAGE, WOUND Right 6/20/2018    Procedure: INCISION AND DRAINAGE, LOWER EXTREMITY with BONE BIOPSY/CULTURE ;  Surgeon: Denisse Fletcher DPM;  Location: [facility] [place];  Service: Podiatry     ND KNEE SCOPE,DIAGNOSTIC      Description: Arthroscopy Knee Left;  Recorded: 06/30/2011;     ND REMOVE TONSILS/ADENOIDS,<13 Y/O      Description: Tonsillectomy With Adenoidectomy;  Recorded: 06/30/2011;     ND SHLDR ARTHROSCOP,DIAGNOSTIC      Description: Arthroscopy Shoulder Right;  Recorded: 06/30/2011;     TONSILLECTOMY      and adenoids     Family History   Problem Relation Age of Onset     Stroke Mother      Emphysema Mother      Goiter Mother      Cancer Father      Stomach     Alzheimer's disease Brother      Social History     Social History     Marital status:      Spouse name: N/A     Number of children: 3     Years of education: N/A     Occupational History     Not on file.     Social History Main Topics     Smoking status: Former Smoker     Packs/day: 2.50     Years: 50.00     Types: Cigarettes, Pipe, Cigars     Quit date: 1/1/2000     Smokeless tobacco: Never Used     Alcohol use No      Comment: Last used 1979     Drug use: No     Sexual activity: Yes     Partners: Female     Birth control/ protection: Post-menopausal     Other Topics Concern     Not on file     Social History Narrative     since 1964, 3 children. 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 Quit smoking in 1999.      Allergies   Allergen Reactions     Venom-Honey Bee Swelling and Dizziness     Current Outpatient  Prescriptions   Medication Sig Dispense Refill     acetaminophen (TYLENOL) 325 MG tablet Take 650 mg by mouth 2 (two) times a day as needed for pain.        allopurinol (ZYLOPRIM) 300 MG tablet Take 1 tablet (300 mg total) by mouth daily. 90 tablet 3     amiodarone (PACERONE) 200 MG tablet Take 1 tablet (200 mg total) by mouth 2 (two) times a day. 180 tablet 2     [START ON 8/2/2018] amoxicillin-clavulanate (AUGMENTIN) 875-125 mg per tablet Take 1 tablet by mouth 2 (two) times a day for 21 days. 42 tablet 0     ascorbic acid, vitamin C, (ASCORBIC ACID WITH JAVIER HIPS) 500 MG tablet Take 500 mg by mouth daily. (start after guiacs obtained)       aspirin 81 MG EC tablet Take 81 mg by mouth daily.       carvedilol (COREG) 25 MG tablet Take 1.5 tablets (37.5 mg total) by mouth 2 (two) times a day with meals. 270 tablet 3     CHOLECALCIFEROL 1,000 unit tablet TAKE 1 TABLET BY MOUTH DAILY. 90 tablet 3     CINNAMON BARK (CINNAMON ORAL) 1,000 tablets 2 (two) times a day.        coenzyme Q10 100 mg capsule Take 200 mg by mouth daily.        DULoxetine (CYMBALTA) 60 MG capsule Take 1 capsule (60 mg total) by mouth 2 (two) times a day. 180 capsule 3     EPINEPHrine (EPIPEN/ADRENACLICK) 0.3 mg/0.3 mL injection Inject 0.3 mL (0.3 mg total) as directed as needed for anaphylaxis. Inject into thigh. 2 Pre-filled Pen Syringe 0     famotidine (PEPCID) 20 MG tablet Take 1 tablet (20 mg total) by mouth 2 (two) times a day.  0     ferrous sulfate 325 (65 FE) MG tablet Take 1 tablet by mouth daily with breakfast. (start after guiacs obtained)       fexofenadine (ALLEGRA) 180 MG tablet TAKE 1 TABLET (180 MG TOTAL) BY MOUTH DAILY. 90 tablet 3     furosemide (LASIX) 40 MG tablet Take 1 tablet (40 mg total) by mouth daily. Dose decreased by DND 4/6. (Patient taking differently: Take 20 mg by mouth daily. Dose decreased to 30 mg on 7/10/18. Give 1.5 tablets for a total of 30 mg daily) 270 tablet 3     KRILL OIL ORAL Take 1,000 mg by mouth.         Lactobacillus rhamnosus GG (CULTURELLE) 10-15 Billion cell capsule Take 1 capsule by mouth daily.       loperamide (IMODIUM) 2 mg capsule Take 1 capsule (2 mg total) by mouth 3 (three) times a day as needed for diarrhea.  0     milk thistle 175 mg tablet Take 175 mg by mouth daily.       multivitamin (MULTIVITAMIN) per tablet 1 tablet every other day.        mupirocin (BACTROBAN) 2 % ointment Apply topically 2 (two) times a day. To affected area(s). 22 g 3     oxymetazoline (AFRIN) 0.05 % nasal spray 2 sprays into each nostril 2 (two) times a day as needed.        piperacillin-tazobactam 3.375 g in NaCl 0.9 % 0.9 % 50 mL IVPB Infuse 3.375 g into a venous catheter every 8 (eight) hours. 1 each 0     potassium chloride (K-DUR,KLOR-CON) 20 MEQ tablet Take 20 mEq by mouth 2 (two) times a day.       senna-docusate (PERICOLACE) 8.6-50 mg tablet Take 1 tablet by mouth 2 (two) times a day. And two times a day prn       simethicone (MYLICON) 125 MG chewable tablet Chew 125 mg every 6 (six) hours as needed for flatulence.       spironolactone (ALDACTONE) 25 MG tablet Take one tablet by mouth one time daily 90 tablet 3     topiramate (TOPAMAX) 25 MG tablet Take 1 tablet (25 mg total) by mouth 2 (two) times a day. 180 tablet 3     traMADol (ULTRAM) 50 mg tablet Take 2 tablets (100 mg total) by mouth 2 (two) times a day. 10 tablet 0     No current facility-administered medications for this visit.      REVIEW OF SYSTEMS:    Currently, no fever, chills, or rigors. Does not have any visual or hearing problems. Denies any chest pain, headaches, palpitations, lightheadedness, dizziness, shortness of breath, or cough. Appetite is good. Denies any GERD symptoms. Denies any difficulty with swallowing, nausea, or vomiting.  Denies any abdominal pain, diarrhea or constipation. Denies any urinary symptoms. No insomnia. No active bleeding. No rash.     PHYSICAL EXAMINATION:  Vitals:    08/01/18 0831   BP: 101/58   Pulse: 60   Resp: 16    Temp: 97.8  F (36.6  C)   SpO2: 94%   Weight: (!) 233 lb 4.8 oz (105.8 kg)       GENERAL: Awake, Alert, oriented x3, not in any form of acute distress, answers questions appropriately, follows simple commands, conversant  HEENT: Head is normocephalic with normal hair distribution. No evidence of trauma. Ears: No acute purulent discharge. Eyes: Conjunctivae pink with no scleral jaundice. Nose: Normal mucosa and septum. NECK: Supple with no cervical or supraclavicular lymphadenopathy. Trachea is midline.   CHEST: No tenderness or deformity, no crepitus  LUNG: Clear to auscultation with good chest expansion. There are no crackles or wheezes, normal AP diameter.  BACK: No kyphosis of the thoracic spine. Symmetric, no curvature, ROM normal, no CVA tenderness, no spinal tenderness   CVS: There is good S1  S2, there are no murmurs, rubs, gallops, or heaves, rhythm is regular,  2+ pulses symmetric in all extremities.  ABDOMEN: Globular and soft, nontender to palpation, non distended, no masses, no organomegaly, good bowel sounds, no rebound or guarding, no peritoneal signs.   EXTREMITIES: Limited range of motion on both upper and lower extremities, there is no tenderness to palpation, bilateral pedal edema, no cyanosis or clubbing, no calf tenderness.  Pulses equal in all extremities, normal cap refill, no joint swelling. PICC line in place.  SKIN: Warm and dry, no erythema noted.  Skin color, texture, no rashes or lesions.  NEUROLOGICAL: The patient is oriented to person, place and time. Strength and sensation are grossly intact. Face is symmetric.    LABS:      Lab Results   Component Value Date    WBC 4.3 07/30/2018    HGB 12.2 (L) 07/30/2018    HCT 36.3 (L) 07/30/2018    MCV 96 07/30/2018     (L) 07/30/2018     Results for orders placed or performed in visit on 07/30/18   Basic Metabolic Panel   Result Value Ref Range    Sodium 140 136 - 145 mmol/L    Potassium 4.0 3.5 - 5.0 mmol/L    Chloride 108 (H) 98 - 107  mmol/L    CO2 25 22 - 31 mmol/L    Anion Gap, Calculation 7 5 - 18 mmol/L    Glucose 86 70 - 125 mg/dL    Calcium 9.3 8.5 - 10.5 mg/dL    BUN 27 8 - 28 mg/dL    Creatinine 1.42 (H) 0.70 - 1.30 mg/dL    GFR MDRD Af Amer 59 (L) >60 mL/min/1.73m2    GFR MDRD Non Af Amer 49 (L) >60 mL/min/1.73m2     Vitamin D, Total (25-Hydroxy)   Date Value Ref Range Status   11/15/2016 41.6 30.0 - 80.0 ng/mL Final     Lab Results   Component Value Date    ALCWAEBT08 909 (H) 11/15/2016     ASSESSMENT/PLAN:    1. Subacute osteomyelitis of right foot (H)  - Wound VAC in place - Negative blood culture but wound culture grow Enterococcus faecalis - Patient will need 6 weeks of IV Zosyn as recommended by ID -  PICC line in place - Followed by ID and podiatry   2. Anemia - Last Hgb 12.2, increase Ferrous sulfate to two times a day and check Hgb in 1 week at follow up appointment with PCP   3. Ventricular tachycardia (H) - No episodes of abnormal arrhythmias or  ICD firing   4. ICD (implantable cardioverter-defibrillator), biventricular, in situ - Stable   5. Generalized muscle weakness - Wheelchair upon discharge   6. Essential hypertension with goal blood pressure less than 140/90  - Blood pressures are within target range, will continue Lasix, Aldactone, and Coreg                       Electronically signed by:  Michaela Garcia CNP

## 2021-06-19 NOTE — PROGRESS NOTES
[facility]    DATE: 2018    NAME:  Florentino Fall             :  1945  MRN: [id number]  CODE STATUS:  FULL CODE    FACILITY:  [facility] [[id number]]       ROOM:   119    CHIEF COMPLAINT/REASON FOR VISIT:  Chief Complaint   Patient presents with     Problem Visit     Subacute osteomyelitis of right foot (H)     HISTORY OF PRESENT ILLNESS: Florentino Fall is a 73 y.o. male  with Non-ischemic cardiomyopathy and HTN with chronic foot deformities and wounds who presented to vascular clinic to see Dr. Zaman (podiatry). Concern for foot wounds with possible Osteomyelitis.  Known Charcot foot.  He was admitted to the hospital.      Right foot wounds infection   - With Osteomyelitis  - Appreciate ID consult  - Started initially on IV Vanco and Zosyn  - Appreciate podiatry consult  - Dr. Zaman requested MRI however unable to do with defibrillator   - CT foot is negative for osteomyelitis  - S/P incision and drainage   - Started wound VAC yesterday  - Negative blood culture but wound culture grow Enterococcus faecalis  - Patient will need 6 weeks of IV Zosyn as recommended by ID  - Patient will require long-term IV antibiotic, PICC line was placed yesterday  - Follow up with ID and podiatry as scheduled      Non-ischemic cardiomyopathy  - Last echo shows EF 27% on 16  - does not appear to be in acute exacerbation  - continue coreg, spironolactone and furosemide      Sustained Vtach with ICD biventricular  - noted on ICD interrogation  - recently had adjustments 18 to device  - Sees Dr. Hernández  - interrogation just yesterday with recent episode of VT where shock was given from device  - Normal mag and potassium level  - Appreciate cardiology consult      Essential HTN  - Stable blood pressure after surgery  - Resume home meds with hold parameters      Chronic kidney disease  - Stage III  - Stable creatinine  - Avoid nephrotoxic drugs  - Continue to monitor renal  function      Diarrhea  - Improving  - continue to monitor      Peripheral neuropathy  - History of Charcot joints  - Resume home meds of Topiramate, Tramadol      Weakness and deconditioning  - Secondary to above  - PT/OT evaluation  - Plan for TCU on discharge as patient is nonweightbearing on his right foot         Patient was stabilized and transferred to TCU for continued rehabilitation. He was recently hospitalized on 6/30/2018 for ventricular tachycardia.  Patient had about 7 episodes of ICD discharge. En route to the [facility]", medics witnessed a 30 second run of vtach.  ICD was interrogated in the ER and noted to have multiple episodes of sustained ventricular tachycardia with the device trying to override. Patient was started on Amiodarone.  Patient responded well to Amiodarone with only brief runs of NSVT with no ICD firing over past 48 hours.  Cardiologist discussed with EP physician at [facility] who suggested outpatient consultation.  Patient was continued on Zosyn for right foot Osteomyelitis which he is supposed to use for 1 more month.  Patient developed diarrhea for which C. difficile was checked and was negative.  Diarrhea improved with Loperamide.  Patient was discharged back to TCU with advice to continue Amiodarone.  Patient has an appointment with [facility] for outpatient consultation with EP physician on 7/11/2018.    Today, patient is seen at the bedside.  He has had no further episodes of firing of his ICD and no palpitations of the heart.  He is not experiencing chest pain or shortness of breath.  He is followed by ID with his next appointment on 7/24/2018.  He is afebrile.      Past Medical History:   Diagnosis Date     Arthritis      Cardiomyopathy (H)      Cardiomyopathy with implantable cardioverter-defibrillator (H)      Charcot's joint of foot     bilateral ankles     CHF (congestive heart failure) (H)      Deviated septum      Eczema      Hernia      History of transfusion      Hypertension       Migraine      Systolic heart failure (H)      V-tach (H)     has icd     Past Surgical History:   Procedure Laterality Date     CARDIAC PACEMAKER PLACEMENT      ICD/PACEMAKER     PICC AND MIDLINE TEAM LINE INSERTION  6/22/2018          IL COMPLEX DRAINAGE, WOUND Right 6/20/2018    Procedure: INCISION AND DRAINAGE, LOWER EXTREMITY with BONE BIOPSY/CULTURE ;  Surgeon: Denisse Fletcher DPM;  Location: [facility];  Service: Podiatry     IL KNEE SCOPE,DIAGNOSTIC      Description: Arthroscopy Knee Left;  Recorded: 06/30/2011;     IL REMOVE TONSILS/ADENOIDS,<11 Y/O      Description: Tonsillectomy With Adenoidectomy;  Recorded: 06/30/2011;     IL SHLDR ARTHROSCOP,DIAGNOSTIC      Description: Arthroscopy Shoulder Right;  Recorded: 06/30/2011;     TONSILLECTOMY      and adenoids     Family History   Problem Relation Age of Onset     Stroke Mother      Emphysema Mother      Goiter Mother      Cancer Father      Stomach     Alzheimer's disease Brother      Social History     Social History     Marital status:      Spouse name: N/A     Number of children: 3     Years of education: N/A     Occupational History     Not on file.     Social History Main Topics     Smoking status: Former Smoker     Packs/day: 2.50     Years: 50.00     Types: Cigarettes, Pipe, Cigars     Quit date: 1/1/2000     Smokeless tobacco: Never Used     Alcohol use No      Comment: Last used 1979     Drug use: No     Sexual activity: Yes     Partners: Female     Birth control/ protection: Post-menopausal     Other Topics Concern     Not on file     Social History Narrative     since 1964, 3 children. 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 Quit smoking in 1999.      Allergies   Allergen Reactions     Venom-Honey Bee Swelling and Dizziness     Current Outpatient Prescriptions   Medication Sig Dispense Refill     acetaminophen (TYLENOL) 325 MG tablet Take 650 mg by mouth 2 (two) times a day as needed for pain.        allopurinol  (ZYLOPRIM) 300 MG tablet Take 1 tablet (300 mg total) by mouth daily. 90 tablet 3     amiodarone (PACERONE) 200 MG tablet Take 1 tablet (200 mg total) by mouth 3 (three) times a day. 21 tablet 0     [START ON 8/2/2018] amoxicillin-clavulanate (AUGMENTIN) 875-125 mg per tablet Take 1 tablet by mouth 2 (two) times a day for 21 days. 42 tablet 0     ascorbic acid, vitamin C, (ASCORBIC ACID WITH JAVIER HIPS) 500 MG tablet Take 500 mg by mouth daily. (start after guiacs obtained)       aspirin 81 MG EC tablet Take 81 mg by mouth daily.       carvedilol (COREG) 25 MG tablet Take 1.5 tablets (37.5 mg total) by mouth 2 (two) times a day with meals. 270 tablet 3     CHOLECALCIFEROL 1,000 unit tablet TAKE 1 TABLET BY MOUTH DAILY. 90 tablet 3     CINNAMON BARK (CINNAMON ORAL) 1,000 tablets 2 (two) times a day.        coenzyme Q10 100 mg capsule Take 200 mg by mouth daily.        DULoxetine (CYMBALTA) 60 MG capsule Take 1 capsule (60 mg total) by mouth 2 (two) times a day. 180 capsule 3     EPINEPHrine (EPIPEN/ADRENACLICK) 0.3 mg/0.3 mL injection Inject 0.3 mL (0.3 mg total) as directed as needed for anaphylaxis. Inject into thigh. 2 Pre-filled Pen Syringe 0     famotidine (PEPCID) 20 MG tablet Take 1 tablet (20 mg total) by mouth 2 (two) times a day.  0     ferrous sulfate 325 (65 FE) MG tablet Take 1 tablet by mouth daily with breakfast. (start after guiacs obtained)       fexofenadine (ALLEGRA) 180 MG tablet TAKE 1 TABLET (180 MG TOTAL) BY MOUTH DAILY. 90 tablet 3     furosemide (LASIX) 40 MG tablet Take 1 tablet (40 mg total) by mouth daily. Dose decreased by DND 4/6. (Patient taking differently: Take 20 mg by mouth daily. Dose decreased to 30 mg on 7/10/18. Give 1.5 tablets for a total of 30 mg daily) 270 tablet 3     KRILL OIL ORAL Take 1,000 mg by mouth.        Lactobacillus rhamnosus GG (CULTURELLE) 10-15 Billion cell capsule Take 1 capsule by mouth daily.       loperamide (IMODIUM) 2 mg capsule Take 1 capsule (2 mg  total) by mouth 3 (three) times a day as needed for diarrhea.  0     milk thistle 175 mg tablet Take 175 mg by mouth daily.       multivitamin (MULTIVITAMIN) per tablet 1 tablet every other day.        mupirocin (BACTROBAN) 2 % ointment Apply topically 2 (two) times a day. To affected area(s). 22 g 3     oxymetazoline (AFRIN) 0.05 % nasal spray 2 sprays into each nostril 2 (two) times a day as needed.        piperacillin-tazobactam 3.375 g in NaCl 0.9 % 0.9 % 50 mL IVPB Infuse 3.375 g into a venous catheter every 8 (eight) hours. 1 each 0     potassium chloride (K-DUR,KLOR-CON) 20 MEQ tablet Take 20 mEq by mouth 2 (two) times a day.       senna-docusate (PERICOLACE) 8.6-50 mg tablet Take 1 tablet by mouth 2 (two) times a day. And two times a day prn       simethicone (MYLICON) 125 MG chewable tablet Chew 125 mg every 6 (six) hours as needed for flatulence.       spironolactone (ALDACTONE) 25 MG tablet Take one tablet by mouth one time daily 90 tablet 3     topiramate (TOPAMAX) 25 MG tablet Take 1 tablet (25 mg total) by mouth 2 (two) times a day. 180 tablet 3     traMADol (ULTRAM) 50 mg tablet Take 2 tablets (100 mg total) by mouth 2 (two) times a day. 10 tablet 0     No current facility-administered medications for this visit.      REVIEW OF SYSTEMS:    Currently, no fever, chills, or rigors. Does not have any visual or hearing problems. Denies any chest pain, headaches, palpitations, lightheadedness, dizziness, shortness of breath, or cough. Appetite is good. Denies any GERD symptoms. Denies any difficulty with swallowing, nausea, or vomiting.  Denies any abdominal pain, diarrhea or constipation. Denies any urinary symptoms. No insomnia. No active bleeding. No rash.     PHYSICAL EXAMINATION:  Vitals:    07/25/18 2058   BP: 108/63   Pulse: 61   Resp: 18   Temp: 98.2  F (36.8  C)   SpO2: 94%   Weight: (!) 233 lb 4.8 oz (105.8 kg)       GENERAL: Awake, Alert, oriented x3, not in any form of acute distress, answers  questions appropriately, follows simple commands, conversant  HEENT: Head is normocephalic with normal hair distribution. No evidence of trauma. Ears: No acute purulent discharge. Eyes: Conjunctivae pink with no scleral jaundice. Nose: Normal mucosa and septum. NECK: Supple with no cervical or supraclavicular lymphadenopathy. Trachea is midline.   CHEST: No tenderness or deformity, no crepitus  LUNG: Clear to auscultation with good chest expansion. There are no crackles or wheezes, normal AP diameter.  BACK: No kyphosis of the thoracic spine. Symmetric, no curvature, ROM normal, no CVA tenderness, no spinal tenderness   CVS: There is good S1  S2, there are no murmurs, rubs, gallops, or heaves, rhythm is regular,  2+ pulses symmetric in all extremities.  ABDOMEN: Globular and soft, nontender to palpation, non distended, no masses, no organomegaly, good bowel sounds, no rebound or guarding, no peritoneal signs.   EXTREMITIES: Limited range of motion on both upper and lower extremities, there is no tenderness to palpation, bilateral pedal edema, no cyanosis or clubbing, no calf tenderness.  Pulses equal in all extremities, normal cap refill, no joint swelling. PICC line in place.  SKIN: Warm and dry, no erythema noted.  Skin color, texture, no rashes or lesions.  NEUROLOGICAL: The patient is oriented to person, place and time. Strength and sensation are grossly intact. Face is symmetric.    LABS:      Lab Results   Component Value Date    WBC 3.9 (L) 07/23/2018    HGB 12.1 (L) 07/23/2018    HCT 37.2 (L) 07/23/2018    MCV 98 07/23/2018     (L) 07/23/2018     Results for orders placed or performed in visit on 07/23/18   Basic Metabolic Panel   Result Value Ref Range    Sodium 143 136 - 145 mmol/L    Potassium 4.1 3.5 - 5.0 mmol/L    Chloride 110 (H) 98 - 107 mmol/L    CO2 25 22 - 31 mmol/L    Anion Gap, Calculation 8 5 - 18 mmol/L    Glucose 80 70 - 125 mg/dL    Calcium 9.5 8.5 - 10.5 mg/dL    BUN 27 8 - 28 mg/dL     Creatinine 1.52 (H) 0.70 - 1.30 mg/dL    GFR MDRD Af Amer 54 (L) >60 mL/min/1.73m2    GFR MDRD Non Af Amer 45 (L) >60 mL/min/1.73m2     Vitamin D, Total (25-Hydroxy)   Date Value Ref Range Status   11/15/2016 41.6 30.0 - 80.0 ng/mL Final     Lab Results   Component Value Date    ZBHUBTRU08 909 (H) 11/15/2016     ASSESSMENT/PLAN:    1. Subacute osteomyelitis of right foot (H)  - Wound VAC in place - Negative blood culture but wound culture grow Enterococcus faecalis - Patient will need 6 weeks of IV Zosyn as recommended by ID -  PICC line in place - Followed by ID and podiatry   2. Gout - Continue Allopurnol   3. Chronic systolic heart failure (H) - No overt signs or symptoms of decompensation, continue Lasix, Aldactone   4. Generalized muscle weakness - Continue PT/OT   5. Essential hypertension  - Blood pressures are within target range, will continue Lasix, Aldactone, and Coreg                 Electronically signed by:  Michaela Garcia CNP

## 2021-06-19 NOTE — PROGRESS NOTES
[facility]    DATE: 2018    NAME:  Florentino Fall             :  1945  MRN: [id number]  CODE STATUS:  FULL CODE    FACILITY:  [facility] [[id number]]       ROOM:   119    CHIEF COMPLAINT/REASON FOR VISIT:  Chief Complaint   Patient presents with     Problem Visit     Ventricular tachycardia     HISTORY OF PRESENT ILLNESS: Florentino Fall is a 73 y.o. male  with Non-ischemic cardiomyopathy and HTN with chronic foot deformities and wounds who presented to vascular clinic to see Dr. Zaman (podiatry). Concern for foot wounds with possible Osteomyelitis.  Known Charcot foot.  He was admitted to the hospital.      Right foot wounds infection   - With Osteomyelitis  - Appreciate ID consult  - Started initially on IV Vanco and Zosyn  - Appreciate podiatry consult  - Dr. Zaman requested MRI however unable to do with defibrillator   - C T foot is negative for osteomyelitis  - S/P incision and drainage   - Started wound VAC yesterday  - Negative blood culture but wound culture grow Enterococcus faecalis  - Patient will need 6 weeks of IV Zosyn as recommended by ID  - Patient will require long-term IV antibiotic, PICC line was placed yesterday  - Follow up with ID and podiatry as scheduled      Non-ischemic cardiomyopathy  - Last echo shows EF 27% on 16  - does not appear to be in acute exacerbation  - continue coreg, spironolactone and furosemide      Sustained Vtach with ICD biventricular  - noted on ICD interrogation  - recently had adjustments 18 to device  - Sees Dr. Hernández  - interrogation just yesterday with recent episode of VT where shock was given from device  - Normal mag and potassium level  - Appreciate cardiology consult      Essential HTN  - Stable blood pressure after surgery  - Resume home meds with hold parameters      Chronic kidney disease  - Stage III  - Stable creatinine  - Avoid nephrotoxic drugs  - Continue to monitor renal  function      Diarrhea  - Improving  - continue to monitor      Peripheral neuropathy  - History of Charcot joints  - Resume home meds of topiramate, tramadol      Weakness and deconditioning  - Secondary to above  - PT/OT evaluation  - Plan for TCU on discharge as patient is nonweightbearing on his right foot         Patient was stabilized and transferred to TCU for continued rehabilitation. He was recently hospitalized on 6/30/2018 for ventricular tachycardia.  Patient had about 7 episodes of ICD discharge. En route to the [facility], medics witnessed a 30 second run of vtach.  ICD was interrogated in the ER and noted to have multiple episodes of sustained ventricular tachycardia with the device trying to override. Patient was started on Amiodarone.  Patient responded well to Amiodarone with only brief runs of NSVT with no ICD firing over past 48 hours.  Cardiologist discussed with EP physician at [facility] who suggested outpatient consultation.  Patient was continued on Zosyn for right foot Osteomyelitis which he is supposed to use for 1 more month.  Patient developed diarrhea for which C. difficile was checked and was negative.  Diarrhea improved with Loperamide.  Patient was discharged back to TCU with advice to continue Amiodarone.  Patient has an appointment with [facility] for outpatient consultation with EP physician on 7/11/2018.    Today, patient is seen at the bedside.  He had a consultative appointment  with Cardiology last week for evaluation of Ventricular tachycardia.  He was given a Holter monitor and he will have an ablation done in August when his infection has resolved.     Past Medical History:   Diagnosis Date     Arthritis      Cardiomyopathy (H)      Cardiomyopathy with implantable cardioverter-defibrillator (H)      Charcot's joint of foot     bilateral ankles     CHF (congestive heart failure) (H)      Deviated septum      Eczema      Hernia      History of transfusion      Hypertension       Migraine      Systolic heart failure (H)      V-tach (H)     has icd     Past Surgical History:   Procedure Laterality Date     CARDIAC PACEMAKER PLACEMENT      ICD/PACEMAKER     PICC AND MIDLINE TEAM LINE INSERTION  6/22/2018          NM COMPLEX DRAINAGE, WOUND Right 6/20/2018    Procedure: INCISION AND DRAINAGE, LOWER EXTREMITY with BONE BIOPSY/CULTURE ;  Surgeon: Denisse Fletcher DPM;  Location: [facility];  Service: Podiatry     NM KNEE SCOPE,DIAGNOSTIC      Description: Arthroscopy Knee Left;  Recorded: 06/30/2011;     NM REMOVE TONSILS/ADENOIDS,<13 Y/O      Description: Tonsillectomy With Adenoidectomy;  Recorded: 06/30/2011;     NM SHLDR ARTHROSCOP,DIAGNOSTIC      Description: Arthroscopy Shoulder Right;  Recorded: 06/30/2011;     TONSILLECTOMY      and adenoids     Family History   Problem Relation Age of Onset     Stroke Mother      Emphysema Mother      Goiter Mother      Cancer Father      Stomach     Alzheimer's disease Brother      Social History     Social History     Marital status:      Spouse name: N/A     Number of children: 3     Years of education: N/A     Occupational History     Not on file.     Social History Main Topics     Smoking status: Former Smoker     Packs/day: 2.50     Years: 50.00     Types: Cigarettes, Pipe, Cigars     Quit date: 1/1/2000     Smokeless tobacco: Never Used     Alcohol use No      Comment: Last used 1979     Drug use: No     Sexual activity: Yes     Partners: Female     Birth control/ protection: Post-menopausal     Other Topics Concern     Not on file     Social History Narrative     since 1964, 3 children. 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 Quit smoking in 1999.      Allergies   Allergen Reactions     Venom-Honey Bee Swelling and Dizziness     Current Outpatient Prescriptions   Medication Sig Dispense Refill     acetaminophen (TYLENOL) 325 MG tablet Take 650 mg by mouth 2 (two) times a day as needed for pain.        allopurinol (ZYLOPRIM)  300 MG tablet Take 1 tablet (300 mg total) by mouth daily. 90 tablet 3     amiodarone (PACERONE) 200 MG tablet Take 1 tablet (200 mg total) by mouth 3 (three) times a day. 21 tablet 0     ascorbic acid, vitamin C, (ASCORBIC ACID WITH JAVIER HIPS) 500 MG tablet Take 500 mg by mouth daily. (start after guiacs obtained)       aspirin 81 MG EC tablet Take 81 mg by mouth daily.       carvedilol (COREG) 25 MG tablet Take 1.5 tablets (37.5 mg total) by mouth 2 (two) times a day with meals. 270 tablet 3     CHOLECALCIFEROL 1,000 unit tablet TAKE 1 TABLET BY MOUTH DAILY. 90 tablet 3     CINNAMON BARK (CINNAMON ORAL) 1,000 tablets 2 (two) times a day.        coenzyme Q10 100 mg capsule Take 200 mg by mouth daily.        DULoxetine (CYMBALTA) 60 MG capsule Take 1 capsule (60 mg total) by mouth 2 (two) times a day. 180 capsule 3     EPINEPHrine (EPIPEN/ADRENACLICK) 0.3 mg/0.3 mL injection Inject 0.3 mL (0.3 mg total) as directed as needed for anaphylaxis. Inject into thigh. 2 Pre-filled Pen Syringe 0     famotidine (PEPCID) 20 MG tablet Take 1 tablet (20 mg total) by mouth 2 (two) times a day.  0     ferrous sulfate 325 (65 FE) MG tablet Take 1 tablet by mouth daily with breakfast. (start after guiacs obtained)       fexofenadine (ALLEGRA) 180 MG tablet TAKE 1 TABLET (180 MG TOTAL) BY MOUTH DAILY. 90 tablet 3     furosemide (LASIX) 40 MG tablet Take 1 tablet (40 mg total) by mouth daily. Dose decreased by DND 4/6. (Patient taking differently: Take 20 mg by mouth daily. Dose decreased to 30 mg on 7/10/18. Give 1.5 tablets for a total of 30 mg daily) 270 tablet 3     KRILL OIL ORAL Take 1,000 mg by mouth.        loperamide (IMODIUM) 2 mg capsule Take 1 capsule (2 mg total) by mouth 3 (three) times a day as needed for diarrhea.  0     milk thistle 175 mg tablet Take 175 mg by mouth daily.       multivitamin (MULTIVITAMIN) per tablet 1 tablet every other day.        mupirocin (BACTROBAN) 2 % ointment Apply topically 2 (two) times a  day. To affected area(s). 22 g 3     oxymetazoline (AFRIN) 0.05 % nasal spray 2 sprays into each nostril 2 (two) times a day as needed.        piperacillin-tazobactam 3.375 g in NaCl 0.9 % 0.9 % 50 mL IVPB Infuse 3.375 g into a venous catheter every 8 (eight) hours. 1 each 0     potassium chloride (K-DUR,KLOR-CON) 20 MEQ tablet Take 20 mEq by mouth 2 (two) times a day.       senna-docusate (PERICOLACE) 8.6-50 mg tablet Take 1 tablet by mouth 2 (two) times a day. And two times a day prn       simethicone (MYLICON) 125 MG chewable tablet Chew 125 mg every 6 (six) hours as needed for flatulence.       spironolactone (ALDACTONE) 25 MG tablet Take one tablet by mouth one time daily 90 tablet 3     topiramate (TOPAMAX) 25 MG tablet Take 1 tablet (25 mg total) by mouth 2 (two) times a day. 180 tablet 3     traMADol (ULTRAM) 50 mg tablet Take 2 tablets (100 mg total) by mouth 2 (two) times a day. 10 tablet 0     No current facility-administered medications for this visit.      REVIEW OF SYSTEMS:    Currently, no fever, chills, or rigors. Does not have any visual or hearing problems. Denies any chest pain, headaches, palpitations, lightheadedness, dizziness, shortness of breath, or cough. Appetite is good. Denies any GERD symptoms. Denies any difficulty with swallowing, nausea, or vomiting.  Denies any abdominal pain, diarrhea or constipation. Denies any urinary symptoms. No insomnia. No active bleeding. No rash.     PHYSICAL EXAMINATION:  Vitals:    07/18/18 2221   BP: 102/68   Pulse: 61   Resp: 18   Temp: 98.6  F (37  C)   SpO2: 98%   Weight: (!) 226 lb 12.8 oz (102.9 kg)       GENERAL: Awake, Alert, oriented x3, not in any form of acute distress, answers questions appropriately, follows simple commands, conversant  HEENT: Head is normocephalic with normal hair distribution. No evidence of trauma. Ears: No acute purulent discharge. Eyes: Conjunctivae pink with no scleral jaundice. Nose: Normal mucosa and septum. NECK:  Supple with no cervical or supraclavicular lymphadenopathy. Trachea is midline.   CHEST: No tenderness or deformity, no crepitus  LUNG: Clear to auscultation with good chest expansion. There are no crackles or wheezes, normal AP diameter.  BACK: No kyphosis of the thoracic spine. Symmetric, no curvature, ROM normal, no CVA tenderness, no spinal tenderness   CVS: There is good S1  S2, there are no murmurs, rubs, gallops, or heaves, rhythm is regular,  2+ pulses symmetric in all extremities.  ABDOMEN: Globular and soft, nontender to palpation, non distended, no masses, no organomegaly, good bowel sounds, no rebound or guarding, no peritoneal signs.   EXTREMITIES: Limited range of motion on both upper and lower extremities, there is no tenderness to palpation, bilateral pedal edema, no cyanosis or clubbing, no calf tenderness.  Pulses equal in all extremities, normal cap refill, no joint swelling. PICC line in place.  SKIN: Warm and dry, no erythema noted.  Skin color, texture, no rashes or lesions.  NEUROLOGICAL: The patient is oriented to person, place and time. Strength and sensation are grossly intact. Face is symmetric.    LABS:      Lab Results   Component Value Date    WBC 6.0 07/16/2018    HGB 11.7 (L) 07/16/2018    HCT 36.4 (L) 07/16/2018    MCV 97 07/16/2018     07/16/2018     Results for orders placed or performed in visit on 07/16/18   Basic Metabolic Panel   Result Value Ref Range    Sodium 139 136 - 145 mmol/L    Potassium 4.1 3.5 - 5.0 mmol/L    Chloride 107 98 - 107 mmol/L    CO2 23 22 - 31 mmol/L    Anion Gap, Calculation 9 5 - 18 mmol/L    Glucose 81 70 - 125 mg/dL    Calcium 9.4 8.5 - 10.5 mg/dL    BUN 31 (H) 8 - 28 mg/dL    Creatinine 1.56 (H) 0.70 - 1.30 mg/dL    GFR MDRD Af Amer 53 (L) >60 mL/min/1.73m2    GFR MDRD Non Af Amer 44 (L) >60 mL/min/1.73m2     Vitamin D, Total (25-Hydroxy)   Date Value Ref Range Status   11/15/2016 41.6 30.0 - 80.0 ng/mL Final     Lab Results   Component Value  Date    [id number] 909 (H) 11/15/2016     ASSESSMENT/PLAN:    1. Ventricular tachycardia (H) - Continue Amiodarone and will follow up with [facility] for an ablation in mid August.  Continue Digoxin   2. ICD (implantable cardioverter-defibrillator), biventricular, in situ - See above   3. Subacute osteomyelitis of right foot (H) - S/P incision and drainage - Wound VAC in place - Negative blood culture but wound culture grow Enterococcus faecalis - Patient will need 6 weeks of IV Zosyn as recommended by ID -  PICC line in place - Followed by ID and podiatry   4. Essential Hypertension - Blood pressures are within target range, will continue Lasix, Aldactone, and Coreg   5. Chronic systolic heart failure (H) - No overt signs or symptoms of decompensation, will continue Lasix, Aldactone, and compression stockings           Electronically signed by:  Michaela Garcia CNP

## 2021-06-19 NOTE — PROGRESS NOTES
[facility]    Facility:   [facility] [[id number]]   Code Status: FULL CODE  PCP: Mario Lepe MD   Phone: [phone]   Fax: [phone]      CHIEF COMPLAINT/REASON FOR VISIT:  Chief Complaint   Patient presents with     Discharge Summary       HISTORY COURSE:  Florentino is a 73 y.o. male who was most recently hospitalized for cardiac arrhythmia triggering his ICD.  More than half a dozen times was the occurrence.  When he was hospitalized his potassium was noted to be at 3.5 and he was given extra potassium.  Most importantly was the start of amiodarone and then his ventricular tachycardia improved.  Recommendation was to follow-up as an outpatient at the [facility] for ablation therapy.     He has a complex medical history who previously was hospitalized due to abscess of his right foot which subsequently was determined to be osteomyelitis after incision and drainage and biopsy of bone.  He is on a nonweightbearing status and has a wound VAC to the area and is requiring IV antibiotics.   Underlying medical history includes nonischemic cardiomyopathy with chronic systolic congestive heart failure with an ejection fraction of 27%.  He has a history of sustained ventricular tachycardia and subsequent ICD biventricular pacemaker insertion.  He has essential hypertension.  He has chronic kidney disease stage III.  He has past history of alcohol abuse and stopped drinking alcohol in 1979.  He quit smoking and 1999 and prior to that time was a 2 pack per day smoker.    Upon current review of systems, the plan is to discontinue his IV on August 1 and then start Augmentin for 14 days.  Due to his osteomyelitis and nonweightbearing status, standard wheelchair is required to assist in mobility related activities of daily living that are affected in the home such as bathing, food preparation, grooming, and dressing.  A wheelchair is suitable and necessary for use in the home.   A cane or walker will not meet the patient's needs due to his nonweightbearing status and easy fatigability.  He has not expressed any unwillingness to use the wheelchair in the home.  The patient has a physical and cognitive abilities to maneuver the equipment.     In regards to other review of systems, he is not having any fevers or chills.  He does not have sore throat or nasal congestion, and he does not have cough or shortness of breath.  He does not have chest pain nor has he had any palpitations or firing of his ICD.  He does not have abdominal pain or nausea.      Review of Systems    Vitals:    07/25/18 1909   BP: 106/66   Pulse: 60   Temp: 98.4  F (36.9  C)   SpO2: 93%       Physical Exam   Constitutional: No distress.   Eyes: Right eye exhibits no discharge. Left eye exhibits no discharge.   Neck: No JVD present.   Cardiovascular: Normal heart sounds.    Pulmonary/Chest: Breath sounds normal. No respiratory distress.   Abdominal: Bowel sounds are normal. There is no tenderness.   Neurological: He is alert.   Psychiatric: He has a normal mood and affect.   Nursing note and vitals reviewed.      MEDICATION LIST:  Current Outpatient Prescriptions   Medication Sig     acetaminophen (TYLENOL) 325 MG tablet Take 650 mg by mouth 2 (two) times a day as needed for pain.      allopurinol (ZYLOPRIM) 300 MG tablet Take 1 tablet (300 mg total) by mouth daily.     amiodarone (PACERONE) 200 MG tablet Take 1 tablet (200 mg total) by mouth 3 (three) times a day.     [START ON 8/2/2018] amoxicillin-clavulanate (AUGMENTIN) 875-125 mg per tablet Take 1 tablet by mouth 2 (two) times a day for 21 days.     ascorbic acid, vitamin C, (ASCORBIC ACID WITH JAVIER HIPS) 500 MG tablet Take 500 mg by mouth daily. (start after mya obtained)     aspirin 81 MG EC tablet Take 81 mg by mouth daily.     carvedilol (COREG) 25 MG tablet Take 1.5 tablets (37.5 mg total) by mouth 2 (two) times a day with meals.     CHOLECALCIFEROL 1,000 unit  tablet TAKE 1 TABLET BY MOUTH DAILY.     CINNAMON BARK (CINNAMON ORAL) 1,000 tablets 2 (two) times a day.      coenzyme Q10 100 mg capsule Take 200 mg by mouth daily.      DULoxetine (CYMBALTA) 60 MG capsule Take 1 capsule (60 mg total) by mouth 2 (two) times a day.     EPINEPHrine (EPIPEN/ADRENACLICK) 0.3 mg/0.3 mL injection Inject 0.3 mL (0.3 mg total) as directed as needed for anaphylaxis. Inject into thigh.     famotidine (PEPCID) 20 MG tablet Take 1 tablet (20 mg total) by mouth 2 (two) times a day.     ferrous sulfate 325 (65 FE) MG tablet Take 1 tablet by mouth daily with breakfast. (start after guiacs obtained)     fexofenadine (ALLEGRA) 180 MG tablet TAKE 1 TABLET (180 MG TOTAL) BY MOUTH DAILY.     furosemide (LASIX) 40 MG tablet Take 1 tablet (40 mg total) by mouth daily. Dose decreased by DND 4/6. (Patient taking differently: Take 20 mg by mouth daily. Dose decreased to 30 mg on 7/10/18. Give 1.5 tablets for a total of 30 mg daily)     KRILL OIL ORAL Take 1,000 mg by mouth.      Lactobacillus rhamnosus GG (CULTURELLE) 10-15 Billion cell capsule Take 1 capsule by mouth daily.     loperamide (IMODIUM) 2 mg capsule Take 1 capsule (2 mg total) by mouth 3 (three) times a day as needed for diarrhea.     milk thistle 175 mg tablet Take 175 mg by mouth daily.     multivitamin (MULTIVITAMIN) per tablet 1 tablet every other day.      mupirocin (BACTROBAN) 2 % ointment Apply topically 2 (two) times a day. To affected area(s).     oxymetazoline (AFRIN) 0.05 % nasal spray 2 sprays into each nostril 2 (two) times a day as needed.      piperacillin-tazobactam 3.375 g in NaCl 0.9 % 0.9 % 50 mL IVPB Infuse 3.375 g into a venous catheter every 8 (eight) hours.     potassium chloride (K-DUR,KLOR-CON) 20 MEQ tablet Take 20 mEq by mouth 2 (two) times a day.     senna-docusate (PERICOLACE) 8.6-50 mg tablet Take 1 tablet by mouth 2 (two) times a day. And two times a day prn     simethicone (MYLICON) 125 MG chewable tablet Chew  "125 mg every 6 (six) hours as needed for flatulence.     spironolactone (ALDACTONE) 25 MG tablet Take one tablet by mouth one time daily     topiramate (TOPAMAX) 25 MG tablet Take 1 tablet (25 mg total) by mouth 2 (two) times a day.     traMADol (ULTRAM) 50 mg tablet Take 2 tablets (100 mg total) by mouth 2 (two) times a day.       DISCHARGE DIAGNOSIS:    ICD-10-CM    1. Subacute osteomyelitis of right foot (H) M86.271    2. Ventricular tachycardia (H) I47.2    3. ICD (implantable cardioverter-defibrillator), biventricular, in situ Z95.810    4. Cardiomyopathy, nonischemic (H) I42.8    5. Generalized muscle weakness M62.81    6. Chronic systolic heart failure (H) I50.22        MEDICAL EQUIPMENT NEEDS:  He needs a standard wheelchair 18\" x 18\" with a standard foot rest, bilateral and cushion of 18\" x 18\".  He also needs full length armrests bilaterally.  He also needs extended brake handles.  He needs standard foot pedals.    DISCHARGE PLAN/FACE TO FACE:  I certify that services are/were furnished while this patient was under the care of a physician and that a physician or an allowed non-physician practitioner (NPP), had a face-to-face encounter that meets the physician face-to-face encounter requirements. The encounter was in whole, or in part, related to the primary reason for home health. The patient is confined to his/her home and needs intermittent skilled nursing, physical therapy, speech-language pathology, or the continued need for occupational therapy. A plan of care has been established by a physician and is periodically reviewed by a physician.  Date of Face-to-Face Encounter: 7/26/18.    I certify that, based on my findings, the following services are medically necessary home health services: Home physical therapy, home occupational therapy, home health aide, skilled nursing, and .    My clinical findings support the need for the above skilled services because: (Please write a brief narrative " summary that describes what the RN, PT, SLP, or other services will be doing in the home. A list of diagnoses in this section does not meet the CMS requirements.)  Skilled nursing visits are for medication set up and teaching, symptom and disease process monitoring and education.  Home health aide services are for bathing and ADL needs.  Home physical therapy is to evaluate and treat for strengthening, balance, endurance, and safety with mobility and ambulation.  Home occupational therapy is to evaluate and treat for strengthening, ADL needs, adaptive equipment, and safety.  Social work evaluation and treatment is required for coordination of these multiple needs and services.    This patient is homebound because: (Please write a brief narrative summary describing the functional limitations as to why this patient is homebound and specifically what makes this patient homebound.)  Due to his nonweightbearing status and weakness in general, he is homebound.    The patient is, or has been, under my care and I have initiated the establishment of the plan of care. This patient will be followed by a physician who will periodically review the plan of care.    Schedule follow up visit with primary care provider within 7 days to reestablish care.    Electronically signed by: Paxton Kessler MD

## 2021-06-19 NOTE — PROGRESS NOTES
[facility] Patient Outreach:     Discharge Date::  8/2/18      Reason for TCU stay (discharge diagnosis)::  Osteomyelitis of right foot, sustained V-tach, CHF, cardiomyopathy      Are you feeling better, the same or worse since your discharge?:  Patient is feeling better          As part of your discharge plan, did they discuss home care with you?: Yes        Have your seen them yet, or are they scheduled to visit?: Yes                Do you have any follow up visits scheduled with your PCP or Specialist?:  Yes, with PCP      (RN) Is it scheduled soon enough (3-5 days)?: Yes

## 2021-06-19 NOTE — PROGRESS NOTES
[facility]    Facility:   [facility] [[id number]]   Code Status: FULL CODE      CHIEF COMPLAINT/REASON FOR VISIT:  Chief Complaint   Patient presents with     Review Of Multiple Medical Conditions       HISTORY:      HPI: Florentino is a 73 y.o. male who was most recently hospitalized for cardiac arrhythmia triggering his ICD.  More than half a dozen times was the occurrence.  When he was hospitalized his potassium was noted to be at 3.5 and he was given extra potassium.  Most importantly was the start of amiodarone and then his ventricular tachycardia improved.  Recommendation was to follow-up as an outpatient at the [facility] for ablation therapy.      He has a complex medical history who previously was hospitalized due to abscess of his right foot which subsequently was determined to be osteomyelitis after incision and drainage and biopsy of bone.  He is on a nonweightbearing status and has a wound VAC to the area and is requiring IV antibiotics. Underlying medical history includes nonischemic cardiomyopathy with chronic systolic congestive heart failure with an ejection fraction of 27%.  He has a history of sustained ventricular tachycardia and subsequent ICD biventricular pacemaker insertion.  He has essential hypertension.  He has chronic kidney disease stage III.  He has past history of alcohol abuse and stopped drinking alcohol in 1979.  He quit smoking and 1999 and prior to that time was a 2 pack per day smoker.    Upon current review of systems, he is doing well with his therapies and is even making it up a flight of stairs.  He has had no further episodes of firing of his ICD and no palpitations of the heart.  He is not experiencing chest pain or shortness of breath nor cough nor abdominal pain or nausea.    Past Medical History:   Diagnosis Date     Arthritis      Cardiomyopathy (H)      Cardiomyopathy with implantable cardioverter-defibrillator (H)       Charcot's joint of foot     bilateral ankles     CHF (congestive heart failure) (H)      Deviated septum      Eczema      Hernia      History of transfusion      Hypertension      Migraine      Systolic heart failure (H)      V-tach (H)     has icd             Family History   Problem Relation Age of Onset     Stroke Mother      Emphysema Mother      Goiter Mother      Cancer Father      Stomach     Alzheimer's disease Brother      Social History     Social History     Marital status:      Spouse name: N/A     Number of children: 3     Years of education: N/A     Social History Main Topics     Smoking status: Former Smoker     Packs/day: 2.50     Years: 50.00     Types: Cigarettes, Pipe, Cigars     Quit date: 1/1/2000     Smokeless tobacco: Never Used     Alcohol use No      Comment: Last used 1979     Drug use: No     Sexual activity: Yes     Partners: Female     Birth control/ protection: Post-menopausal     Other Topics Concern     Not on file     Social History Narrative     since 1964, 3 children. 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 Quit smoking in 1999.          Review of Systems    .  Vitals:    07/18/18 2008   BP: 129/72   Pulse: 66   Resp: 16   Temp: 98  F (36.7  C)   SpO2: 96%       Physical Exam   Constitutional: No distress.   Eyes: Right eye exhibits no discharge. Left eye exhibits no discharge.   Neck: No JVD present.   Cardiovascular: Normal heart sounds.    Pulmonary/Chest: Breath sounds normal. No respiratory distress.   Neurological: He is alert.   Psychiatric: He has a normal mood and affect.   Nursing note and vitals reviewed.        LABS:   No new laboratory testing    ASSESSMENT:      ICD-10-CM    1. Ventricular tachycardia (H) I47.2    2. ICD (implantable cardioverter-defibrillator), biventricular, in situ Z95.810    3. Subacute osteomyelitis of right foot (H) M86.271    4. Cardiomyopathy, nonischemic (H) I42.8        PLAN:    Continue current plan of therapies and medication  management and wound VAC      Electronically signed by: Paxton Kessler MD

## 2021-06-20 NOTE — PROGRESS NOTES
ASSESSMENT:  1. Abscess of right foot  Completely resolved.  Reviewed hospitalization and IV antibiotic    2. Skin ulcer of right foot with fat layer exposed (H)  Improving.  Reviewed podiatry notes.  Per Florentino, sore is clearly improving with minimal pain and decreased systemic side effects.  Dr. Zaman may obtain a bone scan in 2 weeks if healing does not continue.  Inflammatory markers checked in August were improving and will recheck today  - traMADol (ULTRAM) 50 mg tablet; Take 2 tablets (100 mg total) by mouth 3 (three) times a day.  Dispense: 180 tablet; Refill: 5    3. CKD (chronic kidney disease) stage 3, GFR 30-59 ml/min  Stable.  Update labs  - Basic Metabolic Panel  - Erythrocyte Sedimentation Rate  - C-Reactive Protein    4. Cerumen debris on tympanic membrane of left ear  Ear wash.  Some degree of otitis externa after antibiotics.  Resume eardrops  - neomycin-polymyxin-hydrocortisone (CORTISPORIN) otic solution; Administer 3 drops into both ears 3 (three) times a day for 10 days.  Dispense: 10 mL; Refill: 11  - Ear wax removal    5.  V. tach.  Reviewed event monitor.  No recurrence since initiation of amiodarone.  Dose of amiodarone decreased per cardiology twice weekly and remains stable the other 5 days.    6.  Neuropathy.  Improved with increased topiramate.  Pain minimal.  Tramadol twice daily.    PLAN:  Patient Instructions   Update blood tests    Take Meloxicam for jaw pain and any inflammatory pain    Ear drops     Wash out the ears          Orders Placed This Encounter   Procedures     Influenza High Dose, Seasonal     Basic Metabolic Panel     Erythrocyte Sedimentation Rate     C-Reactive Protein     Ear wax removal     Medications Discontinued During This Encounter   Medication Reason     traMADol (ULTRAM) 50 mg tablet Duplicate order     traMADol (ULTRAM) 50 mg tablet Reorder     allopurinol (ZYLOPRIM) 300 MG tablet Reorder     neomycin-polymyxin-hydrocortisone (CORTISPORIN) otic solution  Reorder       Return in about 2 months (around 11/26/2018).      CHIEF COMPLAINT:  Chief Complaint   Patient presents with     Follow-up       HISTORY OF PRESENT ILLNESS:  Florentino is a 73 y.o. male presenting to the clinic today to follow up on his foot ulcer, ventricular tachycardia, and with complaints of ear pain and TMJ dysfunction. He is accompanied by his wife.     Foot Ulceration: He was just seen by Dr. Zaman yesterday for his right foot ulceration. He still has a wound vac and now has a boot on the right foot. The wound is getting smaller, but the hole is closing slowly. Dr. Zaman is concerned about osteomyelitis, so they are going to look for that via bone scan if the wound is not healed soon. The wound is in the medial aspect of the ankle. It looks very small and significantly better to him, so he is optimistic. He does not have any pain, but he admits that could be because of his neuropathy. Home care has been coming every three days. He keeps his right foot elevated as much as possible and the swelling is better than it used to be.     Ventricular Tachycardia: He was seen by Dr. Hernández on 9/12 to follow up on his ventricular tachycardia. He was taking his amiodarone one pill twice daily prior but was told to take it once daily on Wednesdays and Sundays and twice daily the rest of the week at that appointment. He has not noticed any side effects from the medication and has not noticed any heart problems. They were initially thinking he would need an ablation, but he was recently told he may not need it if he continues to improve.     Ear Discomfort: He is concerned that he has swimmers ear on the left. There is some drainage on the left, and the ear seems to be wet every morning. He has had this problem in the past and has treated it with cortisporin drops, which worked well. He would like to try that again.     TMJ Disorder: He has pain anterior to his right ear, over the area of his temporomandibular  "joint. It hurts to open his mouth and feels better holding a finger over the TMJ while chewing. He feels a clicking a popping. His left TMJ does not bother him.     Neuropathy: He had his dose of topiramate increased to 50 mg twice daily the last time he was here on 8/7 ad his neuropathy seems to be better. He has tolerated the medication well. His pain is significantly better.     REVIEW OF SYSTEMS:   His mood has been okay. His breathing has been fine. He is not able to stand on a scale, so he is not sure how his weight is doing. He still has some swelling in his left leg. All other systems are negative.    PFSH:  Reviewed, as below.     TOBACCO USE:  History   Smoking Status     Former Smoker     Packs/day: 2.50     Years: 50.00     Types: Cigarettes, Pipe, Cigars     Quit date: 1/1/2000   Smokeless Tobacco     Never Used       VITALS:  Vitals:    09/26/18 1139   BP: 100/60   Patient Site: Left Arm   Patient Position: Sitting   Cuff Size: Adult Regular   Pulse: 60   Resp: 16   SpO2: 92%   Height: 6' 2\" (1.88 m)     Wt Readings from Last 3 Encounters:   09/25/18 (!) 233 lb (105.7 kg)   09/12/18 (!) 233 lb (105.7 kg)   08/01/18 (!) 233 lb 4.8 oz (105.8 kg)     There is no height or weight on file to calculate BMI.    PHYSICAL EXAM:  Constitutional:  Reveals an alert, pleasant, talkative man who presents in a wheelchair with a wound vac on.  Vitals:  Per nursing notes.  Ears: Some cerumen in left canal. TMs clear.   Oropharynx:  Without posterior nasal drainage or thrush.  Neck:  Supple, thyroid not palpable.  Cardiac:  Regular rate and rhythm without murmurs, rubs, or gallops. Palpation of the radial pulse regular.  Extremities: Wearing boot on right foot and brace on left.   Neurologic:  Cranial nerves II-XII intact.     Psychiatric:  Mood appropriate, memory intact.     MEDICATIONS:  Current Outpatient Prescriptions   Medication Sig Dispense Refill     acetaminophen (TYLENOL) 325 MG tablet Take 650 mg by mouth 2 " (two) times a day as needed for pain.        allopurinol (ZYLOPRIM) 300 MG tablet Take 1 tablet (300 mg total) by mouth daily. 90 tablet 3     amiodarone (PACERONE) 200 MG tablet One tablet twice daily except Wed and Sun, take 1 tablet 180 tablet 0     ascorbic acid, vitamin C, (ASCORBIC ACID WITH JAVIER HIPS) 500 MG tablet Take 500 mg by mouth daily. (start after guiacs obtained)       aspirin 81 MG EC tablet Take 81 mg by mouth daily.       carvedilol (COREG) 25 MG tablet Take 1.5 tablets (37.5 mg total) by mouth 2 (two) times a day with meals. 270 tablet 3     CHOLECALCIFEROL 1,000 unit tablet TAKE 1 TABLET BY MOUTH DAILY. 90 tablet 3     CINNAMON BARK (CINNAMON ORAL) 1,000 tablets 2 (two) times a day.        coenzyme Q10 100 mg capsule Take 200 mg by mouth daily.        DULoxetine (CYMBALTA) 60 MG capsule Take 1 capsule (60 mg total) by mouth 2 (two) times a day. 180 capsule 3     EPINEPHrine (EPIPEN/ADRENACLICK) 0.3 mg/0.3 mL injection Inject 0.3 mL (0.3 mg total) as directed as needed for anaphylaxis. Inject into thigh. 2 Pre-filled Pen Syringe 0     famotidine (PEPCID) 20 MG tablet Take 1 tablet (20 mg total) by mouth 2 (two) times a day.  0     ferrous sulfate 325 (65 FE) MG tablet Take 1 tablet by mouth daily with breakfast. (start after guiacs obtained)       fexofenadine (ALLEGRA) 180 MG tablet TAKE 1 TABLET (180 MG TOTAL) BY MOUTH DAILY. 90 tablet 3     furosemide (LASIX) 40 MG tablet Take 1 tablet (40 mg total) by mouth daily. Dose decreased by DND 4/6. 270 tablet 3     KRILL OIL ORAL Take 1,000 mg by mouth.        Lactobacillus rhamnosus GG (CULTURELLE) 10-15 Billion cell capsule Take 1 capsule by mouth daily.       loperamide (IMODIUM) 2 mg capsule Take 1 capsule (2 mg total) by mouth 3 (three) times a day as needed for diarrhea.  0     meloxicam (MOBIC) 15 MG tablet TAKE ONE TABLET BY MOUTH ONE TIME DAILY 90 tablet 3     milk thistle 175 mg tablet Take 175 mg by mouth daily.       multivitamin  (MULTIVITAMIN) per tablet 1 tablet every other day.        mupirocin (BACTROBAN) 2 % ointment Apply topically 2 (two) times a day. To affected area(s). 22 g 3     neomycin-polymyxin-hydrocortisone (CORTISPORIN) otic solution Administer 3 drops into both ears 3 (three) times a day for 10 days. 10 mL 11     oxymetazoline (AFRIN) 0.05 % nasal spray 2 sprays into each nostril 2 (two) times a day as needed.        potassium chloride (K-DUR,KLOR-CON) 20 MEQ tablet Take 1 tablet (20 mEq total) by mouth daily. 90 tablet 3     senna-docusate (PERICOLACE) 8.6-50 mg tablet Take 1 tablet by mouth 2 (two) times a day. And two times a day prn       simethicone (MYLICON) 125 MG chewable tablet Chew 125 mg every 6 (six) hours as needed for flatulence.       spironolactone (ALDACTONE) 25 MG tablet Take one tablet by mouth one time daily 90 tablet 3     topiramate (TOPAMAX) 50 MG tablet Take 1 tablet (50 mg total) by mouth 2 (two) times a day. 180 tablet 3     traMADol (ULTRAM) 50 mg tablet Take 2 tablets (100 mg total) by mouth 3 (three) times a day. 180 tablet 5     No current facility-administered medications for this visit.        ADDITIONAL HISTORY SUMMARIZED (2): Reviewed 9/12/2018 Dr. Hernández note regarding amiodarone dosing. Reviewed Dr. Zaman note from yesterday regarding foot ulceration.  DECISION TO OBTAIN EXTRA INFORMATION (1): None.   RADIOLOGY TESTS (1): None.  LABS (1): Labs from 8/7/2018 reviewed. Labs ordered.   MEDICINE TESTS (1): Reviewed 9/12 pacemaker report regarding ventricular tachycardia.   INDEPENDENT REVIEW (2 each): None.     The visit lasted a total of 20 minutes face to face with the patient. Over 50% of the time was spent counseling and educating the patient about his foot ulceration.    Mitch HARPER, am scribing for and in the presence of, Dr. Lepe.    I, Dr. Lepe, personally performed the services described in this documentation, as scribed by Mitch Vines in my presence, and it is both  accurate and complete.    Total data points: 4

## 2021-06-21 NOTE — PROGRESS NOTES
ASSESSMENT:  1. Neuropathy (H)  Doing very well.  Weight loss noted.  Push topiramate  - C-Reactive Protein  - Erythrocyte Sedimentation Rate  - topiramate (TOPAMAX) 50 MG tablet; 50 mgs am and 100 mgs pm.  Dispense: 270 tablet; Refill: 3    2. CKD (chronic kidney disease) stage 3, GFR 30-59 ml/min (H)  Stable.  Monitor.  Attempt to decrease furosemide    3. Essential hypertension with goal blood pressure less than 140/90  Stable and well controlled    4. Idiopathic chronic gout of multiple sites without tophus  Monitor uric acid on allopurinol  - Uric Acid    5. Ventricular tachycardia (H)  Marked improvement in symptoms.  Next event monitor review next month.  Update labs on amiodarone.  Decrease dosage of amiodarone slightly  - Comprehensive Metabolic Panel  - HM2(CBC w/o Differential)    6. Medication monitoring encounter  Check on amiodarone  - Thyroid Stimulating Hormone (TSH)    7. Tachycardia   Check on amiodarone  - Thyroid Stimulating Hormone (TSH)      PLAN:  Patient Instructions   Increase Topiramate to 50 am and 100 pm for better neuropathy pain control    Decrease amiodorone to 2 pills 3 days a week, and one pill 4 days a week    See Dr Hernández December 7    Update blood tests        Orders Placed This Encounter   Procedures     Comprehensive Metabolic Panel     HM2(CBC w/o Differential)     Uric Acid     Thyroid Stimulating Hormone (TSH)     C-Reactive Protein     Erythrocyte Sedimentation Rate     Medications Discontinued During This Encounter   Medication Reason     topiramate (TOPAMAX) 50 MG tablet        Return in about 3 months (around 2/26/2019) for for a full review of medications and lab testing, and contact me through [facility], with any new sympt.      CHIEF COMPLAINT:  Chief Complaint   Patient presents with     Follow-up       HISTORY OF PRESENT ILLNESS:  Florentino is a 73 y.o. male presenting to the clinic today to follow up on his foot ulceration, edema, and depression. He is accompanied by  "his wife.     Foot Ulceration: He continues to follow up with Dr. Zaman on a regular basis for his right foot ulceration. He was last seen on 11/16 and was told he could stop using the wound vac at that time. He continues to pack and dress the wound and is wearing a boot. It continues to \"leak a lot\" of clear and yellow fluid but is closing up slowly. They were considering doing a bone scan to look for osteomyelitis, but it was decided that was not necessary. His left ankle has started to bother him more. It feels similar to what his right ankle felt like when it first started, but he is not going to let it get to the same point. He plans to mention this to Dr. Zaman the next time he sees him on 12/7.     Hx Ventricular Tachycardia: He continues on amiodarone and has not had any episodes at all since this past summer. He is aware of when he goes into an abnormal rhythm and states he gets short of breath and dizzy. He tolerates the medication well and thinks it works incredibly well to prevent arrhythmias. The last time he was seen by cardiology in September, he had his dose of amiodarone decreased. He is now taking one pill twice daily five days per week and then just one pill daily the other two days. He has an appointment to follow up with cardiology on 12/12.     Edema: He tried decreasing his furosemide to 40 mg every other day, but he started to notice some increased edema. He has since gone up to alternating between 20 mg one day and 40 mg the next day, which has been a better regimen. He has seen an improvement in lower extremity edema since discontinuing gabapentin. His weight has been stable.     Neuropathy: He thinks the topiramate 50 mg twice daily has been helpful in controlling his neuropathy pain.     Anxiety/Depression: His mood has been good, and he thinks the duloxetine has been helpful for that.     Health Maintenance: He will look into getting the Shingrix vaccine.     REVIEW OF SYSTEMS:   For a " "while, he was noticing a darker color to his urine and less volume, but it has since started to clear up and increase in volume. Staying well hydrated has helped. He has been hearing better since he had his ears washed out the last time he was here. He has not had any acid reflux symptoms. His weight is down about 50 pounds in the past two years. He did not have any appetite while he was sick. Denies gout. All other systems are negative.    PFSH:  Reviewed, as below.     TOBACCO USE:  Social History     Tobacco Use   Smoking Status Former Smoker     Packs/day: 2.50     Years: 50.00     Pack years: 125.00     Types: Cigarettes, Pipe, Cigars     Last attempt to quit: 2000     Years since quittin.9   Smokeless Tobacco Never Used       VITALS:  Vitals:    18 1117   BP: 104/70   Patient Site: Left Arm   Patient Position: Sitting   Cuff Size: Adult Regular   Pulse: 60   Resp: 14   Weight: (!) 233 lb (105.7 kg)   Height: 6' 2\" (1.88 m)     Wt Readings from Last 3 Encounters:   18 (!) 233 lb (105.7 kg)   18 (!) 233 lb (105.7 kg)   18 (!) 233 lb (105.7 kg)     Body mass index is 29.92 kg/m .    PHYSICAL EXAM:  Constitutional:  Reveals an alert, pleasant, talkative male who presents in a wheelchair with a brace on his left ankle and a boot on his right.  Vitals:  Per nursing notes.  Cardiac:  Regular rate and rhythm without murmurs, rubs, or gallops.  Legs without edema. Palpation of the radial pulse regular.  Neurologic:  Cranial nerves II-XII intact.     Psychiatric:  Mood appropriate, memory intact.     MEDICATIONS:  Current Outpatient Medications   Medication Sig Dispense Refill     acetaminophen (TYLENOL) 325 MG tablet Take 650 mg by mouth 2 (two) times a day as needed for pain.        allopurinol (ZYLOPRIM) 300 MG tablet TAKE 1 TABLET BY MOUTH DAILY. 90 tablet 2     amiodarone (PACERONE) 200 MG tablet One tablet twice daily except Wed and Sun, take 1 tablet 180 tablet 1     ascorbic " acid, vitamin C, (ASCORBIC ACID WITH JAVIER HIPS) 500 MG tablet Take 500 mg by mouth daily. (start after guia obtained)       aspirin 81 MG EC tablet Take 81 mg by mouth daily.       carvedilol (COREG) 25 MG tablet Take 1.5 tablets (37.5 mg total) by mouth 2 (two) times a day with meals. 270 tablet 3     CHOLECALCIFEROL 1,000 unit tablet TAKE 1 TABLET BY MOUTH DAILY. 90 tablet 2     cinnamon bark (CINNAMON ORAL) Take 1,000 tablets by mouth Daily at 5 pm. Indications: supplement       coenzyme Q10 100 mg capsule Take 200 mg by mouth daily.        DULoxetine (CYMBALTA) 60 MG capsule Take 1 capsule (60 mg total) by mouth 2 (two) times a day. 180 capsule 3     EPINEPHrine (EPIPEN/ADRENACLICK) 0.3 mg/0.3 mL injection Inject 0.3 mL (0.3 mg total) as directed as needed for anaphylaxis. Inject into thigh. 2 Pre-filled Pen Syringe 0     famotidine (PEPCID) 20 MG tablet Take 1 tablet (20 mg total) by mouth 2 (two) times a day. (Patient taking differently: Take 1 tablet by mouth 2 (two) times a day as needed for heartburn. Indications: multiple endocrine neoplasia, gastroesophageal reflux disease)  0     ferrous sulfate 325 (65 FE) MG tablet Take 1 tablet by mouth daily with breakfast. (start after guiacs obtained)       fexofenadine (ALLEGRA) 180 MG tablet TAKE 1 TABLET (180 MG TOTAL) BY MOUTH DAILY. 90 tablet 3     furosemide (LASIX) 40 MG tablet Take 1 tablet (40 mg total) by mouth daily. Dose decreased by DND 4/6. (Patient taking differently: Take 40 mg by mouth every other day. Dose decreased by DND 4/6.  9/28/18 dose decreased to every other day by PMD   Then as needed for swelling) 270 tablet 3     KRILL OIL ORAL Take 1,000 mg by mouth.        loperamide (IMODIUM) 2 mg capsule Take 1 capsule (2 mg total) by mouth 3 (three) times a day as needed for diarrhea.  0     meloxicam (MOBIC) 15 MG tablet TAKE ONE TABLET BY MOUTH ONE TIME DAILY 90 tablet 3     milk thistle 175 mg tablet Take 175 mg by mouth daily.        multivitamin (MULTIVITAMIN) per tablet 1 tablet every other day.        mupirocin (BACTROBAN) 2 % ointment Apply topically 2 (two) times a day. To affected area(s). 22 g 3     oxymetazoline (AFRIN) 0.05 % nasal spray 2 sprays into each nostril 2 (two) times a day as needed.        potassium chloride (K-DUR,KLOR-CON) 20 MEQ tablet Take 1 tablet (20 mEq total) by mouth daily. 90 tablet 3     simethicone (MYLICON) 125 MG chewable tablet Chew 125 mg every 6 (six) hours as needed for flatulence.       spironolactone (ALDACTONE) 25 MG tablet TAKE ONE TABLET BY MOUTH ONE TIME DAILY 90 tablet 2     topiramate (TOPAMAX) 50 MG tablet 50 mgs am and 100 mgs pm. 270 tablet 3     traMADol (ULTRAM) 50 mg tablet Take 2 tablets (100 mg total) by mouth 3 (three) times a day. 180 tablet 5     No current facility-administered medications for this visit.        ADDITIONAL HISTORY SUMMARIZED (2): Reviewed April note regarding switch from gabapentin to topiramate. Reviewed 9/12/2018 Dr. Hernández cardiology note regarding v-tach  DECISION TO OBTAIN EXTRA INFORMATION (1): None.   RADIOLOGY TESTS (1): None.  LABS (1): Labs from 9/26/2018 reviewed. Labs ordered.   MEDICINE TESTS (1): Reviewed 9/12 pacemaker report regarding episodes of v-tach.  INDEPENDENT REVIEW (2 each): None.     The visit lasted a total of 26 minutes face to face with the patient. Over 50% of the time was spent counseling and educating the patient about his foot ulceration, edema, and ventricular tachycardia.    IMitch, am scribing for and in the presence of, Dr. Lepe.    IDr. Lepe, personally performed the services described in this documentation, as scribed by Mitch Vines in my presence, and it is both accurate and complete.    Total data points: 4

## 2021-06-21 NOTE — PROGRESS NOTES
FOOT AND ANKLE SURGERY/PODIATRY Progress Note        ASSESSMENT:   Ulceration right foot  Pes Planovalgus right       TREATMENT:  -Right foot ulceration has improved with less depth along the medial border. Recommend he continue to use the wound vac, pack foam into the wound.   -Sharp, excisional debridement of the wound into subcutaneous tissue was performed using #15 blade and currette for a total of 0.84 square centimeters. Debridement was done to reduce pressure, remove non-viable, devitalized tissue and promote wound healing. Patient tolerated this well. A gauze dressing was applied.   -He will remain non-weight bearing and will follow-up in 2 weeks.      This note was electronically signed by Phu Zaman, [facility] [place]      HPI: Florentino Fall was seen again today for a right foot ulceration. He is doing well today. Continues to walk on the right foot.      Past Medical History:   Diagnosis Date     Arthritis      Cardiomyopathy (H)      Cardiomyopathy with implantable cardioverter-defibrillator (H)      Charcot's joint of foot     bilateral ankles     CHF (congestive heart failure) (H)      Deviated septum      Eczema      Hernia      History of transfusion      Hypertension      Migraine      Systolic heart failure (H)      V-tach (H)     has icd       Allergies   Allergen Reactions     Venom-Honey Bee Swelling and Dizziness         Current Outpatient Prescriptions:      acetaminophen (TYLENOL) 325 MG tablet, Take 650 mg by mouth 2 (two) times a day as needed for pain. , Disp: , Rfl:      allopurinol (ZYLOPRIM) 300 MG tablet, Take 1 tablet (300 mg total) by mouth daily., Disp: 90 tablet, Rfl: 3     allopurinol (ZYLOPRIM) 300 MG tablet, TAKE 1 TABLET BY MOUTH DAILY., Disp: 90 tablet, Rfl: 2     amiodarone (PACERONE) 200 MG tablet, One tablet twice daily except Wed and Sun, take 1 tablet, Disp: 180 tablet, Rfl: 1     amiodarone (PACERONE) 200 MG tablet, Take 200 mg two times a day except  Sun and Wed take 100 daily, Disp: 180 tablet, Rfl: 0     ascorbic acid, vitamin C, (ASCORBIC ACID WITH JAVIER HIPS) 500 MG tablet, Take 500 mg by mouth daily. (start after guiacs obtained), Disp: , Rfl:      aspirin 81 MG EC tablet, Take 81 mg by mouth daily., Disp: , Rfl:      carvedilol (COREG) 25 MG tablet, Take 1.5 tablets (37.5 mg total) by mouth 2 (two) times a day with meals., Disp: 270 tablet, Rfl: 3     CHOLECALCIFEROL 1,000 unit tablet, TAKE 1 TABLET BY MOUTH DAILY., Disp: 90 tablet, Rfl: 2     cinnamon bark (CINNAMON ORAL), Take 1,000 tablets by mouth Daily at 5 pm. Indications: supplement, Disp: , Rfl:      coenzyme Q10 100 mg capsule, Take 200 mg by mouth daily. , Disp: , Rfl:      DULoxetine (CYMBALTA) 60 MG capsule, Take 1 capsule (60 mg total) by mouth 2 (two) times a day., Disp: 180 capsule, Rfl: 3     EPINEPHrine (EPIPEN/ADRENACLICK) 0.3 mg/0.3 mL injection, Inject 0.3 mL (0.3 mg total) as directed as needed for anaphylaxis. Inject into thigh., Disp: 2 Pre-filled Pen Syringe, Rfl: 0     famotidine (PEPCID) 20 MG tablet, Take 1 tablet (20 mg total) by mouth 2 (two) times a day. (Patient taking differently: Take 1 tablet by mouth 2 (two) times a day as needed for heartburn. Indications: multiple endocrine neoplasia, gastroesophageal reflux disease), Disp: , Rfl: 0     ferrous sulfate 325 (65 FE) MG tablet, Take 1 tablet by mouth daily with breakfast. (start after guiacs obtained), Disp: , Rfl:      fexofenadine (ALLEGRA) 180 MG tablet, TAKE 1 TABLET (180 MG TOTAL) BY MOUTH DAILY., Disp: 90 tablet, Rfl: 3     furosemide (LASIX) 40 MG tablet, Take 1 tablet (40 mg total) by mouth daily. Dose decreased by DND 4/6. (Patient taking differently: Take 40 mg by mouth every other day. Dose decreased by DND 4/6. 9/28/18 dose decreased to every other day by PMD  Then as needed for swelling), Disp: 270 tablet, Rfl: 3     KRILL OIL ORAL, Take 1,000 mg by mouth. , Disp: , Rfl:      loperamide (IMODIUM) 2 mg capsule,  Take 1 capsule (2 mg total) by mouth 3 (three) times a day as needed for diarrhea., Disp: , Rfl: 0     meloxicam (MOBIC) 15 MG tablet, TAKE ONE TABLET BY MOUTH ONE TIME DAILY, Disp: 90 tablet, Rfl: 3     milk thistle 175 mg tablet, Take 175 mg by mouth daily., Disp: , Rfl:      multivitamin (MULTIVITAMIN) per tablet, 1 tablet every other day. , Disp: , Rfl:      mupirocin (BACTROBAN) 2 % ointment, Apply topically 2 (two) times a day. To affected area(s)., Disp: 22 g, Rfl: 3     oxymetazoline (AFRIN) 0.05 % nasal spray, 2 sprays into each nostril 2 (two) times a day as needed. , Disp: , Rfl:      potassium chloride (K-DUR,KLOR-CON) 20 MEQ tablet, Take 1 tablet (20 mEq total) by mouth daily., Disp: 90 tablet, Rfl: 3     simethicone (MYLICON) 125 MG chewable tablet, Chew 125 mg every 6 (six) hours as needed for flatulence., Disp: , Rfl:      spironolactone (ALDACTONE) 25 MG tablet, TAKE ONE TABLET BY MOUTH ONE TIME DAILY, Disp: 90 tablet, Rfl: 2     topiramate (TOPAMAX) 50 MG tablet, Take 1 tablet (50 mg total) by mouth 2 (two) times a day., Disp: 180 tablet, Rfl: 3     traMADol (ULTRAM) 50 mg tablet, Take 2 tablets (100 mg total) by mouth 3 (three) times a day., Disp: 180 tablet, Rfl: 5    Review of Systems - Negative       OBJECTIVE:  Appearance: alert, well appearing, and in no distress.    Vitals:    11/02/18 1033   BP: 100/62   Pulse: 60   Resp: 16   Temp: 97.4  F (36.3  C)       Vascular: Dorsalis pedis non-palpableRight.  Dermatologic:    VASC Wound 09/11/18 Right medial ankle (previously removed) (Active)   Pre Size Length 1.2 11/2/2018 10:00 AM   Pre Size Width 0.7 11/2/2018 10:00 AM   Pre Size Depth 0.1 11/2/2018 10:00 AM   Pre Total Sq cm 0.84 11/2/2018 10:00 AM       Other Ulcers 08/04/18 Other (Comment) Right;Medial (Active)   Granular base with improved depth. No erythema right foot.   Neurologic: Diminished to light touch Right.  Musculoskeletal: Contracted digits noted Right.    Imaging:  None    Picture: None

## 2021-06-22 NOTE — PROGRESS NOTES
"S: Patient is a 74 y/o male, 6'2\", 235 lbs seen at our [place] office for the evaluation and casting for a Rigid Rocker AFO/Cage Splint for his left side on orders from Dr. Austyn DPM for the treatment of Dx: Other acute osteomyelitis of left foot (H) [M86.172], Skin ulcer of right foot, limited to breakdown of skin (H) [L97.511], PAD (peripheral artery disease) (H) [I73.9], Pes planovalgus, acquired, left [M21.42]. Patient also has a longstanding diagnosis of Charcot joint of his foot and ankle bilaterally. Patient steadfastly refuses to be fit with a CROW style AFO. Patient reports he has used one in the past, dislikes it severely, and blames it for past development of sores on his foot and ankle.    O: Patient arrived using a wheelchair and was accompanied by his wife. Patient is wearing a Cage Splint on his right foot at the time of our appointment that was provided by [facility]. Patient's left foot and ankle were wrapped with gauze by vascular clinic staff before he arrived to our office. Patient's bilateral foot deformity is severe. Patient's medial malleolus has dropped inferior so that the inferior aspect is in contact with the ground. Patient reports that he does have upcoming surgery with Dr. Zaman for treatment of osteomyelitis of 2nd digit of left foot and ulceration of medial left rear foot in the area of his medial malleolus.     G: Patient's Rigid Rocker AFO will provide the support and stability need for the patient's unstable and deformed left foot through the use of a solid ankle style of AFO while offering protection in order to help heal his current ulcerations and prevent the development of further ulcerations in the future. Patient's orthosis requires customization due to the severe deformity of the patient's left foot and ankle. Patient's molded inner boot will offer protection the patient's skin of his foot and ankle as he has a past history of diabetic ulcerations. Patient's molded inner " "boot will allow the patient to have a smooth gait with proper weight distribution as to not cause undue pressure in any given area of his foot and ankle complex. Patient requires a custom device as no OTS option could provide the triplanar control needed for his ankle and adequately fit her deformed foot and ankle    A: I took a cast of the patient's left foot and ankle while correcting his deformity as much as I could during the casting process. No incidents occurs during our casting process. Patient's AFO will be a solid ankle design with 1/4\" polypropolene plastic. Patient's molded inner boot will be fabricated using 3/4\" blue puff and his rocker bottom will be fabricated using crepe with tread.     P: Patient was asked to schedule his appointment for two weeks time on his way out. Patient informed me that he was going to have surgery on 1/2/19 at that time and that he would call to schedule an appointment once he had recovered from said surgery.   "

## 2021-06-22 NOTE — TELEPHONE ENCOUNTER
Request for Orders    Who s Requesting: Henny Hickman [facility] Registered Nurse    Orders being requested: Patient is open to Homecare, Currently no wound care is needed.   Requesting to put SNV on hold until after patient is seen by Dr Zaman on 1/9/19.   Then SN 1w1 for re-assessment     Where to send Orders: please reply to this encounter or call [phone] ( ok to leave )    Thank You,   Henny Hickman,RN  formerly [facility]

## 2021-06-22 NOTE — PROGRESS NOTES
Preoperative Exam    Scheduled Procedure: left foot/2nd toe  Surgery Date:  1/2/2019  Surgery Location: [facility], fax [phone]    Surgeon:  Dr. Zaman    Assessment/Plan:     1. Pre-op exam  Stable for surgery under local block    2. Ulcer of foot with necrosis of bone, unspecified laterality (H)  Reviewed podiatry notes.  Stable for surgery  - Basic Metabolic Panel  - HM2(CBC w/o Differential)    3. Chronic systolic heart failure (H)  Volume status stable.  Continue to attempt to decrease furosemide.  Reviewed device report with episodes of atrial and ventricular tachycardia.  Increase dose of carvedilol and amiodarone.  Otherwise stable for surgery  - Electrocardiogram Perform and Read    4. CKD (chronic kidney disease) stage 3, GFR 30-59 ml/min (H)  Slightly worsening.  Currently on sulfa, which will magnify perceived worsening but lab aberrancy.  Continue to decrease furosemide    5. Neuropathy (H)  Improved with increased topiramate.  Continue dose same    6. Ventricular tachycardia (H)  Nonsustained.  Reviewed device report.  Increase carvedilol and amiodarone    7. Sustained VT (ventricular tachycardia) (H)  As above  - carvedilol (COREG) 25 MG tablet; Take 2 tablets (50 mg total) by mouth 2 (two) times a day with meals.  Dispense: 360 tablet; Refill: 3      Surgical Procedure Risk: Intermediate (reported cardiac risk generally 1-5%)  Have you had prior anesthesia?: Yes  Have you or any family members had a previous anesthesia reaction:  No  Do you or any family members have a history of a clotting or bleeding disorder?: Yes: No recent bleeding  Cardiac Risk Assessment: no increased risk for major cardiac complications    Patient approved for surgery with general or local anesthesia.    Please Note:    Functional Status: Independent  Patient plans to recover at home with family.     Subjective:      Florentino Fall is a 73 y.o. male who presents for a preoperative consultation. He is  accompanied by his wife. The exam is requested by Dr. Zaman in preparation for amputation of the left second toe to be performed at [facility] on January 2, 2019. Today s examination on 12/14/2018 is done to review the underlying surgical condition of osteomyelitis of the left second toe, clear for anesthesia, and review medical problems with appropriate changes in medications. He has had problems with this toe intermittently for the past ten years. The bone is visible looking at the toe, but he states it has always been like that. He was started on Sulfa last week. The toe does not bother him at all because he cannot feel it. He still has an open wound on his right ankle that drains frequently.     Florentino has tolerated previous surgeries well without bleeding or anesthesia difficulty.     Ventricular Tachycardia: His ICD interrogation showed some abnormalities. He had numerous episodes of ventricular tachycardia. He has noticed more frequent episodes of shortness of breath since going down on his dose of amiodarone at the end of November. shortness of breath and lightheadedness are his typical indicators that he is in an abnormal rhythm. His heart rate is usually around 60. He continues to take carvedilol as well and does not feel like it causes any side effects. He would like to try going back up on his dose of amiodarone. He thinks he needed the ICD more for the defibrillator than the pacemaker aspect.     Hypertension: His blood pressure fluctuates. He reports that it was 82/53 at an appointment earlier today. Typically, his blood pressure is more around 110 systolic.     Neuropathy: His neuropathy pain has been better controlled since increasing his dose of topiramate at the end of November.     Edema He has been taking furosemide 20 mg every other day, which has worked well to control his edema.     ROS:   He has been losing weight since starting topiramate. He has been taking a daily aspirin.   All  other systems reviewed and are negative, other than those listed in the HPI.    Pertinent History  Do you have difficulty breathing or chest pain after walking up a flight of stairs: No  History of obstructive sleep apnea: No  Steroid use in the last 6 months: No  Frequent Aspirin/NSAID use: yes  Prior Blood Transfusion: Yes: Not for many many years  Prior Blood Transfusion Reaction: Yes: Unclear  If for some reason prior to, during or after the procedure, if it is medically indicated, would you be willing to have a blood transfusion?:  There is no transfusion refusal.     Current Outpatient Medications   Medication Sig Dispense Refill     acetaminophen (TYLENOL) 325 MG tablet Take 650 mg by mouth 2 (two) times a day as needed for pain.        allopurinol (ZYLOPRIM) 300 MG tablet TAKE 1 TABLET BY MOUTH DAILY. 90 tablet 2     amiodarone (PACERONE) 200 MG tablet One tablet twice daily except Wed and Sun, take 1 tablet 180 tablet 1     ascorbic acid, vitamin C, (ASCORBIC ACID WITH JAVIER HIPS) 500 MG tablet Take 500 mg by mouth daily. (start after guiacs obtained)       aspirin 81 MG EC tablet Take 81 mg by mouth daily.       carvedilol (COREG) 25 MG tablet Take 2 tablets (50 mg total) by mouth 2 (two) times a day with meals. 360 tablet 3     CHOLECALCIFEROL 1,000 unit tablet TAKE 1 TABLET BY MOUTH DAILY. 90 tablet 2     cinnamon bark (CINNAMON ORAL) Take 1,000 tablets by mouth Daily at 5 pm. Indications: supplement       coenzyme Q10 100 mg capsule Take 200 mg by mouth daily.        DULoxetine (CYMBALTA) 60 MG capsule Take 1 capsule (60 mg total) by mouth 2 (two) times a day. 180 capsule 3     EPINEPHrine (EPIPEN/ADRENACLICK) 0.3 mg/0.3 mL injection Inject 0.3 mL (0.3 mg total) as directed as needed for anaphylaxis. Inject into thigh. 2 Pre-filled Pen Syringe 0     famotidine (PEPCID) 20 MG tablet Take 1 tablet (20 mg total) by mouth 2 (two) times a day. (Patient taking differently: Take 1 tablet by mouth 2 (two) times  a day as needed for heartburn. Indications: multiple endocrine neoplasia, gastroesophageal reflux disease)  0     ferrous sulfate 325 (65 FE) MG tablet Take 1 tablet by mouth daily with breakfast. (start after guiacs obtained)       fexofenadine (ALLEGRA) 180 MG tablet TAKE 1 TABLET (180 MG TOTAL) BY MOUTH DAILY. 90 tablet 3     furosemide (LASIX) 40 MG tablet Take 1 tablet (40 mg total) by mouth daily. Dose decreased by DND 4/6. (Patient taking differently: Take 20 mg by mouth every other day Dose decreased by DND 4/6.  9/28/18 dose decreased to every other day by PMD   Then as needed for swelling.      ) 270 tablet 3     KRILL OIL ORAL Take 1,000 mg by mouth.        loperamide (IMODIUM) 2 mg capsule Take 1 capsule (2 mg total) by mouth 3 (three) times a day as needed for diarrhea.  0     meloxicam (MOBIC) 15 MG tablet TAKE ONE TABLET BY MOUTH ONE TIME DAILY 90 tablet 3     milk thistle 175 mg tablet Take 175 mg by mouth daily.       multivitamin (MULTIVITAMIN) per tablet 1 tablet every other day.        mupirocin (BACTROBAN) 2 % ointment Apply topically 2 (two) times a day. To affected area(s). 22 g 3     oxymetazoline (AFRIN) 0.05 % nasal spray 2 sprays into each nostril 2 (two) times a day as needed.        potassium chloride (K-DUR,KLOR-CON) 20 MEQ tablet Take 1 tablet (20 mEq total) by mouth daily. 90 tablet 3     simethicone (MYLICON) 125 MG chewable tablet Chew 125 mg every 6 (six) hours as needed for flatulence.       spironolactone (ALDACTONE) 25 MG tablet TAKE ONE TABLET BY MOUTH ONE TIME DAILY 90 tablet 2     sulfamethoxazole-trimethoprim (SEPTRA DS) 800-160 mg per tablet Take 1 tablet by mouth 2 (two) times a day for 10 days. 20 tablet 0     topiramate (TOPAMAX) 50 MG tablet 50 mgs am and 100 mgs pm. 270 tablet 3     traMADol (ULTRAM) 50 mg tablet Take 2 tablets (100 mg total) by mouth 3 (three) times a day. 180 tablet 5     Current Facility-Administered Medications   Medication Dose Route Frequency  Provider Last Rate Last Dose     lidocaine 2 % jelly (XYLOCAINE)   Topical PRN Phu Zaman DPM            Allergies   Allergen Reactions     Venom-Honey Bee Swelling and Dizziness       Patient Active Problem List   Diagnosis     History of alcohol abuse     Herpes Zoster (Shingles)     Allergic Rhinitis     Chronic gout of multiple sites     Essential hypertension with goal blood pressure less than 140/90     History of cardiac arrest     Laryngitis     ICD (implantable cardioverter-defibrillator), biventricular, in situ     Anemia     Major depressive disorder, single episode     Eczema     Osteoarthritis     Cardiomyopathy, nonischemic (H)     Dyslipidemia     Chronic systolic heart failure (H)     Neuropathy (H)     Frequent PVCs     PSVT (paroxysmal supraventricular tachycardia) (H)     Bee sting allergy     Skin ulcer of right foot, limited to breakdown of skin (H)     Right foot infection     Osteomyelitis of right foot (H)     Osteomyelitis of foot (H)     CKD (chronic kidney disease) stage 3, GFR 30-59 ml/min (H)     Generalized muscle weakness     Ventricular tachycardia (H)     BMI 29.0-29.9,adult     Pes planovalgus, acquired, right     Pes planovalgus, acquired, left       Past Medical History:   Diagnosis Date     Arthritis      Cardiomyopathy (H)      Cardiomyopathy with implantable cardioverter-defibrillator (H)      Charcot's joint of foot     bilateral ankles     CHF (congestive heart failure) (H)      Deviated septum      Eczema      Hernia      History of transfusion      Hypertension      Migraine      Systolic heart failure (H)      V-tach (H)     has icd       Past Surgical History:   Procedure Laterality Date     CARDIAC PACEMAKER PLACEMENT      ICD/PACEMAKER     PICC AND MIDLINE TEAM LINE INSERTION  6/22/2018          NC COMPLEX DRAINAGE, WOUND Right 6/20/2018    Procedure: INCISION AND DRAINAGE, LOWER EXTREMITY with BONE BIOPSY/CULTURE ;  Surgeon: Denisse Fletcher DPM;   Location: [facility] OR;  Service: Podiatry     FL KNEE SCOPE,DIAGNOSTIC      Description: Arthroscopy Knee Left;  Recorded: 2011;     FL REMOVE TONSILS/ADENOIDS,<11 Y/O      Description: Tonsillectomy With Adenoidectomy;  Recorded: 2011;     FL SHLDR ARTHROSCOP,DIAGNOSTIC      Description: Arthroscopy Shoulder Right;  Recorded: 2011;     TONSILLECTOMY      and adenoids       Social History     Socioeconomic History     Marital status:      Spouse name: Not on file     Number of children: 3     Years of education: Not on file     Highest education level: Not on file   Social Needs     Financial resource strain: Not on file     Food insecurity - worry: Not on file     Food insecurity - inability: Not on file     Transportation needs - medical: Not on file     Transportation needs - non-medical: Not on file   Occupational History     Not on file   Tobacco Use     Smoking status: Former Smoker     Packs/day: 2.50     Years: 50.00     Pack years: 125.00     Types: Cigarettes, Pipe, Cigars     Last attempt to quit: 2000     Years since quittin.9     Smokeless tobacco: Never Used   Substance and Sexual Activity     Alcohol use: No     Comment: Last used      Drug use: No     Sexual activity: Yes     Partners: Female     Birth control/protection: Post-menopausal   Other Topics Concern     Not on file   Social History Narrative     since , 3 children. Recovering alcoholic since . Quit smoking in .        Patient Care Team:  Mario Lepe MD as PCP - General      Objective:     Vitals:    18 1517   BP: 102/70   Pulse: 60   SpO2: 96%   Weight: (!) 235 lb (106.6 kg)     Physical Exam:  Constitutional:  Reveals an alert, talkative man wearing a hard-sided boot on the right foot and ankle.  Vitals:  Per nursing notes.  Cardiac:  Regular rate and rhythm without murmurs, rubs, or gallops. Carotids without bruits. Minimal edema left leg, hard boot on right.  Palpation of the radial pulse regular.  Lungs: Clear.  Respiratory effort normal.  Neurologic:  Cranial nerves II-XII intact.     Psychiatric:  Mood appropriate, memory intact.    Patient Instructions   Decrease furosemide to 20 mgs daily    After another week or 2 try to decrease the furosemide further    Increase Carvedilol to 50 mgs twice a day    Increase Amiodorone to 2 pills 3 days a week    Kidney tests may be worse today due to the sulfa, but that is not real    Morning of surgery take:  Carvedilol  Topiramate      Stop Meloxicam 3 days before surgery      EKG:  Paced rhythm, left bundle branch block. PVC. No change compared to June EKG.     Labs:  Labs pending at this time.  Results will be reviewed when available.    Immunization History   Administered Date(s) Administered     DT (pediatric) 01/01/1998     Influenza T3v0-61, 12/24/2009     Influenza high dose, seasonal 08/15/2013, 09/15/2016, 09/25/2017, 09/26/2018     Influenza, Seasonal, Inj PF IIV3 09/24/2010     Influenza, inj, historic,unspecified 10/23/2007, 10/15/2008, 10/05/2009, 09/24/2010, 09/20/2012, 09/29/2016     Influenza,seasonal, Inj IIV3 10/27/2004, 10/11/2005, 10/17/2006, 10/23/2007, 10/15/2008, 10/05/2009, 09/20/2012     Pneumo Conj 13-V (2010&after) 11/12/2015     Pneumo Polysac 23-V 10/01/2002, 09/09/2008     Td, Adult, Absorbed 09/09/2008     Td,adult,historic,unspecified 09/09/2008     Tdap 03/21/2014     ZOSTER, LIVE 09/28/2011       ADDITIONAL HISTORY SUMMARIZED (2): Reviewed Dr. Zaman note from this morning regarding osteomyelitis and planned surgery.   DECISION TO OBTAIN EXTRA INFORMATION (1): None.   RADIOLOGY TESTS (1): None.  LABS (1): Labs from 11/26/2018 reviewed. Labs ordered.   MEDICINE TESTS (1): Reviewed 12/12 pacemaker report regarding VT. EKG ordered.   INDEPENDENT REVIEW (2 each): EKG from today personally read. EKG from June personally read for comparison.     The visit lasted a total of 23 minutes face to face with  the patient. Over 50% of the time was spent counseling and educating the patient about his upcoming surgery.    I, Mitch Vines, am scribing for and in the presence of, Dr. Lepe.    I, Dr. Lepe, personally performed the services described in this documentation, as scribed by Mitch Vines in my presence, and it is both accurate and complete.    Total data points: 8    Electronically signed by Mario Lepe MD 12/14/18 3:18 PM

## 2021-06-22 NOTE — ANESTHESIA POSTPROCEDURE EVALUATION
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  Anesthesia type: MAC    Patient location: PACU  Last vitals:   Vitals:    01/02/19 0830   BP: 98/56   Pulse: 60   Resp: 22   Temp:    SpO2: 93%     Post vital signs: stable  Level of consciousness: awake and responds to simple questions  Post-anesthesia pain: pain controlled  Post-anesthesia nausea and vomiting: no  Pulmonary: unassisted, return to baseline  Cardiovascular: stable and blood pressure at baseline  Hydration: adequate  Anesthetic events: no    QCDR Measures:  ASA# 11 - Laury-op Cardiac Arrest: ASA11B - Patient did NOT experience unanticipated cardiac arrest  ASA# 12 - Laury-op Mortality Rate: ASA12B - Patient did NOT die  ASA# 13 - PACU Re-Intubation Rate: ASA13B - Patient did NOT require a new airway mgmt  ASA# 10 - Composite Anes Safety: ASA10A - No serious adverse event    Additional Notes:

## 2021-06-22 NOTE — ANESTHESIA CARE TRANSFER NOTE
Last vitals:   Vitals:    01/02/19 0803   BP: 116/63   Pulse: (!) 59   Resp: 14   Temp: 97.7   SpO2: 97%     Patient's level of consciousness is drowsy  Spontaneous respirations: yes  Maintains airway independently: yes  Dentition unchanged: yes  Oropharynx: oropharynx clear of all foreign objects    QCDR Measures:  ASA# 20 - Surgical Safety Checklist: WHO surgical safety checklist completed prior to induction    PQRS# 430 - Adult PONV Prevention: 4558F - Pt received => 2 anti-emetic agents (different classes) preop & intraop  ASA# 8 - Peds PONV Prevention: NA - Not pediatric patient, not GA or 2 or more risk factors NOT present  PQRS# 424 - Laury-op Temp Management: 4559F - At least one body temp DOCUMENTED => 35.5C or 95.9F within required timeframe  PQRS# 426 - PACU Transfer Protocol: - Transfer of care checklist used  ASA# 14 - Acute Post-op Pain: ASA14B - Patient did NOT experience pain >= 7 out of 10

## 2021-06-23 NOTE — PROGRESS NOTES
"S: Patient is a 74 y/o male, 6'2\", 235 lbs seen at our [place] office for the evaluation and casting for a Rigid Rocker AFO/Cage Splint for his left side on orders from Dr. Austyn DPM for the treatment of Dx: Other acute osteomyelitis of left foot (H) [M86.172], Skin ulcer of right foot, limited to breakdown of skin (H) [L97.511], PAD (peripheral artery disease) (H) [I73.9], Pes planovalgus, acquired, left [M21.42]. Patient also has a longstanding diagnosis of Charcot joint of his foot and ankle bilaterally. Patient steadfastly refuses to be fit with a CROW style AFO. Patient reports he has used one in the past, dislikes it severely, and blames it for past development of sores on his foot and ankle.    O: Patient arrived using a wheelchair and was accompanied by his wife. Patient is wearing a Cage Splint on his right foot at the time of our appointment that was provided by [facility]. Patient's left foot and ankle were wrapped with gauze by vascular clinic staff before he arrived to our office. Patient's bilateral foot deformity is severe. Patient's medial malleolus has dropped inferior so that the inferior aspect is in contact with the ground. Patient reports that he does have upcoming surgery with Dr. Zaman for treatment of osteomyelitis of 2nd digit of left foot and ulceration of medial left rear foot in the area of his medial malleolus.     G: Patient's Rigid Rocker AFO will provide the support and stability need for the patient's unstable and deformed left foot through the use of a solid ankle style of AFO while offering protection in order to help heal his current ulcerations and prevent the development of further ulcerations in the future. Patient's orthosis requires customization due to the severe deformity of the patient's left foot and ankle. Patient's molded inner boot will offer protection the patient's skin of his foot and ankle as he has a past history of diabetic ulcerations. Patient's molded inner " boot will allow the patient to have a smooth gait with proper weight distribution as to not cause undue pressure in any given area of his foot and ankle complex. Patient requires a custom device as no OTS option could provide the triplanar control needed for his ankle and adequately fit her deformed foot and ankle    A: I had to make multiple adjustments to the patient AFO inorder to gain proper fit. These adjustments include: reducing height of AFO, flaring the superior trimline, heating and relieving the medial malleolar area, offloading the plantar aspect of the medial malleolus using the molded inner boot, and adjusting the rocker bottom of the patient's AFO so that it correctly positioned the patient during standing. After all adjustments were completed, patient was happy with the fit and function of his AFO. Patient and I then discussed care and use of his new AFO. Patient and his wife reported that they understood the instructions they were given and did not have further questions regarding the fit and function of the patient's AFO.     P: Patient was asked to call our office if there are any issues with the fit/function of his AFO in the future.

## 2021-06-23 NOTE — TELEPHONE ENCOUNTER
Who is calling:  Mercy, Nurse w/ [facility].   Reason for Call:  Calling to update PCP w/ Current BP readings for Patient at of 01/22/19 at 10 am   1. 82/50  2.85/54  Patient denies any sx of headache or dizziness. Patient was advised by nurse to seek Medical attention if any of these Sx occur.   Date of last appointment with primary care: 01/09/19  Has the patient been recently seen:  Yes  Okay to leave a detailed message: Yes

## 2021-06-23 NOTE — TELEPHONE ENCOUNTER
Hi Dr. Lepe,  I will continue home visit for wounds care once a week for 9 weeks and 3 PRN visits for wounds issues.  OK for continue SN visits and statement of estimation of care below?  Physician Estimation Statement: I estimate that the patient will require home health services for an additional 9 weeks to continue the skilled services outlined in the Plan of Care.     Phone: [phone]

## 2021-06-23 NOTE — TELEPHONE ENCOUNTER
Patient's home care nurse would like a call back to confirm patient is OK to drop down home care visits to 1x/week for 9 weeks.

## 2021-06-23 NOTE — TELEPHONE ENCOUNTER
Called [facility] RN leontine and notified her MD Zaman authorized reducing homecare to 1x a week.

## 2021-06-23 NOTE — TELEPHONE ENCOUNTER
Request for Orders    Who s Requesting: Henny Hickman  [facility] Registered Nurse    Orders being requested: Patient reports per clinic visit on 1/9/19 that Endoform is to be used on bilat ankle ulcers. Review of clinic note does not specify that. Requesting clarification. Should Endoform be used in  bilat ankle wounds?    Where to send Orders: Please respond to this message or call my cell @ [phone].    Thank You,  Henny Hickman,RN  [facility]

## 2021-06-24 NOTE — PROGRESS NOTES
"S: Patient is a 72 y/o male, 6'2\", 235 lbs seen at our [place] office for an adjustment to his ciustom Rigid Rocker AFO/Cage Splint for his left side on orders from Dr. Austyn DPM for the treatment of Dx: Other acute osteomyelitis of left foot (H) [M86.172], Skin ulcer of right foot, limited to breakdown of skin (H) [L97.511], PAD (peripheral artery disease) (H) [I73.9], Pes planovalgus, acquired, left [M21.42]. Patient also has a longstanding diagnosis of Charcot joint of his foot and ankle bilaterally.     O: Patient arrived using a wheelchair and was accompanied by his wife. Patient is wearing a Cage Splint on his right foot at the time of our appointment that was provided by Che and his custom Rigid Rokcer from us on his left. Patient's left foot and ankle were wrapped with gauze at home and he steadfastly wrapped both ankle to help with his pain. Patient reports that is is difficult for him to don his AFO without causing pain in his medial malleolus and that when he stand and walks there is painful pressure on the lateral apex of his medial malleolus.     G: Patient's Rigid Rocker AFO will provide the support and stability need for the patient's unstable and deformed left foot through the use of a solid ankle style of AFO while offering protection in order to help heal his current ulcerations and prevent the development of further ulcerations in the future. Patient's orthosis requires customization due to the severe deformity of the patient's left foot and ankle. Patient's molded inner boot will offer protection the patient's skin of his foot and ankle as he has a past history of diabetic ulcerations. Patient's molded inner boot will allow the patient to have a smooth gait with proper weight distribution as to not cause undue pressure in any given area of his foot and ankle complex. Patient requires a custom device as no OTS option could provide the triplanar control needed for his ankle and adequately " fit her deformed foot and ankle    A: I had to make multiple adjustments to the patient AFO in order to correct these issues. I laterally wedges the rocker bottom of his AFO in order to stop the patient from putting weight only on the medial border of the plantar aspect of his AFO. This adjustment made it so he was not levering into his medial malleolus as much as before. I also created a supramalleolar pad and offloaded the apex of his medial malleolus in his custom molded inner boot; both modifications further offloaded pressure that he had been experiencing on his medial malleolus. I also heated and flared the medial and lateral malleolar trimlines in order for the patient to more easily don his AFO. After all adjustments were completed, patient was happy with the fit and function of his device.     P: Patient was asked to call our office if there are any furher issues with the fit/function of his AFO in the future.

## 2021-06-24 NOTE — PROGRESS NOTES
ASSESSMENT:  1. Neuropathy (H)  Doing very well on high-dose topiramate and duloxetine.  Repeat trial of B12 shot  - cyanocobalamin injection 1,000 mcg    2. Long term use of drug  Recheck on amiodarone  - Thyroid Stimulating Hormone (TSH)  - ALT (SGPT)  - Basic Metabolic Panel  - HM2(CBC w/o Differential)    3. Idiopathic chronic gout of multiple sites with tophus  Reviewed tophus in amputated toe.  This speaks to poor circulation.  Reviewed surgery    4. Essential hypertension with goal blood pressure less than 140/90  Blood pressure low.  If renal function tenuous consider trial off Spironolactone    5. CKD (chronic kidney disease) stage 3, GFR 30-59 ml/min (H)  Stable.  Consider trial off Spironolactone despite decreased LV function    6. ICD (implantable cardioverter-defibrillator) in place  Recurrent firing within the last week.  Event monitor shows episodes of ventricular tachycardia but no ventricular fibrillation.  Continue amiodarone.  To see cardiology next week    7. History of ventricular fibrillation  No recurrence recently    8. Chronic systolic heart failure (H)  Volume excellent.  Recurrent if tries to stop furosemide.  Consider trial off Spironolactone    9. Sustained VT (ventricular tachycardia) (H)  Reviewed event monitors    10. PAD (peripheral artery disease) (H)  Stable status post amputation.  Presence of tophus confirms need for amputation    11. Major depressive disorder with single episode, in full remission (H)  Doing well according to patient and wife    12. Yeast dermatitis  Perirectal secondary to prolonged antibiotics.  Trial of Lamisil.  Fluconazole would interact with amiodarone        PLAN:  Patient Instructions   Update blood tests    See Dr Hernández next month    Vitamin B 12 shot can help with memory, mood, neuropathy, pain     lamisil 250 mgs one pill daily for 2 weeks for fungus, jock itch etc    lamisil cream or chlortrimazole or nystatin powder or gold bond      Orders Placed  "This Encounter   Procedures     Thyroid Stimulating Hormone (TSH)     ALT (SGPT)     Basic Metabolic Panel     HM2(CBC w/o Differential)     There are no discontinued medications.  Administrations This Visit     cyanocobalamin injection 1,000 mcg     Admin Date  02/26/2019 Action  Given Dose  1000 mcg Route  Intramuscular Administered By  Brittani Patino LPN              Return in about 4 months (around 6/26/2019) for for a full review of medications and lab testing.      CHIEF COMPLAINT:  Chief Complaint   Patient presents with     Medication Management     Chronic Kidney Disease       HISTORY OF PRESENT ILLNESS:  Florentino is a 73 y.o. male presenting to the clinic today for medication monitoring with multiple podiatry issues, HTN, peripheral edema, yeast infection, and gout.     Toe amputation: Dec 14 2018 he was seen for preop for toe amputation. He had that done on 1/2/19 and how he has a brace on both his feet. The right one does not hurt, and the left one does hurt quite a bit. He has adjusted it himself 3 times, and needs to be seen to have this taken care of. The pathology report showed significant gout and tophi found in the toe, and per patient the toe was quite disfigured prior to the surgery. It was his 2nd toe on his left foot.     Podiatry/Skin ulcer: He had a skin ulcer on right foot cleared up. This had gotten infected and he had surgery on it. Problems with the right foot started with a \"ball\" lump on the medial aspect of the right foot, that grew over years until it ulcerated, and got infected and had surgery on it. Then he started favoring the left foot and it got bad, to the point that it needed with surgery and now the left foot is the bad one. The podiatrist would like to do additional surgery on the left foot that will have a one year recovery period. He prefers not to do this surgery if he does not have to.     HTN/peripheral edema/arrythmia: Furosemide he is down to 1/2 a pill a day. If he " takes less than that he puffs up. Amiodarone and Carvedilol tolerated well. .     Cardiac defibrillator: He had an episode of 3 shocks a week ago on Saturday. He had been feeling lousy around 11pm with sore shoulders, but no dyspnea or shortness of breath or anything. Then he woke up with these shocks in the night, and felt much better. Afterward he just went back to sleep.     Gout: Pathology on his toe showed a lot of gout in there. Prior to the surgery he was told there was likely an infection in the toe, but after the surgery they did not marie there was any infection, but they did see clumps of gout.. He is taking allopurinol and has not had any attacks of gout recently. He tolerates the allopurinol well.     Yeast infection: He is getting a lot of irritation in his groin with sitting in his wheelchair most of the time lately. He would prefer to use pills for this. He was on many Abx over the past summer.     Pain/Neuropathy: The pain is much improved, but he is still getting neuropathy. He has received a B12 shot in the past, but this was quite some time ago. He would like to try one again today.    REVIEW OF SYSTEMS:   Denies mood symptoms, Dyspnea, shortness of breath, lightheadedness,   Admits to continues neuropathy, peripheral edema if missed diuretic, left foot discomfort, irritation in groin  All other systems are negative.    PFSH:  He had surgery on his right foot for infected foot ulcer, and left foot recently had 2nd toe amputation.   He thinks he has hereditary disfigurement of his feet.   Appointment to see Dr. Hernández next week.   Reviewed as below.     TOBACCO USE:  Social History     Tobacco Use   Smoking Status Former Smoker     Packs/day: 2.50     Years: 50.00     Pack years: 125.00     Types: Cigarettes, Pipe, Cigars     Last attempt to quit: 2000     Years since quittin.1   Smokeless Tobacco Never Used       VITALS:  Vitals:    19 1140   BP: (!) 88/60   Patient Site: Left Arm  "  Patient Position: Sitting   Cuff Size: Adult Large   Pulse: 60   Resp: 16   SpO2: 98%   Weight: (!) 237 lb 9 oz (107.8 kg)   Height: 6' 2.5\" (1.892 m)     Wt Readings from Last 3 Encounters:   02/26/19 (!) 237 lb 9 oz (107.8 kg)   12/28/18 (!) 235 lb (106.6 kg)   12/14/18 (!) 235 lb (106.6 kg)     Body mass index is 30.09 kg/m .    PHYSICAL EXAM:  Constitutional:  Reveals an alert oriented man in no acute distress, affect appropriate, does not appear to be in acute distress, wearing bilateral foot braces, seen in wheelchair today.  Vitals:  Per nursing notes.  Neck:  Supple  Cardiac:  Regular rate and rhythm without murmurs, rubs, or gallops. Legs without edema. Palpation of the radial pulse regular.  Lungs: Clear lung fields bilaterally, no wheezes crackles or rhonchi.  Respiratory effort normal.  Neurologic: Cranial nerves II-XII, motor strength, sensation, and coordination grossly intact.     Psychiatric:  Mood appropriate, memory intact.    MEDICATIONS:  Current Outpatient Medications   Medication Sig Dispense Refill     acetaminophen (TYLENOL) 325 MG tablet Take 650 mg by mouth 2 (two) times a day as needed for pain.        allopurinol (ZYLOPRIM) 300 MG tablet TAKE 1 TABLET BY MOUTH DAILY. 90 tablet 2     amiodarone (PACERONE) 200 MG tablet Take 200 mg by mouth see administration instructions. Take 1 tablet daily on 4 days per week, and 1 tablet twice a day on 3 days per week as directed       ascorbic acid, vitamin C, (ASCORBIC ACID WITH JAVIER HIPS) 500 MG tablet Take 500 mg by mouth daily. (start after mya obtained)       aspirin 81 MG EC tablet Take 81 mg by mouth daily.       carvedilol (COREG) 25 MG tablet Take 2 tablets (50 mg total) by mouth 2 (two) times a day with meals. 360 tablet 3     CHOLECALCIFEROL 1,000 unit tablet TAKE 1 TABLET BY MOUTH DAILY. 90 tablet 2     cinnamon bark (CINNAMON ORAL) Take 1 tablet by mouth Daily at 5 pm..              coenzyme Q10 100 mg capsule Take 200 mg by mouth " daily.        DULoxetine (CYMBALTA) 60 MG capsule Take 1 capsule (60 mg total) by mouth 2 (two) times a day. 180 capsule 3     EPINEPHrine (EPIPEN/ADRENACLICK) 0.3 mg/0.3 mL injection Inject 0.3 mL (0.3 mg total) as directed as needed for anaphylaxis. Inject into thigh. 2 Pre-filled Pen Syringe 0     famotidine (PEPCID) 20 MG tablet Take 1 tablet (20 mg total) by mouth 2 (two) times a day. (Patient taking differently: Take 1 tablet by mouth 2 (two) times a day as needed for heartburn. Indications: multiple endocrine neoplasia, gastroesophageal reflux disease)  0     ferrous sulfate 325 (65 FE) MG tablet Take 1 tablet by mouth daily with breakfast. (start after guiacs obtained)       fexofenadine (ALLEGRA) 180 MG tablet TAKE 1 TABLET (180 MG TOTAL) BY MOUTH DAILY. 90 tablet 3     furosemide (LASIX) 20 MG tablet Take 20 mg by mouth daily.       HYDROcodone-acetaminophen (NORCO) 5-325 mg per tablet Take 1 tablet by mouth every 4 (four) hours as needed for pain. 20 tablet 0     KRILL OIL ORAL Take 1,000 mg by mouth daily.              loperamide (IMODIUM) 2 mg capsule Take 1 capsule (2 mg total) by mouth 3 (three) times a day as needed for diarrhea.  0     meloxicam (MOBIC) 15 MG tablet TAKE ONE TABLET BY MOUTH ONE TIME DAILY 90 tablet 3     milk thistle 175 mg tablet Take 175 mg by mouth daily.       multivitamin (MULTIVITAMIN) per tablet 1 tablet every other day.        mupirocin (BACTROBAN) 2 % ointment Apply 1 application topically 2 (two) times a day as needed.       oxymetazoline (AFRIN) 0.05 % nasal spray 2 sprays into each nostril 2 (two) times a day as needed.        simethicone (MYLICON) 125 MG chewable tablet Chew 125 mg every 6 (six) hours as needed for flatulence.       spironolactone (ALDACTONE) 25 MG tablet TAKE ONE TABLET BY MOUTH ONE TIME DAILY 90 tablet 2     terbinafine HCl (LAMISIL) 250 mg tablet Take 1 tablet (250 mg total) by mouth daily for 14 days. 14 tablet 0     topiramate (TOPAMAX) 50 MG  tablet 50 mgs am and 100 mgs pm. (Patient taking differently: Take  mg by mouth 2 (two) times a day. 50 mg in the morning and 100 mg in the evening      ) 270 tablet 3     traMADol (ULTRAM) 50 mg tablet Take 2 tablets (100 mg total) by mouth 3 (three) times a day. (Patient taking differently: Take 100 mg by mouth 2 (two) times a day.       ) 180 tablet 5     Current Facility-Administered Medications   Medication Dose Route Frequency Provider Last Rate Last Dose     cyanocobalamin injection 1,000 mcg  1,000 mcg Intramuscular Q30 Days Mario Lepe MD   1,000 mcg at 02/26/19 1231     lidocaine 2 % jelly (XYLOCAINE)   Topical PRN Phu Zaman DPM   1 application at 01/09/19 1349       ADDITIONAL HISTORY SUMMARIZED (2): 1/2/19 Podiatry amputation note reviewed showing significant gout with Tophi in the toe.   DECISION TO OBTAIN EXTRA INFORMATION (1): None.   RADIOLOGY TESTS (1): None.  LABS (1): 1/2/19, 12/27/18, 12/14/18 labs reviewed and ordered labs today.   MEDICINE TESTS (1): 7/2/18 transesophageal Echocardiogram reviewed showing EF 30%  INDEPENDENT REVIEW (2 each): None.     The visit lasted a total of 25 minutes face to face with the patient. Over 50% of the time was spent counseling and educating the patient about podiatry, HTN, peripheral edema, yeast infection, and gout.    I, Harrison Dudley, am scribing for and in the presence of, Dr. Lepe.    I, Dr. Lepe, personally performed the services described in this documentation, as scribed by Harrison Dudley in my presence, and it is both accurate and complete.    Total data points: 4

## 2021-06-24 NOTE — PATIENT INSTRUCTIONS - HE
Florentino Fall    It was a pleasure to see you at [facility] today.    Antitachycardia pacing was adjusted today  Continue current medications  Medical management over VT ablation is preferred at this time  Follow up appointment:   Dr. Hernández in 4 months in device clinic    Contact Tatyaan at [phone] with questions or concerns.    Ulises Hernández MD

## 2021-06-24 NOTE — PATIENT INSTRUCTIONS - HE
Update blood tests    See Dr Hernández next month    Vitamin B 12 shot can help with memory, mood, neuropathy, pain     lamisil 250 mgs one pill daily for 2 weeks for fungus, jock itch etc    lamisil cream or chlortrimazole or nystatin powder or gold bond

## 2021-06-24 NOTE — TELEPHONE ENCOUNTER
"Type: CRT-D alert remote transmission for shock therapy.   Presenting rhythm: unavailable.   Battery/lead status: stable.  Arrhythmias: since 12/12/18; two VT-1 episodes on 2/17/19, one at 2:12am lasting 3 minutes rates 150-180bpm, ATPx3 unsuccessful, three shock attempts of 41J, return to normal sinus at third attempt, other episode receiving ATPx2. 32 NSVT. 10 other VT episodes in the no therapy zone. No mode switch episodes detected.  Comments: appears to be normal ICD function. Device biVP 98%RV and 97%LV. Routed to device RN for review.   Device/lead alerts: none. HARSHA      Transmission reviewed, agree with above. Patient has known VT, was [facility] to [facility] last year for possible VT ablation but developed leg/foot open wounds with toe amputation so could not have mapping done (with open wounds/wound vac per patient).     After last shocks back in June/July of 2018 he was sent to TCU for recovery and is now at home. States he has not had any shocks since until early this Saturday, where he received 3 shocks and was aware of all 3.  He also have several minutes of VT before and after shocks. 1 episode after received ATP, the others where in monitor only zone or toggling detections.     Patient tells me that earlier that night around 10:30 pm, he had a late night snack and doesn't know if it agreed with his stomach, but states he thought \"he had heart attack like symptoms, I had chest pressure/tightness that went up into my shoulders. Not like my typical lightheaded spells with VT, this was much different.\" States he waited for a while until it passed and then laid down to sleep,  Was woke up by the shocks a few hours later.  Denies any similar episodes since. States he felt fine all day yesterday and so far today.     He states he thought the Amiodarone had been doing a great job of controlling the VT until now. Currently is taking Amiodarone 200 mg x1 daily, 3 days of the week, and 200 mg twice/daily the other 4 " days of the week. Also, is on Coreg 50 mg twice daily. No recently missed doses per patient.     Advised to go to ER if received multiple shocks again, he agrees. Will review with Dr. Hernández at this time.     Cassandra Cook RN

## 2021-06-25 NOTE — PROGRESS NOTES
Progress Notes by Ulises Hernández MD at 5/31/2017  1:30 PM     Author: Ulises Hernández MD Service: -- Author Type: Physician    Filed: 5/31/2017  2:20 PM Encounter Date: 5/31/2017 Status: Signed    : Ulises Hernández MD (Physician)           Click to link to [facility] Heart [facility] HEART [facility] ELECTROPHYSIOLOGY NOTE    Today I had the opportunity to see  Florentino Fall for follow-up evaluation of recurrent ventricular tachycardia associated with nonischemic cardiomyopathy.      Assessment/Recommendations   Clinic Problem List:  1. Sustained VT (ventricular tachycardia)     2. Cardiomyopathy, nonischemic         Assessment:    Ventricular tachycardia, single recent documented episode, cycle length, 280 ms terminated with antitachycardia pacing.  Normal CRT-D function  Nonischemic cardiomyopathy, no current evidence for decompensated heart failure  Plan:  Continue current medications  Follow up appointment:   Dr. Hernández in device clinic in 6 months       History of Present Illness     Florentino Flal is a 72 y.o. male with a history of nonischemic cardiomyopathy. He suffered an out of hospital cardiac arrest on February 7, 2013. Coronary angiography showed no significant coronary artery disease. Cardiac echo at about that time showed an ejection fraction of 30% and mid myocardial scar consistent with nonischemic cardiomyopathy. He did have underlying left bundle branch block he received a CRT-D on August 30, 2013. ventricular tachycardia detected on ICD histograms.  He has had episodes of recurrent of ventricular tachycardia since April 2016 resulting in either antitachycardia pacing or shocks from his implantable defibrillator.  VT cycling since ranged from 350 ms to 260 ms. He had a syncopal episode on April 23, 2016 with rapid VT VF terminated by shock. The shock antitachycardia pacing at 88% cycle length was ineffective. Cardiac echo on May 2016 showed an ejection fraction of 27% essentially  "unchanged. He was subsequently started on amiodarone.  He had 2 episodes of ventricular tachycardia in quick succession on June 23 2016 at 10:23 PM. Each VT was initiated by a late PVC, Initial cycle length 300 ms, quickly accelerated and was terminated 41 J shocks. Amiodarone was discontinued on 7/8/2016 as it was ineffective and associated with some side effects of malaise.  He developed shortness of breath on August 21,2016 in the afternoon on a hot day after mowing the lawn. He denied palpitations chest pain or pressure. Interrogation of his ICD however showed that he was in ventricular tachycardia at a cycle length 350 ms probably for approximately 40 minutes this was just below the VT detect rate of his device. Tachycardia converted spontaneously. He has felt much better off amiodarone.  December 2016 his device was reprogrammed to VT monitor zone at 140 bpm.  He had rapid ventricular tachycardia cycling 250 MS 1 treated with antitachycardia pacing and one resolved spontaneously but resulted in ICD shock.  Sotalol was considered but not started.  He has felt well.  He denies palpitations syncope presyncope or shocks from his defibrillator.  There is no exertional chest pain or pressure no dyspnea on exertion.  Personally reviewed.  CT interrogation today shows a single episode of ventricular tachycardia rate 23 2017 cycle length 280 ms terminated with first burst of antitachycardia pacing without shocks.  His biventricular pacing 87% of the time there is excellent pacing and sensing thresholds in all 3 chambers.  Estimated battery longevity is greater than 5.5 years.  2 episodes of \"VT\" the monitor zone rates 150 bpm are more likely atrial tachycardia.       Physical Examination Review of Systems   Vitals:    05/31/17 1256   BP: 110/66   Pulse: 80   Resp: 18     Body mass index is 34.28 kg/(m^2).  Wt Readings from Last 3 Encounters:   05/31/17 (!) 267 lb (121.1 kg)   03/27/17 (!) 270 lb (122.5 kg)   01/16/17 " (!) 275 lb (124.7 kg)        Appearance:   no distress, overweight    HEENT:   no scleral icterus, normal conjunctivae    Neck: no carotid bruits or thyromegaly   Chest/Lungs:   lungs are clear to auscultation, no rales or wheezing, ICD left subclavian pocket    Cardiovascular:   Jugular venous pressure 5 cm, Apical pulse is regular. Normal S1,S2 with murmurs or gallops,   Abdomen:  no  Hepatosplenomegaly., nontender,  bowel sounds are present   Extremities: no cyanosis or clubbing, No edema, right  ankle boot    Skin: no xanthelasma, warm.    Neurologic: No gross focal neurologic deficits   Mood/Affect: Alert, cooperative    General: WNL  Eyes: WNL  Ears/Nose/Throat: WNL  Lungs: WNL  Heart: WNL  Stomach: WNL  Bladder: WNL  Muscle/Joints: WNL  Skin: WNL  Nervous System: WNL  Mental Health: WNL     Blood: WNL     Medical History  Surgical History Family History Social History   Past Medical History:   Diagnosis Date   ? Arthritis    ? Cardiomyopathy    ? Cardiomyopathy with implantable cardioverter-defibrillator    ? CHF (congestive heart failure)    ? Deviated septum    ? Hernia    ? History of transfusion    ? Hypertension    ? Migraine     Past Surgical History:   Procedure Laterality Date   ? CARDIAC PACEMAKER PLACEMENT      ICD   ? [place] KNEE SCOPE,DIAGNOSTIC      Description: Arthroscopy Knee Left;  Recorded: 06/30/2011;   ? [place] REMOVE TONSILS/ADENOIDS,<13 Y/O      Description: Tonsillectomy With Adenoidectomy;  Recorded: 06/30/2011;   ? [place] SHLDR ARTHROSCOP,DIAGNOSTIC      Description: Arthroscopy Shoulder Right;  Recorded: 06/30/2011;   ? TONSILLECTOMY      and adenoids    Family History   Problem Relation Age of Onset   ? Stroke Mother    ? Emphysema Mother    ? Goiter Mother    ? Cancer Father      Stomach   ? Alzheimer's disease Brother     Social History     Social History   ? Marital status:      Spouse name: N/A   ? Number of children: 3   ? Years of education: N/A     Occupational History   ? Not on  file.     Social History Main Topics   ? Smoking status: Former Smoker     Packs/day: 2.50     Years: 50.00     Types: Cigarettes, Pipe, Cigars     Quit date: 1/1/2000   ? Smokeless tobacco: Never Used   ? Alcohol use No      Comment: Last used 1979   ? Drug use: No   ? Sexual activity: Yes     Partners: Female     Birth control/ protection: Post-menopausal     Other Topics Concern   ? Not on file     Social History Narrative     since 1964, 3 children. 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 Quit smoking in 1999.           Medications  Allergies   Current Outpatient Prescriptions   Medication Sig Dispense Refill   ? acetaminophen (TYLENOL) 325 MG tablet Take 325-650 mg by mouth every 6 (six) hours as needed for pain.     ? allopurinol (ZYLOPRIM) 300 MG tablet Take 1 tablet (300 mg total) by mouth daily. 90 tablet 3   ? ascorbic acid (ASCORBIC ACID WITH JAVIER HIPS) 500 MG tablet Take 500 mg by mouth daily.      ? aspirin 81 MG EC tablet Take 81 mg by mouth daily.     ? b complex vitamins capsule 1 capsule every other day.      ? carvedilol (COREG) 25 MG tablet 1 tablet twice daily 180 tablet 3   ? cholecalciferol, vitamin D3, 1,000 unit tablet Take 1 tablet (1,000 Units total) by mouth daily. 90 tablet 3   ? CINNAMON BARK (CINNAMON ORAL) 1,000 tablets 2 (two) times a day.      ? coenzyme Q10 100 mg capsule Take 200 mg by mouth daily.      ? digoxin (DIGOX) 125 mcg tablet TAKE ONE TABLET BY MOUTH ONE TIME DAILY 90 tablet 3   ? DULoxetine (CYMBALTA) 60 MG capsule Take 1 capsule (60 mg total) by mouth daily. 90 capsule 3   ? EPINEPHrine (EPIPEN) 0.3 mg/0.3 mL atIn Inject 0.3 mL (0.3 mg total) into the shoulder, thigh, or buttocks as needed. 1 Pre-filled Pen Syringe 0   ? fexofenadine (ALLEGRA) 180 MG tablet Take 1 tablet (180 mg total) by mouth daily. 90 tablet 3   ? furosemide (LASIX) 40 MG tablet TAKE ONE TABLET BY MOUTH ONE TIME DAILY 90 tablet 3   ? gabapentin (NEURONTIN) 300 MG capsule Take 1 capsule (300 mg total)  by mouth bedtime. 90 capsule 3   ? HYDROcodone-acetaminophen 5-325 mg per tablet TAKE 1 TABLET BY MOUTH EVERY 6 HOURS AS NEEDED FOR PAIN 60 tablet 0   ? KRILL OIL ORAL Take 1,000 mg by mouth.      ? magnesium oxide 250 mg Tab Take 250 mg by mouth daily.      ? meloxicam (MOBIC) 15 MG tablet Take one tablet by mouth one time daily 90 tablet 3   ? multivitamin (MULTIVITAMIN) per tablet 1 tablet every other day.      ? mupirocin (BACTROBAN) 2 % ointment Apply topically 2 (two) times a day. To affected area(s). 22 g 3   ? omeprazole (PRILOSEC) 20 MG capsule Take 20 mg by mouth as needed.      ? SAW PALMETTO ORAL 2 capsules daily.     ? sertraline (ZOLOFT) 100 MG tablet Take 0.5 tablets (50 mg total) by mouth daily. Take one tablet by mouth one time daily 90 tablet 3   ? sildenafil (VIAGRA) 50 MG tablet 50 mg. Take 1 tablet 1 hour before needed     ? spironolactone (ALDACTONE) 25 MG tablet Take one tablet by mouth one time daily 90 tablet 3   ? traMADol (ULTRAM) 50 mg tablet TAKE TWO TABLETS BY MOUTH THREE TIMES DAILY 180 tablet 0   ? triamcinolone (KENALOG) 0.5 % cream as needed. Apply 1 inch      ? amoxicillin (AMOXIL) 500 MG tablet Take 4 tablets (2,000 mg total) by mouth once as needed. TAKE 4 TABLETS 1 HOUR BEFORE DENTAL APPOINTMENT. 8 tablet 1   ? loratadine (CLARITIN) 10 mg tablet Take 10 mg by mouth daily.     ? simethicone (MYLICON) 125 MG chewable tablet Use As Directed.       Current Facility-Administered Medications   Medication Dose Route Frequency Provider Last Rate Last Dose   ? cyanocobalamin injection 1,000 mcg  1,000 mcg Intramuscular Q30 Days Mario Lepe MD   1,000 mcg at 11/15/16 9783      Allergies   Allergen Reactions   ? Venom-Honey Bee Swelling and Dizziness

## 2021-06-25 NOTE — PROGRESS NOTES
Progress Notes by Ulises Hernández MD at 10/17/2017 12:50 PM     Author: Ulises Hernández MD Service: -- Author Type: Physician    Filed: 10/17/2017  1:32 PM Encounter Date: 10/17/2017 Status: Addendum    : Ulises Hernández MD (Physician)    Related Notes: Original Note by Ulises Hernández MD (Physician) filed at 10/17/2017  1:31 PM           Click to link to [facility] Heart [facility] HEART [facility] ELECTROPHYSIOLOGY NOTE    Today I had the opportunity to see  Florentino Fall for follow-up evaluation of ventricular tachycardia associated with nonischemic cardiomyopathy.      Assessment/Recommendations   Clinic Problem List:  1. Sustained VT (ventricular tachycardia)     2. Cardiomyopathy, nonischemic     3. Frequent PVCs  ECG 12 lead with MUSE   4. Chronic systolic heart failure     5. PSVT (paroxysmal supraventricular tachycardia)         Assessment:    Ventricular tachycardia readily terminated with antitachycardia pacing relatively infrequent episodes  Nonischemic cardiomyopathy, no current evidence for decompensated heart failure but optimal biventricular pacing related to frequent of ventricular premature beats.  Supraventricular tachycardia probable AV chaz reentry tachycardia documented on a single occasion.  The patient recalls being extremely angry but other symptoms were relatively mild.    Plan:  24-hour Holter  Continue present medications  Follow up appointment:   Dr. Hernández in 1 month  Junk consideration will be given to EP studies and ablation of outflow tract ectopy to enhance biventricular pacing.  If EP studies identify inducible SVT that also could be ablated.       History of Present Illness     Florentino Fall is a 72 y.o. male with  nonischemic cardiomyopathy. He suffered an out of hospital cardiac arrest on February 7, 2013. Coronary angiography showed no significant coronary artery disease. Cardiac echo at about that time showed an ejection fraction of 30% and mid myocardial  scar consistent with nonischemic cardiomyopathy. He did have underlying left bundle branch block he received a CRT-D on August 30, 2013. ventricular tachycardia detected on ICD histograms.  He has had episodes of recurrent of ventricular tachycardia since April 2016 resulting in either antitachycardia pacing or shocks from his implantable defibrillator.  VT cycling since ranged from 350 ms to 260 ms. He had a syncopal episode on April 23, 2016 with rapid VT VF terminated by shock. The shock antitachycardia pacing at 88% cycle length was ineffective. Cardiac echo on May 2016 showed a dilated left ventricle with an ejection fraction of 27% essentially unchanged. He was subsequently started on amiodarone.  He had 2 episodes of ventricular tachycardia in quick succession on June 23 2016 at 10:23 PM. Each VT was initiated by a late PVC, Initial cycle length 300 ms, quickly accelerated and was terminated 41 J shocks. Amiodarone was discontinued on 7/8/2016 as it was ineffective and associated with some side effects of malaise.  He developed shortness of breath on August 21,2016 in the afternoon on a hot day after mowing the lawn. He denied palpitations chest pain or pressure. Interrogation of his ICD however showed that he was in ventricular tachycardia at a cycle length 350 ms probably for approximately 40 minutes this was just below the VT detect rate of his device. Tachycardia converted spontaneously. He has felt much better off amiodarone.  December 2016 his device was reprogrammed to VT monitor zone at 140 bpm.  He had rapid ventricular tachycardia cycling 250 MS 1 treated with antitachycardia pacing and one resolved spontaneously but resulted in ICD shock.  Sotalol was considered but not started.  VT was terminated with antitachycardia pacing in the spring 2017.  He had increased shortness of breath in September and was felt to be in heart failure.  For most furosemide was increased to 60 mg daily and carvedilol to  37.5 mg twice daily.  His symptoms have resolved.  He did recall palpitations on July 10 2017 associated with an argument.  There was no chest pain syncope or presyncope.   ICD interrogation showed supraventricular tachycardia with aberrancy rate cycling 390 ms rate of 153 bpm.  PVCs did not reset atrial tachycardia most consistent with AV chaz reentry tachycardia.  This was below the VT rate to tach.  He also had an episode of ventricular tachycardia cycling 290 mils seconds treated with antitachycardia pacing on July 1 rhythm strip today shows atrial synchronous biventricular pacing with frequent of ventricular premature beats and bigeminy.  He is biventricular pacing 75% of the time with 1% atrial pacing pacing and sensing thresholds in all 3 chambers are excellent.  Estimated device longevity is 5.5 years.  Of lead ECG shows sinus rhythm with atrial synchronous biventricular pacing and ventricular bigeminy.  PVCs has a left bundle configuration transition in V3 and frontal plane axis of 100  consistent with outflow tract ectopy probably in the right ventricular outflow tract  LV pacing was advanced 20 ms prior to RV pacing based on ECG biventricular pacing QRS morphology.     Physical Examination Review of Systems   Vitals:    10/17/17 1223   BP: 92/50     There is no height or weight on file to calculate BMI.  Wt Readings from Last 3 Encounters:   09/25/17 (!) 270 lb 7 oz (122.7 kg)   05/31/17 (!) 267 lb (121.1 kg)   03/27/17 (!) 270 lb (122.5 kg)        Appearance:   no distress, obese   HEENT:   no scleral icterus, normal conjunctivae    Neck: no carotid bruits or thyromegaly   Chest/Lungs:   lungs are clear to auscultation, no rales or wheezing, ICD left subclavian pocket   Cardiovascular:   Jugular venous pressure 4 cm, Apical pulse is irregular with extrasystoles. Normal S1,S2 with no murmurs or gallops,   Abdomen:  no  Hepatosplenomegaly., nontender,  bowel sounds are present   Extremities: no cyanosis  or clubbing, No edema   Skin: no xanthelasma, warm.    Neurologic: No gross focal neurologic deficits   Mood/Affect: Alert, cooperative    General: Weight Gain, Weight Loss  Eyes: Visual Distubance  Ears/Nose/Throat: Hearing Loss  Lungs: Cough, Shortness of Breath  Heart: Shortness of Breath with activity  Stomach: WNL  Bladder: WNL  Muscle/Joints: Joint Pain, Muscle Pain  Skin: WNL  Nervous System: Loss of Balance  Mental Health: WNL     Blood: WNL     Medical History  Surgical History Family History Social History   Past Medical History:   Diagnosis Date   ? Arthritis    ? Cardiomyopathy    ? Cardiomyopathy with implantable cardioverter-defibrillator    ? CHF (congestive heart failure)    ? Deviated septum    ? Hernia    ? History of transfusion    ? Hypertension    ? Migraine     Past Surgical History:   Procedure Laterality Date   ? CARDIAC PACEMAKER PLACEMENT      ICD   ? [place] KNEE SCOPE,DIAGNOSTIC      Description: Arthroscopy Knee Left;  Recorded: 06/30/2011;   ? [place] REMOVE TONSILS/ADENOIDS,<13 Y/O      Description: Tonsillectomy With Adenoidectomy;  Recorded: 06/30/2011;   ? [place] SHLDR ARTHROSCOP,DIAGNOSTIC      Description: Arthroscopy Shoulder Right;  Recorded: 06/30/2011;   ? TONSILLECTOMY      and adenoids    Family History   Problem Relation Age of Onset   ? Stroke Mother    ? Emphysema Mother    ? Goiter Mother    ? Cancer Father      Stomach   ? Alzheimer's disease Brother     Social History     Social History   ? Marital status:      Spouse name: N/A   ? Number of children: 3   ? Years of education: N/A     Occupational History   ? Not on file.     Social History Main Topics   ? Smoking status: Former Smoker     Packs/day: 2.50     Years: 50.00     Types: Cigarettes, Pipe, Cigars     Quit date: 1/1/2000   ? Smokeless tobacco: Never Used   ? Alcohol use No      Comment: Last used 1979   ? Drug use: No   ? Sexual activity: Yes     Partners: Female     Birth control/ protection: Post-menopausal      Other Topics Concern   ? Not on file     Social History Narrative     since 1964, 3 children. 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 Quit smoking in 1999.           Medications  Allergies   Current Outpatient Prescriptions   Medication Sig Dispense Refill   ? acetaminophen (TYLENOL) 325 MG tablet Take 325-650 mg by mouth every 6 (six) hours as needed for pain.     ? allopurinol (ZYLOPRIM) 300 MG tablet Take 1 tablet (300 mg total) by mouth daily. 90 tablet 3   ? amoxicillin (AMOXIL) 500 MG tablet Take 4 tablets (2,000 mg total) by mouth once as needed. TAKE 4 TABLETS 1 HOUR BEFORE DENTAL APPOINTMENT. 8 tablet 1   ? ascorbic acid (ASCORBIC ACID WITH JAVIER HIPS) 500 MG tablet Take 500 mg by mouth daily.      ? aspirin 81 MG EC tablet Take 81 mg by mouth daily.     ? carvedilol (COREG) 25 MG tablet 1.5 tabs twice a day 270 tablet 3   ? cholecalciferol, vitamin D3, 1,000 unit tablet Take 1 tablet (1,000 Units total) by mouth daily. 90 tablet 3   ? CINNAMON BARK (CINNAMON ORAL) 1,000 tablets 2 (two) times a day.      ? coenzyme Q10 100 mg capsule Take 200 mg by mouth daily.      ? digoxin (DIGOX) 125 mcg tablet TAKE ONE TABLET BY MOUTH ONE TIME DAILY 90 tablet 3   ? DULoxetine (CYMBALTA) 60 MG capsule Take 1 capsule (60 mg total) by mouth daily. 90 capsule 3   ? EPINEPHrine (EPIPEN) 0.3 mg/0.3 mL atIn Inject 0.3 mL (0.3 mg total) into the shoulder, thigh, or buttocks as needed. 1 Pre-filled Pen Syringe 0   ? fexofenadine (ALLEGRA) 180 MG tablet Take 1 tablet (180 mg total) by mouth daily. 90 tablet 3   ? furosemide (LASIX) 40 MG tablet Take 1.5 pills twice daily 270 tablet 3   ? gabapentin (NEURONTIN) 300 MG capsule Take 1 capsule (300 mg total) by mouth at bedtime. 90 capsule 3   ? HYDROcodone-acetaminophen 5-325 mg per tablet TAKE 1 TABLET BY MOUTH EVERY 6 HOURS AS NEEDED FOR PAIN 60 tablet 0   ? KRILL OIL ORAL Take 1,000 mg by mouth.      ? meloxicam (MOBIC) 15 MG tablet Take one tablet by mouth one time daily 90  tablet 3   ? multivitamin (MULTIVITAMIN) per tablet 1 tablet every other day.      ? mupirocin (BACTROBAN) 2 % ointment Apply topically 2 (two) times a day. To affected area(s). 22 g 3   ? ramipril (ALTACE) 10 MG capsule TAKE ONE CAPSULE BY MOUTH ONE TIME DAILY 90 capsule 3   ? simethicone (MYLICON) 125 MG chewable tablet Use As Directed.     ? spironolactone (ALDACTONE) 25 MG tablet Take one tablet by mouth one time daily 90 tablet 3   ? traMADol (ULTRAM) 50 mg tablet TAKE TWO TABLETS BY MOUTH THREE TIMES DAILY 180 tablet 0   ? triamcinolone (KENALOG) 0.5 % cream as needed. Apply 1 inch      ? b complex vitamins capsule 1 capsule every other day.      ? loratadine (CLARITIN) 10 mg tablet Take 10 mg by mouth daily.     ? omeprazole (PRILOSEC) 20 MG capsule Take 20 mg by mouth as needed.      ? SAW PALMETTO ORAL 2 capsules daily.     ? sildenafil (VIAGRA) 50 MG tablet 50 mg. Take 1 tablet 1 hour before needed       Current Facility-Administered Medications   Medication Dose Route Frequency Provider Last Rate Last Dose   ? cyanocobalamin injection 1,000 mcg  1,000 mcg Intramuscular Q30 Days Mario Lepe MD   1,000 mcg at 11/15/16 4404      Allergies   Allergen Reactions   ? Venom-Honey Bee Swelling and Dizziness

## 2021-06-25 NOTE — TELEPHONE ENCOUNTER
Refill Approved    Rx renewed per Medication Renewal Policy. Medication was last renewed on 18.    Angelina Gonzalez, [facility] Triage/Med Refill 3/19/2019     Requested Prescriptions   Pending Prescriptions Disp Refills     topiramate (TOPAMAX) 50 MG tablet 270 tablet 3     Si mgs am and 100 mgs pm    Topiramate Refill Protocol  Passed - 3/17/2019  4:29 PM       Passed - Bicarbonate/Electrolyte panel in last 12 months    Sodium   Date Value Ref Range Status   2019 141 136 - 145 mmol/L Final     Potassium   Date Value Ref Range Status   2019 4.3 3.5 - 5.0 mmol/L Final     Chloride   Date Value Ref Range Status   2019 108 (H) 98 - 107 mmol/L Final     CO2   Date Value Ref Range Status   2019 23 22 - 31 mmol/L Final               Passed - PCP or prescribing provider visit in last 12 months or next 3 months    Last office visit with prescriber/PCP: 2019 Mario Lepe MD OR same dept: 2019 Mario Lepe MD OR same specialty: 2019 Mario Lepe MD  Last physical: 2018 Last MTM visit: Visit date not found   Next visit within 3 mo: Visit date not found  Next physical within 3 mo: Visit date not found  Prescriber OR PCP: Mario Lepe MD  Last diagnosis associated with med order: 1. Neuropathy (H)  - topiramate (TOPAMAX) 50 MG tablet; 50 mgs am and 100 mgs pm  Dispense: 270 tablet; Refill: 3    If protocol passes may refill for 12 months if within 3 months of last provider visit (or a total of 15 months).            Passed - Serum creatinine in last 12 months    Creatinine   Date Value Ref Range Status   2019 2.00 (H) 0.70 - 1.30 mg/dL Final

## 2021-06-26 NOTE — PROGRESS NOTES
Progress Notes by Phu Zaman DPM at 11/16/2018 10:20 AM     Author: Phu Zaman DPM Service: -- Author Type: Physician    Filed: 11/16/2018 11:14 AM Encounter Date: 11/16/2018 Status: Signed    : Phu Zaman DPM (Physician)       FOOT AND ANKLE SURGERY/PODIATRY Progress Note        ASSESSMENT:   Ulceration right foot  Pes Planovalgus right         TREATMENT:   -Right foot ulceration has less depth today. I have asked him to hold the wound vac and begin topical dressings at this time.  -Sharp, excisional debridement of the wound into subcutaneous tissue was performed using #15 blade and currette for a total of 0.5 square centimeters. Debridement was done to reduce pressure, remove non-viable, devitalized tissue and promote wound healing. Patient tolerated this well. Endoform with a gauze dresssing was applied.   -He will continue to apply Endoform with a gauze dresssing as directed. Continue limited walking right foot in cage splint.  -He will follow-up in 3 weeks.      Phu Zaman DPM  [facility]      HPI: Florentino Fall was seen again today for a right foot ulceration. He has used the wound vac as directed and remained limited walking on the right foot.       Past Medical History:   Diagnosis Date   ? Arthritis    ? Cardiomyopathy (H)    ? Cardiomyopathy with implantable cardioverter-defibrillator (H)    ? Charcot's joint of foot     bilateral ankles   ? CHF (congestive heart failure) (H)    ? Deviated septum    ? Eczema    ? Hernia    ? History of transfusion    ? Hypertension    ? Migraine    ? Systolic heart failure (H)    ? V-tach (H)     has icd       Allergies   Allergen Reactions   ? Venom-Honey Bee Swelling and Dizziness         Current Outpatient Medications:   ?  acetaminophen (TYLENOL) 325 MG tablet, Take 650 mg by mouth 2 (two) times a day as needed for pain. , Disp: , Rfl:   ?  allopurinol (ZYLOPRIM) 300 MG tablet, TAKE 1 TABLET BY MOUTH DAILY., Disp:  90 tablet, Rfl: 2  ?  amiodarone (PACERONE) 200 MG tablet, One tablet twice daily except Wed and Sun, take 1 tablet, Disp: 180 tablet, Rfl: 1  ?  ascorbic acid, vitamin C, (ASCORBIC ACID WITH JAVIER HIPS) 500 MG tablet, Take 500 mg by mouth daily. (start after guiacs obtained), Disp: , Rfl:   ?  aspirin 81 MG EC tablet, Take 81 mg by mouth daily., Disp: , Rfl:   ?  carvedilol (COREG) 25 MG tablet, Take 1.5 tablets (37.5 mg total) by mouth 2 (two) times a day with meals., Disp: 270 tablet, Rfl: 3  ?  CHOLECALCIFEROL 1,000 unit tablet, TAKE 1 TABLET BY MOUTH DAILY., Disp: 90 tablet, Rfl: 2  ?  cinnamon bark (CINNAMON ORAL), Take 1,000 tablets by mouth Daily at 5 pm. Indications: supplement, Disp: , Rfl:   ?  coenzyme Q10 100 mg capsule, Take 200 mg by mouth daily. , Disp: , Rfl:   ?  DULoxetine (CYMBALTA) 60 MG capsule, Take 1 capsule (60 mg total) by mouth 2 (two) times a day., Disp: 180 capsule, Rfl: 3  ?  EPINEPHrine (EPIPEN/ADRENACLICK) 0.3 mg/0.3 mL injection, Inject 0.3 mL (0.3 mg total) as directed as needed for anaphylaxis. Inject into thigh., Disp: 2 Pre-filled Pen Syringe, Rfl: 0  ?  famotidine (PEPCID) 20 MG tablet, Take 1 tablet (20 mg total) by mouth 2 (two) times a day. (Patient taking differently: Take 1 tablet by mouth 2 (two) times a day as needed for heartburn. Indications: multiple endocrine neoplasia, gastroesophageal reflux disease), Disp: , Rfl: 0  ?  ferrous sulfate 325 (65 FE) MG tablet, Take 1 tablet by mouth daily with breakfast. (start after guiacs obtained), Disp: , Rfl:   ?  fexofenadine (ALLEGRA) 180 MG tablet, TAKE 1 TABLET (180 MG TOTAL) BY MOUTH DAILY., Disp: 90 tablet, Rfl: 3  ?  furosemide (LASIX) 40 MG tablet, Take 1 tablet (40 mg total) by mouth daily. Dose decreased by DND 4/6. (Patient taking differently: Take 40 mg by mouth every other day. Dose decreased by DND 4/6. 9/28/18 dose decreased to every other day by PMD  Then as needed for swelling), Disp: 270 tablet, Rfl: 3  ?  KRILL  OIL ORAL, Take 1,000 mg by mouth. , Disp: , Rfl:   ?  loperamide (IMODIUM) 2 mg capsule, Take 1 capsule (2 mg total) by mouth 3 (three) times a day as needed for diarrhea., Disp: , Rfl: 0  ?  meloxicam (MOBIC) 15 MG tablet, TAKE ONE TABLET BY MOUTH ONE TIME DAILY, Disp: 90 tablet, Rfl: 3  ?  milk thistle 175 mg tablet, Take 175 mg by mouth daily., Disp: , Rfl:   ?  multivitamin (MULTIVITAMIN) per tablet, 1 tablet every other day. , Disp: , Rfl:   ?  mupirocin (BACTROBAN) 2 % ointment, Apply topically 2 (two) times a day. To affected area(s)., Disp: 22 g, Rfl: 3  ?  oxymetazoline (AFRIN) 0.05 % nasal spray, 2 sprays into each nostril 2 (two) times a day as needed. , Disp: , Rfl:   ?  potassium chloride (K-DUR,KLOR-CON) 20 MEQ tablet, Take 1 tablet (20 mEq total) by mouth daily., Disp: 90 tablet, Rfl: 3  ?  simethicone (MYLICON) 125 MG chewable tablet, Chew 125 mg every 6 (six) hours as needed for flatulence., Disp: , Rfl:   ?  spironolactone (ALDACTONE) 25 MG tablet, TAKE ONE TABLET BY MOUTH ONE TIME DAILY, Disp: 90 tablet, Rfl: 2  ?  topiramate (TOPAMAX) 50 MG tablet, Take 1 tablet (50 mg total) by mouth 2 (two) times a day., Disp: 180 tablet, Rfl: 3  ?  traMADol (ULTRAM) 50 mg tablet, Take 2 tablets (100 mg total) by mouth 3 (three) times a day., Disp: 180 tablet, Rfl: 5    Review of Systems - Negative       OBJECTIVE:  Appearance: alert, well appearing, and in no distress.    Vitals:    11/16/18 1008   BP: 100/68   Pulse: 68   Resp: 16   Temp: 98.1  F (36.7  C)       Vascular: Dorsalis pedis palpableRight.  Dermatologic:    VASC Wound 09/11/18 Right medial ankle (previously removed) (Active)   Pre Size Length 0.4 11/16/2018 10:00 AM   Pre Size Width 0.2 11/16/2018 10:00 AM   Pre Size Depth 0.2 11/16/2018 10:00 AM   Pre Total Sq cm 0.08 11/16/2018 10:00 AM       VASC Wound 11/16/18 right medial ankle inferior (Active)   Pre Size Length 0.7 11/16/2018 10:00 AM   Pre Size Width 0.7 11/16/2018 10:00 AM   Pre Size  Depth 0.1 11/16/2018 10:00 AM   Pre Total Sq cm 0.42 11/16/2018 10:00 AM       Other Ulcers 08/04/18 Other (Comment) Right;Medial (Active)   Granular base, less depth as compared to previous visit. No erythema right foot.  Neurologic: Diminished to light touch Right.  Musculoskeletal: Decreased medial arch right foot.    Imaging: None    Picture:

## 2021-06-26 NOTE — PROGRESS NOTES
Progress Notes by Phu Zaman DPM at 9/25/2018 10:00 AM     Author: Phu Zaman DPM Service: -- Author Type: Physician    Filed: 9/25/2018 11:36 AM Encounter Date: 9/25/2018 Status: Signed    : Phu Zaman DPM (Physician)       FOOT AND ANKLE SURGERY/PODIATRY Progress Note        ASSESSMENT:   Ulceration right foot  Pes Planovalgus right       TREATMENT:  -Right foot ulceration continues to improve, however, the center portion of the wound continues to bottom out and probe to deep tissues. I am concerned about underlying osteomyelitis at the talonavicular joint and recommend a 3 phase bone scan and ceretec bone scan. The patient would like to avoid the bone scans at this time but will consider in 2 weeks if no improvement is noted.  -Sharp, excisional debridement of the wound into muscle tissue was performed using currette for a total of 3.6 square centimeters. Debridement was done to reduce pressure, remove non-viable, devitalized tissue and promote wound healing. Patient tolerated this well. A gauze dressing was applied. Continue with wound vac.  -He will follow-up in 2 weeks. Limited walking in [facility].       This note was electronically signed by Phu Zaman DPM  [facility]      HPI: Florentino Fall was seen again today for a right foot ulceration. He has continued to use the wound vac and stay limited walking in the [place].       Past Medical History:   Diagnosis Date   ? Arthritis    ? Cardiomyopathy (H)    ? Cardiomyopathy with implantable cardioverter-defibrillator (H)    ? Charcot's joint of foot     bilateral ankles   ? CHF (congestive heart failure) (H)    ? Deviated septum    ? Eczema    ? Hernia    ? History of transfusion    ? Hypertension    ? Migraine    ? Systolic heart failure (H)    ? V-tach (H)     has icd       Allergies   Allergen Reactions   ? Venom-Honey Bee Swelling and Dizziness         Current Outpatient Prescriptions:   ?  acetaminophen (TYLENOL)  325 MG tablet, Take 650 mg by mouth 2 (two) times a day as needed for pain. , Disp: , Rfl:   ?  allopurinol (ZYLOPRIM) 300 MG tablet, Take 1 tablet (300 mg total) by mouth daily., Disp: 90 tablet, Rfl: 3  ?  amiodarone (PACERONE) 200 MG tablet, One tablet twice daily except Wed and Sun, take 1 tablet, Disp: 180 tablet, Rfl: 0  ?  ascorbic acid, vitamin C, (ASCORBIC ACID WITH JAVIER HIPS) 500 MG tablet, Take 500 mg by mouth daily. (start after guiacs obtained), Disp: , Rfl:   ?  aspirin 81 MG EC tablet, Take 81 mg by mouth daily., Disp: , Rfl:   ?  carvedilol (COREG) 25 MG tablet, Take 1.5 tablets (37.5 mg total) by mouth 2 (two) times a day with meals., Disp: 270 tablet, Rfl: 3  ?  CHOLECALCIFEROL 1,000 unit tablet, TAKE 1 TABLET BY MOUTH DAILY., Disp: 90 tablet, Rfl: 3  ?  CINNAMON BARK (CINNAMON ORAL), 1,000 tablets 2 (two) times a day. , Disp: , Rfl:   ?  coenzyme Q10 100 mg capsule, Take 200 mg by mouth daily. , Disp: , Rfl:   ?  DULoxetine (CYMBALTA) 60 MG capsule, Take 1 capsule (60 mg total) by mouth 2 (two) times a day., Disp: 180 capsule, Rfl: 3  ?  EPINEPHrine (EPIPEN/ADRENACLICK) 0.3 mg/0.3 mL injection, Inject 0.3 mL (0.3 mg total) as directed as needed for anaphylaxis. Inject into thigh., Disp: 2 Pre-filled Pen Syringe, Rfl: 0  ?  famotidine (PEPCID) 20 MG tablet, Take 1 tablet (20 mg total) by mouth 2 (two) times a day., Disp: , Rfl: 0  ?  ferrous sulfate 325 (65 FE) MG tablet, Take 1 tablet by mouth daily with breakfast. (start after guiacs obtained), Disp: , Rfl:   ?  fexofenadine (ALLEGRA) 180 MG tablet, TAKE 1 TABLET (180 MG TOTAL) BY MOUTH DAILY., Disp: 90 tablet, Rfl: 3  ?  furosemide (LASIX) 40 MG tablet, Take 1 tablet (40 mg total) by mouth daily. Dose decreased by DND 4/6., Disp: 270 tablet, Rfl: 3  ?  KRILL OIL ORAL, Take 1,000 mg by mouth. , Disp: , Rfl:   ?  Lactobacillus rhamnosus GG (CULTURELLE) 10-15 Billion cell capsule, Take 1 capsule by mouth daily., Disp: , Rfl:   ?  loperamide  (IMODIUM) 2 mg capsule, Take 1 capsule (2 mg total) by mouth 3 (three) times a day as needed for diarrhea., Disp: , Rfl: 0  ?  meloxicam (MOBIC) 15 MG tablet, TAKE ONE TABLET BY MOUTH ONE TIME DAILY, Disp: 90 tablet, Rfl: 3  ?  milk thistle 175 mg tablet, Take 175 mg by mouth daily., Disp: , Rfl:   ?  multivitamin (MULTIVITAMIN) per tablet, 1 tablet every other day. , Disp: , Rfl:   ?  mupirocin (BACTROBAN) 2 % ointment, Apply topically 2 (two) times a day. To affected area(s)., Disp: 22 g, Rfl: 3  ?  oxymetazoline (AFRIN) 0.05 % nasal spray, 2 sprays into each nostril 2 (two) times a day as needed. , Disp: , Rfl:   ?  potassium chloride (K-DUR,KLOR-CON) 20 MEQ tablet, Take 1 tablet (20 mEq total) by mouth daily., Disp: 90 tablet, Rfl: 3  ?  senna-docusate (PERICOLACE) 8.6-50 mg tablet, Take 1 tablet by mouth 2 (two) times a day. And two times a day prn, Disp: , Rfl:   ?  simethicone (MYLICON) 125 MG chewable tablet, Chew 125 mg every 6 (six) hours as needed for flatulence., Disp: , Rfl:   ?  spironolactone (ALDACTONE) 25 MG tablet, Take one tablet by mouth one time daily, Disp: 90 tablet, Rfl: 3  ?  topiramate (TOPAMAX) 50 MG tablet, Take 1 tablet (50 mg total) by mouth 2 (two) times a day., Disp: 180 tablet, Rfl: 3  ?  traMADol (ULTRAM) 50 mg tablet, Take 2 tablets (100 mg total) by mouth 2 (two) times a day., Disp: 10 tablet, Rfl: 0  ?  traMADol (ULTRAM) 50 mg tablet, TAKE TWO TABLETS BY MOUTH THREE TIMES DAILY, Disp: 180 tablet, Rfl: 5    Review of Systems - Negative       OBJECTIVE:  Appearance: alert, well appearing, and in no distress.    Vitals:    09/25/18 1002   BP: 104/68   Pulse: 64   Resp: 16   Temp: 98.3  F (36.8  C)       Vascular: Dorsalis pedis non-palpableRight.  Dermatologic:    VASC Wound 09/11/18 Right medial ankle (previously removed) (Active)   Pre Size Length 2.4 9/25/2018 10:00 AM   Pre Size Width 1.5 9/25/2018 10:00 AM   Pre Size Depth 0.1 9/25/2018 10:00 AM   Pre Total Sq cm 3.6 9/25/2018  10:00 AM       Other Ulcers 08/04/18 Other (Comment) Right;Medial (Active)   Site Assessment Yellow;Pink;Red 9/24/2018 12:00 PM   Surrounding Tissue Assessment Clean;Dry;Intact 9/24/2018 12:00 PM   Wound Measurement (L x W x D cm) 1 x0 .5 x 0.24 9/24/2018 12:00 PM   Drainage Amount Moderate 9/24/2018 12:00 PM   Drainage Description Serosanguineous 9/24/2018 12:00 PM   Other Ulcer Site Care Cleansed 9/24/2018 12:00 PM   Other Ulcer Treatment/Dressings Negative Pressure Wound Therapy 9/24/2018 12:00 PM   Dressing Changed Canister changed 9/24/2018 12:00 PM   Dressing Status Clean;Dry;Intact;Dressing changed 9/24/2018 12:00 PM   Margins Undefined edges 9/24/2018 12:00 PM   Granular base, center of the wound probes to deep tissues. No erythema right foot.  Neurologic: Diminished to light touch Right.  Musculoskeletal: Significant pes planus deformity right.     Imaging: None    Picture:

## 2021-06-26 NOTE — PROGRESS NOTES
Progress Notes by Ulises Hernández MD at 2/8/2018  8:20 AM     Author: Ulises Hernández MD Service: -- Author Type: Physician    Filed: 2/8/2018  8:34 AM Encounter Date: 2/8/2018 Status: Signed    : Ulises Hernández MD (Physician)           Click to link to [facility] HEART [facility] ELECTROPHYSIOLOGY NOTE    Today I had the opportunity to see  Florentino Fall for follow-up evaluation of sustained ventricular tachycardia associated with nonischemic cardiomyopathy.      Assessment/Recommendations   Clinic Problem List:  1. Sustained VT (ventricular tachycardia)     2. Cardiomyopathy, nonischemic     3. Frequent PVCs     4. PSVT (paroxysmal supraventricular tachycardia)     5. Essential hypertension with goal blood pressure less than 140/90     6. Chronic systolic heart failure         Assessment:    Sustained ventricular tachycardia associated with vigorous exercise, antitachycardia pacing was ineffective but appropriate shock was delivered.  No other VT episodes noted.  Nonischemic cardiomyopathy with chronic systolic failure well compensated, moderate hypotension noted but without symptoms, biventricular pacing limited by frequent of ventricular premature beats  No further PSVT    Plan:  Continue current medications  A minor adjustment was made to antitachycardia pacing for ventricular tachycardia  Call if symptoms of lightheadedness or weakness, consideration will be given to decreasing ramapril  Advised against heavy exercise  Follow up appointment:   Dr. Hernández in 6 months in device clinic       History of Present Illness     Florentino Fall is a 72 y.o. male with nonischemic cardiomyopathy. He suffered an out of hospital cardiac arrest on February 7, 2013. Coronary angiography showed no significant coronary artery disease. Cardiac echo at about that time showed an ejection fraction of 30% and mid myocardial scar consistent with nonischemic cardiomyopathy. He had left bundle branch  block and received a CRT-D on August 30, 2013.   He  had episodes of recurrent of ventricular tachycardia since April 2016 resulting in either antitachycardia pacing or shocks from his implantable defibrillator.  VT cycling since ranged from 350 ms to 260 ms. He had a syncopal episode on April 23, 2016 with rapid VT VF terminated by shock.  Cardiac echo on May 2016 showed a dilated left ventricle with an ejection fraction of 27% essentially unchanged. He was started on amiodarone.  He had 2 episodes of ventricular tachycardia in quick succession on June 23 2016 at 10:23 PM. Each VT was initiated by a late PVC, Initial cycle length 300 ms, quickly accelerated and was terminated 41 J shocks. Amiodarone was discontinued on 7/8/2016 as it was ineffective and associated with some side effects of malaise.  He developed shortness of breath on August 21,2016 in the afternoon on a hot day after mowing the lawn. He denied palpitations chest pain or pressure. Interrogation of his ICD however showed that he was in ventricular tachycardia at a cycle length 350 ms probably for approximately 40 minutes this was just below the VT detect rate of his device. Tachycardia converted spontaneously. He has felt much better off amiodarone.  December 2016 his device was reprogrammed to VT monitor zone at 140 bpm.  He had rapid ventricular tachycardia cycling 250 MS 1 treated with antitachycardia pacing and one resolved spontaneously but resulted in ICD shock.  Sotalol was considered but not started.  VT was terminated with antitachycardia pacing in the spring 2017.  He had increased shortness of breath in September,2017 and was felt to be in heart failure.  Furosemide was increased to 60 mg daily and carvedilol to 37.5 mg twice daily.    ICD interrogation showed supraventricular tachycardia with aberrancy rate cycling 390 ms rate of 153 bpm.  PVCs did not reset atrial tachycardia most consistent with AV chaz reentry tachycardia.  This was  below the VT rate detection.   He was shoveling snow off his car roof on January 23, 2018 when he became short of breath.  This was followed in about 15 seconds by a shock from his implantable defibrillator and he felt better.  There was no associated chest pain and no syncope.  He denies exertional dyspnea or chest pain.  He has felt better on the higher dose of carvedilol.  He denies orthostatic lightheadedness.    Personally reviewed.  ICD interrogation today confirms of ventricular tachycardia cycle length 295 ms, burst ATP ×2 at 80% 8% VT cycle length was ineffective in a rapid burst cycling 220 ms accelerated his VT. antitachycardia pacing was programmed more aggressively with burst pacing and somewhat less aggressively with ramp pacing.  Only other episodes were brief nonsustained ventricular tachycardia there were no atrial arrhythmias.  Pacing thresholds are excellent in all 3 chambers.  He is a by ventricularly paced 75% of the time.       Physical Examination Review of Systems   Vitals:    02/08/18 0748   BP: (!) 74/46   Pulse: 60   Resp: 16     Body mass index is 33.25 kg/(m^2).  Wt Readings from Last 3 Encounters:   02/08/18 (!) 259 lb (117.5 kg)   10/18/17 (!) 264 lb 8 oz (120 kg)   09/25/17 (!) 270 lb 7 oz (122.7 kg)        Appearance:   no distress,    HEENT:   no scleral icterus, normal conjunctivae    Neck: no carotid bruits or thyromegaly   Chest/Lungs:   lungs are clear to auscultation, no rales or wheezing, ICD left subclavian pocket   Cardiovascular:   Jugular venous pressure 5 cm, Apical pulse is regular. Normal S1,S2 with no murmurs or gallops,   Abdomen:  no  Hepatosplenomegaly., nontender,  bowel sounds are present   Extremities: no cyanosis or clubbing, No edema,  Walking boot right leg   Skin: no xanthelasma, warm.    Neurologic: No gross focal neurologic deficits   Mood/Affect: Alert, cooperative    General: WNL  Eyes: WNL  Ears/Nose/Throat: WNL  Lungs: WNL  Heart: WNL  Stomach:  WNL  Bladder: WNL  Muscle/Joints: WNL  Skin: WNL  Nervous System: WNL  Mental Health: WNL     Blood: WNL     Medical History  Surgical History Family History Social History   Past Medical History:   Diagnosis Date   ? Arthritis    ? Cardiomyopathy    ? Cardiomyopathy with implantable cardioverter-defibrillator    ? CHF (congestive heart failure)    ? Deviated septum    ? Hernia    ? History of transfusion    ? Hypertension    ? Migraine     Past Surgical History:   Procedure Laterality Date   ? CARDIAC PACEMAKER PLACEMENT      ICD   ? MO KNEE SCOPE,DIAGNOSTIC      Description: Arthroscopy Knee Left;  Recorded: 06/30/2011;   ? MO REMOVE TONSILS/ADENOIDS,<9 Y/O      Description: Tonsillectomy With Adenoidectomy;  Recorded: 06/30/2011;   ? MO SHLDR ARTHROSCOP,DIAGNOSTIC      Description: Arthroscopy Shoulder Right;  Recorded: 06/30/2011;   ? TONSILLECTOMY      and adenoids    Family History   Problem Relation Age of Onset   ? Stroke Mother    ? Emphysema Mother    ? Goiter Mother    ? Cancer Father      Stomach   ? Alzheimer's disease Brother     Social History     Social History   ? Marital status:      Spouse name: N/A   ? Number of children: 3   ? Years of education: N/A     Occupational History   ? Not on file.     Social History Main Topics   ? Smoking status: Former Smoker     Packs/day: 2.50     Years: 50.00     Types: Cigarettes, Pipe, Cigars     Quit date: 1/1/2000   ? Smokeless tobacco: Never Used   ? Alcohol use No      Comment: Last used 1979   ? Drug use: No   ? Sexual activity: Yes     Partners: Female     Birth control/ protection: Post-menopausal     Other Topics Concern   ? Not on file     Social History Narrative     since 1964, 3 children. 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 Quit smoking in 1999.           Medications  Allergies   Current Outpatient Prescriptions   Medication Sig Dispense Refill   ? acetaminophen (TYLENOL) 325 MG tablet Take 325-650 mg by mouth every 6 (six) hours as  needed for pain.     ? allopurinol (ZYLOPRIM) 300 MG tablet Take 1 tablet (300 mg total) by mouth daily. 90 tablet 3   ? amoxicillin (AMOXIL) 500 MG tablet Take 4 tablets (2,000 mg total) by mouth once as needed. TAKE 4 TABLETS 1 HOUR BEFORE DENTAL APPOINTMENT. 8 tablet 1   ? ascorbic acid (ASCORBIC ACID WITH JAVIER HIPS) 500 MG tablet Take 500 mg by mouth daily.      ? aspirin 81 MG EC tablet Take 81 mg by mouth daily.     ? carvedilol (COREG) 25 MG tablet 1.5 tabs twice a day 270 tablet 3   ? CHOLECALCIFEROL 1,000 unit tablet TAKE 1 TABLET BY MOUTH DAILY. 90 tablet 3   ? CINNAMON BARK (CINNAMON ORAL) 1,000 tablets 2 (two) times a day.      ? coenzyme Q10 100 mg capsule Take 200 mg by mouth daily.      ? digoxin (DIGOX) 125 mcg tablet TAKE ONE TABLET BY MOUTH ONE TIME DAILY 90 tablet 3   ? DULoxetine (CYMBALTA) 60 MG capsule Take 1 capsule (60 mg total) by mouth daily. 90 capsule 3   ? EPINEPHrine (EPIPEN) 0.3 mg/0.3 mL atIn Inject 0.3 mL (0.3 mg total) into the shoulder, thigh, or buttocks as needed. 1 Pre-filled Pen Syringe 0   ? fexofenadine (ALLEGRA) 180 MG tablet TAKE 1 TABLET (180 MG TOTAL) BY MOUTH DAILY. 90 tablet 3   ? furosemide (LASIX) 40 MG tablet Take 1.5 pills twice daily 270 tablet 3   ? gabapentin (NEURONTIN) 300 MG capsule Take 1 capsule (300 mg total) by mouth at bedtime. 90 capsule 3   ? HYDROcodone-acetaminophen 5-325 mg per tablet TAKE 1 TABLET BY MOUTH EVERY 6 HOURS AS NEEDED FOR PAIN 60 tablet 0   ? KRILL OIL ORAL Take 1,000 mg by mouth.      ? meloxicam (MOBIC) 15 MG tablet Take one tablet by mouth one time daily 90 tablet 3   ? multivitamin (MULTIVITAMIN) per tablet 1 tablet every other day.      ? mupirocin (BACTROBAN) 2 % ointment Apply topically 2 (two) times a day. To affected area(s). 22 g 3   ? omeprazole (PRILOSEC) 20 MG capsule Take 20 mg by mouth as needed.      ? ramipril (ALTACE) 10 MG capsule Take 1 capsule (10 mg total) by mouth daily. 90 capsule 2   ? sildenafil (VIAGRA) 50 MG  tablet 50 mg. Take 1 tablet 1 hour before needed     ? simethicone (MYLICON) 125 MG chewable tablet Use As Directed.     ? spironolactone (ALDACTONE) 25 MG tablet Take one tablet by mouth one time daily 90 tablet 3   ? traMADol (ULTRAM) 50 mg tablet TAKE TWO TABLETS BY MOUTH THREE TIMES DAILY 180 tablet 5   ? triamcinolone (KENALOG) 0.5 % cream as needed. Apply 1 inch        Current Facility-Administered Medications   Medication Dose Route Frequency Provider Last Rate Last Dose   ? cyanocobalamin injection 1,000 mcg  1,000 mcg Intramuscular Q30 Days Mario Lepe MD   1,000 mcg at 11/15/16 4986      Allergies   Allergen Reactions   ? Venom-Honey Bee Swelling and Dizziness

## 2021-06-26 NOTE — PROGRESS NOTES
Progress Notes by Phu Zaman DPM at 9/11/2018 10:40 AM     Author: Phu Zaman DPM Service: -- Author Type: Physician    Filed: 9/11/2018 11:18 AM Encounter Date: 9/11/2018 Status: Signed    : Phu Zaman DPM (Physician)       FOOT AND ANKLE SURGERY/PODIATRY Progress Note        ASSESSMENT:   Ulceration right foot  Pes Planovalgus right       TREATMENT:  -Right foot wound is stable, but continues to probe to deep tissues. The patient complains of loosing suction with the wound vac. Will check with home healthcare on this. Also discussed that the patient that weight bearing can disrupted the seal on the vac.   -Sharp, excisional debridement of the wound into muscle tissue was performed using #15 blade for a total of 6 square centimeters. Debridement was done to reduce pressure, remove non-viable, devitalized tissue and promote wound healing. Patient tolerated this well. A gauze dressing was applied. Continue with the wound vac.   -Referred to [facility] for cage splint to accommodate wound vac.  -He will follow-up in 2 weeks.      This note was electronically signed by Phu Zaman DPM  [facility]      HPI: Florentino Fall was seen again today for a right foot ulceration. He has been using the wound vac but states that the wound vac looses suction on a regular basis.     Past Medical History:   Diagnosis Date   ? Arthritis    ? Cardiomyopathy (H)    ? Cardiomyopathy with implantable cardioverter-defibrillator (H)    ? Charcot's joint of foot     bilateral ankles   ? CHF (congestive heart failure) (H)    ? Deviated septum    ? Eczema    ? Hernia    ? History of transfusion    ? Hypertension    ? Migraine    ? Systolic heart failure (H)    ? V-tach (H)     has icd       Allergies   Allergen Reactions   ? Venom-Honey Bee Swelling and Dizziness         Current Outpatient Prescriptions:   ?  acetaminophen (TYLENOL) 325 MG tablet, Take 650 mg by mouth 2 (two) times a day as  needed for pain. , Disp: , Rfl:   ?  allopurinol (ZYLOPRIM) 300 MG tablet, Take 1 tablet (300 mg total) by mouth daily., Disp: 90 tablet, Rfl: 3  ?  amiodarone (PACERONE) 200 MG tablet, Take 1 tablet (200 mg total) by mouth 2 (two) times a day., Disp: 180 tablet, Rfl: 0  ?  ascorbic acid, vitamin C, (ASCORBIC ACID WITH JAVIER HIPS) 500 MG tablet, Take 500 mg by mouth daily. (start after guiacs obtained), Disp: , Rfl:   ?  aspirin 81 MG EC tablet, Take 81 mg by mouth daily., Disp: , Rfl:   ?  carvedilol (COREG) 25 MG tablet, Take 1.5 tablets (37.5 mg total) by mouth 2 (two) times a day with meals., Disp: 270 tablet, Rfl: 3  ?  CHOLECALCIFEROL 1,000 unit tablet, TAKE 1 TABLET BY MOUTH DAILY., Disp: 90 tablet, Rfl: 3  ?  CINNAMON BARK (CINNAMON ORAL), 1,000 tablets 2 (two) times a day. , Disp: , Rfl:   ?  coenzyme Q10 100 mg capsule, Take 200 mg by mouth daily. , Disp: , Rfl:   ?  DULoxetine (CYMBALTA) 60 MG capsule, Take 1 capsule (60 mg total) by mouth 2 (two) times a day., Disp: 180 capsule, Rfl: 3  ?  EPINEPHrine (EPIPEN/ADRENACLICK) 0.3 mg/0.3 mL injection, Inject 0.3 mL (0.3 mg total) as directed as needed for anaphylaxis. Inject into thigh., Disp: 2 Pre-filled Pen Syringe, Rfl: 0  ?  famotidine (PEPCID) 20 MG tablet, Take 1 tablet (20 mg total) by mouth 2 (two) times a day., Disp: , Rfl: 0  ?  ferrous sulfate 325 (65 FE) MG tablet, Take 1 tablet by mouth daily with breakfast. (start after guiacs obtained), Disp: , Rfl:   ?  fexofenadine (ALLEGRA) 180 MG tablet, TAKE 1 TABLET (180 MG TOTAL) BY MOUTH DAILY., Disp: 90 tablet, Rfl: 3  ?  furosemide (LASIX) 40 MG tablet, Take 1 tablet (40 mg total) by mouth daily. Dose decreased by DND 4/6. (Patient taking differently: Take 20 mg by mouth daily. Dose decreased to 30 mg on 7/10/18. Give 1.5 tablets for a total of 30 mg daily), Disp: 270 tablet, Rfl: 3  ?  KRILL OIL ORAL, Take 1,000 mg by mouth. , Disp: , Rfl:   ?  Lactobacillus rhamnosus GG (CULTURELLE) 10-15 Billion  cell capsule, Take 1 capsule by mouth daily., Disp: , Rfl:   ?  loperamide (IMODIUM) 2 mg capsule, Take 1 capsule (2 mg total) by mouth 3 (three) times a day as needed for diarrhea., Disp: , Rfl: 0  ?  milk thistle 175 mg tablet, Take 175 mg by mouth daily., Disp: , Rfl:   ?  multivitamin (MULTIVITAMIN) per tablet, 1 tablet every other day. , Disp: , Rfl:   ?  mupirocin (BACTROBAN) 2 % ointment, Apply topically 2 (two) times a day. To affected area(s)., Disp: 22 g, Rfl: 3  ?  oxymetazoline (AFRIN) 0.05 % nasal spray, 2 sprays into each nostril 2 (two) times a day as needed. , Disp: , Rfl:   ?  potassium chloride (K-DUR,KLOR-CON) 20 MEQ tablet, Take 1 tablet (20 mEq total) by mouth daily., Disp: 90 tablet, Rfl: 3  ?  senna-docusate (PERICOLACE) 8.6-50 mg tablet, Take 1 tablet by mouth 2 (two) times a day. And two times a day prn, Disp: , Rfl:   ?  simethicone (MYLICON) 125 MG chewable tablet, Chew 125 mg every 6 (six) hours as needed for flatulence., Disp: , Rfl:   ?  spironolactone (ALDACTONE) 25 MG tablet, Take one tablet by mouth one time daily, Disp: 90 tablet, Rfl: 3  ?  topiramate (TOPAMAX) 50 MG tablet, Take 1 tablet (50 mg total) by mouth 2 (two) times a day., Disp: 180 tablet, Rfl: 3  ?  traMADol (ULTRAM) 50 mg tablet, Take 2 tablets (100 mg total) by mouth 2 (two) times a day., Disp: 10 tablet, Rfl: 0  ?  traMADol (ULTRAM) 50 mg tablet, TAKE TWO TABLETS BY MOUTH THREE TIMES DAILY, Disp: 180 tablet, Rfl: 5    Review of Systems - Negative       OBJECTIVE:  Appearance: alert, well appearing, and in no distress.    Vitals:    09/11/18 1037   BP: 118/62   Pulse: 80   Resp: 16   Temp: 99.1  F (37.3  C)       Vascular: Dorsalis pedis non-palpableRight.  Dermatologic:    VASC Wound 09/11/18 Right medial ankle  (Active)   Pre Size Length 3 9/11/2018 10:00 AM   Pre Size Width 2 9/11/2018 10:00 AM   Pre Size Depth 0.5 9/11/2018 10:00 AM   Pre Total Sq cm 6 9/11/2018 10:00 AM       Other Ulcers 08/04/18 Other (Comment)  Right;Medial (Active)   Probes to deep tissues, not to bone or TN capsule. No erythema right foot. Granular tissue noted along the peripheral wound.   Neurologic: Diminished to light touch Right.  Musculoskeletal: Collapse of medial arch with abduction of the forefoot on the rearfoot.    Imaging: None    Picture:

## 2021-06-26 NOTE — PROGRESS NOTES
Progress Notes by Phu Zaman DPM at 10/9/2018 11:00 AM     Author: Phu Zaman DPM Service: -- Author Type: Physician    Filed: 10/9/2018  1:31 PM Encounter Date: 10/9/2018 Status: Signed    : Phu Zaman DPM (Physician)       FOOT AND ANKLE SURGERY/PODIATRY Progress Note        ASSESSMENT:   Ulceration right foot  Pes Planovalgus right       TREATMENT:  -Right foot ulceration has improved, continues to probe to deep tissues at medial margin. Recommend he continue to use the wound vac, pack foam into the wound.   -Sharp, excisional debridement of the wound into subcutaneous tissue was performed using currette for a total of 1.65 square centimeters. Debridement was done to reduce pressure, remove non-viable, devitalized tissue and promote wound healing. Patient tolerated this well. A gauze dressing was applied. Non-weight bearing right foot.  -He will follow-up in 2-3 weeks.      This note was electronically signed by Phu Zaman DPM  [facility]      HPI: Florentino Fall was seen again today for a right foot ulcer. He has been using the wound vac as directed. No new concerns.      Past Medical History:   Diagnosis Date   ? Arthritis    ? Cardiomyopathy (H)    ? Cardiomyopathy with implantable cardioverter-defibrillator (H)    ? Charcot's joint of foot     bilateral ankles   ? CHF (congestive heart failure) (H)    ? Deviated septum    ? Eczema    ? Hernia    ? History of transfusion    ? Hypertension    ? Migraine    ? Systolic heart failure (H)    ? V-tach (H)     has icd       Allergies   Allergen Reactions   ? Venom-Honey Bee Swelling and Dizziness         Current Outpatient Prescriptions:   ?  acetaminophen (TYLENOL) 325 MG tablet, Take 650 mg by mouth 2 (two) times a day as needed for pain. , Disp: , Rfl:   ?  allopurinol (ZYLOPRIM) 300 MG tablet, Take 1 tablet (300 mg total) by mouth daily., Disp: 90 tablet, Rfl: 3  ?  amiodarone (PACERONE) 200 MG tablet, One  tablet twice daily except Wed and Sun, take 1 tablet, Disp: 180 tablet, Rfl: 0  ?  ascorbic acid, vitamin C, (ASCORBIC ACID WITH JAVIER HIPS) 500 MG tablet, Take 500 mg by mouth daily. (start after guiacs obtained), Disp: , Rfl:   ?  aspirin 81 MG EC tablet, Take 81 mg by mouth daily., Disp: , Rfl:   ?  carvedilol (COREG) 25 MG tablet, Take 1.5 tablets (37.5 mg total) by mouth 2 (two) times a day with meals., Disp: 270 tablet, Rfl: 3  ?  CHOLECALCIFEROL 1,000 unit tablet, TAKE 1 TABLET BY MOUTH DAILY., Disp: 90 tablet, Rfl: 3  ?  cinnamon bark (CINNAMON ORAL), Take 1,000 tablets by mouth Daily at 5 pm. Indications: supplement, Disp: , Rfl:   ?  coenzyme Q10 100 mg capsule, Take 200 mg by mouth daily. , Disp: , Rfl:   ?  DULoxetine (CYMBALTA) 60 MG capsule, Take 1 capsule (60 mg total) by mouth 2 (two) times a day., Disp: 180 capsule, Rfl: 3  ?  EPINEPHrine (EPIPEN/ADRENACLICK) 0.3 mg/0.3 mL injection, Inject 0.3 mL (0.3 mg total) as directed as needed for anaphylaxis. Inject into thigh., Disp: 2 Pre-filled Pen Syringe, Rfl: 0  ?  famotidine (PEPCID) 20 MG tablet, Take 1 tablet (20 mg total) by mouth 2 (two) times a day. (Patient taking differently: Take 1 tablet by mouth 2 (two) times a day as needed for heartburn. Indications: multiple endocrine neoplasia, gastroesophageal reflux disease), Disp: , Rfl: 0  ?  ferrous sulfate 325 (65 FE) MG tablet, Take 1 tablet by mouth daily with breakfast. (start after guiacs obtained), Disp: , Rfl:   ?  fexofenadine (ALLEGRA) 180 MG tablet, TAKE 1 TABLET (180 MG TOTAL) BY MOUTH DAILY., Disp: 90 tablet, Rfl: 3  ?  furosemide (LASIX) 40 MG tablet, Take 1 tablet (40 mg total) by mouth daily. Dose decreased by DND 4/6. (Patient taking differently: Take 40 mg by mouth every other day. Dose decreased by DND 4/6. 9/28/18 dose decreased to every other day by PMD  Then as needed for swelling), Disp: 270 tablet, Rfl: 3  ?  KRILL OIL ORAL, Take 1,000 mg by mouth. , Disp: , Rfl:   ?   loperamide (IMODIUM) 2 mg capsule, Take 1 capsule (2 mg total) by mouth 3 (three) times a day as needed for diarrhea., Disp: , Rfl: 0  ?  meloxicam (MOBIC) 15 MG tablet, TAKE ONE TABLET BY MOUTH ONE TIME DAILY, Disp: 90 tablet, Rfl: 3  ?  milk thistle 175 mg tablet, Take 175 mg by mouth daily., Disp: , Rfl:   ?  multivitamin (MULTIVITAMIN) per tablet, 1 tablet every other day. , Disp: , Rfl:   ?  mupirocin (BACTROBAN) 2 % ointment, Apply topically 2 (two) times a day. To affected area(s)., Disp: 22 g, Rfl: 3  ?  oxymetazoline (AFRIN) 0.05 % nasal spray, 2 sprays into each nostril 2 (two) times a day as needed. , Disp: , Rfl:   ?  potassium chloride (K-DUR,KLOR-CON) 20 MEQ tablet, Take 1 tablet (20 mEq total) by mouth daily., Disp: 90 tablet, Rfl: 3  ?  simethicone (MYLICON) 125 MG chewable tablet, Chew 125 mg every 6 (six) hours as needed for flatulence., Disp: , Rfl:   ?  spironolactone (ALDACTONE) 25 MG tablet, Take one tablet by mouth one time daily, Disp: 90 tablet, Rfl: 3  ?  topiramate (TOPAMAX) 50 MG tablet, Take 1 tablet (50 mg total) by mouth 2 (two) times a day., Disp: 180 tablet, Rfl: 3  ?  traMADol (ULTRAM) 50 mg tablet, Take 2 tablets (100 mg total) by mouth 3 (three) times a day., Disp: 180 tablet, Rfl: 5    Review of Systems - Negative       OBJECTIVE:  Appearance: alert, well appearing, and in no distress.    Vitals:    10/09/18 1103   BP: 100/64   Pulse: 60   Resp: 16   Temp: 98.7  F (37.1  C)       Vascular: Dorsalis pedis palpableRight.  Dermatologic:    VASC Wound 09/11/18 Right medial ankle (previously removed) (Active)   Pre Size Length 1.5 10/9/2018 10:00 AM   Pre Size Width 1.1 10/9/2018 10:00 AM   Pre Size Depth 0.1 10/9/2018 10:00 AM   Pre Total Sq cm 1.65 10/9/2018 10:00 AM       Other Ulcers 08/04/18 Other (Comment) Right;Medial (Active)   Granular base, medial wound probes to deep tissues. No erythema right foot.   Neurologic: Diminished to light touch Right.  Musculoskeletal: Contracted  digits noted Right.    Imaging: None    Picture:

## 2021-06-26 NOTE — PROGRESS NOTES
Progress Notes by Phu Zaman DPM at 6/19/2018 11:00 AM     Author: Phu Zaman DPM Service: -- Author Type: Physician    Filed: 6/19/2018 12:14 PM Encounter Date: 6/19/2018 Status: Signed    : Phu Zaman DPM (Physician)       FOOT AND ANKLE SURGERY/PODIATRY Progress Note        ASSESSMENT:   Cellulitis right foot/ankle  Ulcerations right foot/ankle  Charcot right   Pes Planovalgus right       TREATMENT:  -The patient presents today with cellulitis and full thickness wounds of the right foot/ankle. Afebrile. Due to the extensive erythema, non-viable tissue and with some constitutional symptoms I recommend hospital admission for IV antibiotics and surgical I&D. Patient consents. I have also referred him for weight bearing x-rays of the right foot and in-house MRI of the foot/ankle to evaluate for osteomyelitis.   -The patient has a large charcot/flatfoot deformity on the right foot. Discussed possible reconstructive surgery vs BKA. I reviewed with the patient and his wife that reconstructive surgery would be very intensive with no guaranteed results. He would like to try and salvage his right leg. We will proceed with resolving the current infection and discuss reconstructive details at a later date.   -Aerobic/anaerobic cultures taken today. Sharp, excisional debridement of the wound into muscle tissue was performed using currette for a total of 3 square centimeters. Debridement was done to reduce pressure, remove non-viable, devitalized tissue and promote wound healing. Patient tolerated this well. A gauze dressing and Medi-honey with a gauze dressing was applied.   -I spoke with hospitalist at [facility] today and they have agreed to except to medicine service for admission and IV antibiotics. Patient instructed to check-in at  at [facility] today. My partner, Dr. Fletcher, is on-call and will follow in-house. Surgery likely tomorrow pending cardiology clearance.        Phu Zaman, [facility]      HPI: Florentino Fall was seen today at the request of Dr. Johns for a right foot ulceration. The patient's wife is present for today's exam. According to the patient, he first noticed a sore on the right foot several weeks ago which he believes was caused by an ill fitting cage splint. He has a large charcot deformity on both feet and has been using custom cage splint with limited success. He admits to chronic skin breakdown on both feet in the past. The patient admits to diarrhea over the past weekend, and has felt unwell with low energy.     Past Medical History:   Diagnosis Date   ? Arthritis    ? Cardiomyopathy (H)    ? Cardiomyopathy with implantable cardioverter-defibrillator (H)    ? CHF (congestive heart failure) (H)    ? Deviated septum    ? Hernia    ? History of transfusion    ? Hypertension    ? Migraine        Allergies   Allergen Reactions   ? Venom-Honey Bee Swelling and Dizziness         Current Outpatient Prescriptions:   ?  acetaminophen (TYLENOL) 325 MG tablet, Take 325-650 mg by mouth every 6 (six) hours as needed for pain., Disp: , Rfl:   ?  allopurinol (ZYLOPRIM) 300 MG tablet, Take 1 tablet (300 mg total) by mouth daily., Disp: 90 tablet, Rfl: 3  ?  ascorbic acid (ASCORBIC ACID WITH JAVIER HIPS) 500 MG tablet, Take 500 mg by mouth daily. , Disp: , Rfl:   ?  aspirin 81 MG EC tablet, Take 81 mg by mouth daily., Disp: , Rfl:   ?  carvedilol (COREG) 25 MG tablet, Take 1.5 tablets (37.5 mg total) by mouth 2 (two) times a day with meals., Disp: 270 tablet, Rfl: 3  ?  CHOLECALCIFEROL 1,000 unit tablet, TAKE 1 TABLET BY MOUTH DAILY., Disp: 90 tablet, Rfl: 3  ?  CINNAMON BARK (CINNAMON ORAL), 1,000 tablets 2 (two) times a day. , Disp: , Rfl:   ?  coenzyme Q10 100 mg capsule, Take 200 mg by mouth daily. , Disp: , Rfl:   ?  digoxin (DIGOX) 125 mcg tablet, TAKE ONE TABLET BY MOUTH ONE TIME DAILY, Disp: 90 tablet, Rfl: 3  ?  DULoxetine (CYMBALTA)  60 MG capsule, Take 1 capsule (60 mg total) by mouth 2 (two) times a day., Disp: 180 capsule, Rfl: 3  ?  EPINEPHrine (EPIPEN/ADRENACLICK) 0.3 mg/0.3 mL injection, Inject 0.3 mL (0.3 mg total) as directed as needed for anaphylaxis. Inject into thigh., Disp: 2 Pre-filled Pen Syringe, Rfl: 0  ?  fexofenadine (ALLEGRA) 180 MG tablet, TAKE 1 TABLET (180 MG TOTAL) BY MOUTH DAILY., Disp: 90 tablet, Rfl: 3  ?  furosemide (LASIX) 40 MG tablet, Take 1 tablet (40 mg total) by mouth daily. Dose decreased by DND 4/6., Disp: 270 tablet, Rfl: 3  ?  KRILL OIL ORAL, Take 1,000 mg by mouth. , Disp: , Rfl:   ?  meloxicam (MOBIC) 15 MG tablet, Take one tablet by mouth one time daily, Disp: 90 tablet, Rfl: 3  ?  milk thistle 175 mg tablet, Take 175 mg by mouth daily., Disp: , Rfl:   ?  multivitamin (MULTIVITAMIN) per tablet, 1 tablet every other day. , Disp: , Rfl:   ?  mupirocin (BACTROBAN) 2 % ointment, Apply topically 2 (two) times a day. To affected area(s)., Disp: 22 g, Rfl: 3  ?  sildenafil (VIAGRA) 50 MG tablet, 50 mg. Take 1 tablet 1 hour before needed, Disp: , Rfl:   ?  simethicone (MYLICON) 125 MG chewable tablet, Use As Directed., Disp: , Rfl:   ?  spironolactone (ALDACTONE) 25 MG tablet, Take one tablet by mouth one time daily, Disp: 90 tablet, Rfl: 3  ?  topiramate (TOPAMAX) 25 MG tablet, Take 1 tablet (25 mg total) by mouth 2 (two) times a day., Disp: 180 tablet, Rfl: 3  ?  traMADol (ULTRAM) 50 mg tablet, TAKE TWO TABLETS BY MOUTH THREE TIMES DAILY, Disp: 180 tablet, Rfl: 5  ?  triamcinolone (KENALOG) 0.5 % cream, as needed. Apply 1 inch , Disp: , Rfl:   ?  amoxicillin (AMOXIL) 500 MG tablet, Take 4 tablets (2,000 mg total) by mouth once as needed. TAKE 4 TABLETS 1 HOUR BEFORE DENTAL APPOINTMENT., Disp: 8 tablet, Rfl: 1  ?  omeprazole (PRILOSEC) 20 MG capsule, Take 20 mg by mouth as needed. , Disp: , Rfl:     Current Facility-Administered Medications:   ?  cyanocobalamin injection 1,000 mcg, 1,000 mcg, Intramuscular, Q30  Days, Mario Lepe MD, 1,000 mcg at 11/15/16 7662    Past Surgical History:   Procedure Laterality Date   ? CARDIAC PACEMAKER PLACEMENT      ICD   ? [place] KNEE SCOPE,DIAGNOSTIC      Description: Arthroscopy Knee Left;  Recorded: 06/30/2011;   ? NH REMOVE TONSILS/ADENOIDS,<13 Y/O      Description: Tonsillectomy With Adenoidectomy;  Recorded: 06/30/2011;   ? NH SHLDR ARTHROSCOP,DIAGNOSTIC      Description: Arthroscopy Shoulder Right;  Recorded: 06/30/2011;   ? TONSILLECTOMY      and adenoids       Social History     Social History Narrative     since 1964, 3 children. 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 Quit smoking in 1999.        Family History   Problem Relation Age of Onset   ? Stroke Mother    ? Emphysema Mother    ? Goiter Mother    ? Cancer Father      Stomach   ? Alzheimer's disease Brother        Review of Systems - Negative except the above symptoms       OBJECTIVE:  Appearance: alert, well appearing, and in no distress.    Vitals:    06/19/18 1101   BP: 102/64   Pulse: (!) 108   Resp: 20   Temp: 99.7  F (37.6  C)       Vascular: Dorsalis pedis palpableRight.  Dermatologic:   Wound 06/19/18 right medial ankle (inferior) (Active)   Pre Size Length 1 6/19/2018 11:00 AM   Pre Size Width 1.6 6/19/2018 11:00 AM   Pre Size Depth 0.4 6/19/2018 11:00 AM   Pre Total Sq cm 1.6 6/19/2018 11:00 AM       Wound 06/19/18 right medial ankle (superior-posterior) (Active)   Pre Size Length 0.5 6/19/2018 11:00 AM   Pre Size Width 1 6/19/2018 11:00 AM   Pre Size Depth 0 6/19/2018 11:00 AM   Pre Total Sq cm 0.5 6/19/2018 11:00 AM       Wound 06/19/18 right medial ankle (superior-anterior) (Active)   Pre Size Length 1 6/19/2018 11:00 AM   Pre Size Width 1.7 6/19/2018 11:00 AM   Pre Size Depth 0.7 6/19/2018 11:00 AM   Pre Total Sq cm 1.7 6/19/2018 11:00 AM       Wound 08/08/14 Puncture Arm Left reddened, swollen.  redness delineated by marker (Active)   3 ulcerations along the medial right foot/ankle, probe to deep tissues  possible to bone, appear to communicate with each other. Extensive erythema along the medial right foot extending into the right ankle joint. Base of ulcerations have non-viable tissue with non-viable skin.   Neurologic: Diminished to light touch Right.  Musculoskeletal: Significant pes planovalgus deformity right foot with collapse of the medial longitudinal arch, prominent talar head, rearfoot valgum, abducted forefoot on rearfoot. Available ROM of the STJ and ankle joint right.     Imaging: MRI and x-rays pending    Picture:

## 2021-06-26 NOTE — PROGRESS NOTES
Progress Notes by Ulises Hernández MD at 4/4/2018 10:50 AM     Author: Ulises Hernández MD Service: -- Author Type: Physician    Filed: 4/4/2018  1:42 PM Encounter Date: 4/4/2018 Status: Signed    : Ulises Hernández MD (Physician)           Click to link to [facility] Heart [facility] HEART [facility] ELECTROPHYSIOLOGY NOTE    Today I had the opportunity to see  Florentino Fall for follow-up evaluation of ventricular tachycardia associated with nonischemic cardiomyopathy.      Assessment/Recommendations   Clinic Problem List:  1. Sustained VT (ventricular tachycardia)  Basic Metabolic Panel    Magnesium    carvedilol (COREG) 25 MG tablet    DISCONTINUED: carvedilol (COREG) 25 MG tablet   2. PSVT (paroxysmal supraventricular tachycardia)     3. Cardiomyopathy, nonischemic         Assessment:    Recurrent sustained ventricular tachycardia characterized ICD shocks several times per year.  Antitachycardia pacing and amiodarone has been ineffective.    PSVT asymptomatic  Nonischemic cardiomyopathy, functional class I with heart failure well compensated today.  Low blood pressures noted however.    Plan:  Stop taking ramipril  Continue carvedilol  Slight reprogramming of ICD was done with less aggressive antitachycardia pacing 88 versus 84% of VT cycle length  BMP and serum magnesium were drawn today  Empiric sotalol was discussed but not started as amiodarone had been ineffective, VT ablation was also considered.  Follow up appointment:   Dr. Hernández in device clinic in 4 month       History of Present Illness     Florentino Fall is a 72 y.o. male with   nonischemic cardiomyopathy. He suffered an out of hospital cardiac arrest on February 7, 2013. Coronary angiography showed no significant coronary artery disease. Cardiac echo at about that time showed an ejection fraction of 30% and mid myocardial scar consistent with nonischemic cardiomyopathy. He had left bundle branch block and received a CRT-D on August  30, 2013.   He  had episodes of recurrent of ventricular tachycardia since April 2016 resulting in either antitachycardia pacing or shocks from his implantable defibrillator.  VT CL ranged from 350 ms to 260 ms. He had a syncopal episode on April 23, 2016 with rapid VT VF terminated by shock.  Cardiac echo on May 2016 showed a dilated left ventricle with an ejection fraction of 27% essentially unchanged. He was started on amiodarone.  He had 2 episodes of ventricular tachycardia in quick succession on June 23 2016 at 10:23 PM. Each VT was initiated by a late PVC, Initial cycle length 300 ms, quickly accelerated and was terminated 41 J shocks. Amiodarone was discontinued on 7/8/2016 as it was ineffective and associated with some side effects of malaise.  He developed shortness of breath on August 21,2016 associated with ventricular tachycardia at a cycle length 350 ms for approximately 40 minutes and the VT detect reprogrammed to 140 bpm.  He had rapid ventricular tachycardia cycling 250 MS 1 treated with antitachycardia pacing and one resolved spontaneously but resulted in ICD shock.  Sotalol was considered but not started.  VT was terminated with antitachycardia pacing in the spring 2017.  He had increased shortness of breath in September,2017 and was felt to be in heart failure.  Furosemide was increased to 60 mg daily and carvedilol to 37.5 mg twice daily.    ICD interrogation showed supraventricular tachycardia with aberrancy rate cycling 390 ms rate of 153 bpm.  PVCs did not reset atrial tachycardia most consistent with AV chaz reentry tachycardia.  This was below the VT rate detection.  His last shock was January 23, 2018 associated with VT cycle length 295 ms.      He has felt well.  He denies palpitations, syncope or presyncope.  He denies trouble some orthostatic lightheadedness.  There is no chest pain or pressure.  Peripheral edema had resolved with increased dose of furosemide to 60 mg  daily.    Personally reviewed.  ICD interrogation shows 2 episodes of ventricular tachycardia on February 28, 2018.  The first had VT cycle length of 300 accelerating to 260 ms after antitachycardia pacing.  The second had VT cycle length of 280 ms with ineffective antitachycardia pacing.  Both were terminated by 41 J shocks.  Pacing thresholds remain excellent.  He is biventricular pacing 4084% the time with estimated device longevity of 5 years.       Physical Examination Review of Systems   Vitals:    04/04/18 1023   BP: (!) 68/36   Pulse: (!) 56   Resp: 16     Body mass index is 33.38 kg/(m^2).  Wt Readings from Last 3 Encounters:   04/04/18 (!) 260 lb (117.9 kg)   02/08/18 (!) 259 lb (117.5 kg)   10/18/17 (!) 264 lb 8 oz (120 kg)   Repeat blood pressure left arm 84/58     Appearance:   no distress,    HEENT:   no scleral icterus, normal conjunctivae    Neck: no carotid bruits or thyromegaly   Chest/Lungs:   lungs are clear to auscultation, no rales or wheezing, ICD left subclavian pocket   Cardiovascular:   Jugular venous pressure 4 cm, Apical pulse is regular. Normal S1,S2 with no murmurs or gallops,   Abdomen:  no  Hepatosplenomegaly., nontender,  bowel sounds are present   Extremities: no cyanosis or clubbing, No edema   Skin: no xanthelasma, warm.    Neurologic: No gross focal neurologic deficits   Mood/Affect: Alert, cooperative    General: WNL  Eyes: WNL  Ears/Nose/Throat: WNL  Lungs: WNL  Heart: WNL  Stomach: Diarrhea  Bladder: WNL  Muscle/Joints: Joint Pain  Skin: Poor Wound Healing  Nervous System: WNL  Mental Health: WNL     Blood: WNL     Medical History  Surgical History Family History Social History   Past Medical History:   Diagnosis Date   ? Arthritis    ? Cardiomyopathy    ? Cardiomyopathy with implantable cardioverter-defibrillator    ? CHF (congestive heart failure)    ? Deviated septum    ? Hernia    ? History of transfusion    ? Hypertension    ? Migraine     Past Surgical History:    Procedure Laterality Date   ? CARDIAC PACEMAKER PLACEMENT      ICD   ? [place] KNEE SCOPE,DIAGNOSTIC      Description: Arthroscopy Knee Left;  Recorded: 06/30/2011;   ? IA REMOVE TONSILS/ADENOIDS,<11 Y/O      Description: Tonsillectomy With Adenoidectomy;  Recorded: 06/30/2011;   ? IA SHLDR ARTHROSCOP,DIAGNOSTIC      Description: Arthroscopy Shoulder Right;  Recorded: 06/30/2011;   ? TONSILLECTOMY      and adenoids    Family History   Problem Relation Age of Onset   ? Stroke Mother    ? Emphysema Mother    ? Goiter Mother    ? Cancer Father      Stomach   ? Alzheimer's disease Brother     Social History     Social History   ? Marital status:      Spouse name: N/A   ? Number of children: 3   ? Years of education: N/A     Occupational History   ? Not on file.     Social History Main Topics   ? Smoking status: Former Smoker     Packs/day: 2.50     Years: 50.00     Types: Cigarettes, Pipe, Cigars     Quit date: 1/1/2000   ? Smokeless tobacco: Never Used   ? Alcohol use No      Comment: Last used 1979   ? Drug use: No   ? Sexual activity: Yes     Partners: Female     Birth control/ protection: Post-menopausal     Other Topics Concern   ? Not on file     Social History Narrative     since 1964, 3 children. 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 Quit smoking in 1999.           Medications  Allergies   Current Outpatient Prescriptions   Medication Sig Dispense Refill   ? acetaminophen (TYLENOL) 325 MG tablet Take 325-650 mg by mouth every 6 (six) hours as needed for pain.     ? allopurinol (ZYLOPRIM) 300 MG tablet Take 1 tablet (300 mg total) by mouth daily. 90 tablet 3   ? amoxicillin (AMOXIL) 500 MG tablet Take 4 tablets (2,000 mg total) by mouth once as needed. TAKE 4 TABLETS 1 HOUR BEFORE DENTAL APPOINTMENT. 8 tablet 1   ? ascorbic acid (ASCORBIC ACID WITH JAVIER HIPS) 500 MG tablet Take 500 mg by mouth daily.      ? aspirin 81 MG EC tablet Take 81 mg by mouth daily.     ? carvedilol (COREG) 25 MG tablet 1.5 tabs  twice a day 270 tablet 3   ? CHOLECALCIFEROL 1,000 unit tablet TAKE 1 TABLET BY MOUTH DAILY. 90 tablet 3   ? CINNAMON BARK (CINNAMON ORAL) 1,000 tablets 2 (two) times a day.      ? coenzyme Q10 100 mg capsule Take 200 mg by mouth daily.      ? digoxin (DIGOX) 125 mcg tablet TAKE ONE TABLET BY MOUTH ONE TIME DAILY 90 tablet 3   ? DULoxetine (CYMBALTA) 60 MG capsule Take 1 capsule (60 mg total) by mouth daily. 90 capsule 3   ? EPINEPHrine (EPIPEN) 0.3 mg/0.3 mL atIn Inject 0.3 mL (0.3 mg total) into the shoulder, thigh, or buttocks as needed. 1 Pre-filled Pen Syringe 0   ? fexofenadine (ALLEGRA) 180 MG tablet TAKE 1 TABLET (180 MG TOTAL) BY MOUTH DAILY. 90 tablet 3   ? furosemide (LASIX) 40 MG tablet Take 1.5 pills twice daily (Patient taking differently: Take 60 mg by mouth daily. Take 1.5 tablets by mouth daily) 270 tablet 3   ? gabapentin (NEURONTIN) 300 MG capsule Take 1 capsule (300 mg total) by mouth at bedtime. 90 capsule 3   ? HYDROcodone-acetaminophen 5-325 mg per tablet TAKE 1 TABLET BY MOUTH EVERY 6 HOURS AS NEEDED FOR PAIN 60 tablet 0   ? KRILL OIL ORAL Take 1,000 mg by mouth.      ? meloxicam (MOBIC) 15 MG tablet Take one tablet by mouth one time daily 90 tablet 3   ? multivitamin (MULTIVITAMIN) per tablet 1 tablet every other day.      ? mupirocin (BACTROBAN) 2 % ointment Apply topically 2 (two) times a day. To affected area(s). 22 g 3   ? sildenafil (VIAGRA) 50 MG tablet 50 mg. Take 1 tablet 1 hour before needed     ? simethicone (MYLICON) 125 MG chewable tablet Use As Directed.     ? spironolactone (ALDACTONE) 25 MG tablet Take one tablet by mouth one time daily 90 tablet 3   ? traMADol (ULTRAM) 50 mg tablet TAKE TWO TABLETS BY MOUTH THREE TIMES DAILY 180 tablet 5   ? triamcinolone (KENALOG) 0.5 % cream as needed. Apply 1 inch      ? omeprazole (PRILOSEC) 20 MG capsule Take 20 mg by mouth as needed.        Current Facility-Administered Medications   Medication Dose Route Frequency Provider Last Rate  Last Dose   ? cyanocobalamin injection 1,000 mcg  1,000 mcg Intramuscular Q30 Days Mario Lepe MD   1,000 mcg at 11/15/16 7679      Allergies   Allergen Reactions   ? Venom-Honey Bee Swelling and Dizziness

## 2021-06-26 NOTE — PROGRESS NOTES
Progress Notes by Phu Zaman DPM at 8/28/2018 10:20 AM     Author: Phu Zaman DPM Service: -- Author Type: Physician    Filed: 8/28/2018 11:25 AM Encounter Date: 8/28/2018 Status: Signed    : Phu Zaman DPM (Physician)       FOOT AND ANKLE SURGERY/PODIATRY Progress Note        ASSESSMENT:   Ulceration right foot  Pes Planovalgus right       TREATMENT:  -Right foot ulceration appears stable, no signs of infection but with increased depth. I recommend a wound vac to assist with granulation tissue formation. Patient consents.  -At his previous visit, the patient was considering reconstructive surgery for the large flatfoot deformity on his right foot. At today's visit, he has decided not to pursue surgery. Will continue with conservative treatment.   -Patient presents to clinic for evaluation of their neuropathic ulcer. It is medically necessary for the accelerated formation of granulation tissue.   VASC Wound 08/28/18 right medial ankle ( wound accidently removed on 8/28/18) (Active)   Pre Size Length 3.1 8/28/2018 10:00 AM   Pre Size Width 2.1 8/28/2018 10:00 AM   Pre Size Depth 0.3 8/28/2018 10:00 AM   Pre Total Sq cm 6.51 8/28/2018 10:00 AM       Other Ulcers 08/04/18 Other (Comment) Right;Medial (Active)   Site Assessment White;Pink 8/27/2018 10:01 AM   Surrounding Tissue Assessment Clean;Edema;Pink 8/27/2018 10:01 AM   Wound Measurement (L x W x D cm) 2.8 x 1.9x 0.6 8/27/2018 10:01 AM   Wound Width 1.9 8/27/2018 10:01 AM   Depth 0.6 8/27/2018 10:01 AM   Drainage Amount Large 8/27/2018 10:01 AM   Drainage Description Serosanguineous 8/27/2018 10:01 AM   Other Ulcer Site Care Cleansed 8/27/2018 10:01 AM   Other Ulcer Treatment/Dressings Wet to moist 8/27/2018 10:01 AM   Dressing Changed New 8/27/2018 10:01 AM   Dressing Status Clean;Intact;Dressing changed 8/27/2018 10:01 AM    Patient appears to be well nourished. Patient's nutritional status is not compromising wound healing. Patient  has been offloading with wheelchair.   -Sharp, excisional debridement of the wound into muscle tissue was performed using #15 blade for a total of 6.51 square centimeters. Debridement was done to reduce pressure, remove non-viable, devitalized tissue and promote wound healing. Patient tolerated this well. A gauze dressing was applied. He will continue to apply a gauze dressing until the wound vac can be applied. Non-weight bearing right foot with wheelchair.   -He will follow-up with me in 2 weeks.      This note was electronically signed by Phu Zaman, [facility]      HPI: Florentino Fall was seen again today for a right foot ulceration. I admitted the patient to [facility] at his last visit with me. He underwent debridement of the wound with Dr. Fletcher. He has completed a course of antibiotics per ID, and has finished using a wound vac. Currently applying a wet to dry dressing per home healthcare. Prior CT scans were negative for osteomyelitis. [facility] splint.     Past Medical History:   Diagnosis Date   ? Arthritis    ? Cardiomyopathy (H)    ? Cardiomyopathy with implantable cardioverter-defibrillator (H)    ? Charcot's joint of foot     bilateral ankles   ? CHF (congestive heart failure) (H)    ? Deviated septum    ? Eczema    ? Hernia    ? History of transfusion    ? Hypertension    ? Migraine    ? Systolic heart failure (H)    ? V-tach (H)     has icd       Allergies   Allergen Reactions   ? Venom-Honey Bee Swelling and Dizziness         Current Outpatient Prescriptions:   ?  acetaminophen (TYLENOL) 325 MG tablet, Take 650 mg by mouth 2 (two) times a day as needed for pain. , Disp: , Rfl:   ?  allopurinol (ZYLOPRIM) 300 MG tablet, Take 1 tablet (300 mg total) by mouth daily., Disp: 90 tablet, Rfl: 3  ?  amiodarone (PACERONE) 200 MG tablet, Take 1 tablet (200 mg total) by mouth 2 (two) times a day., Disp: 180 tablet, Rfl: 0  ?  ascorbic  acid, vitamin C, (ASCORBIC ACID WITH JAVIER HIPS) 500 MG tablet, Take 500 mg by mouth daily. (start after guiacs obtained), Disp: , Rfl:   ?  aspirin 81 MG EC tablet, Take 81 mg by mouth daily., Disp: , Rfl:   ?  carvedilol (COREG) 25 MG tablet, Take 1.5 tablets (37.5 mg total) by mouth 2 (two) times a day with meals., Disp: 270 tablet, Rfl: 3  ?  CHOLECALCIFEROL 1,000 unit tablet, TAKE 1 TABLET BY MOUTH DAILY., Disp: 90 tablet, Rfl: 3  ?  CINNAMON BARK (CINNAMON ORAL), 1,000 tablets 2 (two) times a day. , Disp: , Rfl:   ?  coenzyme Q10 100 mg capsule, Take 200 mg by mouth daily. , Disp: , Rfl:   ?  DULoxetine (CYMBALTA) 60 MG capsule, Take 1 capsule (60 mg total) by mouth 2 (two) times a day., Disp: 180 capsule, Rfl: 3  ?  EPINEPHrine (EPIPEN/ADRENACLICK) 0.3 mg/0.3 mL injection, Inject 0.3 mL (0.3 mg total) as directed as needed for anaphylaxis. Inject into thigh., Disp: 2 Pre-filled Pen Syringe, Rfl: 0  ?  famotidine (PEPCID) 20 MG tablet, Take 1 tablet (20 mg total) by mouth 2 (two) times a day., Disp: , Rfl: 0  ?  ferrous sulfate 325 (65 FE) MG tablet, Take 1 tablet by mouth daily with breakfast. (start after guiacs obtained), Disp: , Rfl:   ?  fexofenadine (ALLEGRA) 180 MG tablet, TAKE 1 TABLET (180 MG TOTAL) BY MOUTH DAILY., Disp: 90 tablet, Rfl: 3  ?  furosemide (LASIX) 40 MG tablet, Take 1 tablet (40 mg total) by mouth daily. Dose decreased by DND 4/6. (Patient taking differently: Take 20 mg by mouth daily. Dose decreased to 30 mg on 7/10/18. Give 1.5 tablets for a total of 30 mg daily), Disp: 270 tablet, Rfl: 3  ?  KRILL OIL ORAL, Take 1,000 mg by mouth. , Disp: , Rfl:   ?  Lactobacillus rhamnosus GG (CULTURELLE) 10-15 Billion cell capsule, Take 1 capsule by mouth daily., Disp: , Rfl:   ?  loperamide (IMODIUM) 2 mg capsule, Take 1 capsule (2 mg total) by mouth 3 (three) times a day as needed for diarrhea., Disp: , Rfl: 0  ?  milk thistle 175 mg tablet, Take 175 mg by mouth daily., Disp: , Rfl:   ?   multivitamin (MULTIVITAMIN) per tablet, 1 tablet every other day. , Disp: , Rfl:   ?  mupirocin (BACTROBAN) 2 % ointment, Apply topically 2 (two) times a day. To affected area(s)., Disp: 22 g, Rfl: 3  ?  oxymetazoline (AFRIN) 0.05 % nasal spray, 2 sprays into each nostril 2 (two) times a day as needed. , Disp: , Rfl:   ?  potassium chloride (K-DUR,KLOR-CON) 20 MEQ tablet, Take 1 tablet (20 mEq total) by mouth daily., Disp: 90 tablet, Rfl: 3  ?  senna-docusate (PERICOLACE) 8.6-50 mg tablet, Take 1 tablet by mouth 2 (two) times a day. And two times a day prn, Disp: , Rfl:   ?  simethicone (MYLICON) 125 MG chewable tablet, Chew 125 mg every 6 (six) hours as needed for flatulence., Disp: , Rfl:   ?  spironolactone (ALDACTONE) 25 MG tablet, Take one tablet by mouth one time daily, Disp: 90 tablet, Rfl: 3  ?  topiramate (TOPAMAX) 50 MG tablet, Take 1 tablet (50 mg total) by mouth 2 (two) times a day., Disp: 180 tablet, Rfl: 3  ?  traMADol (ULTRAM) 50 mg tablet, Take 2 tablets (100 mg total) by mouth 2 (two) times a day., Disp: 10 tablet, Rfl: 0  ?  traMADol (ULTRAM) 50 mg tablet, TAKE TWO TABLETS BY MOUTH THREE TIMES DAILY, Disp: 180 tablet, Rfl: 5    Review of Systems - Negative       OBJECTIVE:  Appearance: alert, well appearing, and in no distress.    Vitals:    08/28/18 1014   BP: 118/80   Pulse: 86   Resp: 16   Temp: 97.5  F (36.4  C)       Vascular: Dorsalis pedis non-palpableRight.  Dermatologic:    VASC Wound 08/28/18 right medial ankle ( wound accidently removed on 8/28/18) (Active)   Pre Size Length 3.1 8/28/2018 10:00 AM   Pre Size Width 2.1 8/28/2018 10:00 AM   Pre Size Depth 0.3 8/28/2018 10:00 AM   Pre Total Sq cm 6.51 8/28/2018 10:00 AM       Other Ulcers 08/04/18 Other (Comment) Right;Medial (Active)   Site Assessment White;Pink 8/27/2018 10:01 AM   Surrounding Tissue Assessment Clean;Edema;Pink 8/27/2018 10:01 AM   Wound Measurement (L x W x D cm) 2.8 x 1.9x 0.6 8/27/2018 10:01 AM   Wound Width 1.9  8/27/2018 10:01 AM   Depth 0.6 8/27/2018 10:01 AM   Drainage Amount Large 8/27/2018 10:01 AM   Drainage Description Serosanguineous 8/27/2018 10:01 AM   Other Ulcer Site Care Cleansed 8/27/2018 10:01 AM   Other Ulcer Treatment/Dressings Wet to moist 8/27/2018 10:01 AM   Dressing Changed New 8/27/2018 10:01 AM   Dressing Status Clean;Intact;Dressing changed 8/27/2018 10:01 AM   Right foot ulceration probes close to the TN joint capsule. No erythema right foot. Granular tissue along periphery of the wound with non-viable tissue noted centrally.   Neurologic: Diminished to light touch Right.  Musculoskeletal: Collapse of the medial arch right foot with prominent talus.     Imaging: None    Picture:

## 2021-06-27 NOTE — PROGRESS NOTES
Progress Notes by Phu Zaman DPM at 1/24/2019  9:20 AM     Author: Phu Zaman DPM Service: -- Author Type: Physician    Filed: 1/24/2019 10:39 AM Encounter Date: 1/24/2019 Status: Signed    : Phu Zaman DPM (Physician)       FOOT AND ANKLE SURGERY/PODIATRY Progress Note        ASSESSMENT:   S/p Amputation 2nd digit left foot  Ulceration bilateral feet  Pes Planovalgus right   Hammertoe      TREATMENT:  -Surgical site left foot is progressing well. Sutures removed today, steri-strips applied.   -I again discussed possible surgical intervention for severe pes planovalgus with dislocated talus. Patient declines.  -Sharp, excisional debridement of the wound into subcutaneous tissue was performed using currette for a total of 0.12 square centimeters. Debridement was done to reduce pressure, remove non-viable, devitalized tissue and promote wound healing. Patient tolerated this well. Endoform with a gauze dresssing was applied.   -He will continue to apply Endoform with a gauze dresssing as directed.  -He will follow-up in 4 weeks.      Phu Zaman DPM  [facility]      HPI: Florentino Fall was seen again today s/p amputation 2nd digit left foot and bilateral foot ulcerations. He has remained limited walking with the cage splint. He is scheduled to see [facility] today for dispensing of AFO.     Past Medical History:   Diagnosis Date   ? Allergic rhinitis    ? Arthritis    ? Cardiomyopathy (H)    ? Cardiomyopathy with implantable cardioverter-defibrillator (H)    ? Charcot ankle     bilateral   ? Charcot's joint of foot     bilateral ankles   ? CHF (congestive heart failure) (H)    ? Chronic kidney disease     stage 3   ? Deviated septum    ? Diabetic ulcer of left midfoot associated with type 2 diabetes mellitus, limited to breakdown of skin (H)    ? Dyslipidemia    ? Eczema    ? Gout    ? H/O cardiac arrest    ? Hernia    ? History of transfusion    ? Hypertension    ?  Migraine    ? Osteomyelitis of foot (H)     right   ? Systolic heart failure (H)    ? V-tach (H)     has icd       Past Surgical History:   Procedure Laterality Date   ? CARDIAC PACEMAKER PLACEMENT      ICD/PACEMAKER   ? HERNIA REPAIR     ? NOSE SURGERY     ? PICC AND MIDLINE TEAM LINE INSERTION  6/22/2018        ? TX COMPLEX DRAINAGE, WOUND Right 6/20/2018    Procedure: INCISION AND DRAINAGE, LOWER EXTREMITY with BONE BIOPSY/CULTURE ;  Surgeon: Denisse Fletcher DPM;  Location: [facility];  Service: Podiatry   ? TX KNEE SCOPE,DIAGNOSTIC      Description: Arthroscopy Knee Left;  Recorded: 06/30/2011;   ? TX REMOVE TONSILS/ADENOIDS,<13 Y/O      Description: Tonsillectomy With Adenoidectomy;  Recorded: 06/30/2011;   ? TX SHLDR ARTHROSCOP,DIAGNOSTIC      Description: Arthroscopy Shoulder Right;  Recorded: 06/30/2011;   ? TONSILLECTOMY      and adenoids       Allergies   Allergen Reactions   ? Venom-Honey Bee Swelling and Dizziness         Current Outpatient Medications:   ?  acetaminophen (TYLENOL) 325 MG tablet, Take 650 mg by mouth 2 (two) times a day as needed for pain. , Disp: , Rfl:   ?  allopurinol (ZYLOPRIM) 300 MG tablet, TAKE 1 TABLET BY MOUTH DAILY., Disp: 90 tablet, Rfl: 2  ?  amiodarone (PACERONE) 200 MG tablet, Take 200 mg by mouth see administration instructions. Take 1 tablet daily on 4 days per week, and 1 tablet twice a day on 3 days per week as directed, Disp: , Rfl:   ?  ascorbic acid, vitamin C, (ASCORBIC ACID WITH JAVIER HIPS) 500 MG tablet, Take 500 mg by mouth daily. (start after mya obtained), Disp: , Rfl:   ?  aspirin 81 MG EC tablet, Take 81 mg by mouth daily., Disp: , Rfl:   ?  carvedilol (COREG) 25 MG tablet, Take 2 tablets (50 mg total) by mouth 2 (two) times a day with meals., Disp: 360 tablet, Rfl: 3  ?  CHOLECALCIFEROL 1,000 unit tablet, TAKE 1 TABLET BY MOUTH DAILY., Disp: 90 tablet, Rfl: 2  ?  cinnamon bark (CINNAMON ORAL), Take 1 tablet by mouth Daily at 5 pm..    ,  Disp: , Rfl:   ?  coenzyme Q10 100 mg capsule, Take 200 mg by mouth daily. , Disp: , Rfl:   ?  DULoxetine (CYMBALTA) 60 MG capsule, Take 1 capsule (60 mg total) by mouth 2 (two) times a day., Disp: 180 capsule, Rfl: 3  ?  EPINEPHrine (EPIPEN/ADRENACLICK) 0.3 mg/0.3 mL injection, Inject 0.3 mL (0.3 mg total) as directed as needed for anaphylaxis. Inject into thigh., Disp: 2 Pre-filled Pen Syringe, Rfl: 0  ?  famotidine (PEPCID) 20 MG tablet, Take 1 tablet (20 mg total) by mouth 2 (two) times a day. (Patient taking differently: Take 1 tablet by mouth 2 (two) times a day as needed for heartburn. Indications: multiple endocrine neoplasia, gastroesophageal reflux disease), Disp: , Rfl: 0  ?  ferrous sulfate 325 (65 FE) MG tablet, Take 1 tablet by mouth daily with breakfast. (start after guiacs obtained), Disp: , Rfl:   ?  fexofenadine (ALLEGRA) 180 MG tablet, TAKE 1 TABLET (180 MG TOTAL) BY MOUTH DAILY., Disp: 90 tablet, Rfl: 3  ?  furosemide (LASIX) 20 MG tablet, Take 20 mg by mouth daily., Disp: , Rfl:   ?  HYDROcodone-acetaminophen (NORCO) 5-325 mg per tablet, Take 1 tablet by mouth every 4 (four) hours as needed for pain., Disp: 20 tablet, Rfl: 0  ?  KRILL OIL ORAL, Take 1,000 mg by mouth daily.    , Disp: , Rfl:   ?  loperamide (IMODIUM) 2 mg capsule, Take 1 capsule (2 mg total) by mouth 3 (three) times a day as needed for diarrhea., Disp: , Rfl: 0  ?  meloxicam (MOBIC) 15 MG tablet, TAKE ONE TABLET BY MOUTH ONE TIME DAILY, Disp: 90 tablet, Rfl: 3  ?  milk thistle 175 mg tablet, Take 175 mg by mouth daily., Disp: , Rfl:   ?  multivitamin (MULTIVITAMIN) per tablet, 1 tablet every other day. , Disp: , Rfl:   ?  mupirocin (BACTROBAN) 2 % ointment, Apply 1 application topically 2 (two) times a day as needed., Disp: , Rfl:   ?  oxymetazoline (AFRIN) 0.05 % nasal spray, 2 sprays into each nostril 2 (two) times a day as needed. , Disp: , Rfl:   ?  simethicone (MYLICON) 125 MG chewable tablet, Chew 125 mg every 6 (six)  hours as needed for flatulence., Disp: , Rfl:   ?  spironolactone (ALDACTONE) 25 MG tablet, TAKE ONE TABLET BY MOUTH ONE TIME DAILY, Disp: 90 tablet, Rfl: 2  ?  topiramate (TOPAMAX) 50 MG tablet, 50 mgs am and 100 mgs pm. (Patient taking differently: Take  mg by mouth 2 (two) times a day. 50 mg in the morning and 100 mg in the evening   ), Disp: 270 tablet, Rfl: 3  ?  traMADol (ULTRAM) 50 mg tablet, Take 2 tablets (100 mg total) by mouth 3 (three) times a day. (Patient taking differently: Take 100 mg by mouth 2 (two) times a day.    ), Disp: 180 tablet, Rfl: 5    Current Facility-Administered Medications:   ?  lidocaine 2 % jelly (XYLOCAINE), , Topical, ISMAELN, Phu Zaman DPM, 1 application at 01/09/19 1349    Review of Systems - per HPI, all others negative      OBJECTIVE:  Appearance: alert, well appearing, and in no distress.    Vitals:    01/24/19 0931   BP: (!) 88/56   Pulse: 64   Resp: 16   Temp: 98.5  F (36.9  C)       General appearance: Patient is alert and fully cooperative with history & exam.  No sign of distress is noted during the visit.     Psychiatric: Affect is pleasant & appropriate.  Patient appears motivated to improve health.     Respiratory: Breathing is regular & unlabored while sitting.     HEENT: Hearing is intact to spoken word.  Speech is clear.  No gross evidence of visual impairment that would impact ambulation.    Vascular: Dorsalis pedis non-palpableBilateral.  Dermatologic:    VASC Wound 11/16/18 right medial ankle inferior (Active)   Pre Size Length 0.4 1/24/2019  9:00 AM   Pre Size Width 0.3 1/24/2019  9:00 AM   Pre Size Depth 0.1 1/24/2019  9:00 AM   Pre Total Sq cm 0.12 1/24/2019  9:00 AM       VASC Wound 12/07/18 left medial ankle (Active)   Pre Size Length 1 1/24/2019  9:00 AM   Pre Size Width 1 1/24/2019  9:00 AM   Pre Size Depth 0.1 1/24/2019  9:00 AM   Pre Total Sq cm 1 1/24/2019  9:00 AM       Wound 11/19/18 Other wound type (comment) Ankle Right;Medial  (center/inner) (Active)       Wound 12/07/18 Left;2nd digit (Active)       Wound 12/07/18 Ankle Left;Medial (center/inner) (Active)       Wound 01/02/19 Other wound type (comment) Ankle Right (Active)       Wound 01/02/19 Other wound type (comment) Ankle Left (Active)       Incision 01/02/19 Surgical Foot Left (Active)       Other Ulcers 08/04/18 Other (Comment) Right;Medial (Active)   Sutures intact with skin edges well approximated, no gapping noted. No erythema bilateral.  Neurologic: Diminished to light touch Bilateral.  Musculoskeletal: Prominent talar head bilateral with severe pes planovalgus.    Imaging:   X-ray of affected locations - see orders.     Interpretation: report not available    Xr Foot Left 3 Or More Vws Standing    Result Date: 1/9/2019  EXAM DATE:         01/09/2019 [facility] RADIOLOGY EXAM: X-RAY FOOT LEFT, MINIMUM 3 VIEWS LOCATION: [facility] DATE/TIME: 1/9/2019 2:45 PM INDICATION: Type 2 diabetes mellitus with heel and mid foot ulcer. COMPARISON: Ankle films 4/4/2016 FINDINGS: Second toe amputation. No focal bony abnormality to suggest osteomyelitis. 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 This has progressed since the prior ankle films.          Picture:

## 2021-06-27 NOTE — PROGRESS NOTES
Progress Notes by Phu Zaman DPM at 12/14/2018  9:00 AM     Author: Phu Zaman DPM Service: -- Author Type: Physician    Filed: 12/14/2018 11:08 AM Encounter Date: 12/14/2018 Status: Signed    : Phu Zaman DPM (Physician)       FOOT AND ANKLE SURGERY/PODIATRY Progress Note        ASSESSMENT:   Ulceration/Osteomyelitis 2nd digit left foot   Ulceration bilateral feet  Pes Planovalgus right   Hammertoe      TREATMENT:  -Bilateral foot ulcerations are stable. Exposed bone along the 2nd digit left foot. Surgery scheduled for 1/2/19. He is scheduled with Dr. Lepe later today for a pre-op physical.   -Sharp, excisional debridement of the wound into osseous tissue was performed using currette for a total of 3 square centimeters. Debridement was done to reduce pressure, remove non-viable, devitalized tissue and promote wound healing. Patient tolerated this well. Endoform with a gauze dresssing was applied.   -He will continue to apply Endoform with a gauze dresssing as directed.  -I will see him for follow-up at the surgical date or sooner if problems develop.       Phu Zaman DPM  [facility]      HPI: Florentino Fall was seen again today for bilateral foot ulcerations. Doing well today. He is scheduled to see Dr. Lepe today for a pre-op physical.      Past Medical History:   Diagnosis Date   ? Arthritis    ? Cardiomyopathy (H)    ? Cardiomyopathy with implantable cardioverter-defibrillator (H)    ? Charcot's joint of foot     bilateral ankles   ? CHF (congestive heart failure) (H)    ? Deviated septum    ? Eczema    ? Hernia    ? History of transfusion    ? Hypertension    ? Migraine    ? Systolic heart failure (H)    ? V-tach (H)     has icd       Allergies   Allergen Reactions   ? Venom-Honey Bee Swelling and Dizziness         Current Outpatient Medications:   ?  acetaminophen (TYLENOL) 325 MG tablet, Take 650 mg by mouth 2 (two) times a day as needed for pain. ,  Disp: , Rfl:   ?  allopurinol (ZYLOPRIM) 300 MG tablet, TAKE 1 TABLET BY MOUTH DAILY., Disp: 90 tablet, Rfl: 2  ?  amiodarone (PACERONE) 200 MG tablet, One tablet twice daily except Wed and Sun, take 1 tablet, Disp: 180 tablet, Rfl: 1  ?  ascorbic acid, vitamin C, (ASCORBIC ACID WITH JAVIER HIPS) 500 MG tablet, Take 500 mg by mouth daily. (start after guiacs obtained), Disp: , Rfl:   ?  aspirin 81 MG EC tablet, Take 81 mg by mouth daily., Disp: , Rfl:   ?  carvedilol (COREG) 25 MG tablet, Take 1.5 tablets (37.5 mg total) by mouth 2 (two) times a day with meals., Disp: 270 tablet, Rfl: 3  ?  CHOLECALCIFEROL 1,000 unit tablet, TAKE 1 TABLET BY MOUTH DAILY., Disp: 90 tablet, Rfl: 2  ?  cinnamon bark (CINNAMON ORAL), Take 1,000 tablets by mouth Daily at 5 pm. Indications: supplement, Disp: , Rfl:   ?  coenzyme Q10 100 mg capsule, Take 200 mg by mouth daily. , Disp: , Rfl:   ?  DULoxetine (CYMBALTA) 60 MG capsule, Take 1 capsule (60 mg total) by mouth 2 (two) times a day., Disp: 180 capsule, Rfl: 3  ?  EPINEPHrine (EPIPEN/ADRENACLICK) 0.3 mg/0.3 mL injection, Inject 0.3 mL (0.3 mg total) as directed as needed for anaphylaxis. Inject into thigh., Disp: 2 Pre-filled Pen Syringe, Rfl: 0  ?  famotidine (PEPCID) 20 MG tablet, Take 1 tablet (20 mg total) by mouth 2 (two) times a day. (Patient taking differently: Take 1 tablet by mouth 2 (two) times a day as needed for heartburn. Indications: multiple endocrine neoplasia, gastroesophageal reflux disease), Disp: , Rfl: 0  ?  ferrous sulfate 325 (65 FE) MG tablet, Take 1 tablet by mouth daily with breakfast. (start after guiacs obtained), Disp: , Rfl:   ?  fexofenadine (ALLEGRA) 180 MG tablet, TAKE 1 TABLET (180 MG TOTAL) BY MOUTH DAILY., Disp: 90 tablet, Rfl: 3  ?  furosemide (LASIX) 40 MG tablet, Take 1 tablet (40 mg total) by mouth daily. Dose decreased by DND 4/6. (Patient taking differently: Take 20 mg by mouth every other day Dose decreased by DND 4/6. 9/28/18 dose decreased  to every other day by PMD  Then as needed for swelling.   ), Disp: 270 tablet, Rfl: 3  ?  KRILL OIL ORAL, Take 1,000 mg by mouth. , Disp: , Rfl:   ?  loperamide (IMODIUM) 2 mg capsule, Take 1 capsule (2 mg total) by mouth 3 (three) times a day as needed for diarrhea., Disp: , Rfl: 0  ?  meloxicam (MOBIC) 15 MG tablet, TAKE ONE TABLET BY MOUTH ONE TIME DAILY, Disp: 90 tablet, Rfl: 3  ?  milk thistle 175 mg tablet, Take 175 mg by mouth daily., Disp: , Rfl:   ?  multivitamin (MULTIVITAMIN) per tablet, 1 tablet every other day. , Disp: , Rfl:   ?  mupirocin (BACTROBAN) 2 % ointment, Apply topically 2 (two) times a day. To affected area(s)., Disp: 22 g, Rfl: 3  ?  oxymetazoline (AFRIN) 0.05 % nasal spray, 2 sprays into each nostril 2 (two) times a day as needed. , Disp: , Rfl:   ?  potassium chloride (K-DUR,KLOR-CON) 20 MEQ tablet, Take 1 tablet (20 mEq total) by mouth daily., Disp: 90 tablet, Rfl: 3  ?  simethicone (MYLICON) 125 MG chewable tablet, Chew 125 mg every 6 (six) hours as needed for flatulence., Disp: , Rfl:   ?  spironolactone (ALDACTONE) 25 MG tablet, TAKE ONE TABLET BY MOUTH ONE TIME DAILY, Disp: 90 tablet, Rfl: 2  ?  sulfamethoxazole-trimethoprim (SEPTRA DS) 800-160 mg per tablet, Take 1 tablet by mouth 2 (two) times a day for 10 days., Disp: 20 tablet, Rfl: 0  ?  topiramate (TOPAMAX) 50 MG tablet, 50 mgs am and 100 mgs pm., Disp: 270 tablet, Rfl: 3  ?  traMADol (ULTRAM) 50 mg tablet, Take 2 tablets (100 mg total) by mouth 3 (three) times a day., Disp: 180 tablet, Rfl: 5    Current Facility-Administered Medications:   ?  lidocaine 2 % jelly (XYLOCAINE), , Topical, PRN, Phu Zaman, DPM    Review of Systems - per HPI, all others negative      OBJECTIVE:  Appearance: alert, well appearing, and in no distress.    Vitals:    12/14/18 0911   BP: (!) 84/56   Pulse: 60   Resp: 20   Temp: 98.2  F (36.8  C)       Vascular: Dorsalis pedis non-palpableBilateral.  Dermatologic:    VASC Wound 09/11/18 Right  medial ankle (previously removed) (Active)   Pre Size Length 0.4 11/16/2018 10:00 AM   Pre Size Width 0.2 11/16/2018 10:00 AM   Pre Size Depth 0.2 11/16/2018 10:00 AM   Pre Total Sq cm 0.08 11/16/2018 10:00 AM       VASC Wound 11/16/18 right medial ankle inferior (Active)   Pre Size Length 0.6 12/14/2018  9:00 AM   Pre Size Width 0.3 12/14/2018  9:00 AM   Pre Size Depth 0.1 12/14/2018  9:00 AM   Pre Total Sq cm 0.02 12/14/2018  9:00 AM       VASC Wound 12/07/18 left medial ankle (Active)   Pre Size Length 0.5 12/14/2018  9:00 AM   Pre Size Width 0.9 12/14/2018  9:00 AM   Pre Size Depth 0.1 12/14/2018  9:00 AM   Pre Total Sq cm 0.45 12/14/2018  9:00 AM       VASC Wound 12/07/18 left foot, anterior 2nd toe  (Active)   Pre Size Length 0.5 12/14/2018  9:00 AM   Pre Size Width 0.7 12/14/2018  9:00 AM   Pre Size Depth 0.4 12/14/2018  9:00 AM   Pre Total Sq cm 0.01 12/14/2018  9:00 AM       Wound 11/19/18 Other wound type (comment) Ankle Right;Medial (center/inner) (Active)       Wound 12/07/18 Left;2nd digit (Active)   Site Assessment Clean 12/13/2018 12:41 PM   Surrounding Tissue Assessment Clean;Dry;Intact 12/13/2018 12:41 PM   Drainage Amount Small 12/13/2018 12:41 PM   Drainage Description Serous 12/13/2018 12:41 PM   Site Care Cleansed 12/13/2018 12:41 PM   Dressing Dry gauze 12/13/2018 12:41 PM       Wound 12/07/18 Ankle Left;Medial (center/inner) (Active)   Site Assessment Clean 12/13/2018 12:41 PM   Surrounding Tissue Assessment Clean;Dry;Intact;Pale 12/13/2018 12:41 PM   Drainage Amount Small 12/13/2018 12:41 PM   Drainage Description Serous 12/13/2018 12:41 PM   Site Care Cleansed 12/13/2018 12:41 PM   Dressing Dry gauze 12/13/2018 12:41 PM   Dressing Status  Dressing changed 12/13/2018 12:41 PM       Other Ulcers 08/04/18 Other (Comment) Right;Medial (Active)   Site Assessment Pink 12/13/2018 12:41 PM   Drainage Amount Large 12/13/2018 12:41 PM   Drainage Description Serosanguineous 12/13/2018 12:41 PM   Other  Ulcer Site Care Cleansed 12/13/2018 12:41 PM   Other Ulcer Treatment/Dressings Dry gauze;Other (Comment) 12/13/2018 12:41 PM   Dressing Status Dressing changed 12/13/2018 12:41 PM   No erythema bilateral feet.   Neurologic: Diminished to light touch Bilateral.  Musculoskeletal: Contracted digits noted Bilateral.    Imaging: None    Picture:

## 2021-06-27 NOTE — PROGRESS NOTES
Progress Notes by Phu Zaman DPM at 2/22/2019  9:20 AM     Author: Phu Zaman DPM Service: -- Author Type: Physician    Filed: 2/22/2019 11:01 AM Encounter Date: 2/22/2019 Status: Signed    : Phu Zaman DPM (Physician)       FOOT AND ANKLE SURGERY/PODIATRY Progress Note        ASSESSMENT:   S/p Amputation 2nd digit left foot  Ulceration bilateral feet  Pes Planovalgus right   Hammertoe      TREATMENT:  -Surgical site left foot is well coapted. Bilateral rearfoot ulcerations persist. I again discussed that due to the severe bony deformity, it is unlikely that conservative treatment will resolve foot ulceration in the long term. Again recommend surgical reconstruction. Patient declines.  -Sharp, excisional debridement of the wound into subcutaneous tissue was performed using currette for a total of 3 square centimeters. Debridement was done to reduce pressure, remove non-viable, devitalized tissue and promote wound healing. Patient tolerated this well. Endoform with a gauze dresssing was applied.   -He will continue to apply Endoform with a gauze dresssing as directed. Continue with cage splint right and modified AFO left.  -He will follow-up in 4 weeks.       Phu Zaman DPM  [facility] [place]      HPI: Florentino Fall was seen again today for bilateral foot ulcerations and s/p amputation 2nd digit left foot. He is doing well today. Continues to use cage splint right foot and modified AFO on the left foot.       Past Medical History:   Diagnosis Date   ? Allergic rhinitis    ? Arthritis    ? Cardiomyopathy (H)    ? Cardiomyopathy with implantable cardioverter-defibrillator (H)    ? Charcot ankle     bilateral   ? Charcot's joint of foot     bilateral ankles   ? CHF (congestive heart failure) (H)    ? Chronic kidney disease     stage 3   ? Deviated septum    ? Diabetic ulcer of left midfoot associated with type 2 diabetes mellitus, limited to breakdown of skin (H)    ?  Dyslipidemia    ? Eczema    ? Gout    ? H/O cardiac arrest    ? Hernia    ? History of transfusion    ? Hypertension    ? Migraine    ? Osteomyelitis of foot (H)     right   ? Systolic heart failure (H)    ? V-tach (H)     has icd       Past Surgical History:   Procedure Laterality Date   ? CARDIAC PACEMAKER PLACEMENT      ICD/PACEMAKER   ? FOOT AMPUTATION Left 1/2/2019    Procedure: AMPUTATION, 2nd digit left foot;  Surgeon: Phu Zaman DPM;  Location: [facility] [place];  Service: Podiatry   ? HERNIA REPAIR     ? NOSE SURGERY     ? PICC AND MIDLINE TEAM LINE INSERTION  6/22/2018        ? CO COMPLEX DRAINAGE, WOUND Right 6/20/2018    Procedure: INCISION AND DRAINAGE, LOWER EXTREMITY with BONE BIOPSY/CULTURE ;  Surgeon: Denisse Fletcher DPM;  Location: [facility] [place];  Service: Podiatry   ? CO KNEE SCOPE,DIAGNOSTIC      Description: Arthroscopy Knee Left;  Recorded: 06/30/2011;   ? CO REMOVE TONSILS/ADENOIDS,<11 Y/O      Description: Tonsillectomy With Adenoidectomy;  Recorded: 06/30/2011;   ? CO SHLDR ARTHROSCOP,DIAGNOSTIC      Description: Arthroscopy Shoulder Right;  Recorded: 06/30/2011;   ? TONSILLECTOMY      and adenoids       Allergies   Allergen Reactions   ? Venom-Honey Bee Swelling and Dizziness         Current Outpatient Medications:   ?  acetaminophen (TYLENOL) 325 MG tablet, Take 650 mg by mouth 2 (two) times a day as needed for pain. , Disp: , Rfl:   ?  allopurinol (ZYLOPRIM) 300 MG tablet, TAKE 1 TABLET BY MOUTH DAILY., Disp: 90 tablet, Rfl: 2  ?  amiodarone (PACERONE) 200 MG tablet, Take 200 mg by mouth see administration instructions. Take 1 tablet daily on 4 days per week, and 1 tablet twice a day on 3 days per week as directed, Disp: , Rfl:   ?  ascorbic acid, vitamin C, (ASCORBIC ACID WITH JAVIER HIPS) 500 MG tablet, Take 500 mg by mouth daily. (start after mya obtained), Disp: , Rfl:   ?  aspirin 81 MG EC tablet, Take 81 mg by mouth daily., Disp: , Rfl:   ?  carvedilol  (COREG) 25 MG tablet, Take 2 tablets (50 mg total) by mouth 2 (two) times a day with meals., Disp: 360 tablet, Rfl: 3  ?  CHOLECALCIFEROL 1,000 unit tablet, TAKE 1 TABLET BY MOUTH DAILY., Disp: 90 tablet, Rfl: 2  ?  cinnamon bark (CINNAMON ORAL), Take 1 tablet by mouth Daily at 5 pm..    , Disp: , Rfl:   ?  coenzyme Q10 100 mg capsule, Take 200 mg by mouth daily. , Disp: , Rfl:   ?  DULoxetine (CYMBALTA) 60 MG capsule, Take 1 capsule (60 mg total) by mouth 2 (two) times a day., Disp: 180 capsule, Rfl: 3  ?  EPINEPHrine (EPIPEN/ADRENACLICK) 0.3 mg/0.3 mL injection, Inject 0.3 mL (0.3 mg total) as directed as needed for anaphylaxis. Inject into thigh., Disp: 2 Pre-filled Pen Syringe, Rfl: 0  ?  famotidine (PEPCID) 20 MG tablet, Take 1 tablet (20 mg total) by mouth 2 (two) times a day. (Patient taking differently: Take 1 tablet by mouth 2 (two) times a day as needed for heartburn. Indications: multiple endocrine neoplasia, gastroesophageal reflux disease), Disp: , Rfl: 0  ?  ferrous sulfate 325 (65 FE) MG tablet, Take 1 tablet by mouth daily with breakfast. (start after mya obtained), Disp: , Rfl:   ?  fexofenadine (ALLEGRA) 180 MG tablet, TAKE 1 TABLET (180 MG TOTAL) BY MOUTH DAILY., Disp: 90 tablet, Rfl: 3  ?  furosemide (LASIX) 20 MG tablet, Take 20 mg by mouth daily., Disp: , Rfl:   ?  HYDROcodone-acetaminophen (NORCO) 5-325 mg per tablet, Take 1 tablet by mouth every 4 (four) hours as needed for pain., Disp: 20 tablet, Rfl: 0  ?  KRILL OIL ORAL, Take 1,000 mg by mouth daily.    , Disp: , Rfl:   ?  loperamide (IMODIUM) 2 mg capsule, Take 1 capsule (2 mg total) by mouth 3 (three) times a day as needed for diarrhea., Disp: , Rfl: 0  ?  meloxicam (MOBIC) 15 MG tablet, TAKE ONE TABLET BY MOUTH ONE TIME DAILY, Disp: 90 tablet, Rfl: 3  ?  milk thistle 175 mg tablet, Take 175 mg by mouth daily., Disp: , Rfl:   ?  multivitamin (MULTIVITAMIN) per tablet, 1 tablet every other day. , Disp: , Rfl:   ?  mupirocin (BACTROBAN)  2 % ointment, Apply 1 application topically 2 (two) times a day as needed., Disp: , Rfl:   ?  oxymetazoline (AFRIN) 0.05 % nasal spray, 2 sprays into each nostril 2 (two) times a day as needed. , Disp: , Rfl:   ?  simethicone (MYLICON) 125 MG chewable tablet, Chew 125 mg every 6 (six) hours as needed for flatulence., Disp: , Rfl:   ?  spironolactone (ALDACTONE) 25 MG tablet, TAKE ONE TABLET BY MOUTH ONE TIME DAILY, Disp: 90 tablet, Rfl: 2  ?  topiramate (TOPAMAX) 50 MG tablet, 50 mgs am and 100 mgs pm. (Patient taking differently: Take  mg by mouth 2 (two) times a day. 50 mg in the morning and 100 mg in the evening   ), Disp: 270 tablet, Rfl: 3  ?  traMADol (ULTRAM) 50 mg tablet, Take 2 tablets (100 mg total) by mouth 3 (three) times a day. (Patient taking differently: Take 100 mg by mouth 2 (two) times a day.    ), Disp: 180 tablet, Rfl: 5    Current Facility-Administered Medications:   ?  lidocaine 2 % jelly (XYLOCAINE), , Topical, PRN, Phu Zaman, CRISTIN, 1 application at 01/09/19 1349    Review of Systems - per HPI, all others negative      OBJECTIVE:  Vitals:    02/22/19 0935   BP: 100/62   Pulse: 60   Temp: 97.5  F (36.4  C)     General appearance: Patient is alert and fully cooperative with history & exam.  No sign of distress is noted during the visit.   Vascular: Dorsalis pedis palpableBilateral.  Dermatologic:    VASC Wound 11/16/18 right medial ankle inferior (Active)   Pre Size Length 1.2 2/22/2019  9:00 AM   Pre Size Width 0.2 2/22/2019  9:00 AM   Pre Size Depth 0.1 1/24/2019  9:00 AM   Pre Total Sq cm 0.24 2/22/2019  9:00 AM       VASC Wound 12/07/18 left medial ankle (Active)   Pre Size Length 1.5 2/22/2019  9:00 AM   Pre Size Width 1.5 2/22/2019  9:00 AM   Pre Size Depth 0.1 1/24/2019  9:00 AM   Pre Total Sq cm 2.25 2/22/2019  9:00 AM       Wound 11/19/18 Other wound type (comment) Ankle Right;Medial (center/inner) (Active)       Wound 12/07/18 Left;2nd digit (Active)       Wound 12/07/18  Ankle Left;Medial (center/inner) (Active)   Surgical site left foot is well coapted. Granular base at bilateral rearfoot ulcerations. No erythema bilateral.  Neurologic: Diminished to light touch Bilateral.  Musculoskeletal: Contracted digits noted Bilateral.    Imaging:   none    No results found.       Picture:

## 2021-06-27 NOTE — PROGRESS NOTES
Progress Notes by Phu Zaman DPM at 1/9/2019  1:40 PM     Author: Phu Zaman DPM Service: -- Author Type: Physician    Filed: 1/9/2019  2:23 PM Encounter Date: 1/9/2019 Status: Signed    : Phu Zaman DPM (Physician)       FOOT AND ANKLE SURGERY/PODIATRY Progress Note        ASSESSMENT:   S/p Amputation 2nd digit right foot  Ulceration bilateral feet  Pes Planovalgus right   Hammertoe          TREATMENT:  -Surgical site left foot is progressing well. No signs of infection bilateral feet. I recommend he continue to remain limited walking on both feet with cage splint right. He is scheduled to be fitted for a cage splint on the left foot next week.  -Sharp, excisional debridement of the wound into subcutaneous tissue was performed using currette for a total of 5 square centimeters. Debridement was done to reduce pressure, remove non-viable, devitalized tissue and promote wound healing. Patient tolerated this well. Endoform with a gauze dresssing was applied.   -He will continue to apply Endoform with a gauze dresssing as directed.  -Gauze dressing applied to the surgical site which should remain intact.  -He will follow-up in 2 weeks for suture removal and follow-up ulcer cares.       Phu Zaman DPM  [facility]      HPI: Florentino Fall was seen again today s/p amputation 2nd digit right foot and follow-up bilateral foot ulcerations. He has remained limited walking on both feet with a cage splint on the right foot. He is scheduled to pick-up the cage splint for the left foot next week.     Past Medical History:   Diagnosis Date   ? Allergic rhinitis    ? Arthritis    ? Cardiomyopathy (H)    ? Cardiomyopathy with implantable cardioverter-defibrillator (H)    ? Charcot ankle     bilateral   ? Charcot's joint of foot     bilateral ankles   ? CHF (congestive heart failure) (H)    ? Chronic kidney disease     stage 3   ? Deviated septum    ? Diabetic ulcer of left midfoot  associated with type 2 diabetes mellitus, limited to breakdown of skin (H)    ? Dyslipidemia    ? Eczema    ? Gout    ? H/O cardiac arrest    ? Hernia    ? History of transfusion    ? Hypertension    ? Migraine    ? Osteomyelitis of foot (H)     right   ? Systolic heart failure (H)    ? V-tach (H)     has icd       Allergies   Allergen Reactions   ? Venom-Honey Bee Swelling and Dizziness         Current Outpatient Medications:   ?  acetaminophen (TYLENOL) 325 MG tablet, Take 650 mg by mouth 2 (two) times a day as needed for pain. , Disp: , Rfl:   ?  allopurinol (ZYLOPRIM) 300 MG tablet, TAKE 1 TABLET BY MOUTH DAILY., Disp: 90 tablet, Rfl: 2  ?  amiodarone (PACERONE) 200 MG tablet, Take 200 mg by mouth see administration instructions. Take 1 tablet daily on 4 days per week, and 1 tablet twice a day on 3 days per week as directed, Disp: , Rfl:   ?  ascorbic acid, vitamin C, (ASCORBIC ACID WITH JAVIER HIPS) 500 MG tablet, Take 500 mg by mouth daily. (start after mya obtained), Disp: , Rfl:   ?  aspirin 81 MG EC tablet, Take 81 mg by mouth daily., Disp: , Rfl:   ?  carvedilol (COREG) 25 MG tablet, Take 2 tablets (50 mg total) by mouth 2 (two) times a day with meals., Disp: 360 tablet, Rfl: 3  ?  CHOLECALCIFEROL 1,000 unit tablet, TAKE 1 TABLET BY MOUTH DAILY., Disp: 90 tablet, Rfl: 2  ?  cinnamon bark (CINNAMON ORAL), Take 1 tablet by mouth Daily at 5 pm..    , Disp: , Rfl:   ?  coenzyme Q10 100 mg capsule, Take 200 mg by mouth daily. , Disp: , Rfl:   ?  DULoxetine (CYMBALTA) 60 MG capsule, Take 1 capsule (60 mg total) by mouth 2 (two) times a day., Disp: 180 capsule, Rfl: 3  ?  EPINEPHrine (EPIPEN/ADRENACLICK) 0.3 mg/0.3 mL injection, Inject 0.3 mL (0.3 mg total) as directed as needed for anaphylaxis. Inject into thigh., Disp: 2 Pre-filled Pen Syringe, Rfl: 0  ?  famotidine (PEPCID) 20 MG tablet, Take 1 tablet (20 mg total) by mouth 2 (two) times a day. (Patient taking differently: Take 1 tablet by mouth 2 (two) times  a day as needed for heartburn. Indications: multiple endocrine neoplasia, gastroesophageal reflux disease), Disp: , Rfl: 0  ?  ferrous sulfate 325 (65 FE) MG tablet, Take 1 tablet by mouth daily with breakfast. (start after mya obtained), Disp: , Rfl:   ?  fexofenadine (ALLEGRA) 180 MG tablet, TAKE 1 TABLET (180 MG TOTAL) BY MOUTH DAILY., Disp: 90 tablet, Rfl: 3  ?  furosemide (LASIX) 20 MG tablet, Take 20 mg by mouth daily., Disp: , Rfl:   ?  HYDROcodone-acetaminophen (NORCO) 5-325 mg per tablet, Take 1 tablet by mouth every 4 (four) hours as needed for pain., Disp: 20 tablet, Rfl: 0  ?  KRILL OIL ORAL, Take 1,000 mg by mouth daily.    , Disp: , Rfl:   ?  loperamide (IMODIUM) 2 mg capsule, Take 1 capsule (2 mg total) by mouth 3 (three) times a day as needed for diarrhea., Disp: , Rfl: 0  ?  meloxicam (MOBIC) 15 MG tablet, TAKE ONE TABLET BY MOUTH ONE TIME DAILY, Disp: 90 tablet, Rfl: 3  ?  milk thistle 175 mg tablet, Take 175 mg by mouth daily., Disp: , Rfl:   ?  multivitamin (MULTIVITAMIN) per tablet, 1 tablet every other day. , Disp: , Rfl:   ?  mupirocin (BACTROBAN) 2 % ointment, Apply 1 application topically 2 (two) times a day as needed., Disp: , Rfl:   ?  oxymetazoline (AFRIN) 0.05 % nasal spray, 2 sprays into each nostril 2 (two) times a day as needed. , Disp: , Rfl:   ?  simethicone (MYLICON) 125 MG chewable tablet, Chew 125 mg every 6 (six) hours as needed for flatulence., Disp: , Rfl:   ?  spironolactone (ALDACTONE) 25 MG tablet, TAKE ONE TABLET BY MOUTH ONE TIME DAILY, Disp: 90 tablet, Rfl: 2  ?  topiramate (TOPAMAX) 50 MG tablet, 50 mgs am and 100 mgs pm. (Patient taking differently: Take  mg by mouth 2 (two) times a day. 50 mg in the morning and 100 mg in the evening   ), Disp: 270 tablet, Rfl: 3  ?  traMADol (ULTRAM) 50 mg tablet, Take 2 tablets (100 mg total) by mouth 3 (three) times a day. (Patient taking differently: Take 100 mg by mouth 2 (two) times a day.    ), Disp: 180 tablet, Rfl:  5    Current Facility-Administered Medications:   ?  lidocaine 2 % jelly (XYLOCAINE), , Topical, PRN, Phu Zaman, CRISTIN, 1 application at 01/09/19 1349    Review of Systems - per HPI, all others negative      OBJECTIVE:  Appearance: alert, well appearing, and in no distress.    Vitals:    01/09/19 1414   BP: (!) 88/58   Pulse:    Resp:    Temp:        Vascular: Dorsalis pedis non-palpableBilateral.  Dermatologic:    VASC Wound 09/11/18 Right medial ankle (previously removed) (Active)   Pre Size Length 0.4 11/16/2018 10:00 AM   Pre Size Width 0.2 11/16/2018 10:00 AM   Pre Size Depth 0.2 11/16/2018 10:00 AM   Pre Total Sq cm 0.08 11/16/2018 10:00 AM       VASC Wound 11/16/18 right medial ankle inferior (Active)   Pre Size Length 0.3 1/9/2019  1:00 PM   Pre Size Width 0.1 1/9/2019  1:00 PM   Pre Size Depth 0.1 1/9/2019  1:00 PM   Pre Total Sq cm 0.03 1/9/2019  1:00 PM       VASC Wound 12/07/18 left medial ankle (Active)   Pre Size Length 1.3 1/9/2019  1:00 PM   Pre Size Width 1.3 1/9/2019  1:00 PM   Pre Size Depth 0.1 1/9/2019  1:00 PM   Pre Total Sq cm 16.9 1/9/2019  1:00 PM       Wound 11/19/18 Other wound type (comment) Ankle Right;Medial (center/inner) (Active)       Wound 12/07/18 Left;2nd digit (Active)       Wound 12/07/18 Ankle Left;Medial (center/inner) (Active)       Wound 01/02/19 Other wound type (comment) Ankle Right (Active)       Wound 01/02/19 Other wound type (comment) Ankle Left (Active)       Incision 01/02/19 Surgical Foot Left (Active)       Other Ulcers 08/04/18 Other (Comment) Right;Medial (Active)   Sutures intact left foot with skin edges well approximated, no gapping noted. No erythema bilateral.  Neurologic: Diminished to light touch Bilateral.  Musculoskeletal: Contracted digits noted Bilateral. Amputated 2nd digit left foot.    Imaging: None    Picture:

## 2021-06-27 NOTE — PROGRESS NOTES
Progress Notes by Phu Zaman DPM at 3/22/2019 10:20 AM     Author: Phu Zaman DPM Service: -- Author Type: Physician    Filed: 3/22/2019  1:15 PM Encounter Date: 3/22/2019 Status: Signed    : Phu Zaman DPM (Physician)       FOOT AND ANKLE SURGERY/PODIATRY Progress Note        ASSESSMENT:   S/p Amputation 2nd digit left foot  Ulceration left foot   Pes Planovalgus  Hammertoe      TREATMENT:  -Right foot ulceration has resolved. Left foot ulceration continues to improve. Patient's cardiologist has advised against foot/ankle surgery due to cardiac risks. Per this mandate, we will continue with conservative management. However, we did discuss that because of the significant flatfoot deformity, he is at serious risk for future skin breakdown with possible infection and need for amputation.  -Sharp, excisional debridement of the wound into subcutaneous tissue was performed using currette for a total of 0.81 square centimeters. Debridement was done to reduce pressure, remove non-viable, devitalized tissue and promote wound healing. Patient tolerated this well. Endoform with a gauze dresssing was applied.   -He will continue to apply Endoform with a gauze dresssing as directed. Continue with protective shoe gear.  -He will follow-up in 3 weeks.      Phu Zaman DPM  [facility]      HPI: Florentino Fall was seen again today for bilateral foot ulcerations. The patient states he recently discussed having reconstructive foot and ankle surgery with his cardiologist who advised against this procedure.      Past Medical History:   Diagnosis Date   ? Allergic rhinitis    ? Arthritis    ? Cardiomyopathy (H)    ? Cardiomyopathy with implantable cardioverter-defibrillator (H)    ? Charcot ankle     bilateral   ? Charcot's joint of foot     bilateral ankles   ? CHF (congestive heart failure) (H)    ? Chronic kidney disease     stage 3   ? Deviated septum    ? Diabetic ulcer of left  midfoot associated with type 2 diabetes mellitus, limited to breakdown of skin (H)    ? Dyslipidemia    ? Eczema    ? Gout    ? H/O cardiac arrest    ? Hernia    ? History of transfusion    ? Hypertension    ? Migraine    ? Osteomyelitis of foot (H)     right   ? Systolic heart failure (H)    ? V-tach (H)     has icd       Past Surgical History:   Procedure Laterality Date   ? CARDIAC PACEMAKER PLACEMENT      ICD/PACEMAKER   ? FOOT AMPUTATION Left 1/2/2019    Procedure: AMPUTATION, 2nd digit left foot;  Surgeon: Phu Zaman DPM;  [place] [facility] [place]  Service: [place]iatry   ? HERNIA REPAIR     ? NOSE SURGERY     ? PICC AND MIDLINE TEAM LINE INSERTION  6/22/2018        ? SC COMPLEX DRAINAGE, WOUND Right 6/20/2018    Procedure: INCISION AND DRAINAGE, LOWER EXTREMITY with BONE BIOPSY/CULTURE ;  Surgeon: Denisse Fletcher DPM;  [place] [facility] [place]  Service: [place]iatry   ? SC KNEE SCOPE,DIAGNOSTIC      Description: Arthroscopy Knee Left;  Recorded: 06/30/2011;   ? SC REMOVE TONSILS/ADENOIDS,<11 Y/O      Description: Tonsillectomy With Adenoidectomy;  Recorded: 06/30/2011;   ? SC SHLDR ARTHROSCOP,DIAGNOSTIC      Description: Arthroscopy Shoulder Right;  Recorded: 06/30/2011;   ? TONSILLECTOMY      and adenoids       Allergies   Allergen Reactions   ? Venom-Honey Bee Swelling and Dizziness         Current Outpatient Medications:   ?  acetaminophen (TYLENOL) 325 MG tablet, Take 650 mg by mouth 2 (two) times a day as needed for pain. , Disp: , Rfl:   ?  allopurinol (ZYLOPRIM) 300 MG tablet, TAKE 1 TABLET BY MOUTH DAILY., Disp: 90 tablet, Rfl: 2  ?  amiodarone (PACERONE) 200 MG tablet, Take 200 mg by mouth see administration instructions. Take 1 tablet daily on 4 days per week, and 1 tablet twice a day on 3 days per week as directed, Disp: , Rfl:   ?  ascorbic acid, vitamin C, (ASCORBIC ACID WITH JAVIER HIPS) 500 MG tablet, Take 500 mg by mouth daily. (start after mya obtained), Disp: , Rfl:   ?   aspirin 81 MG EC tablet, Take 81 mg by mouth daily., Disp: , Rfl:   ?  carvedilol (COREG) 25 MG tablet, Take 2 tablets (50 mg total) by mouth 2 (two) times a day with meals., Disp: 360 tablet, Rfl: 3  ?  CHOLECALCIFEROL 1,000 unit tablet, TAKE 1 TABLET BY MOUTH DAILY., Disp: 90 tablet, Rfl: 2  ?  cinnamon bark (CINNAMON ORAL), Take 1 tablet by mouth Daily at 5 pm..    , Disp: , Rfl:   ?  coenzyme Q10 100 mg capsule, Take 200 mg by mouth daily. , Disp: , Rfl:   ?  DULoxetine (CYMBALTA) 60 MG capsule, Take 1 capsule (60 mg total) by mouth 2 (two) times a day., Disp: 180 capsule, Rfl: 3  ?  EPINEPHrine (EPIPEN/ADRENACLICK) 0.3 mg/0.3 mL injection, Inject 0.3 mL (0.3 mg total) as directed as needed for anaphylaxis. Inject into thigh., Disp: 2 Pre-filled Pen Syringe, Rfl: 0  ?  famotidine (PEPCID) 20 MG tablet, Take 1 tablet (20 mg total) by mouth 2 (two) times a day. (Patient taking differently: Take 1 tablet by mouth 2 (two) times a day as needed for heartburn. Indications: multiple endocrine neoplasia, gastroesophageal reflux disease), Disp: , Rfl: 0  ?  ferrous sulfate 325 (65 FE) MG tablet, Take 1 tablet by mouth daily with breakfast. (start after guiacs obtained), Disp: , Rfl:   ?  fexofenadine (ALLEGRA) 180 MG tablet, TAKE 1 TABLET (180 MG TOTAL) BY MOUTH DAILY., Disp: 90 tablet, Rfl: 3  ?  furosemide (LASIX) 20 MG tablet, Take 20 mg by mouth daily., Disp: , Rfl:   ?  furosemide (LASIX) 40 MG tablet, TAKE 1 AND 1/2 TABLET BY MOUTH TWICE DAILY, Disp: , Rfl: 3  ?  HYDROcodone-acetaminophen (NORCO) 5-325 mg per tablet, Take 1 tablet by mouth every 4 (four) hours as needed for pain., Disp: 20 tablet, Rfl: 0  ?  KRILL OIL ORAL, Take 1,000 mg by mouth daily.    , Disp: , Rfl:   ?  loperamide (IMODIUM) 2 mg capsule, Take 1 capsule (2 mg total) by mouth 3 (three) times a day as needed for diarrhea., Disp: , Rfl: 0  ?  meloxicam (MOBIC) 15 MG tablet, TAKE ONE TABLET BY MOUTH ONE TIME DAILY, Disp: 90 tablet, Rfl: 3  ?  milk  thistle 175 mg tablet, Take 175 mg by mouth daily., Disp: , Rfl:   ?  multivitamin (MULTIVITAMIN) per tablet, 1 tablet every other day. , Disp: , Rfl:   ?  mupirocin (BACTROBAN) 2 % ointment, Apply 1 application topically 2 (two) times a day as needed., Disp: , Rfl:   ?  oxymetazoline (AFRIN) 0.05 % nasal spray, 2 sprays into each nostril 2 (two) times a day as needed. , Disp: , Rfl:   ?  predniSONE (DELTASONE) 20 MG tablet, Take 20 mg by mouth daily for 7 days., Disp: 7 tablet, Rfl: 0  ?  simethicone (MYLICON) 125 MG chewable tablet, Chew 125 mg every 6 (six) hours as needed for flatulence., Disp: , Rfl:   ?  spironolactone (ALDACTONE) 25 MG tablet, TAKE ONE TABLET BY MOUTH ONE TIME DAILY, Disp: 90 tablet, Rfl: 2  ?  topiramate (TOPAMAX) 50 MG tablet, 50 mgs am and 100 mgs pm, Disp: 270 tablet, Rfl: 3  ?  traMADol (ULTRAM) 50 mg tablet, Take 2 tablets (100 mg total) by mouth 3 (three) times a day. (Patient taking differently: Take 100 mg by mouth 2 (two) times a day.    ), Disp: 180 tablet, Rfl: 5    Current Facility-Administered Medications:   ?  cyanocobalamin injection 1,000 mcg, 1,000 mcg, Intramuscular, Q30 Days, Mario eLpe MD, 1,000 mcg at 02/26/19 1231  ?  lidocaine 2 % jelly (XYLOCAINE), , Topical, PRN, Phu Zaman DPM, 1 application at 01/09/19 1349    Review of Systems - per HPI, all others negative      OBJECTIVE:  Vitals:    03/22/19 1025   BP: 108/66   Pulse: 60   Temp: 97.8  F (36.6  C)     General appearance: Patient is alert and fully cooperative with history & exam.  No sign of distress is noted during the visit.   Vascular: Dorsalis pedis non-palpableBilateral.  Dermatologic:    VASC Wound 11/16/18 right medial ankle inferior (Active)   Pre Size Length 0.7 3/22/2019 10:00 AM   Pre Size Width 0.1 3/22/2019 10:00 AM   Pre Size Depth 0.1 3/22/2019 10:00 AM   Pre Total Sq cm 0.7 3/22/2019 10:00 AM       VASC Wound 12/07/18 left medial ankle (Active)   Pre Size Length 0.9 3/22/2019  10:00 AM   Pre Size Width 0.9 3/22/2019 10:00 AM   Pre Size Depth 0.1 3/22/2019 10:00 AM   Pre Total Sq cm 0.81 3/22/2019 10:00 AM       Wound 11/19/18 Other wound type (comment) Ankle Right;Medial (center/inner) (Active)       Wound 12/07/18 Left;2nd digit (Active)       Wound 12/07/18 Ankle Left;Medial (center/inner) (Active)   Granular base left foot. No erythema bilateral.  Neurologic: Diminished to light touch Left.  Musculoskeletal: Contracted digits noted Bilateral.    Imaging:   none    No results found.       Picture:

## 2021-06-27 NOTE — PROGRESS NOTES
"Progress Notes by Ulises Hernández MD at 4/22/2019  3:50 PM     Author: Ulises Hernández MD Service: -- Author Type: Physician    Filed: 4/22/2019  3:52 PM Encounter Date: 4/22/2019 Status: Signed    : Ulises Hernández MD (Physician)           Click to link to [facility] Heart [facility] HEART [facility] ELECTROPHYSIOLOGY NOTE    Today I had the opportunity to see  Florentino Fall for follow-up evaluation of auricular tachycardia associated with nonischemic cardiomyopathy.      Assessment/Recommendations   Clinic Problem List:  1. Recurrent ventricular tachycardia (H)     2. Non-ischemic cardiomyopathy (H)         Assessment:    Recurrent of ventricular tachycardia with several VT morphologies, antitachycardia pacing reprogrammed to 3 zone device therapies.  Mexiletine and amiodarone will be continued at present doses    Plan:  Continue current medications  Follow up appointment:   Dr. Hernández in device clinic on May 8, 2019       History of Present Illness     Florentino Fall is a 73 y.o. male with recent admission to [facility] with ventricular tachycardia storm and diarrhea.  Ventricular tachycardia appeared to slow and stabilized with IV amiodarone bolus and addition of lidocaine with mexiletine substituted.  He was discharged from the hospital on April 18 but did not start taking mexiletine until late on the 19th.  ICD interrogation today confirmed ICD shocks.  Patient was unaware of shocks and felt minimal palpitations.  He denies chest pain or dyspnea on exertion.  Currently taking amiodarone 200 mg 3 times daily and mexiletine 150 mg 3 times daily with meals.  Todd and his wife are moving to an assisted living environment but his wife \"hates this idea\" but has significant limiting dementia.  Diarrhea has largely resolved.    Personally reviewed.  ICD interrogation shows ventricular tachycardia commonly starts with cycling 440 ms.  Burst pacing is variably effective this morning was " effective at a single burst at 84% VT cycle length but at times up to 5-6 bursts were delivered with acceleration with particular pacing around cycle length 270 ms.  With VT acceleration he is received 241 J shocks with conversion.  VT detect rate was adjusted from 480-460 ms ignore very slow ventricular tachycardia which tends to be nonsustained.  Antitachycardia pacing was suggested to burst x3 then up to 3 ramp pacing.  ICD was reprogrammed to a 3 zone device.       Physical Examination Review of Systems   Vitals:    04/22/19 1506   BP: 90/66   Pulse: 100   Resp: 18     Body mass index is 30.17 kg/m .  Wt Readings from Last 3 Encounters:   04/22/19 (!) 235 lb (106.6 kg)   04/18/19 (!) 231 lb (104.8 kg)   04/15/19 (!) 238 lb 3 oz (108 kg)        Appearance:   no distress, elderly   HEENT:   no scleral icterus, normal conjunctivae    Neck: no carotid bruits or thyromegaly   Chest/Lungs:   lungs are clear to auscultation, no rales or wheezing,    Cardiovascular:   Jugular venous pressure less than 5 cm, Apical pulse is regular. Normal S1,S2 with no murmurs or gallops,   Abdomen:  no  Hepatosplenomegaly., nontender,  bowel sounds are present   Extremities: no cyanosis or clubbing, No edema   Skin: no xanthelasma, warm.    Neurologic: No gross focal neurologic deficits   Mood/Affect: Alert, cooperative    General: WNL  Eyes: WNL  Ears/Nose/Throat: WNL  Lungs: Shortness of Breath  Heart: Shortness of Breath with activity, Irregular Heartbeat, Fainting  Stomach: Diarrhea  Bladder: WNL  Muscle/Joints: WNL  Skin: WNL  Nervous System: Falls  Mental Health: WNL     Blood: WNL     Medical History  Surgical History Family History Social History   Past Medical History:   Diagnosis Date   ? Allergic rhinitis    ? Arthritis    ? Cardiomyopathy (H)    ? Cardiomyopathy with implantable cardioverter-defibrillator (H)    ? Charcot ankle     bilateral   ? Charcot's joint of foot     bilateral ankles   ? CHF (congestive heart failure)  (H)    ? Chronic kidney disease     stage 3   ? Deviated septum    ? Diabetic ulcer of left midfoot associated with type 2 diabetes mellitus, limited to breakdown of skin (H)    ? Dyslipidemia    ? Eczema    ? Gout    ? H/O cardiac arrest    ? Hernia    ? History of transfusion    ? Hypertension    ? Migraine    ? Osteomyelitis of foot (H)     right   ? Systolic heart failure (H)    ? V-tach (H)     has icd    Past Surgical History:   Procedure Laterality Date   ? CARDIAC DEFIBRILLATOR PLACEMENT     ? FOOT AMPUTATION Left 1/2/2019    Procedure: AMPUTATION, 2nd digit left foot;  Surgeon: Phu Zaman DPM;  Location: [facility] - [place];  Service: Podiatry   ? HERNIA REPAIR     ? NOSE SURGERY     ? PICC AND MIDLINE TEAM LINE INSERTION  6/22/2018        ? NH COMPLEX DRAINAGE, WOUND Right 6/20/2018    Procedure: INCISION AND DRAINAGE, LOWER EXTREMITY with BONE BIOPSY/CULTURE ;  Surgeon: Denisse Fletcher DPM;  Location: [facility] - [place];  Service: Podiatry   ? NH KNEE SCOPE,DIAGNOSTIC      Description: Arthroscopy Knee Left;  Recorded: 06/30/2011;   ? NH REMOVE TONSILS/ADENOIDS,<11 Y/O      Description: Tonsillectomy With Adenoidectomy;  Recorded: 06/30/2011;   ? NH SHLDR ARTHROSCOP,DIAGNOSTIC      Description: Arthroscopy Shoulder Right;  Recorded: 06/30/2011;    Family History   Problem Relation Age of Onset   ? Stroke Mother    ? Emphysema Mother    ? Goiter Mother    ? Cancer Father         Stomach   ? Alzheimer's disease Brother     Social History     Socioeconomic History   ? Marital status:      Spouse name: Not on file   ? Number of children: 3   ? Years of education: Not on file   ? Highest education level: Not on file   Occupational History   ? Not on file   Social Needs   ? Financial resource strain: Not on file   ? Food insecurity:     Worry: Not on file     Inability: Not on file   ? Transportation needs:     Medical: Not on file     Non-medical: Not on file   Tobacco Use   ? Smoking  status: Former Smoker     Packs/day: 2.50     Years: 50.00     Pack years: 125.00     Types: Cigarettes, Pipe, Cigars     Last attempt to quit: 2000     Years since quittin.3   ? Smokeless tobacco: Never Used   Substance and Sexual Activity   ? Alcohol use: No     Comment: Last used    ? Drug use: No   ? Sexual activity: Yes     Partners: Female     Birth control/protection: Post-menopausal   Lifestyle   ? Physical activity:     Days per week: Not on file     Minutes per session: Not on file   ? Stress: Not on file   Relationships   ? Social connections:     Talks on phone: Not on file     Gets together: Not on file     Attends Worship service: Not on file     Active member of club or organization: Not on file     Attends meetings of clubs or organizations: Not on file     Relationship status: Not on file   ? Intimate partner violence:     Fear of current or ex partner: Not on file     Emotionally abused: Not on file     Physically abused: Not on file     Forced sexual activity: Not on file   Other Topics Concern   ? Not on file   Social History Narrative     since , 3 children. Recovering alcoholic since . Quit smoking in .           Medications  Allergies   Current Outpatient Medications   Medication Sig Dispense Refill   ? acetaminophen (TYLENOL) 325 MG tablet Take 650 mg by mouth 2 (two) times a day as needed for pain.      ? allopurinol (ZYLOPRIM) 300 MG tablet TAKE 1 TABLET BY MOUTH DAILY. 90 tablet 2   ? amiodarone (PACERONE) 200 MG tablet Take 1 tablet (200 mg total) by mouth 3 (three) times a day. 90 tablet 0   ? ascorbic acid, vitamin C, (ASCORBIC ACID WITH JAVIER HIPS) 500 MG tablet Take 500 mg by mouth daily. (start after mya obtained)     ? aspirin 81 MG EC tablet Take 81 mg by mouth daily.     ? CHOLECALCIFEROL 1,000 unit tablet TAKE 1 TABLET BY MOUTH DAILY. 90 tablet 2   ? cinnamon bark (CINNAMON ORAL) Take 1,000 mg by mouth Daily at 5 pm..            ? coenzyme Q10  100 mg capsule Take 200 mg by mouth daily.      ? DULoxetine (CYMBALTA) 60 MG capsule Take 60 mg by mouth daily.     ? EPINEPHrine (EPIPEN/ADRENACLICK) 0.3 mg/0.3 mL injection Inject 0.3 mL (0.3 mg total) as directed as needed for anaphylaxis. Inject into thigh. 2 Pre-filled Pen Syringe 0   ? famotidine (PEPCID) 20 MG tablet Take 1 tablet (20 mg total) by mouth 2 (two) times a day. (Patient taking differently: Take 1 tablet by mouth 2 (two) times a day as needed for heartburn. Indications: multiple endocrine neoplasia, gastroesophageal reflux disease)  0   ? fexofenadine (ALLEGRA) 180 MG tablet TAKE 1 TABLET (180 MG TOTAL) BY MOUTH DAILY. 90 tablet 3   ? furosemide (LASIX) 20 MG tablet Take 1 tablet (20 mg total) by mouth daily. 30 tablet 0   ? KLOR-CON M20 20 mEq tablet Take 20 mEq by mouth daily.  3   ? KRILL OIL ORAL Take 1,000 mg by mouth daily.            ? loperamide (IMODIUM) 2 mg capsule Take 1 capsule (2 mg total) by mouth 3 (three) times a day as needed for diarrhea.  0   ? metoprolol succinate (TOPROL-XL) 100 MG 24 hr tablet Take 1 tablet (100 mg total) by mouth daily. 30 tablet 0   ? mexiletine (MEXITIL) 150 MG capsule Take 1 capsule (150 mg total) by mouth 3 (three) times a day with meals. 90 capsule 0   ? milk thistle 175 mg tablet Take 175 mg by mouth daily.     ? multivitamin (MULTIVITAMIN) per tablet 1 tablet every other day.      ? mupirocin (BACTROBAN) 2 % ointment Apply 1 application topically 2 (two) times a day as needed.     ? simethicone (MYLICON) 125 MG chewable tablet Chew 125 mg every 6 (six) hours as needed for flatulence.     ? topiramate (TOPAMAX) 50 MG tablet Take 50 mg by mouth 2 (two) times a day.     ? traMADol (ULTRAM) 50 mg tablet TAKE 2 TABLETS (100 MG TOTAL) BY MOUTH 3 (THREE) TIMES A DAY. 180 tablet 1     Current Facility-Administered Medications   Medication Dose Route Frequency Provider Last Rate Last Dose   ? cyanocobalamin injection 1,000 mcg  1,000 mcg Intramuscular Q30  Days Mario Lepe MD   1,000 mcg at 02/26/19 1231      Allergies   Allergen Reactions   ? Venom-Honey Bee Swelling and Dizziness

## 2021-06-27 NOTE — PROGRESS NOTES
Progress Notes by Ulises Hernández MD at 3/5/2019 10:30 AM     Author: Ulises Hernández MD Service: -- Author Type: Physician    Filed: 3/5/2019 11:36 AM Encounter Date: 3/5/2019 Status: Signed    : Ulises Hernández MD (Physician)           Click to link to [facility] Heart [facility] HEART [facility] ELECTROPHYSIOLOGY NOTE    Today I had the opportunity to see  Florentino Fall for follow-up evaluation of ventricular tachycardia associated with nonischemic cardiomyopathy.      Assessment/Recommendations   Clinic Problem List:  1. Paroxysmal ventricular tachycardia (H)     2. Non-ischemic cardiomyopathy (H)     3. CKD (chronic kidney disease) stage 3, GFR 30-59 ml/min (H)         Assessment:    Sustained ventricular tachycardia rate 150 bpm with mild symptoms and no presyncope.  Ineffective antitachycardia pacing and 3 sequential shocks each successful.  Options were discussed in detail and Todd prefers medical management at this time rather than a VT ablation which I think is appropriate given that ventricular tachycardia has been much less frequent on moderate dose amiodarone.  Antitachycardia pacing was reprogrammed to be somewhat more aggressive.  Nonischemic cardiomyopathy no current evidence for decompensated heart failure  Stage III renal insufficiency  Peripheral vascular disease with slowly healing ulcers    Plan:  Antitachycardia pacing was adjusted today  Continue current medications  Medical management over VT ablation is preferred at this time  Follow up appointment:   Dr. Hernández in 4 months in device clinic       History of Present Illness     Florentino Fall is a 73 y.o. male with nonischemic cardiomyopathy. He suffered an out of hospital cardiac arrest on February 7, 2013. Coronary angiography showed no significant coronary artery disease. Cardiac echo at about that time showed an ejection fraction of 30% and mid myocardial scar consistent with nonischemic cardiomyopathy. He had left bundle  branch block and received a CRT-D on August 30, 2013.   He  had episodes of recurrent of ventricular tachycardia since April 2016. VT CL ranged from 350 ms to 260 ms. He had a syncopal episode on April 23, 2016 with rapid VT VF terminated by shock.  Cardiac echo on May 2016 showed a dilated left ventricle with an ejection fraction of 27% essentially unchanged. He was started on amiodarone.  He had 2 episodes of ventricular tachycardia in quick succession on June 23 2016 at 10:23 PM. Each VT was initiated by a late PVC, Initial cycle length 300 ms, quickly accelerated and was terminated 41 J shocks. Amiodarone was discontinued on 7/8/2016 as it was ineffective and associated with some side effects of malaise.  He developed shortness of breath on August 21,2016 associated with sustained ventricular tachycardia at a cycle length 350 ms.  He had increased shortness of breath in September,2017 and was felt to be in heart failure.  Furosemide was increased to 60 mg daily and carvedilol to 37.5 mg twice daily.   He has had SVT in the past consistent with AV chaz reentry.  He was admitted with a VT storm and recurrent shocks 6/30/2018 to [facility].  He had recurrent of ventricular tachycardia cycle length 280 to 300 ms for which antitachycardia pacing was ineffective.  Shading beat and VT had a right bundle configuration in V2 and right axis deviation.  He was loaded with IV and then oral amiodarone and referred to [facility] for VT ablation.  There antitachycardia pacing was adjusted to be somewhat more aggressive and a Holter was ordered.  VT ablation is deferred however given chronic right ankle ulcer which has been slow to heal.    He has done reasonably well on 400 mg 4 days/week 200 mg 2 days/week without recurrent of ventricular tachycardia until February 17, 2019.   He noted some palpitations but no syncope or presyncope.  He experienced 3 ICD shocks but felt well afterward and  did not seek medical attention.  See ICD interrogation below.  He has been sedentary with braces on both ankles for a degenerative disease and peripheral vascular disease.  He denies chest pain or pressure or significant dyspnea.  He is also noted to have significant renal insufficiency with creatinines running around 2.1.  TSH was 3 and ALT was 57 February 2019.  He denies orthostatic lightheadedness.    Personally reviewed.  ICD interrogation showed of ventricular tachycardia cycle length 400 ms with rates gradually accelerating to 160 bpm VT detect interval.  Antitachycardia pacing 12 beats at 84% VT cycle length was ineffective x2 scan of 10 ms resulting in the first ICD shocks.  ICD shocks terminated at tachycardia but he developed immediate recurrence and had 2 additional ICD shocks.  Later that day he had again had ventricular tachycardia terminated by a second burst of antitachycardia pacing at 84% of VT CL.  He is atrially paced 87% of the time 5 by ventricularly paced 98% of the time with excellent thresholds in all 3 chambers.  Estimated battery longevity is 3.5 years.       Physical Examination Review of Systems   Vitals:    03/05/19 0931   BP: (!) 88/46   Pulse: 64   Resp: 18     Body mass index is 30.02 kg/m .  Wt Readings from Last 3 Encounters:   03/05/19 (!) 237 lb (107.5 kg)   02/26/19 (!) 237 lb 9 oz (107.8 kg)   12/28/18 (!) 235 lb (106.6 kg)        Appearance:   no distress, elderly, in a wheelchair   HEENT:   no scleral icterus, normal conjunctivae    Neck: no carotid bruits or thyromegaly   Chest/Lungs:   lungs are clear to auscultation, no rales or wheezing,    Cardiovascular:   Jugular venous pressure 5 cm, Apical pulse is regular. Normal S1,S2 with no murmurs or gallops,   Abdomen:  no  Hepatosplenomegaly., nontender,  bowel sounds are present   Extremities: no cyanosis or clubbing, No edema  bilateral ankle braces and bandage   Skin: no xanthelasma, warm.    Neurologic: No gross focal  neurologic deficits   Mood/Affect: Alert, cooperative    General: WNL  Eyes: WNL  Ears/Nose/Throat: WNL  Lungs: WNL  Heart: WNL  Stomach: WNL  Bladder: WNL  Muscle/Joints: WNL  Skin: WNL  Nervous System: WNL  Mental Health: WNL     Blood: WNL     Medical History  Surgical History Family History Social History   Past Medical History:   Diagnosis Date   ? Allergic rhinitis    ? Arthritis    ? Cardiomyopathy (H)    ? Cardiomyopathy with implantable cardioverter-defibrillator (H)    ? Charcot ankle     bilateral   ? Charcot's joint of foot     bilateral ankles   ? CHF (congestive heart failure) (H)    ? Chronic kidney disease     stage 3   ? Deviated septum    ? Diabetic ulcer of left midfoot associated with type 2 diabetes mellitus, limited to breakdown of skin (H)    ? Dyslipidemia    ? Eczema    ? Gout    ? H/O cardiac arrest    ? Hernia    ? History of transfusion    ? Hypertension    ? Migraine    ? Osteomyelitis of foot (H)     right   ? Systolic heart failure (H)    ? V-tach (H)     has icd    Past Surgical History:   Procedure Laterality Date   ? CARDIAC PACEMAKER PLACEMENT      ICD/PACEMAKER   ? FOOT AMPUTATION Left 1/2/2019    Procedure: AMPUTATION, 2nd digit left foot;  Surgeon: Phu Zaman DPM;  Location: [facility];  Service: Podiatry   ? HERNIA REPAIR     ? NOSE SURGERY     ? PICC AND MIDLINE TEAM LINE INSERTION  6/22/2018        ? PA COMPLEX DRAINAGE, WOUND Right 6/20/2018    Procedure: INCISION AND DRAINAGE, LOWER EXTREMITY with BONE BIOPSY/CULTURE ;  Surgeon: Denisse Fletcher DPM;  Location: [facility];  Service: Podiatry   ? PA KNEE SCOPE,DIAGNOSTIC      Description: Arthroscopy Knee Left;  Recorded: 06/30/2011;   ? PA REMOVE TONSILS/ADENOIDS,<11 Y/O      Description: Tonsillectomy With Adenoidectomy;  Recorded: 06/30/2011;   ? PA SHLDR ARTHROSCOP,DIAGNOSTIC      Description: Arthroscopy Shoulder Right;  Recorded: 06/30/2011;   ? TONSILLECTOMY      and adenoids    Family  History   Problem Relation Age of Onset   ? Stroke Mother    ? Emphysema Mother    ? Goiter Mother    ? Cancer Father         Stomach   ? Alzheimer's disease Brother     Social History     Socioeconomic History   ? Marital status:      Spouse name: Not on file   ? Number of children: 3   ? Years of education: Not on file   ? Highest education level: Not on file   Occupational History   ? Not on file   Social Needs   ? Financial resource strain: Not on file   ? Food insecurity:     Worry: Not on file     Inability: Not on file   ? Transportation needs:     Medical: Not on file     Non-medical: Not on file   Tobacco Use   ? Smoking status: Former Smoker     Packs/day: 2.50     Years: 50.00     Pack years: 125.00     Types: Cigarettes, Pipe, Cigars     Last attempt to quit: 2000     Years since quittin.1   ? Smokeless tobacco: Never Used   Substance and Sexual Activity   ? Alcohol use: No     Comment: Last used    ? Drug use: No   ? Sexual activity: Yes     Partners: Female     Birth control/protection: Post-menopausal   Lifestyle   ? Physical activity:     Days per week: Not on file     Minutes per session: Not on file   ? Stress: Not on file   Relationships   ? Social connections:     Talks on phone: Not on file     Gets together: Not on file     Attends Religion service: Not on file     Active member of club or organization: Not on file     Attends meetings of clubs or organizations: Not on file     Relationship status: Not on file   ? Intimate partner violence:     Fear of current or ex partner: Not on file     Emotionally abused: Not on file     Physically abused: Not on file     Forced sexual activity: Not on file   Other Topics Concern   ? Not on file   Social History Narrative     since , 3 children. Recovering alcoholic since . Quit smoking in .           Medications  Allergies   Current Outpatient Medications   Medication Sig Dispense Refill   ? acetaminophen (TYLENOL)  325 MG tablet Take 650 mg by mouth 2 (two) times a day as needed for pain.      ? allopurinol (ZYLOPRIM) 300 MG tablet TAKE 1 TABLET BY MOUTH DAILY. 90 tablet 2   ? amiodarone (PACERONE) 200 MG tablet Take 200 mg by mouth see administration instructions. Take 1 tablet daily on 4 days per week, and 1 tablet twice a day on 3 days per week as directed     ? ascorbic acid, vitamin C, (ASCORBIC ACID WITH JAVIER HIPS) 500 MG tablet Take 500 mg by mouth daily. (start after guiacs obtained)     ? aspirin 81 MG EC tablet Take 81 mg by mouth daily.     ? carvedilol (COREG) 25 MG tablet Take 2 tablets (50 mg total) by mouth 2 (two) times a day with meals. 360 tablet 3   ? CHOLECALCIFEROL 1,000 unit tablet TAKE 1 TABLET BY MOUTH DAILY. 90 tablet 2   ? cinnamon bark (CINNAMON ORAL) Take 1 tablet by mouth Daily at 5 pm..            ? coenzyme Q10 100 mg capsule Take 200 mg by mouth daily.      ? DULoxetine (CYMBALTA) 60 MG capsule Take 1 capsule (60 mg total) by mouth 2 (two) times a day. 180 capsule 3   ? EPINEPHrine (EPIPEN/ADRENACLICK) 0.3 mg/0.3 mL injection Inject 0.3 mL (0.3 mg total) as directed as needed for anaphylaxis. Inject into thigh. 2 Pre-filled Pen Syringe 0   ? famotidine (PEPCID) 20 MG tablet Take 1 tablet (20 mg total) by mouth 2 (two) times a day. (Patient taking differently: Take 1 tablet by mouth 2 (two) times a day as needed for heartburn. Indications: multiple endocrine neoplasia, gastroesophageal reflux disease)  0   ? ferrous sulfate 325 (65 FE) MG tablet Take 1 tablet by mouth daily with breakfast. (start after guiacs obtained)     ? fexofenadine (ALLEGRA) 180 MG tablet TAKE 1 TABLET (180 MG TOTAL) BY MOUTH DAILY. 90 tablet 3   ? furosemide (LASIX) 20 MG tablet Take 20 mg by mouth daily.     ? KRILL OIL ORAL Take 1,000 mg by mouth daily.            ? loperamide (IMODIUM) 2 mg capsule Take 1 capsule (2 mg total) by mouth 3 (three) times a day as needed for diarrhea.  0   ? meloxicam (MOBIC) 15 MG tablet  TAKE ONE TABLET BY MOUTH ONE TIME DAILY 90 tablet 3   ? milk thistle 175 mg tablet Take 175 mg by mouth daily.     ? multivitamin (MULTIVITAMIN) per tablet 1 tablet every other day.      ? mupirocin (BACTROBAN) 2 % ointment Apply 1 application topically 2 (two) times a day as needed.     ? simethicone (MYLICON) 125 MG chewable tablet Chew 125 mg every 6 (six) hours as needed for flatulence.     ? spironolactone (ALDACTONE) 25 MG tablet TAKE ONE TABLET BY MOUTH ONE TIME DAILY 90 tablet 2   ? terbinafine HCl (LAMISIL) 250 mg tablet Take 1 tablet (250 mg total) by mouth daily for 14 days. 14 tablet 0   ? topiramate (TOPAMAX) 50 MG tablet 50 mgs am and 100 mgs pm. (Patient taking differently: Take  mg by mouth 2 (two) times a day. 50 mg in the morning and 100 mg in the evening      ) 270 tablet 3   ? traMADol (ULTRAM) 50 mg tablet Take 2 tablets (100 mg total) by mouth 3 (three) times a day. (Patient taking differently: Take 100 mg by mouth 2 (two) times a day.       ) 180 tablet 5   ? HYDROcodone-acetaminophen (NORCO) 5-325 mg per tablet Take 1 tablet by mouth every 4 (four) hours as needed for pain. 20 tablet 0   ? oxymetazoline (AFRIN) 0.05 % nasal spray 2 sprays into each nostril 2 (two) times a day as needed.        Current Facility-Administered Medications   Medication Dose Route Frequency Provider Last Rate Last Dose   ? cyanocobalamin injection 1,000 mcg  1,000 mcg Intramuscular Q30 Days Mario Lepe MD   1,000 mcg at 02/26/19 1231   ? lidocaine 2 % jelly (XYLOCAINE)   Topical PRN Phu Zaman DPM   1 application at 01/09/19 1349      Allergies   Allergen Reactions   ? Venom-Honey Bee Swelling and Dizziness

## 2021-06-27 NOTE — PROGRESS NOTES
Progress Notes by Ulises Hernández MD at 7/18/2019  8:50 AM     Author: Ulises Hernández MD Service: -- Author Type: Physician    Filed: 7/18/2019  9:51 AM Encounter Date: 7/18/2019 Status: Signed    : Ulises Hernández MD (Physician)           Click to link to [facility] Heart [facility] HEART [facility] ELECTROPHYSIOLOGY NOTE    Today I had the opportunity to see  Florentino Fall for follow-up evaluation of ventricular tachycardia associated with nonischemic cardiomyopathy.      Assessment/Recommendations   Clinic Problem List:  1. Paroxysmal ventricular tachycardia (H)     2. Non-ischemic cardiomyopathy (H)         Assessment:    Paroxysmal ventricular tachycardia with infrequent and slow VT on combination of moderately high-dose amiodarone 400 mg daily and mexiletine.  Agree with the decision to continue amiodarone with abrupt increase in  ALT.  Mexiletine will be continued.  ICD was reprogrammed  Nonischemic cardiomyopathy functional class II without evidence for heart failure today  Chronic renal insufficiency  Charcot joints    Plan:  ICD was reprogrammed for more effective pacing for ventricular tachycardia  Stay off amiodarone  Call if fainting or shocks from your defibrillator  Follow up appointment:   Dr. Hernández in 1 month in device clinic  Consideration given to starting sotalol 80 mg once a day  with a decrease in metoprolol to 50 mg daily if recurrent ICD shocks.  VT ablation at [facility] was discussed as an alternative       History of Present Illness     Florentino Fall is a 74 y.o. male with nonischemic cardiomyopathy. He suffered an out of hospital cardiac arrest on February 7, 2013. Coronary angiography showed no significant coronary artery disease. Cardiac echo at about that time showed an ejection fraction of 30% and mid myocardial scar consistent with nonischemic cardiomyopathy. He had left bundle branch block and received a CRT-D on August 30, 2013.   He  had  "episodes of recurrent of ventricular tachycardia since April 2016. VT CL ranged from 350 ms to 260 ms. He had a syncopal episode on April 23, 2016 with rapid VT VF terminated by shock.  Cardiac echo on May 2016 showed a dilated left ventricle with an ejection fraction of 27% essentially unchanged.He had increased shortness of breath in September,2017 and was felt to be in heart failure.  Furosemide was increased to 60 mg daily and carvedilol to 37.5 mg twice daily.     He was admitted with a VT storm and recurrent shocks 6/30/2018 to [facility].  He had recurrent of ventricular tachycardia cycle length 280 to 300 ms for which antitachycardia pacing was ineffective.   VT had a right bundle configuration in V2 and right axis deviation.  He was loaded with IV and then oral amiodarone and referred to [facility] for VT ablation.  There antitachycardia pacing was adjusted to be somewhat more aggressive and a Holter was ordered.  VT ablation is deferred however given chronic right ankle ulcer which has been slow to heal.   He had  ventricular tachycardia storm and diarrhea 4/16/2019.  Ventricular tachycardia appeared to slow and stabilized with IV amiodarone bolus and addition of lidocaine with mexiletine substituted.  He was discharged from the hospital on April 18 but did not start taking mexiletine until late on the 19th.  ICD interrogation today confirmed ICD shocks.  Patient was unaware of shocks and felt minimal palpitations.  He denies chest pain or dyspnea on exertion.  Currently taking amiodarone 200 mg 3 times daily and mexiletine 150 mg 3 times daily with meals.  Todd and his wife were moving to an assisted living environment but his wife \"hates this idea\" but has significant limiting dementia.  They were rejected by assisted living because they felt her dementia was too advanced.   This is created significant stress.  He has been relatively inactive related to Charcot joints.  " He denies exertional chest pain or dyspnea on exertion.  His amiodarone was discontinued yesterday due to an abrupt rise in ALT to 260 from 75 in May.  Personally reviewed.  ICD interrogation shows slow ventricular tachycardia at night on July 4 with rates of 120 bpm just below VT detect rate of 130 bpm.  Antitachycardia pacing was ultimately effective.  He was asymptomatic during this time.  Underlying rhythm is sinus bradycardia with first-degree AV block.  He is 100% biventricular pacing with excellent thresholds in all 3 leads.  Estimated battery longevity is 2 years.  ICD was reprogrammed to adaptive pacing  81% versus 84% but VT detect interval was left at 130 bpm.     Physical Examination Review of Systems   Vitals:    07/18/19 0824   BP: 98/68   Pulse: 76   Resp: 16     There is no height or weight on file to calculate BMI.  Wt Readings from Last 3 Encounters:   07/16/19 (!) 237 lb 8 oz (107.7 kg)   06/26/19 (!) 242 lb 1 oz (109.8 kg)   05/22/19 (!) 238 lb 6 oz (108.1 kg)        Appearance:   no distress, elderly   HEENT:   no scleral icterus, normal conjunctivae    Neck: no carotid bruits or thyromegaly   Chest/Lungs:   lungs are clear to auscultation, no rales or wheezing,    Cardiovascular:   Jugular venous pressure less than 5 cm, Apical pulse is regular. Normal S1,S2 with no murmurs or gallops,   Abdomen:  no  Hepatosplenomegaly., nontender,  bowel sounds are present   Extremities: no cyanosis or clubbing, No edema   Skin: no xanthelasma, warm.    Neurologic: No gross focal neurologic deficits   Mood/Affect: Alert, cooperative    General: WNL  Eyes: WNL  Ears/Nose/Throat: WNL  Lungs: WNL  Heart: WNL  Stomach: WNL  Bladder: WNL  Muscle/Joints: WNL  Skin: WNL  Nervous System: WNL  Mental Health: WNL     Blood: WNL     Medical History  Surgical History Family History Social History   Past Medical History:   Diagnosis Date   ? Allergic rhinitis    ? Arthritis    ? Cardiomyopathy (H)    ? Cardiomyopathy with  implantable cardioverter-defibrillator (H)    ? Charcot ankle     bilateral   ? Charcot's joint of foot     bilateral ankles   ? CHF (congestive heart failure) (H)    ? Chronic kidney disease     stage 3   ? Deviated septum    ? Diabetic ulcer of left midfoot associated with type 2 diabetes mellitus, limited to breakdown of skin (H)    ? Dyslipidemia    ? Eczema    ? Gout    ? H/O cardiac arrest    ? Hernia    ? History of transfusion    ? Hypertension    ? Migraine    ? Osteomyelitis of foot (H)     right   ? Systolic heart failure (H)    ? V-tach (H)     has icd    Past Surgical History:   Procedure Laterality Date   ? CARDIAC DEFIBRILLATOR PLACEMENT     ? FOOT AMPUTATION Left 1/2/2019    Procedure: AMPUTATION, 2nd digit left foot;  Surgeon: Phu Zaman DPM;  Location: [facility];  Service: Podiatry   ? HERNIA REPAIR     ? NOSE SURGERY     ? PICC AND MIDLINE TEAM LINE INSERTION  6/22/2018        ? NM COMPLEX DRAINAGE, WOUND Right 6/20/2018    Procedure: INCISION AND DRAINAGE, LOWER EXTREMITY with BONE BIOPSY/CULTURE ;  Surgeon: Denisse Fletcher DPM;  Location: [facility];  Service: Podiatry   ? NM KNEE SCOPE,DIAGNOSTIC      Description: Arthroscopy Knee Left;  Recorded: 06/30/2011;   ? NM REMOVE TONSILS/ADENOIDS,<13 Y/O      Description: Tonsillectomy With Adenoidectomy;  Recorded: 06/30/2011;   ? NM SHLDR ARTHROSCOP,DIAGNOSTIC      Description: Arthroscopy Shoulder Right;  Recorded: 06/30/2011;    Family History   Problem Relation Age of Onset   ? Stroke Mother    ? Emphysema Mother    ? Goiter Mother    ? Cancer Father         Stomach   ? Alzheimer's disease Brother     Social History     Socioeconomic History   ? Marital status:      Spouse name: Not on file   ? Number of children: 3   ? Years of education: Not on file   ? Highest education level: Not on file   Occupational History   ? Not on file   Social Needs   ? Financial resource strain: Not on file   ? Food  insecurity:     Worry: Not on file     Inability: Not on file   ? Transportation needs:     Medical: Not on file     Non-medical: Not on file   Tobacco Use   ? Smoking status: Former Smoker     Packs/day: 2.50     Years: 50.00     Pack years: 125.00     Types: Cigarettes, Pipe, Cigars     Last attempt to quit: 2000     Years since quittin.5   ? Smokeless tobacco: Never Used   Substance and Sexual Activity   ? Alcohol use: No     Comment: Last used    ? Drug use: No   ? Sexual activity: Yes     Partners: Female     Birth control/protection: Post-menopausal   Lifestyle   ? Physical activity:     Days per week: Not on file     Minutes per session: Not on file   ? Stress: Not on file   Relationships   ? Social connections:     Talks on phone: Not on file     Gets together: Not on file     Attends [employer] service: Not on file     Active member of club or organization: Not on file     Attends meetings of clubs or organizations: Not on file     Relationship status: Not on file   ? Intimate partner violence:     Fear of current or ex partner: Not on file     Emotionally abused: Not on file     Physically abused: Not on file     Forced sexual activity: Not on file   Other Topics Concern   ? Not on file   Social History Narrative     since , 3 children. Recovering alcoholic since . Quit smoking in .           Medications  Allergies   Current Outpatient Medications   Medication Sig Dispense Refill   ? acetaminophen (TYLENOL) 325 MG tablet Take 650 mg by mouth 2 (two) times a day as needed for pain.      ? allopurinol (ZYLOPRIM) 300 MG tablet TAKE 1 TABLET BY MOUTH DAILY. 90 tablet 2   ? ascorbic acid, vitamin C, (ASCORBIC ACID WITH JAVIER HIPS) 500 MG tablet Take 500 mg by mouth daily. (start after guiacs obtained)     ? aspirin 81 MG EC tablet Take 81 mg by mouth daily.     ? CHOLECALCIFEROL 1,000 unit tablet TAKE 1 TABLET BY MOUTH DAILY. 90 tablet 2   ? cinnamon bark (CINNAMON ORAL) Take 1,000  mg by mouth Daily at 5 pm..            ? coenzyme Q10 100 mg capsule Take 200 mg by mouth daily.      ? DULoxetine (CYMBALTA) 60 MG capsule Take 1 capsule (60 mg total) by mouth 2 (two) times a day. 180 capsule 3   ? EPINEPHrine (EPIPEN/ADRENACLICK) 0.3 mg/0.3 mL injection Inject 0.3 mL (0.3 mg total) as directed as needed for anaphylaxis. Inject into thigh. 2 Pre-filled Pen Syringe 0   ? famotidine (PEPCID) 20 MG tablet Take 1 tablet (20 mg total) by mouth 2 (two) times a day. (Patient taking differently: Take 1 tablet by mouth 2 (two) times a day as needed for heartburn. Indications: multiple endocrine neoplasia, gastroesophageal reflux disease)  0   ? fexofenadine (ALLEGRA) 180 MG tablet TAKE 1 TABLET (180 MG TOTAL) BY MOUTH DAILY. 90 tablet 3   ? furosemide (LASIX) 20 MG tablet TAKE 1 TABLET BY MOUTH EVERY DAY 90 tablet 3   ? KLOR-CON M20 20 mEq tablet Take 20 mEq by mouth daily.  3   ? KRILL OIL ORAL Take 1,000 mg by mouth daily.            ? loperamide (IMODIUM) 2 mg capsule Take 1 capsule (2 mg total) by mouth 3 (three) times a day as needed for diarrhea.  0   ? meloxicam (MOBIC) 15 MG tablet Take 15 mg by mouth daily.  3   ? metoprolol succinate (TOPROL-XL) 100 MG 24 hr tablet TAKE 1 TABLET BY MOUTH EVERY DAY 90 tablet 3   ? mexiletine (MEXITIL) 150 MG capsule Take 1 capsule (150 mg total) by mouth 3 (three) times a day. 270 capsule 3   ? milk thistle 175 mg tablet Take 175 mg by mouth daily.     ? multivitamin (MULTIVITAMIN) per tablet 1 tablet every other day.      ? mupirocin (BACTROBAN) 2 % ointment Apply 1 application topically 2 (two) times a day as needed.     ? potassium chloride (KLOR-CON M20) 20 MEQ tablet Take 1 tablet (20 mEq total) by mouth daily. 90 tablet 3   ? predniSONE (DELTASONE) 20 MG tablet Take 20 mg by mouth daily for 7 days. 7 tablet 0   ? simethicone (MYLICON) 125 MG chewable tablet Chew 125 mg every 6 (six) hours as needed for flatulence.     ? spironolactone (ALDACTONE) 25 MG  tablet Take 25 mg by mouth daily.  2   ? sulfamethoxazole-trimethoprim (BACTRIM DS) 800-160 mg per tablet Take 1 tablet by mouth 2 (two) times a day for 10 days. 20 tablet 0   ? topiramate (TOPAMAX) 50 MG tablet Take 50 mg by mouth 2 (two) times a day.     ? traMADol (ULTRAM) 50 mg tablet Take 2 tablets (100 mg total) by mouth 2 (two) times a day. 360 tablet 3   ? amiodarone (PACERONE) 200 MG tablet Take 1 tablet (200 mg total) by mouth 2 (two) times a day. 180 tablet 3     Current Facility-Administered Medications   Medication Dose Route Frequency Provider Last Rate Last Dose   ? cyanocobalamin injection 1,000 mcg  1,000 mcg Intramuscular Q30 Days Mario Lepe MD   1,000 mcg at 07/16/19 1421      Allergies   Allergen Reactions   ? Venom-Honey Bee Swelling and Dizziness

## 2021-06-27 NOTE — PROGRESS NOTES
Progress Notes by Ulises Hernández MD at 5/13/2019  2:30 PM     Author: Ulises Hernández MD Service: -- Author Type: Physician    Filed: 5/13/2019  2:46 PM Encounter Date: 5/13/2019 Status: Signed    : Ulises Hernández MD (Physician)           Click to link to [facility] Heart [facility] HEART [facility] ELECTROPHYSIOLOGY NOTE    Today I had the opportunity to see  Florentino Fall for follow-up evaluation of recurrent ventricular tachycardia associated with nonischemic cardiomyopathy.      Assessment/Recommendations   Clinic Problem List:  1. Recurrent ventricular tachycardia (H)     2. Non-ischemic cardiomyopathy (H)     3. On amiodarone therapy         Assessment:    Recurrent ventricular tachycardia partially suppressed by amiodarone mexiletine combination with no apparent side effects.  ATP has been more effective and terminating ventricular tachycardia with only sporadic shocks.  Ischemic cardiomyopathy fairly well compensated at present  On amiodarone therapy recent normal TSH and ALT  Primary caregiver for his wife who has advanced dementia  Small vessel disease with chronic leg ulcers related to small vessel disease.  Plan:  Continue taking your current medications including amiodarone 200 mg twice daily  ICD was reset from low rate 95 to low rate 80 bpm  Follow up appointment:   Dr. Hernández in 1 month in device clinic  Consideration will be given to decreasing amiodarone to 400mg 4 days/week 200 mg 3 days/week at that time  VT ablation procedure at [facility] was again discussed       History of Present Illness     Florentino Fall is a 73 y.o. male  nonischemic cardiomyopathy. He suffered an out of hospital cardiac arrest on February 7, 2013. Coronary angiography showed no significant coronary artery disease. Cardiac echo at about that time showed an ejection fraction of 30% and mid myocardial scar consistent with nonischemic cardiomyopathy. He had left bundle branch block  "and received a CRT-D on August 30, 2013.   He  had episodes of recurrent of ventricular tachycardia since April 2016. VT CL ranged from 350 ms to 260 ms. He had a syncopal episode on April 23, 2016 with rapid VT VF terminated by shock.  Cardiac echo on May 2016 showed a dilated left ventricle with an ejection fraction of 27% essentially unchanged.He had increased shortness of breath in September,2017 and was felt to be in heart failure.  Furosemide was increased to 60 mg daily and carvedilol to 37.5 mg twice daily.     He was admitted with a VT storm and recurrent shocks 6/30/2018 to [facility].  He had recurrent of ventricular tachycardia cycle length 280 to 300 ms for which antitachycardia pacing was ineffective.   VT had a right bundle configuration in V2 and right axis deviation.  He was loaded with IV and then oral amiodarone and referred to [facility] for VT ablation.  There antitachycardia pacing was adjusted to be somewhat more aggressive and a Holter was ordered.  VT ablation is deferred however given chronic right ankle ulcer which has been slow to heal.   He had  ventricular tachycardia storm and diarrhea 4/16/2019.  Ventricular tachycardia appeared to slow and stabilized with IV amiodarone bolus and addition of lidocaine with mexiletine substituted.  He was discharged from the hospital on April 18 but did not start taking mexiletine until late on the 19th.  ICD interrogation today confirmed ICD shocks.  Patient was unaware of shocks and felt minimal palpitations.  He denies chest pain or dyspnea on exertion.  Currently taking amiodarone 200 mg 3 times daily and mexiletine 150 mg 3 times daily with meals.  Todd and his wife were moving to an assisted living environment but his wife \"hates this idea\" but has significant limiting dementia.  They were rejected by assisted living because they felt her dementia was too advanced.   This is created significant stress.  Todd " reports occasional palpitations and mild lightheadedness without syncope or presyncope.  He describes mild shortness of breath.  He is not received further shocks from his implantable fibrillator other than a single shock on May 9.  Personally reviewed.  VT on May 9 started with cycling 450 ms and accelerated with burst pacing to cycling 400 ms and accelerated with ramp pacing to cycling 304 ms before terminated by shock.  He states she was under considerable emotional stress that day.  Since that time he has had multiple slower VT episodes average cycling 440 ms converted with 1 or 2 burst of antitachycardia pacing.  Pacing thresholds remain excellent in all estimated battery longevity of 2 years he had been 100% pacing at low rate 95 bpm which was reprogrammed to low rate 80 bpm today.  He is biventricular pacing 95% of the time.  He continues to take amiodarone 400 mg daily.       Physical Examination Review of Systems   Vitals:    05/13/19 1341   BP: 96/68   Pulse: 96   Resp: 18     Body mass index is 30.04 kg/m .  Wt Readings from Last 3 Encounters:   05/13/19 (!) 234 lb (106.1 kg)   04/24/19 (!) 234 lb 4 oz (106.3 kg)   04/22/19 (!) 235 lb (106.6 kg)        Appearance:   no distress,    HEENT:   no scleral icterus, normal conjunctivae    Neck: no carotid bruits or thyromegaly   Chest/Lungs:   lungs are clear to auscultation, no rales or wheezing,    Cardiovascular:   Jugular venous pressure less than 5 cm, Apical pulse is regular. Normal S1,S2 with no murmurs or gallops,   Abdomen:  no  Hepatosplenomegaly., nontender,  bowel sounds are present   Extremities: no cyanosis or clubbing, bandaged lower extremities   Skin: no xanthelasma, warm.    Neurologic: No gross focal neurologic deficits   Mood/Affect: Alert, cooperative    General: WNL  Eyes: WNL  Ears/Nose/Throat: WNL  Lungs: WNL  Heart: WNL  Stomach: WNL  Bladder: WNL  Muscle/Joints: WNL  Skin: WNL  Nervous System: WNL  Mental Health: WNL     Blood: WNL      Medical History  Surgical History Family History Social History   Past Medical History:   Diagnosis Date   ? Allergic rhinitis    ? Arthritis    ? Cardiomyopathy (H)    ? Cardiomyopathy with implantable cardioverter-defibrillator (H)    ? Charcot ankle     bilateral   ? Charcot's joint of foot     bilateral ankles   ? CHF (congestive heart failure) (H)    ? Chronic kidney disease     stage 3   ? Deviated septum    ? Diabetic ulcer of left midfoot associated with type 2 diabetes mellitus, limited to breakdown of skin (H)    ? Dyslipidemia    ? Eczema    ? Gout    ? H/O cardiac arrest    ? Hernia    ? History of transfusion    ? Hypertension    ? Migraine    ? Osteomyelitis of foot (H)     right   ? Systolic heart failure (H)    ? V-tach (H)     has icd    Past Surgical History:   Procedure Laterality Date   ? CARDIAC DEFIBRILLATOR PLACEMENT     ? FOOT AMPUTATION Left 1/2/2019    Procedure: AMPUTATION, 2nd digit left foot;  Surgeon: Phu Zaman DPM;  Location: [facility] [place];  Service: Podiatry   ? HERNIA REPAIR     ? NOSE SURGERY     ? PICC AND MIDLINE TEAM LINE INSERTION  6/22/2018        ? VA COMPLEX DRAINAGE, WOUND Right 6/20/2018    Procedure: INCISION AND DRAINAGE, LOWER EXTREMITY with BONE BIOPSY/CULTURE ;  Surgeon: Denisse Fletcher DPM;  Location: [facility] [place];  Service: Podiatry   ? VA KNEE SCOPE,DIAGNOSTIC      Description: Arthroscopy Knee Left;  Recorded: 06/30/2011;   ? VA REMOVE TONSILS/ADENOIDS,<13 Y/O      Description: Tonsillectomy With Adenoidectomy;  Recorded: 06/30/2011;   ? VA SHLDR ARTHROSCOP,DIAGNOSTIC      Description: Arthroscopy Shoulder Right;  Recorded: 06/30/2011;    Family History   Problem Relation Age of Onset   ? Stroke Mother    ? Emphysema Mother    ? Goiter Mother    ? Cancer Father         Stomach   ? Alzheimer's disease Brother     Social History     Socioeconomic History   ? Marital status:      Spouse name: Not on file   ? Number of children:  3   ? Years of education: Not on file   ? Highest education level: Not on file   Occupational History   ? Not on file   Social Needs   ? Financial resource strain: Not on file   ? Food insecurity:     Worry: Not on file     Inability: Not on file   ? Transportation needs:     Medical: Not on file     Non-medical: Not on file   Tobacco Use   ? Smoking status: Former Smoker     Packs/day: 2.50     Years: 50.00     Pack years: 125.00     Types: Cigarettes, Pipe, Cigars     Last attempt to quit: 2000     Years since quittin.3   ? Smokeless tobacco: Never Used   Substance and Sexual Activity   ? Alcohol use: No     Comment: Last used    ? Drug use: No   ? Sexual activity: Yes     Partners: Female     Birth control/protection: Post-menopausal   Lifestyle   ? Physical activity:     Days per week: Not on file     Minutes per session: Not on file   ? Stress: Not on file   Relationships   ? Social connections:     Talks on phone: Not on file     Gets together: Not on file     Attends Sikh service: Not on file     Active member of club or organization: Not on file     Attends meetings of clubs or organizations: Not on file     Relationship status: Not on file   ? Intimate partner violence:     Fear of current or ex partner: Not on file     Emotionally abused: Not on file     Physically abused: Not on file     Forced sexual activity: Not on file   Other Topics Concern   ? Not on file   Social History Narrative     since , 3 children. Recovering alcoholic since . Quit smoking in .           Medications  Allergies   Current Outpatient Medications   Medication Sig Dispense Refill   ? acetaminophen (TYLENOL) 325 MG tablet Take 650 mg by mouth 2 (two) times a day as needed for pain.      ? allopurinol (ZYLOPRIM) 300 MG tablet TAKE 1 TABLET BY MOUTH DAILY. 90 tablet 2   ? amiodarone (PACERONE) 200 MG tablet TAKE 1 TABLET BY MOUTH THREE TIMES A DAY 90 tablet 0   ? ascorbic acid, vitamin C,  (ASCORBIC ACID WITH JAVIER HIPS) 500 MG tablet Take 500 mg by mouth daily. (start after guiacs obtained)     ? aspirin 81 MG EC tablet Take 81 mg by mouth daily.     ? CHOLECALCIFEROL 1,000 unit tablet TAKE 1 TABLET BY MOUTH DAILY. 90 tablet 2   ? cinnamon bark (CINNAMON ORAL) Take 1,000 mg by mouth Daily at 5 pm..            ? coenzyme Q10 100 mg capsule Take 200 mg by mouth daily.      ? DULoxetine (CYMBALTA) 60 MG capsule Take 60 mg by mouth daily.     ? EPINEPHrine (EPIPEN/ADRENACLICK) 0.3 mg/0.3 mL injection Inject 0.3 mL (0.3 mg total) as directed as needed for anaphylaxis. Inject into thigh. 2 Pre-filled Pen Syringe 0   ? fexofenadine (ALLEGRA) 180 MG tablet TAKE 1 TABLET (180 MG TOTAL) BY MOUTH DAILY. 90 tablet 3   ? furosemide (LASIX) 20 MG tablet TAKE 1 TABLET BY MOUTH EVERY DAY 30 tablet 0   ? KLOR-CON M20 20 mEq tablet Take 20 mEq by mouth daily.  3   ? KRILL OIL ORAL Take 1,000 mg by mouth daily.            ? loperamide (IMODIUM) 2 mg capsule Take 1 capsule (2 mg total) by mouth 3 (three) times a day as needed for diarrhea.  0   ? metoprolol succinate (TOPROL-XL) 100 MG 24 hr tablet TAKE 1 TABLET BY MOUTH EVERY DAY 30 tablet 0   ? mexiletine (MEXITIL) 150 MG capsule TAKE 1 CAPSULE BY MOUTH THREE TIMES A DAY WITH MEALS 90 capsule 0   ? milk thistle 175 mg tablet Take 175 mg by mouth daily.     ? multivitamin (MULTIVITAMIN) per tablet 1 tablet every other day.      ? mupirocin (BACTROBAN) 2 % ointment Apply 1 application topically 2 (two) times a day as needed.     ? simethicone (MYLICON) 125 MG chewable tablet Chew 125 mg every 6 (six) hours as needed for flatulence.     ? topiramate (TOPAMAX) 50 MG tablet Take 50 mg by mouth 2 (two) times a day.     ? traMADol (ULTRAM) 50 mg tablet TAKE 2 TABLETS (100 MG TOTAL) BY MOUTH 3 (THREE) TIMES A DAY. (Patient taking differently: Take 100 mg by mouth 2 (two) times a day.       ) 180 tablet 1   ? famotidine (PEPCID) 20 MG tablet Take 1 tablet (20 mg total) by mouth  2 (two) times a day. (Patient taking differently: Take 1 tablet by mouth 2 (two) times a day as needed for heartburn. Indications: multiple endocrine neoplasia, gastroesophageal reflux disease)  0   ? spironolactone (ALDACTONE) 25 MG tablet Take 25 mg by mouth daily.  2     Current Facility-Administered Medications   Medication Dose Route Frequency Provider Last Rate Last Dose   ? cyanocobalamin injection 1,000 mcg  1,000 mcg Intramuscular Q30 Days Mario Lepe MD   1,000 mcg at 02/26/19 1231      Allergies   Allergen Reactions   ? Venom-Honey Bee Swelling and Dizziness

## 2021-06-27 NOTE — PROGRESS NOTES
Progress Notes by Phu Zaman DPM at 12/7/2018 10:20 AM     Author: Phu Zaman DPM Service: -- Author Type: Physician    Filed: 12/7/2018  1:55 PM Encounter Date: 12/7/2018 Status: Signed    : Phu Zaman DPM (Physician)       FOOT AND ANKLE SURGERY/PODIATRY Progress Note        ASSESSMENT:   Ulceration/Osteomyelitis 2nd digit left foot   Ulceration right foot  Pes Planovalgus right   Hammertoe        TREATMENT:  -The right foot ulcerations have improved, granular base. No signs of infection.  -The patient has two new ulcerations on his left foot. The left rearfoot ulceration corresponds to the prominent talar head, does not probe to deep tissues. The sore on the 2nd digit left foot probes to bone, which likely represents osteomyelitis. In the presence of a severely contracted digit we reviewed the treatment options to include either 6 weeks IV antibiotics with Infectious Disease vs amputation of the involved bone. He would like to proceed with surgery for a more definitive procedure.  -Referred for KAY's which are wnl.  -Sharp, excisional debridement of the wound into osseous tissue was performed using currette for a total of 3 square centimeters. Debridement was done to reduce pressure, remove non-viable, devitalized tissue and promote wound healing. Patient tolerated this well. A gauze dressing was applied.   -He will continue to apply Endoform with a gauze dresssing as directed right foot. Gauze dressing to be applied to the left foot ulcerations.  -I will ask my office to coordinate surgery for his left foot for next week. Pre-op physical with Dr. Lepe's office.      Phu Zaman DPM  [facility]      HPI: Florentino Fall was seen again today for a right foot ulceration. He now complains of a new sore on the left foot and 2nd digit left foot. He admits having recurrent sores on the 2nd digit left foot but has not brought this to our attention in the past.      Past Medical History:   Diagnosis Date   ? Arthritis    ? Cardiomyopathy (H)    ? Cardiomyopathy with implantable cardioverter-defibrillator (H)    ? Charcot's joint of foot     bilateral ankles   ? CHF (congestive heart failure) (H)    ? Deviated septum    ? Eczema    ? Hernia    ? History of transfusion    ? Hypertension    ? Migraine    ? Systolic heart failure (H)    ? V-tach (H)     has icd       Allergies   Allergen Reactions   ? Venom-Honey Bee Swelling and Dizziness         Current Outpatient Medications:   ?  acetaminophen (TYLENOL) 325 MG tablet, Take 650 mg by mouth 2 (two) times a day as needed for pain. , Disp: , Rfl:   ?  allopurinol (ZYLOPRIM) 300 MG tablet, TAKE 1 TABLET BY MOUTH DAILY., Disp: 90 tablet, Rfl: 2  ?  amiodarone (PACERONE) 200 MG tablet, One tablet twice daily except Wed and Sun, take 1 tablet, Disp: 180 tablet, Rfl: 1  ?  ascorbic acid, vitamin C, (ASCORBIC ACID WITH JAVIER HIPS) 500 MG tablet, Take 500 mg by mouth daily. (start after mya obtained), Disp: , Rfl:   ?  aspirin 81 MG EC tablet, Take 81 mg by mouth daily., Disp: , Rfl:   ?  carvedilol (COREG) 25 MG tablet, Take 1.5 tablets (37.5 mg total) by mouth 2 (two) times a day with meals., Disp: 270 tablet, Rfl: 3  ?  CHOLECALCIFEROL 1,000 unit tablet, TAKE 1 TABLET BY MOUTH DAILY., Disp: 90 tablet, Rfl: 2  ?  cinnamon bark (CINNAMON ORAL), Take 1,000 tablets by mouth Daily at 5 pm. Indications: supplement, Disp: , Rfl:   ?  coenzyme Q10 100 mg capsule, Take 200 mg by mouth daily. , Disp: , Rfl:   ?  DULoxetine (CYMBALTA) 60 MG capsule, Take 1 capsule (60 mg total) by mouth 2 (two) times a day., Disp: 180 capsule, Rfl: 3  ?  EPINEPHrine (EPIPEN/ADRENACLICK) 0.3 mg/0.3 mL injection, Inject 0.3 mL (0.3 mg total) as directed as needed for anaphylaxis. Inject into thigh., Disp: 2 Pre-filled Pen Syringe, Rfl: 0  ?  famotidine (PEPCID) 20 MG tablet, Take 1 tablet (20 mg total) by mouth 2 (two) times a day. (Patient taking  differently: Take 1 tablet by mouth 2 (two) times a day as needed for heartburn. Indications: multiple endocrine neoplasia, gastroesophageal reflux disease), Disp: , Rfl: 0  ?  ferrous sulfate 325 (65 FE) MG tablet, Take 1 tablet by mouth daily with breakfast. (start after mya obtained), Disp: , Rfl:   ?  fexofenadine (ALLEGRA) 180 MG tablet, TAKE 1 TABLET (180 MG TOTAL) BY MOUTH DAILY., Disp: 90 tablet, Rfl: 3  ?  furosemide (LASIX) 40 MG tablet, Take 1 tablet (40 mg total) by mouth daily. Dose decreased by DND 4/6. (Patient taking differently: Take 20 mg by mouth every other day Dose decreased by DND 4/6. 9/28/18 dose decreased to every other day by PMD  Then as needed for swelling.   ), Disp: 270 tablet, Rfl: 3  ?  KRILL OIL ORAL, Take 1,000 mg by mouth. , Disp: , Rfl:   ?  loperamide (IMODIUM) 2 mg capsule, Take 1 capsule (2 mg total) by mouth 3 (three) times a day as needed for diarrhea., Disp: , Rfl: 0  ?  meloxicam (MOBIC) 15 MG tablet, TAKE ONE TABLET BY MOUTH ONE TIME DAILY, Disp: 90 tablet, Rfl: 3  ?  milk thistle 175 mg tablet, Take 175 mg by mouth daily., Disp: , Rfl:   ?  multivitamin (MULTIVITAMIN) per tablet, 1 tablet every other day. , Disp: , Rfl:   ?  mupirocin (BACTROBAN) 2 % ointment, Apply topically 2 (two) times a day. To affected area(s)., Disp: 22 g, Rfl: 3  ?  oxymetazoline (AFRIN) 0.05 % nasal spray, 2 sprays into each nostril 2 (two) times a day as needed. , Disp: , Rfl:   ?  potassium chloride (K-DUR,KLOR-CON) 20 MEQ tablet, Take 1 tablet (20 mEq total) by mouth daily., Disp: 90 tablet, Rfl: 3  ?  simethicone (MYLICON) 125 MG chewable tablet, Chew 125 mg every 6 (six) hours as needed for flatulence., Disp: , Rfl:   ?  spironolactone (ALDACTONE) 25 MG tablet, TAKE ONE TABLET BY MOUTH ONE TIME DAILY, Disp: 90 tablet, Rfl: 2  ?  sulfamethoxazole-trimethoprim (SEPTRA DS) 800-160 mg per tablet, Take 1 tablet by mouth 2 (two) times a day for 10 days., Disp: 20 tablet, Rfl: 0  ?  topiramate  (TOPAMAX) 50 MG tablet, 50 mgs am and 100 mgs pm., Disp: 270 tablet, Rfl: 3  ?  traMADol (ULTRAM) 50 mg tablet, Take 2 tablets (100 mg total) by mouth 3 (three) times a day., Disp: 180 tablet, Rfl: 5    Review of Systems - per HPI, all others negative      OBJECTIVE:  Appearance: alert, well appearing, and in no distress.    Vitals:    12/07/18 1012   BP: 118/70   Pulse: 64   Temp: 97.7  F (36.5  C)       Vascular: Dorsalis pedis non-palpableBilateral.  Dermatologic:    VASC Wound 09/11/18 Right medial ankle (previously removed) (Active)   Pre Size Length 0.4 11/16/2018 10:00 AM   Pre Size Width 0.2 11/16/2018 10:00 AM   Pre Size Depth 0.2 11/16/2018 10:00 AM   Pre Total Sq cm 0.08 11/16/2018 10:00 AM       VASC Wound 11/16/18 right medial ankle inferior (Active)   Pre Size Length 0.5 12/7/2018 10:00 AM   Pre Size Width 0.3 12/7/2018 10:00 AM   Pre Size Depth 0.1 12/7/2018 10:00 AM   Pre Total Sq cm 0.15 12/7/2018 10:00 AM       VASC Wound 12/07/18 left medial ankle (Active)   Pre Size Length 0.4 12/7/2018 10:00 AM   Pre Size Width 0.1 12/7/2018 10:00 AM   Pre Size Depth 0.1 12/7/2018 10:00 AM   Pre Total Sq cm 0.4 12/7/2018 10:00 AM       VASC Wound 12/07/18 left foot, anterior 2nd toe  (Active)   Pre Size Length 0.7 12/7/2018 10:00 AM   Pre Size Width 0.4 12/7/2018 10:00 AM   Pre Size Depth 0.1 12/7/2018 10:00 AM   Pre Total Sq cm 0.28 12/7/2018 10:00 AM       Wound 11/19/18 Other wound type (comment) Ankle Right;Medial (center/inner) (Active)       Other Ulcers 08/04/18 Other (Comment) Right;Medial (Active)   Granular base right foot ulcerations. Exposed bone dorsal PIPJ 2nd digit left foot. Localized erythema medial left foot.   Neurologic: Diminished to light touch Bilateral.  Musculoskeletal: Contracted digits noted Bilateral. Collapse of medial arch bilateral with dislocated TN joints bilateral, prominent talar head.     Imaging: None    Picture:

## 2021-06-27 NOTE — PROGRESS NOTES
Progress Notes by Phu Zaman DPM at 4/5/2019 10:20 AM     Author: Phu Zaman DPM Service: -- Author Type: Physician    Filed: 4/5/2019 11:01 AM Encounter Date: 4/5/2019 Status: Signed    : Phu Zaman DPM (Physician)       FOOT AND ANKLE SURGERY/PODIATRY Progress Note        ASSESSMENT:   S/p Amputation 2nd digit left foot  Ulceration left foot   Pes Planovalgus  Hammertoe      TREATMENT:  -Thin top cover noted bilateral midfoot. He is progressing well. I recommend he continue to remain limited walking on both feet in cage splints.   -In the presence of the large bony deformities, I have asked him to closely monitor for any skin breakdown.  -He is discharged from my care at this time, but encouraged to return as symptoms dictate.      Phu Zaman DPM  [facility]      HPI: Florentino Fall was seen again today for the left foot ulceration. He has remained limited walking in cage splints. No new concerns.       Past Medical History:   Diagnosis Date   ? Allergic rhinitis    ? Arthritis    ? Cardiomyopathy (H)    ? Cardiomyopathy with implantable cardioverter-defibrillator (H)    ? Charcot ankle     bilateral   ? Charcot's joint of foot     bilateral ankles   ? CHF (congestive heart failure) (H)    ? Chronic kidney disease     stage 3   ? Deviated septum    ? Diabetic ulcer of left midfoot associated with type 2 diabetes mellitus, limited to breakdown of skin (H)    ? Dyslipidemia    ? Eczema    ? Gout    ? H/O cardiac arrest    ? Hernia    ? History of transfusion    ? Hypertension    ? Migraine    ? Osteomyelitis of foot (H)     right   ? Systolic heart failure (H)    ? V-tach (H)     has icd       Past Surgical History:   Procedure Laterality Date   ? CARDIAC PACEMAKER PLACEMENT      ICD/PACEMAKER   ? FOOT AMPUTATION Left 1/2/2019    Procedure: AMPUTATION, 2nd digit left foot;  Surgeon: Phu Zaman DPM;  Location: [facility] [place];  Service: Podiatry   ?  HERNIA REPAIR     ? NOSE SURGERY     ? PICC AND MIDLINE TEAM LINE INSERTION  6/22/2018        ? WV COMPLEX DRAINAGE, WOUND Right 6/20/2018    Procedure: INCISION AND DRAINAGE, LOWER EXTREMITY with BONE BIOPSY/CULTURE ;  Surgeon: Denisse Fletcher DPM;  Location: [facility];  Service: Podiatry   ? WV KNEE SCOPE,DIAGNOSTIC      Description: Arthroscopy Knee Left;  Recorded: 06/30/2011;   ? WV REMOVE TONSILS/ADENOIDS,<13 Y/O      Description: Tonsillectomy With Adenoidectomy;  Recorded: 06/30/2011;   ? WV SHLDR ARTHROSCOP,DIAGNOSTIC      Description: Arthroscopy Shoulder Right;  Recorded: 06/30/2011;   ? TONSILLECTOMY      and adenoids       Allergies   Allergen Reactions   ? Venom-Honey Bee Swelling and Dizziness         Current Outpatient Medications:   ?  acetaminophen (TYLENOL) 325 MG tablet, Take 650 mg by mouth 2 (two) times a day as needed for pain. , Disp: , Rfl:   ?  allopurinol (ZYLOPRIM) 300 MG tablet, TAKE 1 TABLET BY MOUTH DAILY., Disp: 90 tablet, Rfl: 2  ?  amiodarone (PACERONE) 200 MG tablet, Take 200 mg by mouth see administration instructions. Take 1 tablet daily on 4 days per week, and 1 tablet twice a day on 3 days per week as directed, Disp: , Rfl:   ?  ascorbic acid, vitamin C, (ASCORBIC ACID WITH JAVIER HIPS) 500 MG tablet, Take 500 mg by mouth daily. (start after guiacs obtained), Disp: , Rfl:   ?  aspirin 81 MG EC tablet, Take 81 mg by mouth daily., Disp: , Rfl:   ?  carvedilol (COREG) 25 MG tablet, Take 2 tablets (50 mg total) by mouth 2 (two) times a day with meals., Disp: 360 tablet, Rfl: 3  ?  CHOLECALCIFEROL 1,000 unit tablet, TAKE 1 TABLET BY MOUTH DAILY., Disp: 90 tablet, Rfl: 2  ?  cinnamon bark (CINNAMON ORAL), Take 1 tablet by mouth Daily at 5 pm..    , Disp: , Rfl:   ?  coenzyme Q10 100 mg capsule, Take 200 mg by mouth daily. , Disp: , Rfl:   ?  DULoxetine (CYMBALTA) 60 MG capsule, Take 1 capsule (60 mg total) by mouth 2 (two) times a day., Disp: 180 capsule, Rfl: 3  ?   EPINEPHrine (EPIPEN/ADRENACLICK) 0.3 mg/0.3 mL injection, Inject 0.3 mL (0.3 mg total) as directed as needed for anaphylaxis. Inject into thigh., Disp: 2 Pre-filled Pen Syringe, Rfl: 0  ?  famotidine (PEPCID) 20 MG tablet, Take 1 tablet (20 mg total) by mouth 2 (two) times a day. (Patient taking differently: Take 1 tablet by mouth 2 (two) times a day as needed for heartburn. Indications: multiple endocrine neoplasia, gastroesophageal reflux disease), Disp: , Rfl: 0  ?  ferrous sulfate 325 (65 FE) MG tablet, Take 1 tablet by mouth daily with breakfast. (start after guiacs obtained), Disp: , Rfl:   ?  fexofenadine (ALLEGRA) 180 MG tablet, TAKE 1 TABLET (180 MG TOTAL) BY MOUTH DAILY., Disp: 90 tablet, Rfl: 3  ?  furosemide (LASIX) 20 MG tablet, Take 20 mg by mouth daily., Disp: , Rfl:   ?  furosemide (LASIX) 40 MG tablet, TAKE 1 AND 1/2 TABLET BY MOUTH TWICE DAILY, Disp: , Rfl: 3  ?  HYDROcodone-acetaminophen (NORCO) 5-325 mg per tablet, Take 1 tablet by mouth every 4 (four) hours as needed for pain., Disp: 20 tablet, Rfl: 0  ?  KRILL OIL ORAL, Take 1,000 mg by mouth daily.    , Disp: , Rfl:   ?  loperamide (IMODIUM) 2 mg capsule, Take 1 capsule (2 mg total) by mouth 3 (three) times a day as needed for diarrhea., Disp: , Rfl: 0  ?  meloxicam (MOBIC) 15 MG tablet, TAKE ONE TABLET BY MOUTH ONE TIME DAILY, Disp: 90 tablet, Rfl: 3  ?  milk thistle 175 mg tablet, Take 175 mg by mouth daily., Disp: , Rfl:   ?  multivitamin (MULTIVITAMIN) per tablet, 1 tablet every other day. , Disp: , Rfl:   ?  mupirocin (BACTROBAN) 2 % ointment, Apply 1 application topically 2 (two) times a day as needed., Disp: , Rfl:   ?  oxymetazoline (AFRIN) 0.05 % nasal spray, 2 sprays into each nostril 2 (two) times a day as needed. , Disp: , Rfl:   ?  simethicone (MYLICON) 125 MG chewable tablet, Chew 125 mg every 6 (six) hours as needed for flatulence., Disp: , Rfl:   ?  spironolactone (ALDACTONE) 25 MG tablet, TAKE ONE TABLET BY MOUTH ONE TIME  DAILY, Disp: 90 tablet, Rfl: 2  ?  topiramate (TOPAMAX) 25 MG tablet, Take 25 mg by mouth 2 (two) times a day., Disp: , Rfl: 3  ?  topiramate (TOPAMAX) 50 MG tablet, 50 mgs am and 100 mgs pm, Disp: 270 tablet, Rfl: 3  ?  traMADol (ULTRAM) 50 mg tablet, TAKE 2 TABLETS (100 MG TOTAL) BY MOUTH 3 (THREE) TIMES A DAY., Disp: 180 tablet, Rfl: 1    Current Facility-Administered Medications:   ?  cyanocobalamin injection 1,000 mcg, 1,000 mcg, Intramuscular, Q30 Days, Mario Lepe MD, 1,000 mcg at 02/26/19 1231  ?  lidocaine 2 % jelly (XYLOCAINE), , Topical, PRN, Phu Zaman DPM, 1 application at 01/09/19 1349    Review of Systems - per HPI, all others negative      OBJECTIVE:  Vitals:    04/05/19 1032   BP: 104/66   Pulse: 60   Temp: 97.8  F (36.6  C)     General appearance: Patient is alert and fully cooperative with history & exam.  No sign of distress is noted during the visit.   Vascular: Dorsalis pedis non-palpableBilateral.  Dermatologic:    VASC Wound 11/16/18 right medial ankle inferior (Active)   Pre Size Length 0.5 4/5/2019 10:00 AM   Pre Size Width 0.1 4/5/2019 10:00 AM   Pre Size Depth 0.1 4/5/2019 10:00 AM   Pre Total Sq cm 0.5 4/5/2019 10:00 AM       VASC Wound 12/07/18 left medial ankle (Active)   Pre Size Length 0.9 3/22/2019 10:00 AM   Pre Size Width 0.9 3/22/2019 10:00 AM   Pre Size Depth 0.1 3/22/2019 10:00 AM   Pre Total Sq cm 0.81 3/22/2019 10:00 AM   Undermined Callous 4/5/2019 10:00 AM       Wound 11/19/18 Other wound type (comment) Ankle Right;Medial (center/inner) (Active)       Wound 12/07/18 Left;2nd digit (Active)       Wound 12/07/18 Ankle Left;Medial (center/inner) (Active)     Neurologic: Diminished to light touch Bilateral.  Musculoskeletal: Severe pes planovalgus with prominent talar head.    Imaging:   none    No results found.       Picture:

## 2021-06-28 NOTE — PROGRESS NOTES
Progress Notes by Ulises Hernández MD at 12/5/2019  9:50 AM     Author: Ulises Hernández MD Service: -- Author Type: Physician    Filed: 12/5/2019 10:51 AM Encounter Date: 12/5/2019 Status: Signed    : Ulises Hernández MD (Physician)                ELECTROPHYSIOLOGY NOTE    Today I had the opportunity to see  Florentino Fall for follow-up evaluation of recurrent of ventricular tachycardia and endocarditis.      Assessment/Recommendations   Clinic Problem List:  1. Paroxysmal ventricular tachycardia (H)     2. Subacute bacterial endocarditis     3. Dilated cardiomyopathy (H)     4. Chronic atrial fibrillation     5. Third degree AV block (H)       Assessment:    Paroxysmal of ventricular tachycardia primarily at night, frequent episodes rates 120 bpm with effective antitachycardia pacing, asymptomatic, he was reprogrammed today but no adjustment and antiarrhythmic therapy.  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 Hernández in device clinic in 2 months       History of Present Illness     Florentino Fall is a 74 y.o. male with nonischemic cardiomyopathy. He suffered an out of hospital cardiac arrest on February 7, 2013. Coronary angiography showed no significant coronary artery disease. Cardiac echo at about that time showed an ejection fraction of 30% and mid myocardial scar consistent with nonischemic cardiomyopathy. He had left bundle branch block and received a CRT-D on August 30, 2013.   He  had episodes of recurrent of ventricular tachycardia since April 2016. VT CL ranged from 350 ms to 260 ms. He had a syncopal episode on April 23,  2016 with rapid VT VF terminated by shock.  Cardiac echo on May 2016 showed a dilated left ventricle with an ejection fraction of 27% essentially unchanged.He had increased shortness of breath in September,2017 and was felt to be in heart failure.  Furosemide was increased to 60 mg daily and carvedilol to 37.5 mg twice daily.     He was admitted with a VT storm and recurrent shocks 6/30/2018 to [facility].  He had recurrent of ventricular tachycardia cycle length 280 to 300 ms for which antitachycardia pacing was ineffective.   VT had a right bundle configuration in V2 and right axis deviation.  He was loaded with IV and then oral amiodarone and referred to [facility] for VT ablation.  There antitachycardia pacing was adjusted to be somewhat more aggressive and a Holter was ordered.  VT ablation is deferred however given chronic right ankle ulcer which has been slow to heal.   He had  ventricular tachycardia storm and diarrhea 4/16/2019.  Ventricular tachycardia appeared to slow and stabilized with IV amiodarone bolus and addition of lidocaine with mexiletine substituted.  He was discharged from the hospital on April 18 but did not start taking mexiletine until late on the 19th.  ICD interrogation today confirmed ICD shocks.  Patient was unaware of shocks and felt minimal palpitations.  He denies chest pain or dyspnea on exertion. Currently taking amiodarone 200 mg 3 times daily and mexiletine 150 mg 3 times daily   He was [facility] to [facility]/3/2019 and found to have endocarditis and also a recurrent slow ventricular tachycardia.  VT ablation was performed on 10/7/2019 and was largely successful in preventing recurrent of ventricular tachycardia.  ICD leads were extracted on 10/14/2019 and a CRT D was reimplanted on 10/30/2019.  Recurrent VT was noted during reimplantation.  Recent transtelephonic ICD check showed very frequent episodes of monomorphic ventricular  tachycardia rates of approximately 120 bpm just below the programmed rate cutoff requiring 1-4 ATP bursts.  Todd denies fever chills.  He is able to walk approximately 50 feet feet limited primarily by degenerative joint disease in his ankles with ankle splints.  He notes some mild dyspnea on exertion.  He denies tachypalpitations or exertional chest pain.    Personally reviewed.  ICD interrogation today showed over 20 episodes of ventricular tachycardia monomorphic rate 120 bpm the evening of December 2.  He also had 3 brief episodes last night.  Antitachycardia pacing 1-4 burst have been effective in terminating the tachycardia.  He has had no ICD shocks.  There is been no atrial fibrillation since his device was reimplanted.  Underlying rhythm is sinus rhythm with third-degree AV block with 94% atrial and ventricular pacing.  Pacing thresholds are excellent in all 3 leads.  Estimated battery longevity is 8 years.  ICD was adjusted for somewhat more aggressive antitachycardia pacing to a 78% VT cycle length.  VT 1 zone is rates 110 to 140 bpm with no shocks in that zone.  Labs yesterday showed a potassium of 4.0 BUN 45 creatinine 2.81 white blood cell count 4.5 and hemoglobin 11.9.  Blood cultures were drawn yesterday as patient's been off antibiotics for a week.     Physical Examination Review of Systems   Vitals:    12/05/19 1012   BP: 102/72   Pulse: 80   Resp: 16     Body mass index is 29.53 kg/m .  Wt Readings from Last 3 Encounters:   12/05/19 (!) 230 lb (104.3 kg)   12/04/19 (!) 239 lb (108.4 kg)   11/30/19 (!) 237 lb 8 oz (107.7 kg)        Appearance:   no distress,    HEENT:   no scleral icterus, normal conjunctivae    Neck: no carotid bruits or thyromegaly   Chest/Lungs:   lungs are clear to auscultation, no rales or wheezing, ICD well-healed in the right subclavian pocket   Cardiovascular:   Jugular venous pressure less than 5 cm, Apical pulse is regular. Normal S1,S2 with no murmurs or gallops,    Abdomen:  no  Hepatosplenomegaly., nontender,  bowel sounds are present   Extremities: no cyanosis or clubbing, 1+ edema left calf under leg brace   Skin: no xanthelasma, warm.    Neurologic: No gross focal neurologic deficits   Mood/Affect: Alert, cooperative    General: WNL  Eyes: WNL  Ears/Nose/Throat: WNL  Lungs: Shortness of Breath  Heart: Shortness of Breath with activity, Leg Swelling  Stomach: WNL  Bladder: WNL  Muscle/Joints: Joint Pain  Skin: WNL  Nervous System: WNL  Mental Health: WNL     Blood: WNL     Medical History  Surgical History Family History Social History   Past Medical History:   Diagnosis Date   ? Allergic rhinitis    ? Arthritis    ? Cardiomyopathy (H)    ? Cardiomyopathy with implantable cardioverter-defibrillator (H)    ? Charcot ankle     bilateral   ? Charcot's joint of foot     bilateral ankles   ? CHF (congestive heart failure) (H)    ? Chronic kidney disease     stage 3   ? Deviated septum    ? Diabetic ulcer of left midfoot associated with type 2 diabetes mellitus, limited to breakdown of skin (H)    ? Dyslipidemia    ? Eczema    ? Gout    ? H/O cardiac arrest    ? Hernia    ? History of transfusion    ? Hypertension    ? Migraine    ? Osteomyelitis of foot (H)     right   ? Systolic heart failure (H)    ? V-tach (H)     has icd    Past Surgical History:   Procedure Laterality Date   ? CARDIAC DEFIBRILLATOR PLACEMENT     ? EP ABLATION VT  10/7/2019        ? EP ABLATION VT N/A 10/7/2019    Procedure: EP Ablation Ventricular Tachycardia;  Surgeon: Sylvia Davies MD;  Location: [facility];  Service: Cardiology   ? EP ICD BIV INSERT N/A 10/30/2019    Procedure: EP ICD BIV Insert;  Surgeon: Ulises Hernández MD;  Location: [facility];  Service: Cardiology   ? EP INTRACARDIAC ECHO  10/14/2019        ? EP LEAD EXTRACTION LEVEL 3  10/14/2019        ? EP LEAD EXTRACTION LEVEL 3 N/A 10/14/2019    Procedure: EP Lead Extraction Level 3;  Surgeon: Sylvia Davies MD;   Location: [facility];  Service: Cardiology   ? EP TEMP PACEMAKER INSERT N/A 10/14/2019    Procedure: EP Temporary Pacemaker Insertion;  Surgeon: Sylvia Davies MD;  Location: [facility];  Service: Cardiology   ? FOOT AMPUTATION Left 1/2/2019    Procedure: AMPUTATION, 2nd digit left foot;  Surgeon: Phu Zaman DPM;  Location: [facility];  Service: Podiatry   ? HERNIA REPAIR     ? NOSE SURGERY     ? PICC  10/3/2019        ? PICC AND MIDLINE TEAM LINE INSERTION  6/22/2018        ? PICC AND MIDLINE TEAM LINE INSERTION  10/15/2019        ? PICC AND MIDLINE TEAM LINE INSERTION  11/20/2019        ? WI COMPLEX DRAINAGE, WOUND Right 6/20/2018    Procedure: INCISION AND DRAINAGE, LOWER EXTREMITY with BONE BIOPSY/CULTURE ;  Surgeon: Denisse Fletcher DPM;  Location: [facility];  Service: Podiatry   ? WI INSJ/RPLCMT TEMP TRANSVNS 2CHMBR PACG ELTRDS SPX  10/14/2019        ? WI KNEE SCOPE,DIAGNOSTIC      Description: Arthroscopy Knee Left;  Recorded: 06/30/2011;   ? WI REMOVE TONSILS/ADENOIDS,<11 Y/O      Description: Tonsillectomy With Adenoidectomy;  Recorded: 06/30/2011;   ? WI SHLDR ARTHROSCOP,DIAGNOSTIC      Description: Arthroscopy Shoulder Right;  Recorded: 06/30/2011;    Family History   Problem Relation Age of Onset   ? Stroke Mother    ? Emphysema Mother    ? Goiter Mother    ? Cancer Father         Stomach   ? Alzheimer's disease Brother     Social History     Socioeconomic History   ? Marital status:      Spouse name: Not on file   ? Number of children: 3   ? Years of education: Not on file   ? Highest education level: Not on file   Occupational History   ? Not on file   Social Needs   ? Financial resource strain: Not on file   ? Food insecurity:     Worry: Not on file     Inability: Not on file   ? Transportation needs:     Medical: Not on file     Non-medical: Not on file   Tobacco Use   ? Smoking status: Former Smoker     Packs/day: 2.50     Years: 50.00      Pack years: 125.00     Types: Cigarettes, Pipe, Cigars     Last attempt to quit: 2000     Years since quittin.9   ? Smokeless tobacco: Never Used   Substance and Sexual Activity   ? Alcohol use: No     Comment: Last used    ? Drug use: No   ? Sexual activity: Yes     Partners: Female     Birth control/protection: Post-menopausal   Lifestyle   ? Physical activity:     Days per week: Not on file     Minutes per session: Not on file   ? Stress: Not on file   Relationships   ? Social connections:     Talks on phone: Not on file     Gets together: Not on file     Attends Spiritism service: Not on file     Active member of club or organization: Not on file     Attends meetings of clubs or organizations: Not on file     Relationship status: Not on file   ? Intimate partner violence:     Fear of current or ex partner: Not on file     Emotionally abused: Not on file     Physically abused: Not on file     Forced sexual activity: Not on file   Other Topics Concern   ? Not on file   Social History Narrative     since , 3 children. Recovering alcoholic since . Quit smoking in .           Medications  Allergies   Current Outpatient Medications   Medication Sig Dispense Refill   ? albuterol (PROAIR HFA;PROVENTIL HFA;VENTOLIN HFA) 90 mcg/actuation inhaler Inhale 2 puffs every 6 (six) hours as needed for wheezing. 1 Inhaler 11   ? apixaban (ELIQUIS) 5 mg Tab tablet Take 1 tablet (5 mg total) by mouth 2 (two) times a day. 60 tablet 0   ? DULoxetine (CYMBALTA) 60 MG capsule Take 1 capsule (60 mg total) by mouth daily. 30 capsule 0   ? furosemide (LASIX) 20 MG tablet Take 40 mg by mouth daily.      ? metoprolol succinate (TOPROL-XL) 100 MG 24 hr tablet Take 0.5 tablets (50 mg total) by mouth daily. 90 tablet 3   ? mexiletine (MEXITIL) 150 MG capsule Take 1 capsule (150 mg total) by mouth 3 (three) times a day. 270 capsule 3   ? mirtazapine (REMERON) 15 MG tablet Take 1 tablet (15 mg total) by mouth at  bedtime. 30 tablet 11   ? omeprazole (PRILOSEC) 20 MG capsule Take 1 capsule (20 mg total) by mouth daily before breakfast. 30 capsule 11   ? senna-docusate (SENNOSIDES-DOCUSATE SODIUM) 8.6-50 mg tablet Take 1 tablet by mouth daily.     ? sotalol (BETAPACE) 80 MG tablet Take 1 tablet (80 mg total) by mouth daily. 60 tablet 0   ? tamsulosin (FLOMAX) 0.4 mg cap Take 1 capsule (0.4 mg total) by mouth Daily after lunch. 30 capsule 0   ? topiramate (TOPAMAX) 50 MG tablet Take 1 tablet (50 mg total) by mouth 2 (two) times a day. 60 tablet 0   ? traMADol (ULTRAM) 50 mg tablet Take 2 tablets (100 mg total) by mouth 2 (two) times a day. 120 tablet 3     No current facility-administered medications for this visit.       Allergies   Allergen Reactions   ? Definity [Perflutren Lipid Microspheres] Other (See Comments)     Back pain    ? Venom-Honey Bee Swelling and Dizziness

## 2021-06-28 NOTE — PROGRESS NOTES
Progress Notes by Ulises Hernández MD at 1/6/2020  8:50 AM     Author: Ulises Hernández MD Service: -- Author Type: Physician    Filed: 1/6/2020  9:52 AM Encounter Date: 1/6/2020 Status: Signed    : Ulises Hernández MD (Physician)                ELECTROPHYSIOLOGY NOTE    Today I had the opportunity to see  Florentino Fall for follow-up evaluation of recurrent ventricular tachycardia associated with nonischemic cardiomyopathy.      Assessment/Recommendations   Clinic Problem List:  1. Recurrent ventricular tachycardia (H)     2. Paroxysmal ventricular tachycardia (H)  metoprolol succinate (TOPROL-XL) 100 MG 24 hr tablet   3. Dilated cardiomyopathy (H)     4. On amiodarone therapy  ALT       Assessment:    Recurrent slow ventricular tachycardia rate 120bpm, asymptomatic with successful antitachycardia pacing without shocks.  Probably partially suppressed on moderate dose amiodarone.  Incessant VT likely related to exertion.  Dilated cardiomyopathy, no evidence for heart failure    Plan:  ALT was drawn today  Increase metoprolol succinate to 1 whole pill daily (100 mg)  Call if increased shortness of breath weakness or sustained rapid heart beating greater than 120 bpm.  Call if shocks from your defibrillator  Follow up appointment:   Dr. Hernández in 3 months in device clinic         History of Present Illness     Florentino Fall is a 74 y.o. male with with nonischemic cardiomyopathy. He suffered an out of hospital cardiac arrest on February 7, 2013. Coronary angiography showed no significant coronary artery disease. Cardiac echo at about that time showed an ejection fraction of 30% and mid myocardial scar consistent with nonischemic cardiomyopathy. He had left bundle branch block and received a CRT-D on August 30, 2013.   He  had episodes of recurrent of ventricular tachycardia since April 2016. VT CL ranged from 350 ms to 260 ms. He had a syncopal episode on April 23, 2016 with rapid VT VF terminated by shock.   Cardiac echo on May 2016 showed a dilated left ventricle with an ejection fraction of 27%.  He was admitted with a VT storm withj VT  ms and recurrent shocks 6/30/2018 to [facility].   VT had a right bundle configuration in V2 and right axis deviation.  He was loaded with IV and then oral amiodarone and referred to [facility] for VT ablation but this was not performed due to chronic ankle ulcer. Amiodarone was discontinued due to abnormal liver function test and sotalol was started.  He was admitted to [facility] 10/3/2019 and found to have endocarditis and also a recurrent slow ventricular tachycardia.  VT ablation was performed on 10/7/2019 and was largely successful in preventing recurrent of ventricular tachycardia.  ICD leads were extracted on 10/14/2019 and a CRT D was reimplanted on 10/30/2019.  Recurrent VT was noted during reimplantation. Transtelephonic ICD check showed very frequent episodes of monomorphic ventricular tachycardia rates of approximately 120 bpm just below the programmed rate cutoff requiring 1-4 ATP bursts.  He was reloaded with amiodarone on December 11, 2019 after admission with VT and an ICD shock.    Todd is felt reasonably well since discharge from the hospital.  He denies palpitations, chest pain lightheadedness syncope or shocks from his defibrillator.  He also denies fever or chills.  He states his ankle ulcer has healed reasonably well.  He has largely confined to a wheelchair with ankle ulceration.    Personally reviewed.  He had recurrent and nearly incessant of ventricular tachycardia on 12/31/2019 at 1300 hrs. with ICD histograms showing monomorphic ventricular tachycardia rate 120 bpm relatively readily terminated with antitachycardia pacing but occurrence within a few beats.  He stated he was probably taking a shower at that time.  He denied palpitations lightheadedness or chest pain.  Since that time there is been no  further ventricular tachycardia.  VT rate cutoff is set at 110 beats per minute with ICD shocks delivered at rates greater than 155 bpm.  Pacing thresholds are excellent in all 3 chambers.  He is nearly 100% atrial and ventricular paced with estimated battery longevity of 8 years.       Physical Examination Review of Systems   Vitals:    01/06/20 0820   BP: 104/70   Pulse: 74   Resp: 14     There is no height or weight on file to calculate BMI.  Wt Readings from Last 3 Encounters:   12/20/19 (!) 230 lb (104.3 kg)   12/11/19 213 lb 9.6 oz (96.9 kg)   12/05/19 (!) 230 lb (104.3 kg)        Appearance:   no distress,    HEENT:   no scleral icterus, normal conjunctivae    Neck: no carotid bruits or thyromegaly   Chest/Lungs:   lungs are clear to auscultation, no rales or wheezing,    Cardiovascular:   Jugular venous pressure less than 5 cm, Apical pulse is regular. Normal S1,S2 with no murmurs or gallops,   Abdomen:  no  Hepatosplenomegaly., nontender,  bowel sounds are present   Extremities: no cyanosis or clubbing, No edema  Walking boot left foot   Skin: no xanthelasma, warm.    Neurologic: No gross focal neurologic deficits   Mood/Affect: Alert, cooperative    General: WNL  Eyes: WNL  Ears/Nose/Throat: WNL  Lungs: WNL  Heart: WNL  Stomach: WNL  Bladder: WNL  Muscle/Joints: WNL  Skin: WNL  Nervous System: WNL  Mental Health: WNL     Blood: WNL     Medical History  Surgical History Family History Social History   Past Medical History:   Diagnosis Date   ? Allergic rhinitis    ? Arthritis    ? Cardiomyopathy (H)    ? Cardiomyopathy with implantable cardioverter-defibrillator (H)    ? Charcot ankle     bilateral   ? Charcot's joint of foot     bilateral ankles   ? CHF (congestive heart failure) (H)    ? Chronic kidney disease     stage 3   ? Deviated septum    ? Diabetic ulcer of left midfoot associated with type 2 diabetes mellitus, limited to breakdown of skin (H)    ? Dyslipidemia    ? Eczema    ? Gout    ? H/O  cardiac arrest    ? Hernia    ? History of transfusion    ? Hypertension    ? Migraine    ? Osteomyelitis of foot (H)     right   ? Systolic heart failure (H)    ? V-tach (H)     has icd    Past Surgical History:   Procedure Laterality Date   ? CARDIAC DEFIBRILLATOR PLACEMENT     ? EP ABLATION VT  10/7/2019        ? EP ABLATION VT N/A 10/7/2019    Procedure: EP Ablation Ventricular Tachycardia;  Surgeon: Sylvia Davies MD;  Location: [facility];  Service: Cardiology   ? EP ICD BIV INSERT N/A 10/30/2019    Procedure: EP ICD BIV Insert;  Surgeon: Ulises Hernández MD;  Location: [facility];  Service: Cardiology   ? EP INTRACARDIAC ECHO  10/14/2019        ? EP LEAD EXTRACTION LEVEL 3  10/14/2019        ? EP LEAD EXTRACTION LEVEL 3 N/A 10/14/2019    Procedure: EP Lead Extraction Level 3;  Surgeon: Sylvia Davies MD;  Location: [facility];  Service: Cardiology   ? EP TEMP PACEMAKER INSERT N/A 10/14/2019    Procedure: EP Temporary Pacemaker Insertion;  Surgeon: Sylvia Davies MD;  Location: [facility];  Service: Cardiology   ? FOOT AMPUTATION Left 1/2/2019    Procedure: AMPUTATION, 2nd digit left foot;  Surgeon: Phu Zaman DPM;  Location: [facility] OR;  Service: Podiatry   ? HERNIA REPAIR     ? NOSE SURGERY     ? PICC  10/3/2019        ? PICC AND MIDLINE TEAM LINE INSERTION  6/22/2018        ? PICC AND MIDLINE TEAM LINE INSERTION  10/15/2019        ? PICC AND MIDLINE TEAM LINE INSERTION  11/20/2019        ? IA COMPLEX DRAINAGE, WOUND Right 6/20/2018    Procedure: INCISION AND DRAINAGE, LOWER EXTREMITY with BONE BIOPSY/CULTURE ;  Surgeon: Denisse Fletcher DPM;  Location: [facility] OR;  Service: Podiatry   ? IA INSJ/RPLCMT TEMP TRANSVNS 2CHMBR PACG ELTRDS SPX  10/14/2019        ? IA KNEE SCOPE,DIAGNOSTIC      Description: Arthroscopy Knee Left;  Recorded: 06/30/2011;   ? IA REMOVE TONSILS/ADENOIDS,<13 Y/O      Description: Tonsillectomy With  Adenoidectomy;  Recorded: 2011;   ? CO SHLDR ARTHROSCOP,DIAGNOSTIC      Description: Arthroscopy Shoulder Right;  Recorded: 2011;    Family History   Problem Relation Age of Onset   ? Stroke Mother    ? Emphysema Mother    ? Goiter Mother    ? Cancer Father         Stomach   ? Alzheimer's disease Brother     Social History     Socioeconomic History   ? Marital status:      Spouse name: Not on file   ? Number of children: 3   ? Years of education: Not on file   ? Highest education level: Not on file   Occupational History   ? Not on file   Social Needs   ? Financial resource strain: Not on file   ? Food insecurity:     Worry: Not on file     Inability: Not on file   ? Transportation needs:     Medical: Not on file     Non-medical: Not on file   Tobacco Use   ? Smoking status: Former Smoker     Packs/day: 2.50     Years: 50.00     Pack years: 125.00     Types: Cigarettes, Pipe, Cigars     Last attempt to quit: 2000     Years since quittin.0   ? Smokeless tobacco: Never Used   Substance and Sexual Activity   ? Alcohol use: No     Comment: Last used    ? Drug use: No   ? Sexual activity: Yes     Partners: Female     Birth control/protection: Post-menopausal   Lifestyle   ? Physical activity:     Days per week: Not on file     Minutes per session: Not on file   ? Stress: Not on file   Relationships   ? Social connections:     Talks on phone: Not on file     Gets together: Not on file     Attends [employer] service: Not on file     Active member of club or organization: Not on file     Attends meetings of clubs or organizations: Not on file     Relationship status: Not on file   ? Intimate partner violence:     Fear of current or ex partner: Not on file     Emotionally abused: Not on file     Physically abused: Not on file     Forced sexual activity: Not on file   Other Topics Concern   ? Not on file   Social History Narrative     since , 3 children. Recovering alcoholic since  1979. Quit smoking in 1999.           Medications  Allergies   Current Outpatient Medications   Medication Sig Dispense Refill   ? amiodarone (PACERONE) 200 MG tablet Take 1 tablet (200 mg total) by mouth daily. 90 tablet 2   ? aspirin 81 MG EC tablet Take 1 tablet (81 mg total) by mouth daily.  0   ? DULoxetine (CYMBALTA) 60 MG capsule Take 1 capsule (60 mg total) by mouth daily. 30 capsule 0   ? furosemide (LASIX) 20 MG tablet Take 1 tablet (20 mg total) by mouth daily.  0   ? Lactobacillus rhamnosus GG (CULTURELLE) 10-15 Billion cell capsule Take 1 capsule by mouth daily.     ? metoprolol succinate (TOPROL-XL) 100 MG 24 hr tablet Take 1 tablet (100 mg total) by mouth daily. 90 tablet 3   ? omeprazole (PRILOSEC) 20 MG capsule Take 1 capsule (20 mg total) by mouth daily before breakfast. 30 capsule 11   ? potassium chloride (K-DUR,KLOR-CON) 20 MEQ tablet Take 1 tablet (20 mEq total) by mouth daily. 30 tablet 0   ? senna-docusate (SENNOSIDES-DOCUSATE SODIUM) 8.6-50 mg tablet Take 1 tablet by mouth daily. 90 tablet 3   ? spironolactone (ALDACTONE) 25 MG tablet Take 1 tablet (25 mg total) by mouth daily. 90 tablet 3   ? tamsulosin (FLOMAX) 0.4 mg cap Take 1 capsule (0.4 mg total) by mouth Daily after lunch. 30 capsule 0   ? topiramate (TOPAMAX) 100 MG tablet Take 1 tablet (100 mg total) by mouth 2 (two) times a day. 60 tablet 0   ? traMADol (ULTRAM) 50 mg tablet Take 2 tablets (100 mg total) by mouth 2 (two) times a day. 120 tablet 0   ? albuterol (PROAIR HFA;PROVENTIL HFA;VENTOLIN HFA) 90 mcg/actuation inhaler Inhale 2 puffs every 6 (six) hours as needed for wheezing. 1 Inhaler 11     Current Facility-Administered Medications   Medication Dose Route Frequency Provider Last Rate Last Dose   ? cyanocobalamin injection 1,000 mcg  1,000 mcg Intramuscular Q30 Days Mario Lepe MD   1,000 mcg at 12/20/19 1450      Allergies   Allergen Reactions   ? Definity [Perflutren Lipid Microspheres] Other (See Comments)      Back pain    ? Venom-Honey Bee Swelling and Dizziness

## 2021-06-29 NOTE — PROGRESS NOTES
"Progress Notes by Ulises Hernández MD at 4/15/2020 10:50 AM     Author: Ulises Hernández MD Service: -- Author Type: Physician    Filed: 4/15/2020 10:58 AM Encounter Date: 4/15/2020 Status: Signed    : Ulises Hernández MD (Physician)       The patient has been notified of following:     \"This telephone visit will be conducted via a call between you and your physician/provider. We have found that certain health care needs can be provided without the need for a physical exam.  This service lets us provide the care you need with a phone conversation.  If a prescription is necessary we can send it directly to your pharmacy.  If lab work is needed we can place an order for that and you can then stop by our lab to have the test done at a later time. If during the course of the call the physician/provider feels a telephone visit is not appropriate, you will not be charged for this service.\"         HEART CARE PHONE ENCOUNTER        Appointment is being conducted as a telephone visit, to reduce risk of exposure given the current status of Coronovirus in our community. This telephone visit is being conducted via a call between the patient and physician/provider. Health care needs are being provided without a physical exam.     Assessment/Recommendations   Clinic Problem List:  1. Paroxysmal ventricular tachycardia (H)     2. Dilated cardiomyopathy (H)     3. On amiodarone therapy     4. Third degree AV block (H)         Assessment:      Paroxysmal ventricular tachycardia with ready termination with antitachycardia pacing and no shocks.  No significant symptoms.  Tolerating amiodarone with empiric dose decreased to avoid future side effects.    Dilated cardiomyopathy with no symptoms of heart failure by history    Third-degree AV block with normally functioning CRT-D    Plan:  Decrease amiodarone to 1 pill 5 days/week skipping Mondays and Fridays.  Call if fainting or shocks from your implantable defibrillator  Follow " up appointment: Dr. Hernández in device clinic in 6 months    I have reviewed the note as documented.  This accurately captures the substance of my conversation with the patient.    Total time of call between patient and provider was 21 minutes        History of Present Illness/Subjective    Florentino Fall is a 74 y.o. male who is being evaluated via a billable telephone visit.    He has longstanding nonischemic cardiomyopathy. He suffered an out of hospital cardiac arrest on February 7, 2013. Coronary angiography showed no significant coronary artery disease. Cardiac echo at about that time showed an ejection fraction of 30% and mid myocardial scar consistent with nonischemic cardiomyopathy. He had left bundle branch block and received a CRT-D on August 30, 2013.   He  had episodes of recurrent of ventricular tachycardia since April 2016. VT CL ranged from 350 ms to 260 ms. He had a syncopal episode on April 23, 2016 with rapid VT VF terminated by shock.  Cardiac echo on May 2016 showed a dilated left ventricle with an ejection fraction of 27%.  He was admitted with a VT storm withj VT  ms and recurrent shocks 6/30/2018 to [facility].   VT had a right bundle configuration in V2 and right axis deviation.  He was loaded with IV and then oral amiodarone and referred to [facility] for VT ablation but this was not performed due to chronic ankle ulcer. Amiodarone was discontinued due to abnormal liver function test and sotalol was started.  He was admitted to [facility] 10/3/2019 and found to have endocarditis and also a recurrent slow ventricular tachycardia.  VT ablation was performed on 10/7/2019 and was largely successful in preventing recurrent of ventricular tachycardia.  ICD leads were extracted on 10/14/2019 and a CRT D was reimplanted on 10/30/2019.  Recurrent VT was noted during reimplantation. Transtelephonic ICD check showed very frequent episodes of  monomorphic ventricular tachycardia rates of approximately 120 bpm just below the programmed rate cutoff requiring 1-4 ATP bursts.  He was reloaded with amiodarone on December 11, 2019 after admission with VT and an ICD shock.    Todd describes the feeling some sensation of fluttering in the chest in late February without lightheadedness or shocks from his implantable defibrillator.  His metoprolol succinate dose was decreased from 100 mg daily to 50 mg daily.  He has felt well since that time.  He is able to get around his house limited primarily by degenerative joint disease in the ankles.  He denies chest pain or significant shortness of breath.  He is currently taking amiodarone 200 mg daily.  On February 28, 2020 TSH was 3.13 and ALT was 24.  ICD check yesterday showed frequent episodes of slow ventricular tachycardia rate 115 bpm readily terminated with 1 for burst of antitachycardia pacing.  No ICD shocks were delivered.  Patient is by ventricularly pacing 95% of the time with estimated battery longevity of 9-1/2 years.  I have reviewed and updated the patient's Past Medical History, Social History, Family History and Medication List.     Physical Examination not perform given phone encounter Review of Systems      Please open progress note below.     Medical History  Surgical History Family History Social History   Past Medical History:   Diagnosis Date   ? Allergic rhinitis    ? Arthritis    ? Cardiomyopathy (H)    ? Cardiomyopathy with implantable cardioverter-defibrillator (H)    ? Charcot ankle     bilateral   ? Charcot's joint of foot     bilateral ankles   ? CHF (congestive heart failure) (H)    ? Chronic kidney disease     stage 3   ? Deviated septum    ? Diabetic ulcer of left midfoot associated with type 2 diabetes mellitus, limited to breakdown of skin (H)    ? Dyslipidemia    ? Eczema    ? Gout    ? H/O cardiac arrest    ? Hernia    ? History of transfusion    ? Hypertension    ? Migraine    ?  Osteomyelitis of foot (H)     right   ? Systolic heart failure (H)    ? V-tach (H)     has icd    Past Surgical History:   Procedure Laterality Date   ? CARDIAC DEFIBRILLATOR PLACEMENT     ? EP ABLATION VT  10/7/2019        ? EP ABLATION VT N/A 10/7/2019    Procedure: EP Ablation Ventricular Tachycardia;  Surgeon: Sylvia Davies MD;  Location: [facility];  Service: Cardiology   ? EP ICD BIV INSERT N/A 10/30/2019    Procedure: EP ICD BIV Insert;  Surgeon: Ulises Hernández MD;  Location: [facility];  Service: Cardiology   ? EP INTRACARDIAC ECHO  10/14/2019        ? EP LEAD EXTRACTION LEVEL 3  10/14/2019        ? EP LEAD EXTRACTION LEVEL 3 N/A 10/14/2019    Procedure: EP Lead Extraction Level 3;  Surgeon: Sylvia Davies MD;  Location: [facility];  Service: Cardiology   ? EP TEMP PACEMAKER INSERT N/A 10/14/2019    Procedure: EP Temporary Pacemaker Insertion;  Surgeon: Sylvia Davies MD;  Location: [facility];  Service: Cardiology   ? FOOT AMPUTATION Left 1/2/2019    Procedure: AMPUTATION, 2nd digit left foot;  Surgeon: Phu Zaman DPM;  [place] [facility]iat[place]   ? HERNIA REPAIR     ? NOSE SURGERY     ? PICC  10/3/2019        ? PICC AND MIDLINE TEAM LINE INSERTION  6/22/2018        ? PICC AND MIDLINE TEAM LINE INSERTION  10/15/2019        ? PICC AND MIDLINE TEAM LINE INSERTION  11/20/2019        ? VT COMPLEX DRAINAGE, WOUND Right 6/20/2018    Procedure: INCISION AND DRAINAGE, LOWER EXTREMITY with BONE BIOPSY/CULTURE ;  Surgeon: Denisse Fletcher DPM;  [place] [facility]  Service: [place]iatry   ? VT INSJ/RPLCMT TEMP TRANSVNS 2CHMBR PACG ELTRDS SPX  10/14/2019        ? VT KNEE SCOPE,DIAGNOSTIC      Description: Arthroscopy Knee Left;  Recorded: 06/30/2011;   ? VT REMOVE TONSILS/ADENOIDS,<11 Y/O      Description: Tonsillectomy With Adenoidectomy;  Recorded: 06/30/2011;   ? VT SHLDR ARTHROSCOP,DIAGNOSTIC      Description: Arthroscopy  Shoulder Right;  Recorded: 2011;    Family History   Problem Relation Age of Onset   ? Stroke Mother    ? Emphysema Mother    ? Goiter Mother    ? Cancer Father         Stomach   ? Alzheimer's disease Brother     Social History     Socioeconomic History   ? Marital status:      Spouse name: Not on file   ? Number of children: 3   ? Years of education: Not on file   ? Highest education level: Not on file   Occupational History   ? Not on file   Social Needs   ? Financial resource strain: Not on file   ? Food insecurity     Worry: Not on file     Inability: Not on file   ? Transportation needs     Medical: Not on file     Non-medical: Not on file   Tobacco Use   ? Smoking status: Former Smoker     Packs/day: 2.50     Years: 50.00     Pack years: 125.00     Types: Cigarettes, Pipe, Cigars     Last attempt to quit: 2000     Years since quittin.3   ? Smokeless tobacco: Never Used   Substance and Sexual Activity   ? Alcohol use: No     Comment: Last used    ? Drug use: No   ? Sexual activity: Yes     Partners: Female     Birth control/protection: Post-menopausal   Lifestyle   ? Physical activity     Days per week: Not on file     Minutes per session: Not on file   ? Stress: Not on file   Relationships   ? Social connections     Talks on phone: Not on file     Gets together: Not on file     Attends Sabianism service: Not on file     Active member of club or organization: Not on file     Attends meetings of clubs or organizations: Not on file     Relationship status: Not on file   ? Intimate partner violence     Fear of current or ex partner: Not on file     Emotionally abused: Not on file     Physically abused: Not on file     Forced sexual activity: Not on file   Other Topics Concern   ? Not on file   Social History Narrative     since , 3 children. Recovering alcoholic since . Quit smoking in .           Medications  Allergies   Current Outpatient Medications   Medication Sig  Dispense Refill   ? albuterol (PROAIR HFA;PROVENTIL HFA;VENTOLIN HFA) 90 mcg/actuation inhaler Inhale 2 puffs every 6 (six) hours as needed for wheezing. 1 Inhaler 11   ? allopurinoL (ZYLOPRIM) 300 MG tablet Take 1 tablet (300 mg total) by mouth daily. 90 tablet 3   ? amiodarone (PACERONE) 200 MG tablet Take 1 tablet (200 mg total) by mouth daily. 90 tablet 1   ? aspirin 81 MG EC tablet Take 1 tablet (81 mg total) by mouth daily.  0   ? DULoxetine (CYMBALTA) 60 MG capsule Take 1 capsule (60 mg total) by mouth daily. 30 capsule 0   ? furosemide (LASIX) 20 MG tablet Take 1 tablet (20 mg total) by mouth daily.  0   ? Lactobacillus rhamnosus GG (CULTURELLE) 10-15 Billion cell capsule Take 1 capsule by mouth daily.     ? meloxicam (MOBIC) 7.5 MG tablet Take 1 tablet (7.5 mg total) by mouth daily. 30 tablet 11   ? metoprolol succinate (TOPROL-XL) 100 MG 24 hr tablet Take 0.5 tablets (50 mg total) by mouth daily. 90 tablet 3   ? omeprazole (PRILOSEC) 20 MG capsule Take 1 capsule (20 mg total) by mouth daily before breakfast. 30 capsule 11   ? potassium chloride (K-DUR,KLOR-CON) 20 MEQ tablet Take 0.5 tablets (10 mEq total) by mouth daily. 30 tablet 0   ? senna-docusate (SENNOSIDES-DOCUSATE SODIUM) 8.6-50 mg tablet Take 1 tablet by mouth daily. 90 tablet 3   ? spironolactone (ALDACTONE) 25 MG tablet Take 1 tablet (25 mg total) by mouth daily. 90 tablet 3   ? tamsulosin (FLOMAX) 0.4 mg cap Take 1 capsule (0.4 mg total) by mouth Daily after lunch. 90 capsule 3   ? topiramate (TOPAMAX) 100 MG tablet Take 1 tablet (100 mg total) by mouth 2 (two) times a day. 60 tablet 0   ? traMADoL (ULTRAM) 50 mg tablet Take 2 tablets (100 mg total) by mouth 2 (two) times a day. 120 tablet 0     Current Facility-Administered Medications   Medication Dose Route Frequency Provider Last Rate Last Dose   ? cyanocobalamin injection 1,000 mcg  1,000 mcg Intramuscular Q30 Days Mario Lepe MD   1,000 mcg at 02/28/20 1421    Allergies    Allergen Reactions   ? Definity [Perflutren Lipid Microspheres] Other (See Comments)     Back pain    ? Venom-Honey Bee Swelling and Dizziness         Lab Results    Chemistry/lipid CBC Cardiac Enzymes/BNP/TSH/INR   Lab Results   Component Value Date    CHOL 177 11/15/2016    HDL 41 11/15/2016    LDLCALC 103 11/15/2016    TRIG 163 (H) 11/15/2016    CREATININE 2.69 (H) 02/28/2020    BUN 52 (H) 02/28/2020    K 5.3 (H) 02/28/2020     02/28/2020     (H) 02/28/2020    CO2 22 02/28/2020    Lab Results   Component Value Date    WBC 4.5 02/28/2020    HGB 12.4 (L) 02/28/2020    HCT 38.3 (L) 02/28/2020    MCV 97 02/28/2020     02/28/2020    Lab Results   Component Value Date    CKMB 2 08/27/2013    TROPONINI 0.23 12/08/2019     (H) 10/26/2019    TSH 3.13 02/28/2020    INR 1.30 (H) 10/16/2019

## 2021-06-29 NOTE — PROGRESS NOTES
Progress Notes by Kailey Reddy RN at 4/14/2020 12:00 AM     Author: Kailey Reddy RN Service: -- Author Type: Registered Nurse    Filed: 4/21/2020  9:33 AM Encounter Date: 4/14/2020 Status: Signed    : Kailey Reddy RN (Registered Nurse)       Message   Received: 6 days ago   Message Contents   Ulises Hernández MD Marshall, Alyssa J, RN               OK,  dd    Previous Messages     ----- Message -----   From: Kailey Reddy RN   Sent: 4/14/2020   9:17 AM CDT   To: Ulises Hernández MD

## 2021-07-03 NOTE — ADDENDUM NOTE
Addendum Note by Ritika Ramírez MA at 9/26/2017  8:37 AM     Author: Ritika Ramírez MA Service: -- Author Type: Medical Assistant    Filed: 9/26/2017  8:37 AM Encounter Date: 9/25/2017 Status: Signed    : Ritika Ramírez MA (Medical Assistant)    Addended by: RITIKA RAMÍREZ on: 9/26/2017 08:37 AM        Modules accepted: Erinn

## 2021-07-03 NOTE — ANESTHESIA PREPROCEDURE EVALUATION
Anesthesia Preprocedure Evaluation by Ulises Little MD at 1/2/2019  6:47 AM     Author: Ulises Little MD Service: -- Author Type: Physician    Filed: 1/2/2019  6:54 AM Date of Service: 1/2/2019  6:47 AM Status: Signed    : Ulises Little MD (Physician)       Anesthesia Evaluation      Patient summary reviewed   No history of anesthetic complications     Airway   Mallampati: II   Pulmonary - negative ROS and normal exam                          Cardiovascular - normal exam  Exercise tolerance: < 4 METS  (+) pacemaker, hypertension well controlled, dysrhythmias, CHF, cardiomyopathy,     ECG reviewed  Rhythm: regular  Rate: normal,      ROS comment: TTE  1.Left ventricle ejection fraction is severely decreased. The estimated left ventricular ejection fraction is 30%.  2.Normal right ventricular size and systolic function.  3.Severe enlargement left ventricle with mild increased size left atrium.  4.No significant valvular abnormalities noted other than the trivial tricuspid, pulmonic and mitral insufficiencies.  5.No obvious infective vegetation noted.  6.When compared to the previous TTE study dated 7/1/2018, no significant change.    Dr. Hernández aware of his patient scheduled for surgery - okay     Neuro/Psych - negative ROS   (+) depression,     Endo/Other - negative ROS      GI/Hepatic/Renal - negative ROS   (+)   chronic renal disease CRI,      Other findings: Results for FRANDY LANDERS (MRN [id number]) as of 1/2/2019 06:47    12/27/2018 10:56  Sodium: 142  Potassium: 5.2 (H)  Chloride: 112 (H)  CO2: 24  Anion Gap, Calculation: 6  BUN: 49 (H)  Creatinine: 2.06 (H)  GFR MDRD Af Amer: 38 (L)  GFR MDRD Non Af Amer: 32 (L)  Calcium: 9.7  Glucose: 112    1/2/2019 06:30  Potassium: 4.1  Results for FRANDY LANDERS (MRN [id number]) as of 1/2/2019 06:47    12/14/2018 16:03  WBC: 3.8 (L)  RBC: 4.53  Hemoglobin: 14.4  Hematocrit: 43.1  MCV: 95  MCH: 31.8  MCHC: 33.5  RDW: 12.6  Platelets: 141         Dental    (+) poor dentition and chipped                           Anesthesia Plan  Planned anesthetic: MAC  Fent. Avoid Versed to speed discharge  propofol ggt  Decadron/zofran  ASA 4     Anesthetic plan and risks discussed with: patient and child/children    Post-op plan: routine recovery

## 2021-07-03 NOTE — ADDENDUM NOTE
Addendum Note by Pérez Matt, PharmSAIMA at 4/23/2020  1:08 PM     Author: Pérez Matt, Leah Service: -- Author Type: Pharmacist    Filed: 4/23/2020  1:08 PM Encounter Date: 4/23/2020 Status: Signed    : Pérez Matt PharmD (Pharmacist)    Addended by: PÉREZ MATT on: 4/23/2020 01:08 PM        Modules accepted: Orders

## 2021-07-03 NOTE — ANESTHESIA PREPROCEDURE EVALUATION
"Anesthesia Preprocedure Evaluation by Mayco Zuniga MD at 6/19/2018  6:38 PM     Author: Mayco Zuniga MD Service: -- Author Type: Physician    Filed: 6/20/2018 12:59 PM Date of Service: 6/19/2018  6:38 PM Status: Addendum    : Mayco Zuniga MD (Physician)    Related Notes: Original Note by Binu Dalton MD (Physician) filed at 6/20/2018 12:52 PM       Anesthesia Evaluation      Patient summary reviewed   No history of anesthetic complications     Airway   Mallampati: I  Neck ROM: full   Pulmonary - negative ROS and normal exam   (-) not a smoker                         Cardiovascular - normal exam  Exercise tolerance: > or = 4 METS  (+) pacemaker (Biventricular pacer and AICD. Recent internal shocks for VT.), hypertension, dysrhythmias (V-tach), CHF, cardiomyopathy (EF 27%),     ECG reviewed (Electronic pacemaker.)        Neuro/Psych    (+) depression,     Comments: Migraine HAs    Endo/Other    (+) arthritis, obesity,      Comments: Mild anemia - Hgb 11.9    GI/Hepatic/Renal    (+)   chronic renal disease (CKD Cr 1.51),     Comments: Alcohol abuse     Other findings: Echo 4/22/16: Severely dilated left ventricle.  Mild concentric left ventricular hypertrophy.  Global hypokinesis of the left ventricle with severe impairment of systolic function.  Left ventricular ejection fraction is calculated to be 27%.  Pacer wire visualized in right ventricle.  TAPSE is normal, which is consistent with normal right ventricular systolic function.  Moderately enlarged left atrium.    73 yr old male with history of non-ischemic cardiomyopathy (possibly related to EtOH) and HTN with chronic foot deformities and wounds.  Foot wounds with infection and concern for osteomyelitis.  Gout, anemia.  Today\"s labs:  K 4.3, Cr 1.51, GFR 45, Hg 11.9, Plts 141.       Non-ischemic cardiomyopathy  --EF 27% on ECHO 4/22/16  --does not appear to be in acute exacerbation       Sustained Vtach with ICD biventricular  --noted " on ICD interrogation  --recently had adjustments 4/4/18 to device  --sees Dr. Hernández  --interrogation just yesterday with recent episode of VT where shock was given from device       Essential HTN       Diarrhea       Neuropathy    Cleared by cardiology for surgery.    ? Skin ulcer of right foot with fat layer exposed (H)  ? Bee sting allergy  ? Frequent PVCs  ? PSVT (paroxysmal supraventricular tachycardia) (H)  ? Sustained VT (ventricular tachycardia) (H)  ? Neuropathy (H)  ? Alcohol Abuse  ? Herpes Zoster (Shingles)  ? Allergic Rhinitis  ? Gout  ? Essential hypertension with goal blood pressure less than 140/90  ? Ventricular Fibrillation  ? Laryngitis  ? ICD (implantable cardioverter-defibrillator), biventricular, in situ  ? Anemia  ? Major Depression, Single Episode  ? Alcoholic Cardiomyopathy  ? Eczema  ? Osteoarthritis  ? Cardiomyopathy, nonischemic (H)  ? Hyperlipidemia  ? Chronic systolic heart failure (H)     Past Medical History:  Diagnosis Date  ? Arthritis    ? Cardiomyopathy (H)    ? Cardiomyopathy with implantable cardioverter-defibrillator (H)    ? CHF (congestive heart failure) (H)    ? Deviated septum    ? Hernia    ? History of transfusion    ? Hypertension    ? Migraine       Patient Active Problem List    Diagnosis Date Noted  ? Skin ulcer of right foot with fat layer exposed (H) 06/19/2018  ? Right foot infection 06/19/2018  ? Abscess of right foot 06/19/2018  ? Osteomyelitis of foot (H) 06/19/2018  ? Bee sting allergy 05/15/2018  ? Frequent PVCs 10/17/2017  ? PSVT (paroxysmal supraventricular tachycardia) (H) 10/17/2017  ? Sustained VT (ventricular tachycardia) (H) 03/29/2016  ? Neuropathy (H) 11/12/2015  ? Alcohol Abuse    ? Herpes Zoster (Shingles)    ? Allergic Rhinitis    ? Gout    ? Essential hypertension with goal blood pressure less than 140/90    ? Ventricular Fibrillation    ? Laryngitis    ? ICD (implantable cardioverter-defibrillator), biventricular, in situ    ? Anemia    ? Major  Depression, Single Episode    ? Alcoholic Cardiomyopathy    ? Eczema    ? Osteoarthritis    ? Cardiomyopathy, nonischemic (H)    ? Hyperlipidemia    ? Chronic systolic heart failure (H)             Dental                             Anesthesia Plan  Planned anesthetic: MAC      ASA 3     Anesthetic plan and risks discussed with: patient  Anesthesia plan special considerations: antiemetics,   Post-op plan: routine recovery

## 2021-07-03 NOTE — ADDENDUM NOTE
Addendum Note by Jose G Garcia MD at 11/14/2019 10:10 AM     Author: Jose G Garcia MD Service: -- Author Type: Physician    Filed: 11/14/2019 10:23 AM Encounter Date: 11/14/2019 Status: Signed    : Jose G Garcia MD (Physician)    Addended by: JOSE G GARCIA on: 11/14/2019 10:23 AM        Modules accepted: Orders

## 2021-07-14 PROBLEM — I50.9 ACUTE CHF (CONGESTIVE HEART FAILURE) (H): Status: RESOLVED | Noted: 2019-01-01 | Resolved: 2020-01-01

## 2021-08-03 PROBLEM — I73.9 PAD (PERIPHERAL ARTERY DISEASE) (H): Status: RESOLVED | Noted: 2019-02-26 | Resolved: 2020-01-01

## 2021-08-03 PROBLEM — L97.511 SKIN ULCER OF RIGHT FOOT, LIMITED TO BREAKDOWN OF SKIN (H): Status: RESOLVED | Noted: 2018-06-19 | Resolved: 2019-02-26

## 2021-08-03 PROBLEM — M86.9 OSTEOMYELITIS OF RIGHT FOOT (H): Status: RESOLVED | Noted: 2018-06-19 | Resolved: 2019-02-26

## 2021-08-03 PROBLEM — I48.91 A-FIB (H): Status: RESOLVED | Noted: 2020-01-01 | Resolved: 2020-01-01

## 2021-08-03 PROBLEM — M86.9 OSTEOMYELITIS OF FOOT (H): Status: RESOLVED | Noted: 2018-06-19 | Resolved: 2019-02-26

## 2022-06-25 NOTE — PROGRESS NOTES
Patient would like the video invitation sent by: Text to cell phone: [phone]      ASSESSMENT:  1. Dilated cardiomyopathy (H)  Stable.  Minimal edema.  Spironolactone a constant for the last multiple years.  Furosemide dose markedly decreased.  Endocarditis resolved.  Shortness of breath improved on decreased metoprolol dose  - Lipid Cascade; Future    2. Chronic bilateral low back pain without sciatica  Stable on tramadol and topiramate    3. Idiopathic chronic gout of multiple sites without tophus  Stable with occasional mild attacks    4. Paroxysmal ventricular tachycardia (H)  Stable.  Decrease dose of amiodarone per cardiology  - Comprehensive Metabolic Panel; Future  - Thyroid Stimulating Hormone (TSH); Future  - HM2(CBC w/o Differential); Future    5. Medication monitoring encounter  Due for updated labs on amiodarone  - Comprehensive Metabolic Panel; Future  - Thyroid Stimulating Hormone (TSH); Future  - HM2(CBC w/o Differential); Future    6. Other long term (current) drug therapy   - Thyroid Stimulating Hormone (TSH); Future    7. Chronic kidney disease, stage 4 (severe) (H)   Stable.  Consider trial without Spironolactone or without furosemide  - Lipid Cascade; Future        Preventive Health Care: Reviewed.  Due for labs and PSA    PLAN:  Patient Instructions   Continue medications same    Update B12 shot    Update labs    Phone contact 3 months    Cardiology follow-up 6 months      Orders Placed This Encounter   Procedures     Comprehensive Metabolic Panel     Standing Status:   Future     Standing Expiration Date:   4/28/2021     Thyroid Stimulating Hormone (TSH)     Standing Status:   Future     Standing Expiration Date:   4/28/2021     HM2(CBC w/o Differential)     Standing Status:   Future     Standing Expiration Date:   4/28/2021     Lipid Cascade     Standing Status:   Future     Standing Expiration Date:   4/28/2021     Order Specific Question:   Fasting is required?     Answer:   Unknown  "    Return in about 3 months (around 2020) for a phone/video follow up visit.    CHIEF COMPLAINT:  Chief Complaint   Patient presents with     Follow-up       HISTORY OF PRESENT ILLNESS:  Florentino is a 74 y.o. male calling in to the clinic today for routine follow-up.  He was last seen on .  At that time he was complaining of shortness of breath.  Dose of metoprolol has been increased by cardiology.  I decreased to 50 mg.  Shortness of breath resolved within 48 hours    Peripheral edema has been stable and is better than it has been in years.  Renal function is borderline.  He has been on Spironolactone since     He has had no further cardiac shocks from his defibrillator.  He saw cardiology who decreased amiodarone further.    REVIEW OF SYSTEMS:   Chronic back pain.  Stable and like a program for breathing.  All other systems are negative.    PFSH:  His wife is in memory care.  He is still driving    TOBACCO USE:  Social History     Tobacco Use   Smoking Status Former Smoker     Packs/day: 2.50     Years: 50.00     Pack years: 125.00     Types: Cigarettes, Pipe, Cigars     Last attempt to quit: 2000     Years since quittin.3   Smokeless Tobacco Never Used       VITALS:  Stated Blood Pressure    Stated Pulse    Stated Weight     PHYSICAL EXAM:  (observations via Video)  Alert pleasant bearded man moving around his kitchen in a wheelchair      ADDITIONAL HISTORY SUMMARIZED (2): Cardiology note and amiodarone adjustments reviewed  CARE EVERYWHERE/ EXTRA INFORMATION (1):   RADIOLOGY TESTS (1):   LABS (1): Ordered  CARDIOLOGY/MEDICINE TESTS (1):   INDEPENDENT REVIEW (2 each):     Total data points: 3    Video Start time: 12:33 PM  Video End time: 12:51 PM    The visit lasted a total of 18 minutes face to face    CA intake time  5  minutes      The patient has been notified of following:     \"This video visit will be conducted via a call between you and your physician/provider. We have found " "that certain health care needs can be provided without the need for an in-person physical exam.  This service lets us provide the care you need with a video conversation.  If a prescription is necessary we can send it directly to your pharmacy.  If lab work is needed we can place an order for that and you can then stop by our lab to have the test done at a later time.    Video visits are billed at different rates depending on your insurance coverage. Please reach out to your insurance provider with any questions.    If during the course of the call the physician/provider feels a video visit is not appropriate, you will not be charged for this service.\"    Patient has given verbal consent to a Video visit? Yes    Patient would like to receive their AVS by AVS Preference: Johnathan.        Video-Visit Details    Type of service:  Video Visit    Originating Location (pt. Location): Home    Distant Location (provider location):  [facility]     Mode of Communication:  [facility] Conference [facility]    Mario Lepe MD     " Positive blood culture

## 2025-01-14 NOTE — TELEPHONE ENCOUNTER
2 weeks ago woke with sore back.  Still hurting.  Gotten worse as time has gone by.    Pain now in lower back. Denies chest pain.  Has implanted device.    Hard to get dressed or walk.    Has been constipated most recently.  Urination is fine.    Patient wants to be seen today.    Transferred to scheduling for an appointment.    Scheduled for next available with pcp.  Declines being seen by another provider elsewhere.  On wait list.    Patient asking to be seen sooner by pcp??    Armida Escobedo, RN, [facility] Nurse Triage/Med Refills RN         Reason for Disposition    SEVERE back pain    Protocols used: BACK PAIN-A-[place]       Results finalized. Will review results with Dr. Dotson.